# Patient Record
Sex: FEMALE | Race: BLACK OR AFRICAN AMERICAN | NOT HISPANIC OR LATINO | Employment: OTHER | URBAN - METROPOLITAN AREA
[De-identification: names, ages, dates, MRNs, and addresses within clinical notes are randomized per-mention and may not be internally consistent; named-entity substitution may affect disease eponyms.]

---

## 2017-01-27 ENCOUNTER — HISTORICAL (OUTPATIENT)
Dept: RADIOLOGY | Facility: HOSPITAL | Age: 82
End: 2017-01-27

## 2017-04-17 ENCOUNTER — HISTORICAL (OUTPATIENT)
Dept: LAB | Facility: HOSPITAL | Age: 82
End: 2017-04-17

## 2017-05-23 ENCOUNTER — HISTORICAL (OUTPATIENT)
Dept: SURGERY | Facility: HOSPITAL | Age: 82
End: 2017-05-23

## 2018-01-29 ENCOUNTER — HISTORICAL (OUTPATIENT)
Dept: RADIOLOGY | Facility: HOSPITAL | Age: 83
End: 2018-01-29

## 2018-06-06 ENCOUNTER — HISTORICAL (OUTPATIENT)
Dept: LAB | Facility: HOSPITAL | Age: 83
End: 2018-06-06

## 2018-06-06 LAB
BUN SERPL-MCNC: 19.3 MG/DL (ref 7–18)
CALCIUM SERPL-MCNC: 9.6 MG/DL (ref 8.5–10.1)
CHLORIDE SERPL-SCNC: 104 MMOL/L (ref 98–107)
CO2 SERPL-SCNC: 33.3 MMOL/L (ref 21–32)
CREAT SERPL-MCNC: 1.03 MG/DL (ref 0.6–1.3)
CREAT/UREA NIT SERPL: 19
GLUCOSE SERPL-MCNC: 95 MG/DL (ref 74–106)
POTASSIUM SERPL-SCNC: 4.7 MMOL/L (ref 3.5–5.1)
SODIUM SERPL-SCNC: 140 MMOL/L (ref 136–145)

## 2018-06-27 ENCOUNTER — HISTORICAL (OUTPATIENT)
Dept: LAB | Facility: HOSPITAL | Age: 83
End: 2018-06-27

## 2018-06-27 LAB
BUN SERPL-MCNC: 28.7 MG/DL (ref 7–18)
CALCIUM SERPL-MCNC: 9.8 MG/DL (ref 8.5–10.1)
CHLORIDE SERPL-SCNC: 102 MMOL/L (ref 98–107)
CO2 SERPL-SCNC: 31.2 MMOL/L (ref 21–32)
CREAT SERPL-MCNC: 1.17 MG/DL (ref 0.6–1.3)
CREAT/UREA NIT SERPL: 25
GLUCOSE SERPL-MCNC: 119 MG/DL (ref 74–106)
POTASSIUM SERPL-SCNC: 4 MMOL/L (ref 3.5–5.1)
SODIUM SERPL-SCNC: 139 MMOL/L (ref 136–145)

## 2018-10-15 ENCOUNTER — HISTORICAL (OUTPATIENT)
Dept: LAB | Facility: HOSPITAL | Age: 83
End: 2018-10-15

## 2018-10-15 LAB
ABS NEUT (OLG): 2.04 X10(3)/MCL (ref 2.1–9.2)
ALBUMIN SERPL-MCNC: 3.4 GM/DL (ref 3.4–5)
ALBUMIN/GLOB SERPL: 0.9 RATIO (ref 1.1–2)
ALP SERPL-CCNC: 90 UNIT/L (ref 46–116)
ALT SERPL-CCNC: 23 UNIT/L (ref 12–78)
AST SERPL-CCNC: 17 UNIT/L (ref 15–37)
BASOPHILS # BLD AUTO: 0 X10(3)/MCL (ref 0–0.2)
BASOPHILS NFR BLD AUTO: 0 %
BILIRUB SERPL-MCNC: 0.4 MG/DL (ref 0.2–1)
BILIRUBIN DIRECT+TOT PNL SERPL-MCNC: 0.17 MG/DL (ref 0–0.2)
BILIRUBIN DIRECT+TOT PNL SERPL-MCNC: 0.23 MG/DL (ref 0–0.8)
BUN SERPL-MCNC: 24.9 MG/DL (ref 7–18)
CALCIUM SERPL-MCNC: 9.5 MG/DL (ref 8.5–10.1)
CHLORIDE SERPL-SCNC: 107 MMOL/L (ref 98–107)
CHOLEST SERPL-MCNC: 209 MG/DL (ref 0–200)
CHOLEST/HDLC SERPL: 2.6 {RATIO} (ref 0–4)
CO2 SERPL-SCNC: 31.9 MMOL/L (ref 21–32)
CREAT SERPL-MCNC: 1.08 MG/DL (ref 0.6–1.3)
EOSINOPHIL # BLD AUTO: 0.1 X10(3)/MCL (ref 0–0.9)
EOSINOPHIL NFR BLD AUTO: 3 %
ERYTHROCYTE [DISTWIDTH] IN BLOOD BY AUTOMATED COUNT: 15.2 % (ref 11.5–17)
EST. AVERAGE GLUCOSE BLD GHB EST-MCNC: 143 MG/DL
GLOBULIN SER-MCNC: 3.9 GM/DL (ref 2.4–3.5)
GLUCOSE SERPL-MCNC: 114 MG/DL (ref 74–106)
HBA1C MFR BLD: 6.6 % (ref 4.5–6.2)
HCT VFR BLD AUTO: 41.8 % (ref 37–47)
HDLC SERPL-MCNC: 80 MG/DL (ref 40–60)
HGB BLD-MCNC: 12.7 GM/DL (ref 12–16)
IMM GRANULOCYTES # BLD AUTO: 0.01 % (ref 0–0.02)
IMM GRANULOCYTES NFR BLD AUTO: 0.3 % (ref 0–0.43)
LDLC SERPL CALC-MCNC: 120 MG/DL (ref 0–129)
LYMPHOCYTES # BLD AUTO: 1.1 X10(3)/MCL (ref 0.6–4.6)
LYMPHOCYTES NFR BLD AUTO: 29 %
MCH RBC QN AUTO: 23 PG (ref 27–31)
MCHC RBC AUTO-ENTMCNC: 30.4 GM/DL (ref 33–36)
MCV RBC AUTO: 75.9 FL (ref 80–94)
MONOCYTES # BLD AUTO: 0.4 X10(3)/MCL (ref 0.1–1.3)
MONOCYTES NFR BLD AUTO: 11 %
NEUTROPHILS # BLD AUTO: 2.04 X10(3)/MCL (ref 1.4–7.9)
NEUTROPHILS NFR BLD AUTO: 56 %
PLATELET # BLD AUTO: 172 X10(3)/MCL (ref 130–400)
PMV BLD AUTO: 11.1 FL (ref 9.4–12.4)
POTASSIUM SERPL-SCNC: 4.2 MMOL/L (ref 3.5–5.1)
PROT SERPL-MCNC: 7.3 GM/DL (ref 6.4–8.2)
RBC # BLD AUTO: 5.51 X10(6)/MCL (ref 4.2–5.4)
SODIUM SERPL-SCNC: 146 MMOL/L (ref 136–145)
TRIGL SERPL-MCNC: 47 MG/DL
TSH SERPL-ACNC: 1.48 MIU/ML (ref 0.36–3.74)
VLDLC SERPL CALC-MCNC: 9 MG/DL
WBC # SPEC AUTO: 3.7 X10(3)/MCL (ref 4.5–11.5)

## 2019-02-04 ENCOUNTER — HISTORICAL (OUTPATIENT)
Dept: RADIOLOGY | Facility: HOSPITAL | Age: 84
End: 2019-02-04

## 2019-07-22 ENCOUNTER — HISTORICAL (OUTPATIENT)
Dept: LAB | Facility: HOSPITAL | Age: 84
End: 2019-07-22

## 2019-07-22 LAB
ALBUMIN SERPL-MCNC: 3.3 GM/DL (ref 3.4–5)
ALBUMIN/GLOB SERPL: 0.9 RATIO (ref 1.1–2)
ALP SERPL-CCNC: 97 UNIT/L (ref 46–116)
ALT SERPL-CCNC: 24 UNIT/L (ref 12–78)
AST SERPL-CCNC: 20 UNIT/L (ref 15–37)
BILIRUB SERPL-MCNC: 0.4 MG/DL (ref 0.2–1)
BILIRUBIN DIRECT+TOT PNL SERPL-MCNC: 0.1 MG/DL (ref 0–0.2)
BILIRUBIN DIRECT+TOT PNL SERPL-MCNC: 0.3 MG/DL (ref 0–0.8)
BUN SERPL-MCNC: 20.6 MG/DL (ref 7–18)
CALCIUM SERPL-MCNC: 9.6 MG/DL (ref 8.5–10.1)
CHLORIDE SERPL-SCNC: 106 MMOL/L (ref 98–107)
CHOLEST SERPL-MCNC: 212 MG/DL (ref 0–200)
CHOLEST/HDLC SERPL: 2.5 {RATIO} (ref 0–4)
CO2 SERPL-SCNC: 33.2 MMOL/L (ref 21–32)
CREAT SERPL-MCNC: 0.99 MG/DL (ref 0.6–1.3)
EST. AVERAGE GLUCOSE BLD GHB EST-MCNC: 148 MG/DL
GLOBULIN SER-MCNC: 3.7 GM/DL (ref 2.4–3.5)
GLUCOSE SERPL-MCNC: 113 MG/DL (ref 74–106)
HBA1C MFR BLD: 6.8 % (ref 4.5–6.2)
HDLC SERPL-MCNC: 85 MG/DL (ref 40–60)
LDLC SERPL CALC-MCNC: 114 MG/DL (ref 0–129)
POTASSIUM SERPL-SCNC: 4.4 MMOL/L (ref 3.5–5.1)
PROT SERPL-MCNC: 7 GM/DL (ref 6.4–8.2)
SODIUM SERPL-SCNC: 144 MMOL/L (ref 136–145)
T4 FREE SERPL-MCNC: 1 NG/DL (ref 0.76–1.46)
TRIGL SERPL-MCNC: 65 MG/DL
TSH SERPL-ACNC: 1.64 MIU/ML (ref 0.36–3.74)
VLDLC SERPL CALC-MCNC: 13 MG/DL

## 2019-08-26 ENCOUNTER — HISTORICAL (OUTPATIENT)
Dept: RADIOLOGY | Facility: HOSPITAL | Age: 84
End: 2019-08-26

## 2020-02-06 ENCOUNTER — HISTORICAL (OUTPATIENT)
Dept: RADIOLOGY | Facility: HOSPITAL | Age: 85
End: 2020-02-06

## 2020-05-27 ENCOUNTER — HISTORICAL (OUTPATIENT)
Dept: LAB | Facility: HOSPITAL | Age: 85
End: 2020-05-27

## 2020-05-27 LAB
ALBUMIN SERPL-MCNC: 3.7 GM/DL (ref 3.4–5)
ALBUMIN/GLOB SERPL: 0.9 RATIO (ref 1.1–2)
ALP SERPL-CCNC: 76 UNIT/L (ref 46–116)
ALT SERPL-CCNC: 22 UNIT/L (ref 12–78)
AST SERPL-CCNC: 25 UNIT/L (ref 15–37)
BILIRUB SERPL-MCNC: 0.5 MG/DL (ref 0.2–1)
BILIRUBIN DIRECT+TOT PNL SERPL-MCNC: 0.14 MG/DL (ref 0–0.2)
BILIRUBIN DIRECT+TOT PNL SERPL-MCNC: 0.36 MG/DL (ref 0–0.8)
BUN SERPL-MCNC: 32.9 MG/DL (ref 7–18)
CALCIUM SERPL-MCNC: 10.5 MG/DL (ref 8.5–10.1)
CHLORIDE SERPL-SCNC: 106 MMOL/L (ref 98–107)
CHOLEST SERPL-MCNC: 230 MG/DL (ref 0–200)
CHOLEST/HDLC SERPL: 2.2 {RATIO} (ref 0–4)
CO2 SERPL-SCNC: 30.1 MMOL/L (ref 21–32)
CREAT SERPL-MCNC: 1.04 MG/DL (ref 0.6–1.3)
EST. AVERAGE GLUCOSE BLD GHB EST-MCNC: 126 MG/DL
GLOBULIN SER-MCNC: 4.3 GM/DL (ref 2.4–3.5)
GLUCOSE SERPL-MCNC: 119 MG/DL (ref 74–106)
HBA1C MFR BLD: 6 % (ref 4.5–6.2)
HDLC SERPL-MCNC: 103 MG/DL (ref 40–60)
LDLC SERPL CALC-MCNC: 115 MG/DL (ref 0–129)
POTASSIUM SERPL-SCNC: 5.1 MMOL/L (ref 3.5–5.1)
PROT SERPL-MCNC: 8 GM/DL (ref 6.4–8.2)
SODIUM SERPL-SCNC: 143 MMOL/L (ref 136–145)
TRIGL SERPL-MCNC: 59 MG/DL
VLDLC SERPL CALC-MCNC: 12 MG/DL

## 2021-01-16 ENCOUNTER — HISTORICAL (OUTPATIENT)
Dept: ADMINISTRATIVE | Facility: HOSPITAL | Age: 86
End: 2021-01-16

## 2021-01-16 LAB — SARS-COV-2 RNA RESP QL NAA+PROBE: NOT DETECTED

## 2021-04-05 ENCOUNTER — HISTORICAL (OUTPATIENT)
Dept: RADIOLOGY | Facility: HOSPITAL | Age: 86
End: 2021-04-05

## 2021-08-28 ENCOUNTER — HOSPITAL ENCOUNTER (OUTPATIENT)
Dept: MEDSURG UNIT | Facility: HOSPITAL | Age: 86
End: 2021-08-29
Attending: HOSPITALIST | Admitting: HOSPITALIST

## 2021-08-28 LAB
APPEARANCE, UA: CLEAR
APTT PPP: 22.3 SECOND(S) (ref 24.5–34.9)
BACTERIA SPEC CULT: ABNORMAL
BILIRUB UR QL STRIP: NEGATIVE
COLOR UR: YELLOW
GLUCOSE (UA): NEGATIVE
HGB UR QL STRIP: ABNORMAL
INR PPP: 0.99 (ref 2–3)
KETONES UR QL STRIP: NEGATIVE
LEUKOCYTE ESTERASE UR QL STRIP: NEGATIVE
NITRITE UR QL STRIP: NEGATIVE
PH UR STRIP: 6.5 [PH] (ref 5–9)
PROT UR QL STRIP: NEGATIVE
PROTHROMBIN TIME: 10.4 SECOND(S) (ref 9.3–11.4)
RBC #/AREA URNS HPF: ABNORMAL /HPF
SP GR UR STRIP: 1.02 (ref 1–1.03)
SQUAMOUS EPITHELIAL, UA: ABNORMAL
UROBILINOGEN UR STRIP-ACNC: 0.2
WBC #/AREA URNS HPF: ABNORMAL /[HPF]

## 2021-08-29 LAB
ABS NEUT (OLG): 2.57 X10(3)/MCL (ref 2.1–9.2)
ALBUMIN SERPL-MCNC: 3.2 GM/DL (ref 3.4–4.8)
ALBUMIN/GLOB SERPL: 1.1 RATIO (ref 1.1–2)
ALP SERPL-CCNC: 68 UNIT/L (ref 40–150)
ALT SERPL-CCNC: 16 UNIT/L (ref 0–55)
AST SERPL-CCNC: 20 UNIT/L (ref 5–34)
BASOPHILS # BLD AUTO: 0 X10(3)/MCL (ref 0–0.2)
BASOPHILS NFR BLD AUTO: 0 %
BILIRUB SERPL-MCNC: 0.4 MG/DL
BILIRUBIN DIRECT+TOT PNL SERPL-MCNC: 0.2 MG/DL (ref 0–0.5)
BILIRUBIN DIRECT+TOT PNL SERPL-MCNC: 0.2 MG/DL (ref 0–0.8)
BUN SERPL-MCNC: 24.4 MG/DL (ref 9.8–20.1)
CALCIUM SERPL-MCNC: 9 MG/DL (ref 8.4–10.2)
CHLORIDE SERPL-SCNC: 103 MMOL/L (ref 98–107)
CO2 SERPL-SCNC: 28 MMOL/L (ref 23–31)
CREAT SERPL-MCNC: 0.96 MG/DL (ref 0.55–1.02)
EOSINOPHIL # BLD AUTO: 0.1 X10(3)/MCL (ref 0–0.9)
EOSINOPHIL NFR BLD AUTO: 2 %
ERYTHROCYTE [DISTWIDTH] IN BLOOD BY AUTOMATED COUNT: 15.2 % (ref 11.5–17)
GLOBULIN SER-MCNC: 2.9 GM/DL (ref 2.4–3.5)
GLUCOSE SERPL-MCNC: 98 MG/DL (ref 82–115)
HCT VFR BLD AUTO: 37 % (ref 37–47)
HGB BLD-MCNC: 11.5 GM/DL (ref 12–16)
IMM GRANULOCYTES # BLD AUTO: 0.01 % (ref 0–0.02)
IMM GRANULOCYTES NFR BLD AUTO: 0.2 % (ref 0–0.43)
LYMPHOCYTES # BLD AUTO: 1.5 X10(3)/MCL (ref 0.6–4.6)
LYMPHOCYTES NFR BLD AUTO: 31 %
MCH RBC QN AUTO: 23.8 PG (ref 27–31)
MCHC RBC AUTO-ENTMCNC: 31.1 GM/DL (ref 33–36)
MCV RBC AUTO: 76.4 FL (ref 80–94)
MONOCYTES # BLD AUTO: 0.6 X10(3)/MCL (ref 0.1–1.3)
MONOCYTES NFR BLD AUTO: 13 %
NEUTROPHILS # BLD AUTO: 2.57 X10(3)/MCL (ref 1.4–7.9)
NEUTROPHILS NFR BLD AUTO: 54 %
PLATELET # BLD AUTO: 165 X10(3)/MCL (ref 130–400)
PMV BLD AUTO: 11.4 FL (ref 9.4–12.4)
POTASSIUM SERPL-SCNC: 4.4 MMOL/L (ref 3.5–5.1)
PROT SERPL-MCNC: 6.1 GM/DL (ref 5.8–7.6)
RBC # BLD AUTO: 4.84 X10(6)/MCL (ref 4.2–5.4)
SODIUM SERPL-SCNC: 140 MMOL/L (ref 136–145)
WBC # SPEC AUTO: 4.8 X10(3)/MCL (ref 4.5–11.5)

## 2021-09-02 ENCOUNTER — HISTORICAL (OUTPATIENT)
Dept: ADMINISTRATIVE | Facility: HOSPITAL | Age: 86
End: 2021-09-02

## 2021-09-02 LAB
ABS NEUT (OLG): 2.6 X10(3)/MCL (ref 2.1–9.2)
ALBUMIN SERPL-MCNC: 3.8 GM/DL (ref 3.4–4.8)
ALBUMIN/GLOB SERPL: 1.3 RATIO (ref 1.1–2)
ALP SERPL-CCNC: 83 UNIT/L (ref 40–150)
ALT SERPL-CCNC: 18 UNIT/L (ref 0–55)
AST SERPL-CCNC: 22 UNIT/L (ref 5–34)
BASOPHILS # BLD AUTO: 0 X10(3)/MCL (ref 0–0.2)
BASOPHILS NFR BLD AUTO: 0 %
BILIRUB SERPL-MCNC: 0.7 MG/DL
BILIRUBIN DIRECT+TOT PNL SERPL-MCNC: 0.3 MG/DL (ref 0–0.5)
BILIRUBIN DIRECT+TOT PNL SERPL-MCNC: 0.4 MG/DL (ref 0–0.8)
BUN SERPL-MCNC: 34.2 MG/DL (ref 9.8–20.1)
CALCIUM SERPL-MCNC: 9.3 MG/DL (ref 8.4–10.2)
CHLORIDE SERPL-SCNC: 104 MMOL/L (ref 98–107)
CHOLEST SERPL-MCNC: 172 MG/DL
CHOLEST/HDLC SERPL: 2 {RATIO} (ref 0–5)
CO2 SERPL-SCNC: 26 MMOL/L (ref 23–31)
CREAT SERPL-MCNC: 1.1 MG/DL (ref 0.55–1.02)
EOSINOPHIL # BLD AUTO: 0 X10(3)/MCL (ref 0–0.9)
EOSINOPHIL NFR BLD AUTO: 1 %
ERYTHROCYTE [DISTWIDTH] IN BLOOD BY AUTOMATED COUNT: 15.1 % (ref 11.5–17)
EST. AVERAGE GLUCOSE BLD GHB EST-MCNC: 119.8 MG/DL
GLOBULIN SER-MCNC: 2.9 GM/DL (ref 2.4–3.5)
GLUCOSE SERPL-MCNC: 98 MG/DL (ref 82–115)
HBA1C MFR BLD: 5.8 %
HCT VFR BLD AUTO: 37.3 % (ref 37–47)
HDLC SERPL-MCNC: 82 MG/DL (ref 35–60)
HGB BLD-MCNC: 11.8 GM/DL (ref 12–16)
LDLC SERPL CALC-MCNC: 79 MG/DL (ref 50–140)
LYMPHOCYTES # BLD AUTO: 1.1 X10(3)/MCL (ref 0.6–4.6)
LYMPHOCYTES NFR BLD AUTO: 27 %
MCH RBC QN AUTO: 23.9 PG (ref 27–31)
MCHC RBC AUTO-ENTMCNC: 31.6 GM/DL (ref 33–36)
MCV RBC AUTO: 75.7 FL (ref 80–94)
MONOCYTES # BLD AUTO: 0.4 X10(3)/MCL (ref 0.1–1.3)
MONOCYTES NFR BLD AUTO: 10 %
NEUTROPHILS # BLD AUTO: 2.6 X10(3)/MCL (ref 1.4–7.9)
NEUTROPHILS NFR BLD AUTO: 61 %
PLATELET # BLD AUTO: 187 X10(3)/MCL (ref 130–400)
PMV BLD AUTO: 11.3 FL (ref 9.4–12.4)
POTASSIUM SERPL-SCNC: 4.6 MMOL/L (ref 3.5–5.1)
PROT SERPL-MCNC: 6.7 GM/DL (ref 5.8–7.6)
RBC # BLD AUTO: 4.93 X10(6)/MCL (ref 4.2–5.4)
SODIUM SERPL-SCNC: 140 MMOL/L (ref 136–145)
TRIGL SERPL-MCNC: 53 MG/DL (ref 37–140)
TSH SERPL-ACNC: 1.31 UIU/ML (ref 0.35–4.94)
VLDLC SERPL CALC-MCNC: 11 MG/DL
WBC # SPEC AUTO: 4.2 X10(3)/MCL (ref 4.5–11.5)

## 2021-09-09 ENCOUNTER — HISTORICAL (OUTPATIENT)
Dept: ADMINISTRATIVE | Facility: HOSPITAL | Age: 86
End: 2021-09-09

## 2021-09-09 LAB
CREAT UR-MCNC: 36 MG/DL (ref 45–106)
DEPRECATED CALCIDIOL+CALCIFEROL SERPL-MC: 39.2 NG/ML (ref 30–80)
MICROALBUMIN UR-MCNC: <5 UG/ML
MICROALBUMIN/CREAT RATIO PNL UR: <13.9 MG/GM CR (ref 0–30)

## 2021-09-24 ENCOUNTER — HISTORICAL (OUTPATIENT)
Dept: RADIOLOGY | Facility: HOSPITAL | Age: 86
End: 2021-09-24

## 2022-04-20 ENCOUNTER — HISTORICAL (OUTPATIENT)
Dept: ADMINISTRATIVE | Facility: HOSPITAL | Age: 87
End: 2022-04-20

## 2022-04-20 ENCOUNTER — HISTORICAL (OUTPATIENT)
Dept: RADIOLOGY | Facility: HOSPITAL | Age: 87
End: 2022-04-20

## 2022-04-30 NOTE — OP NOTE
DATE OF SURGERY:    05/23/2017    SURGEON:  Gerry Salinas MD    PREOPERATIVE DIAGNOSES:    1. Right gingival buccal ridge lesion.  2. Midline 1-cm submental lymph node.    POSTOPERATIVE DIAGNOSES:    1. Right gingival buccal ridge lesion.  2. Midline 1-cm submental lymph node.    PROCEDURES:    1. Excisional biopsy of submental lymph node.  2. Excisional biopsy of oral cavity lesion, approximately 5 mm.    ANESTHESIA:  General endotracheal anesthesia.    ESTIMATED BLOOD LOSS:  Less than 5 mL.    SPECIMENS:  Oral cavity lesion and submental lymph node.    INDICATIONS:  This is an 81-year-old female, who presented to the office for evaluation of submental node.  A fine needle biopsy was performed, which came back suspicious for monomorphic population of cells, and could not rule out lymphoma. In light of this, decision was made to proceed with an excisional biopsy.  In addition, she reported an irritating lesion to the right gingival buccal ridge.  I agreed to perform an excisional biopsy of this.  All risks and benefits were explained to the patient in detail.  Informed consent was obtained prior to proceeding.    PROCEDURE IN DETAIL:  The patient was brought to the operating room and placed on the operating table in supine position. General endotracheal anesthesia was administered.  I marked out my preplanned incision of the submental lymph node, as well as the oral cavity lesion, and each were injected with approximately 1 mL of 1% lidocaine with epinephrine.  The patient, before prepping and draping, grasped the very small 5-mm oral cavity lesion with a _______ and Metzenbaum scissors and excised it and sent this for permanent pathology.  No closure was noted.       The patient was prepped and draped in the usual sterile fashion.  A 15 blade was used to make an incision over the submental lymph node.  After approximately 1 to 2 cm in length, using Bovie to dissect down, I quickly was able to identify the  lymph node.  It was grasped with a hemostat and dissected out and bipolar to achieve good hemostasis as we dissected it. It was removed and sent for permanent pathology to rule out lymphoma. The wound was irrigated copiously with saline irrigation.  Bipolar was used for complete hemostasis.  The deep dermal layer was closed using 3-0 Vicryl in interrupted fashion.  The skin was closed using 5-0 nylon in simple interrupted fashion.  Pressure dressing was placed over the wound.  The patient was awoken and brought to the recovery room without complications.        ______________________________  Gerry Salinas MD JD/FRANCISCO  DD:  05/23/2017  Time:  01:19PM  DT:  05/23/2017  Time:  05:22PM  Job #:  [id number]

## 2022-05-04 NOTE — HISTORICAL [facility] CERNER
This is a historical note converted from [employer]. Formatting and pictures may have been removed.  Please reference [employer]ash for original formatting and attached multimedia. Chief Complaint  Ground-level fall  Reason for Consultation  Posttraumatic subarachnoid hemorrhage  History of Present Illness  Patient is an 85-year-old female?who has history of HTN, DM 2,?is on aspirin 81 mg daily?presented to the hospital with?facial trauma after she had a ground-level fall at home where patient tripped and fell?while trying to get her plants outside of the house, she tripped into the concrete and fell forward,?she denies any LOC,?patient with? face discomfort and bleeding from the nose on arrival which is controlled.? Work-up in the ER?showed a small right parietal SAH, ER staff may attempt to transfer patient but unable to, case was discussed by Dr. Miller , ER physician?with FERNANDO Lombardo with [facility]?and recommended admit patient for observation and repeat CT in the morning,?no beds are available at [facility]?and patient is neurologically intact  ?   No beds available. ?I reviewed the CT imaging and patient does have very tiny?right high parietal posttraumatic?subarachnoid blood.? Nothing significant.? She does not?require any neurosurgical intervention.? I talked to the admitting?ER doctor?and recommended just observation and follow-up CT scan?in the morning. ?I indicated I would be willing to review the scan again?and then most likely can discharge. ?Patient was only on aspirin.  Review of Systems  Other than?history of present illness all other systems were reviewed and are negative  Physical Exam  Vitals & Measurements  T:?36.4? ?C (Oral)? HR:?69(Peripheral)? RR:?18? BP:?153/91? SpO2:?99%? SpO2:?100%? WT:?66.1?kg? BMI:?25?  Exam per ER physician  Assessment/Plan  The patient is status post ground-level fall?resulting very tiny right high parietal posttraumatic?hemorrhage.? Does not require any neurosurgical  intervention. ?Patient is neurologically intact.? Recommended observation?for 24 hours with admission?and follow-up CT scan in the morning.? Please call when the scan is done and I will be glad to?review it.   Problem List/Past Medical History  Ongoing  Arthritis of hand  Chest pain  Constipation  Glaucoma  Hypertension  Incontinence  Obesity  Sinus congestion  Wears glasses  Historical  No qualifying data  Procedure/Surgical History  Biopsy Lymph Node (None) (05/23/2017)  Biopsy or excision of lymph node(s); open, superficial (05/23/2017)  Excision Lesion Oral (Right) (05/23/2017)  Excision of Face Subcutaneous Tissue and Fascia, Percutaneous Approach (05/23/2017)  Excision of lesion of mucosa and submucosa, vestibule of mouth; with simple repair (05/23/2017)  Resection of Right Neck Lymphatic, Open Approach (05/23/2017)  BLADDER SUSPENSION  D&C  HYSTERECTOMY   Medications  Inpatient  atorvastatin 10 mg oral tablet, 10 mg= 1 tab(s), Oral, Daily  hydrALAZINE 20 mg/mL injectable solution, 20 mg= 1 mL, IV, q6hr, PRN  hydrochlorothiazide 25 mg oral tablet, 25 mg= 1 tab(s), Oral, Daily  metFORMIN, 500 mg= 1 tab(s), Oral, BID  metoprolol succinate 50 mg oral tablet, extended release, 50 mg= 1 tab(s), Oral, Daily  MVI with Minerals (Adult Tab), 1 tab(s), Oral, Daily  timolol maleate 0.5% ophthalmic solution, 1 drop(s), Eye-Both, Daily  Tylenol, 650 mg= 2 tab(s), Oral, q4hr, PRN  Voltaren 1% topical gel, 1 fior, TOP, QID, PRN  Zestril 10 mg oral tablet, 10 mg= 1 tab(s), Oral, Daily  Home  aspirin 325 mg oral tablet, 325 mg= 1 tab(s), Oral, Daily  atorvastatin 10 mg oral tablet, 10 mg= 1 tab(s), Oral, Daily  Centrum Silver Ultra Womens oral tablet, 1 tab(s), Oral, Daily  diclofenac sodium 50 mg oral delayed release tablet, 50 mg= 1 tab(s), Oral, TID  LISINOP/HCTZ TAB 20-25MG, 1 tab(s), Oral, Daily  metformin 500 mg oral tablet, 500 mg= 1 tab(s), Oral, BID  metoprolol succinate 50 mg oral tablet, extended release, 50 mg= 1  tab(s), Oral, Daily  TIMOLOL MALEATE .5 % SOLN  Allergies  Atarax?(BUMPS ALL OVER BODY)  Social History  Abuse/Neglect  No, 08/28/2021  Alcohol - Denies Alcohol Use, 09/17/2015  Never, 05/21/2017  Employment/School  Home/Environment  Lives with Alone. Living situation: Home/Independent. Family/Friends available for support: Yes., 05/21/2017  Nutrition/Health  Regular, 05/21/2017  Sexual  Sexually active: No., 05/21/2017  Substance Use  Never, 05/21/2017  Tobacco - Denies Tobacco Use, 09/17/2015  Never (less than 100 in lifetime), N/A, 08/28/2021  Never (less than 100 in lifetime), N/A, 02/17/2020  Never (less than 100 in lifetime), N/A, 02/17/2020  Never smoker, 05/21/2017  Family History  PVD - Peripheral vascular disease: Sister.  Primary malignant neoplasm of breast: Sister.  Primary malignant neoplasm of prostate: Brother and Brother.  Stomach cancer......: Brother.  Lab Results  Labs Last 24 Hours?  ?Hematology/Coagulation?   PT: 10.4 second(s) (08/28/21 15:00:00)   INR:?0.99?Low (08/28/21 15:00:00)   PTT:?22.3 second(s)?Low (08/28/21 15:00:00)   ?  ?  Diagnostic Results  Accession:?[id number]  Order:?XR Chest 1 View  Report Dt/Tm:?08/28/2021 16:01  Report:?  EXAMINATION  XR Chest 1 View  ?  INDICATION  Dyspnea  ?  Comparison: 4/17/2017  ?  FINDINGS  The heart is normal in size. The lungs are clear without focal  consolidation. There is no pleural effusion or visible pneumothorax.  ?  IMPRESSION:  No acute abnormality of the chest.  ?Accession:?[id number]  Order:?XR Chest 1 View  Report Dt/Tm:?08/28/2021 16:01  Report:?  EXAMINATION  XR Chest 1 View  ?  INDICATION  Dyspnea  ?  Comparison: 4/17/2017  ?  FINDINGS  The heart is normal in size. The lungs are clear without focal  consolidation. There is no pleural effusion or visible pneumothorax.  ?  IMPRESSION:  No acute abnormality of the chest.  ?  ?

## 2022-05-04 NOTE — HISTORICAL [facility] CERNER
This is a historical note converted from [employer]. Formatting and pictures may have been removed.  Please reference [employer] for original formatting and attached multimedia. Admit and Discharge Dates  Admit Date: 08/28/2021  Discharge Date: 08/29/2021  Physicians  Attending Physician - Ravindra MILES, Leonie  Admitting Physician - Ravindra MILES, Leonie  Consulting Physician - Kate MILES, Raymundo Cazares  Primary Care Physician - Eben NP, Gabriella Valenzuela  Discharge Diagnosis  Fall?W19.XXXA  HTN (hypertension)?I10  SAH (subarachnoid hemorrhage)?I60.9  ?Traumatic small SAH right parietal  Nondisplaced bilateral nasal bones  HTN  DM 2  Patient was taking aspirin 81 mg daily [1]  Surgical Procedures  No procedures recorded for this visit.  Immunizations  No immunizations recorded for this visit.  Admission Information  Patient is an 85-year-old female?who has history of HTN, DM 2,?is on aspirin 81 mg daily?presented to the hospital with?facial trauma after she had a ground-level fall at home where patient tripped and fell?while trying to get her plants outside of the house, she tripped into the concrete and fell forward,?she denies any LOC,?patient with? face discomfort and bleeding from the nose on arrival which is controlled.? Work-up in the ER?showed a small right parietal SAH, ER staff may attempt to transfer patient but unable to, case was discussed by Dr. Miller , ER physician?with Dr. ROBERTA Love, NSG with [facility]?and recommended admit patient for observation and repeat CT in the morning,?no beds are available at [facility]?and patient is neurologically intact. ?Context:?Fall at home, no syncopal episode. ?Modifying factors: None. ?Accompanying signs and symptoms:?Other than face discomfort none [2]  Hospital Course  Patient had an uneventful night,?repeat CT there was no change, she remains neurologically intact,?will add Keppra x 4 weeks to further be adjusted by Dr. Love ?on follow-up outpatient,  neurosurgery.  Significant Findings  (08/28/2021 08:24 CDT CT Head W/O Contrast)  IMPRESSION  ?  ??1. Small focus of right high parietal subarachnoid hemorrhage.  ??2. No evidence of additional acute intracranial process.  ??3. Chronic secondary details discussed above [1] (08/28/2021 08:24 CDT CT Cervical Spine W/O Contrast)  FINDINGS:?  The cervical spine is visualized through the level of T1.  ?  There is no acute fracture identified. There is reversal of normal  cervical lordosis. Moderate disc height loss is present at C4-C7 with  marginal osteophyte formation. There is bilateral facet arthropathy.  There is no paraspinal hematoma. An 8mm left thyroid nodule does not  require further evaluation.  ?  ?  IMPRESSION:?  No acute fracture identified. [2] (08/28/2021 08:24 CDT CT Maxillofacial W/O Contrast)  INDINGS:?  ?  There are nondisplaced bilateral nasal bone fractures. The nasal  septum is deviated to the left. There is mild scattered paranasal  sinus mucosal thickening with fluid layering in the right sphenoid  sinus. The mastoid air cells and middle ear cavities are clear.  ?  There is soft tissue swelling over the nose. The orbits are  unremarkable.  ?  ?  IMPRESSION:?  Nondisplaced bilateral nasal bone fractures. [3]  ?  ?  Repeat CT head 8/29/21.  ?  (08/29/2021 05:07 CDT CT Head W/O Contrast)  IMPRESSION:  Stable small right parietal subarachnoid hemorrhage.  ? [4]  Time Spent on discharge  25 min  Objective  Vitals & Measurements  T:?36.6? ?C (Oral)? TMIN:?36.4? ?C (Oral)? TMAX:?36.9? ?C (Oral)? HR:?68(Peripheral)? RR:?18? BP:?103/67? SpO2:?99%? WT:?66.1?kg? BMI:?25?  Physical Exam  General: ?Alert, awake ?and oriented, No acute distress. ?  Eye: ?Pupils are equal, round and reactive to light, Normal conjunctiva. ?  HENT: ?Normocephalic, Normal hearing. ?  Neck: ?Supple, Non-tender, No carotid bruit. ?  Respiratory: ?Lungs are clear to auscultation, Respirations are non-labored. ?  Cardiovascular:  ?Regular rhythm, No gallop, Good pulses equal in all extremities, No edema. ?  Gastrointestinal: ?Soft, Non-tender, Non-distended. ? ?  Genitourinary: ?normal external genitalia.  Musculoskeletal: ?Normal range of motion, Normal strength, No tenderness. ?  Integumentary: ?Warm, Dry. ?  Neurologic: ?Alert, Oriented, Normal motor function, No focal deficits.Normal DTR and sensation.CN 2-12 wnl. ?  Cognition and Speech: ?Oriented, Speech clear and coherent. ?  Psychiatric: ?Cooperative, Appropriate mood & affect. [5]  Patient Discharge Condition  Good  Discharge Disposition  Home with    Discharge Medication Reconciliation  Prescribed  levETIRAcetam (Keppra 500 mg oral tablet)?500 mg, Oral, BID  Continue  atorvastatin (atorvastatin 10 mg oral tablet)?10 mg, Oral, Daily  hydrochlorothiazide-lisinopril (LISINOP/HCTZ TAB 20-25MG)?1 tab(s), Oral, Daily  metFORMIN (metformin 500 mg oral tablet)?500 mg, Oral, BID  metoprolol (metoprolol succinate 50 mg oral tablet, extended release)?50 mg, Oral, Daily  multivitamin with minerals (Centrum Silver Ultra Womens oral tablet)?1 tab(s), Oral, Daily  timolol ophthalmic (TIMOLOL MALEATE .5 % SOLN)  Discontinue  aspirin (aspirin 325 mg oral tablet)?325 mg, Oral, Daily  diclofenac (diclofenac sodium 50 mg oral delayed release tablet)?50 mg, Oral, TID  Education and Orders Provided  [facility] - Next Dose Due ([facility])  Coronary Artery Disease, Female  Fall Prevention and Home Safety, Easy-to-Read ([facility])  Fall Prevention in Hospitals, Adult  Subarachnoid Hemorrhage  Discharge - 08/29/21 9:20:00 CDT, Home, with Home healthGive all scheduled vaccinations prior to discharge.?  Discharge Activity - Activity as Tolerated?  Discharge Diet - Low Sodium?  Follow up  Raymundo Love, within 1 week  [facility] after discharge Phone # [phone]  Gabriella Treadwell, within 1 week  Car Seat Challenge  No Qualifying Data     [1]?Admission H & P; Ravindra MILES, Leonie  08/28/2021 14:00 CDT  [2]?Admission H & P; Ravindra MILES, Leonie 08/28/2021 14:00 CDT  [3]?Admission H & P; Ravindra MILES, Leonie 08/28/2021 14:00 CDT  [4]?CT Head W/O Contrast; Kya Leon MD 08/29/2021 05:07 CDT  [5]?Admission H & P; Ravindra MILES, Leonie 08/28/2021 14:00 CDT

## 2022-05-04 NOTE — HISTORICAL [facility] CERNER
This is a historical note converted from [employer]. Formatting and pictures may have been removed.  Please reference Arash for original formatting and attached multimedia. Chief Complaint  None at this time  History of Present Illness  Patient is an 85-year-old female?who has history of HTN, DM 2,?is on aspirin 81 mg daily?presented to the hospital with?facial trauma after she had a ground-level fall at home where patient tripped and fell?while trying to get her plants outside of the house, she tripped into the concrete and fell forward,?she denies any LOC,?patient with? face discomfort and bleeding from the nose on arrival which is controlled.? Work-up in the ER?showed a small right parietal SAH, ER staff may attempt to transfer patient but unable to, case was discussed by Dr. Miller , ER physician?with FERNANDO Lombardo with [facility]?and recommended admit patient for observation and repeat CT in the morning,?no beds are available at [facility]?and patient is neurologically intact. ?Context:?Fall at home, no syncopal episode. ?Modifying factors: None. ?Accompanying signs and symptoms:?Other than face discomfort none  Review of Systems  Other systems reviewed and found to be negative except above  Physical Exam  General: ?Alert, awake ?and oriented, No acute distress. ?  Eye: ?Pupils are equal, round and reactive to light, Normal conjunctiva. ?  HENT: ?Normocephalic, Normal hearing. ?  Neck: ?Supple, Non-tender, No carotid bruit. ?  Respiratory: ?Lungs are clear to auscultation, Respirations are non-labored. ?  Cardiovascular: ?Regular rhythm, No gallop, Good pulses equal in all extremities, No edema. ?  Gastrointestinal: ?Soft, Non-tender, Non-distended. ? ?  Genitourinary: ?normal external genitalia.  Musculoskeletal: ?Normal range of motion, Normal strength, No tenderness. ?  Integumentary: ?Warm, Dry. ?  Neurologic: ?Alert, Oriented, Normal motor function, No focal deficits.Normal DTR and sensation.CN 2-12  wnl. ?  Cognition and Speech: ?Oriented, Speech clear and coherent. ?  Psychiatric: ?Cooperative, Appropriate mood & affect.  ?  Assessment/Plan  ?  Impression:  Traumatic small SAH right parietal  Nondisplaced bilateral nasal bones  HTN  DM 2  Patient was taking aspirin 81 mg daily  ?  Plan:  ?  Hold aspirin  ISS per protocol  Keep BP less than 140/80  Neurochecks every 4 hours  Chest x-ray, EKG, CBC, CMP, PT/INR/PTT ordered.  Repeat CT in the morning if stable?will discharge home  Obtain PT/OT?in a.m.   Problem List/Past Medical History  Ongoing  Arthritis of hand  Chest pain  Constipation  Glaucoma  Hypertension  Incontinence  Obesity  Sinus congestion  Wears glasses  Historical  No qualifying data  Procedure/Surgical History  Biopsy Lymph Node (None) (05/23/2017)  Biopsy or excision of lymph node(s); open, superficial (05/23/2017)  Excision Lesion Oral (Right) (05/23/2017)  Excision of Face Subcutaneous Tissue and Fascia, Percutaneous Approach (05/23/2017)  Excision of lesion of mucosa and submucosa, vestibule of mouth; with simple repair (05/23/2017)  Resection of Right Neck Lymphatic, Open Approach (05/23/2017)  BLADDER SUSPENSION  D&C  HYSTERECTOMY   Medications  Inpatient  No active inpatient medications  Home  aspirin 325 mg oral tablet, 325 mg= 1 tab(s), Oral, Daily  atorvastatin 10 mg oral tablet, 10 mg= 1 tab(s), Oral, Daily  Centrum Silver Ultra Womens oral tablet, 1 tab(s), Oral, Daily  diclofenac sodium 50 mg oral delayed release tablet, 50 mg= 1 tab(s), Oral, TID  LISINOP/HCTZ TAB 20-25MG, 1 tab(s), Oral, Daily  metformin 500 mg oral tablet, 500 mg= 1 tab(s), Oral, BID  metoprolol succinate 50 mg oral tablet, extended release, 50 mg= 1 tab(s), Oral, Daily  TIMOLOL MALEATE .5 % SOLN  Allergies  Atarax?(BUMPS ALL OVER BODY)  Social History  Abuse/Neglect  No, 02/17/2020  Alcohol - Denies Alcohol Use, 09/17/2015  Never, 05/21/2017  Employment/School  Home/Environment  Lives with Alone. Living situation:  Home/Independent. Family/Friends available for support: Yes., 05/21/2017  Nutrition/Health  Regular, 05/21/2017  Sexual  Sexually active: No., 05/21/2017  Substance Use  Never, 05/21/2017  Tobacco - Denies Tobacco Use, 09/17/2015  Never (less than 100 in lifetime), N/A, 02/17/2020  Never (less than 100 in lifetime), N/A, 02/17/2020  Never smoker, 05/21/2017  Family History  PVD - Peripheral vascular disease: Sister.  Primary malignant neoplasm of breast: Sister.  Primary malignant neoplasm of prostate: Brother and Brother.  Stomach cancer......: Brother.  Diagnostic Results  (08/28/2021 08:24 CDT CT Head W/O Contrast)  IMPRESSION  ?  ??1. Small focus of right high parietal subarachnoid hemorrhage.  ??2. No evidence of additional acute intracranial process.  ??3. Chronic secondary details discussed above [1] (08/28/2021 08:24 CDT CT Cervical Spine W/O Contrast)  FINDINGS:?  The cervical spine is visualized through the level of T1.  ?  There is no acute fracture identified. There is reversal of normal  cervical lordosis. Moderate disc height loss is present at C4-C7 with  marginal osteophyte formation. There is bilateral facet arthropathy.  There is no paraspinal hematoma. An 8mm left thyroid nodule does not  require further evaluation.  ?  ?  IMPRESSION:?  No acute fracture identified. [2] (08/28/2021 08:24 CDT CT Maxillofacial W/O Contrast)  INDINGS:?  ?  There are nondisplaced bilateral nasal bone fractures. The nasal  septum is deviated to the left. There is mild scattered paranasal  sinus mucosal thickening with fluid layering in the right sphenoid  sinus. The mastoid air cells and middle ear cavities are clear.  ?  There is soft tissue swelling over the nose. The orbits are  unremarkable.  ?  ?  IMPRESSION:?  Nondisplaced bilateral nasal bone fractures. [3]     [1]?CT Head W/O Contrast; Price Henning MD 08/28/2021 08:24 CDT  [2]?CT Cervical Spine W/O Contrast; Kya Leon MD 08/28/2021 08:24  CDT  [3]?CT Maxillofacial W/O Contrast; Kya Leon MD 08/28/2021 08:24 CDT   Patient admitted to observation.

## 2023-03-18 ENCOUNTER — LAB VISIT (OUTPATIENT)
Dept: LAB | Facility: HOSPITAL | Age: 88
End: 2023-03-18
Attending: INTERNAL MEDICINE
Payer: MEDICARE

## 2023-03-18 DIAGNOSIS — I10 HYPERTENSION, UNSPECIFIED TYPE: Primary | ICD-10-CM

## 2023-03-18 LAB
ALBUMIN SERPL-MCNC: 3.7 G/DL (ref 3.4–4.8)
ALP SERPL-CCNC: 107 UNIT/L (ref 40–150)
ALT SERPL-CCNC: 20 UNIT/L (ref 0–55)
AST SERPL-CCNC: 23 UNIT/L (ref 5–34)
BILIRUBIN DIRECT+TOT PNL SERPL-MCNC: 0.2 MG/DL (ref 0–?)
BILIRUBIN DIRECT+TOT PNL SERPL-MCNC: 0.3 MG/DL (ref 0–0.8)
BILIRUBIN DIRECT+TOT PNL SERPL-MCNC: 0.5 MG/DL
CHOLEST SERPL-MCNC: 171 MG/DL
CHOLEST/HDLC SERPL: 2 {RATIO} (ref 0–5)
HDLC SERPL-MCNC: 82 MG/DL (ref 35–60)
LDLC SERPL CALC-MCNC: 79 MG/DL (ref 50–140)
PROT SERPL-MCNC: 7.5 GM/DL (ref 5.8–7.6)
TRIGL SERPL-MCNC: 48 MG/DL (ref 37–140)
VLDLC SERPL CALC-MCNC: 10 MG/DL

## 2023-03-18 PROCEDURE — 80061 LIPID PANEL: CPT

## 2023-03-18 PROCEDURE — 80076 HEPATIC FUNCTION PANEL: CPT

## 2023-03-18 PROCEDURE — 36415 COLL VENOUS BLD VENIPUNCTURE: CPT

## 2023-03-23 DIAGNOSIS — Z12.31 BREAST CANCER SCREENING BY MAMMOGRAM: Primary | ICD-10-CM

## 2023-04-24 ENCOUNTER — HOSPITAL ENCOUNTER (OUTPATIENT)
Dept: RADIOLOGY | Facility: HOSPITAL | Age: 88
Discharge: HOME | End: 2023-04-24
Attending: NURSE PRACTITIONER
Payer: MEDICARE

## 2023-04-24 DIAGNOSIS — Z12.31 BREAST CANCER SCREENING BY MAMMOGRAM: ICD-10-CM

## 2023-04-24 PROCEDURE — 77067 SCR MAMMO BI INCL CAD: CPT | Mod: TC

## 2023-04-24 PROCEDURE — 77067 MAMMO DIGITAL SCREENING BILAT WITH TOMO: ICD-10-PCS | Mod: 26,,, | Performed by: RADIOLOGY

## 2023-04-24 PROCEDURE — 77063 BREAST TOMOSYNTHESIS BI: CPT | Mod: 26,,, | Performed by: RADIOLOGY

## 2023-04-24 PROCEDURE — 77063 MAMMO DIGITAL SCREENING BILAT WITH TOMO: ICD-10-PCS | Mod: 26,,, | Performed by: RADIOLOGY

## 2023-04-24 PROCEDURE — 77067 SCR MAMMO BI INCL CAD: CPT | Mod: 26,,, | Performed by: RADIOLOGY

## 2023-07-22 ENCOUNTER — HOSPITAL ENCOUNTER (EMERGENCY)
Facility: HOSPITAL | Age: 88
Discharge: HOME | End: 2023-07-22
Attending: FAMILY MEDICINE
Payer: MEDICARE

## 2023-07-22 VITALS
HEIGHT: 65 IN | RESPIRATION RATE: 15 BRPM | OXYGEN SATURATION: 99 % | WEIGHT: 150 LBS | DIASTOLIC BLOOD PRESSURE: 81 MMHG | SYSTOLIC BLOOD PRESSURE: 114 MMHG | TEMPERATURE: 98 F | BODY MASS INDEX: 24.99 KG/M2 | HEART RATE: 69 BPM

## 2023-07-22 DIAGNOSIS — E86.0 DEHYDRATION: Primary | ICD-10-CM

## 2023-07-22 DIAGNOSIS — R07.9 CHEST PAIN: ICD-10-CM

## 2023-07-22 LAB
ALBUMIN SERPL-MCNC: 3.8 G/DL (ref 3.4–4.8)
ALBUMIN/GLOB SERPL: 1 RATIO (ref 1.1–2)
ALP SERPL-CCNC: 74 UNIT/L (ref 40–150)
ALT SERPL-CCNC: 20 UNIT/L (ref 0–55)
APPEARANCE UR: CLEAR
AST SERPL-CCNC: 25 UNIT/L (ref 5–34)
BACTERIA #/AREA URNS AUTO: NORMAL /HPF
BASOPHILS # BLD AUTO: 0.02 X10(3)/MCL
BASOPHILS NFR BLD AUTO: 0.5 %
BILIRUB UR QL STRIP.AUTO: NEGATIVE
BILIRUBIN DIRECT+TOT PNL SERPL-MCNC: 0.5 MG/DL
BUN SERPL-MCNC: 29.8 MG/DL (ref 9.8–20.1)
CALCIUM SERPL-MCNC: 10.3 MG/DL (ref 8.4–10.2)
CHLORIDE SERPL-SCNC: 106 MMOL/L (ref 98–107)
CO2 SERPL-SCNC: 27 MMOL/L (ref 23–31)
COLOR UR: YELLOW
CREAT SERPL-MCNC: 1.08 MG/DL (ref 0.55–1.02)
EOSINOPHIL # BLD AUTO: 0.04 X10(3)/MCL (ref 0–0.9)
EOSINOPHIL NFR BLD AUTO: 1 %
ERYTHROCYTE [DISTWIDTH] IN BLOOD BY AUTOMATED COUNT: 15.5 % (ref 11.5–17)
GFR SERPLBLD CREATININE-BSD FMLA CKD-EPI: 50 MLS/MIN/1.73/M2
GLOBULIN SER-MCNC: 3.9 GM/DL (ref 2.4–3.5)
GLUCOSE SERPL-MCNC: 108 MG/DL (ref 82–115)
GLUCOSE UR QL STRIP.AUTO: NEGATIVE
HCT VFR BLD AUTO: 38.4 % (ref 37–47)
HGB BLD-MCNC: 11.6 G/DL (ref 12–16)
IMM GRANULOCYTES # BLD AUTO: 0 X10(3)/MCL (ref 0–0.04)
IMM GRANULOCYTES NFR BLD AUTO: 0 %
KETONES UR QL STRIP.AUTO: NEGATIVE
LEUKOCYTE ESTERASE UR QL STRIP.AUTO: NEGATIVE
LYMPHOCYTES # BLD AUTO: 1.08 X10(3)/MCL (ref 0.6–4.6)
LYMPHOCYTES NFR BLD AUTO: 26.9 %
MCH RBC QN AUTO: 22.9 PG (ref 27–31)
MCHC RBC AUTO-ENTMCNC: 30.2 G/DL (ref 33–36)
MCV RBC AUTO: 75.7 FL (ref 80–94)
MONOCYTES # BLD AUTO: 0.44 X10(3)/MCL (ref 0.1–1.3)
MONOCYTES NFR BLD AUTO: 11 %
NEUTROPHILS # BLD AUTO: 2.43 X10(3)/MCL (ref 2.1–9.2)
NEUTROPHILS NFR BLD AUTO: 60.6 %
NITRITE UR QL STRIP.AUTO: NEGATIVE
PH UR STRIP.AUTO: 5.5 [PH]
PLATELET # BLD AUTO: 167 X10(3)/MCL (ref 130–400)
PMV BLD AUTO: 11.7 FL (ref 7.4–10.4)
POC CARDIAC TROPONIN I: 0.01 NG/ML (ref 0–0.08)
POCT GLUCOSE: 121 MG/DL (ref 70–110)
POTASSIUM SERPL-SCNC: 4.1 MMOL/L (ref 3.5–5.1)
PROT SERPL-MCNC: 7.7 GM/DL (ref 5.8–7.6)
PROT UR QL STRIP.AUTO: NEGATIVE
RBC # BLD AUTO: 5.07 X10(6)/MCL (ref 4.2–5.4)
RBC #/AREA URNS AUTO: NORMAL /HPF
RBC UR QL AUTO: ABNORMAL
SAMPLE: NORMAL
SODIUM SERPL-SCNC: 144 MMOL/L (ref 136–145)
SP GR UR STRIP.AUTO: 1.02
SQUAMOUS #/AREA URNS AUTO: NORMAL /HPF
UROBILINOGEN UR STRIP-ACNC: 0.2
WBC # SPEC AUTO: 4.01 X10(3)/MCL (ref 4.5–11.5)
WBC #/AREA URNS AUTO: NORMAL /HPF

## 2023-07-22 PROCEDURE — 93005 ELECTROCARDIOGRAM TRACING: CPT

## 2023-07-22 PROCEDURE — 85025 COMPLETE CBC W/AUTO DIFF WBC: CPT | Performed by: FAMILY MEDICINE

## 2023-07-22 PROCEDURE — 96361 HYDRATE IV INFUSION ADD-ON: CPT

## 2023-07-22 PROCEDURE — 96360 HYDRATION IV INFUSION INIT: CPT

## 2023-07-22 PROCEDURE — 81001 URINALYSIS AUTO W/SCOPE: CPT | Performed by: FAMILY MEDICINE

## 2023-07-22 PROCEDURE — 82962 GLUCOSE BLOOD TEST: CPT

## 2023-07-22 PROCEDURE — 93010 ELECTROCARDIOGRAM REPORT: CPT | Mod: ,,, | Performed by: STUDENT IN AN ORGANIZED HEALTH CARE EDUCATION/TRAINING PROGRAM

## 2023-07-22 PROCEDURE — 99285 EMERGENCY DEPT VISIT HI MDM: CPT | Mod: 25

## 2023-07-22 PROCEDURE — 84484 ASSAY OF TROPONIN QUANT: CPT

## 2023-07-22 PROCEDURE — 25000003 PHARM REV CODE 250: Performed by: FAMILY MEDICINE

## 2023-07-22 PROCEDURE — 80053 COMPREHEN METABOLIC PANEL: CPT | Performed by: FAMILY MEDICINE

## 2023-07-22 PROCEDURE — 93010 EKG 12-LEAD: ICD-10-PCS | Mod: ,,, | Performed by: STUDENT IN AN ORGANIZED HEALTH CARE EDUCATION/TRAINING PROGRAM

## 2023-07-22 RX ORDER — MECLIZINE HYDROCHLORIDE 25 MG/1
25 TABLET ORAL
Status: COMPLETED | OUTPATIENT
Start: 2023-07-22 | End: 2023-07-22

## 2023-07-22 RX ORDER — MECLIZINE HYDROCHLORIDE 25 MG/1
25 TABLET ORAL 3 TIMES DAILY PRN
Qty: 15 TABLET | Refills: 0 | Status: SHIPPED | OUTPATIENT
Start: 2023-07-22 | End: 2023-07-27

## 2023-07-22 RX ADMIN — SODIUM CHLORIDE 500 ML: 9 INJECTION, SOLUTION INTRAVENOUS at 09:07

## 2023-07-22 RX ADMIN — MECLIZINE HYDROCHLORIDE 25 MG: 25 TABLET ORAL at 09:07

## 2023-07-22 RX ADMIN — SODIUM CHLORIDE 500 ML: 9 INJECTION, SOLUTION INTRAVENOUS at 10:07

## 2023-07-22 NOTE — ED PROVIDER NOTES
Encounter Date: 7/22/2023       History     Chief Complaint   Patient presents with    Dizziness     Pt c/o dizziness that started this morning on her way to [place]. Pt denies CP/ SOB. Pt reports she is a diabetic, and ate a light breakfast this morning;  in triage.     Dizziness   87-year-old female patient who going to [place] earlier today and developed some vertigo otherwise patient had no chest pain no shortness of breath no dyspnea on exertion nausea no vomiting no diarrhea no fever chills or night sweats patient has no complaints at this time patient has chronic history diabetes that she thought was contributing to today's episode otherwise patient is her own history source with family members in the      Review of patient's allergies indicates:  No Known Allergies  No past medical history on file.  No past surgical history on file.  No family history on file.     Review of Systems   Constitutional:  Negative for fever.   HENT:  Negative for sore throat.    Respiratory:  Negative for shortness of breath.    Cardiovascular:  Negative for chest pain.   Gastrointestinal:  Negative for nausea.   Genitourinary:  Negative for dysuria.   Musculoskeletal:  Negative for back pain.   Skin:  Negative for rash.   Neurological:  Positive for dizziness. Negative for weakness.   Hematological:  Does not bruise/bleed easily.   All other systems reviewed and are negative.    Physical Exam     Initial Vitals [07/22/23 0914]   BP Pulse Resp Temp SpO2   (!) 154/74 81 18 98 °F (36.7 °C) 98 %      MAP       --         Physical Exam    Nursing note and vitals reviewed.  Constitutional: She appears well-developed.   HENT:   Head: Normocephalic and atraumatic.   Right Ear: External ear normal.   Left Ear: External ear normal.   Nose: Nose normal.   Mouth/Throat: Oropharynx is clear and moist. No oropharyngeal exudate.   Eyes: Conjunctivae and EOM are normal. Pupils are equal, round, and reactive to light. Right eye exhibits no  discharge. Left eye exhibits no discharge.   Neck: Neck supple. No tracheal deviation present. No JVD present.   Normal range of motion.  Cardiovascular:  Normal rate, regular rhythm, normal heart sounds and intact distal pulses.     Exam reveals no gallop and no friction rub.       No murmur heard.  Pulmonary/Chest: Breath sounds normal. No stridor. No respiratory distress. She has no wheezes. She has no rhonchi. She has no rales.   Abdominal: Abdomen is soft. Bowel sounds are normal. She exhibits no distension and no mass. There is no abdominal tenderness. There is no rebound and no guarding.   Musculoskeletal:         General: Normal range of motion.      Cervical back: Normal range of motion and neck supple.     Neurological: She is alert and oriented to person, place, and time. She has normal strength. No cranial nerve deficit. GCS score is 15. GCS eye subscore is 4. GCS verbal subscore is 5. GCS motor subscore is 6.   Skin: Skin is warm and dry. No rash and no abscess noted. No erythema.   Psychiatric: She has a normal mood and affect. Her behavior is normal. Judgment and thought content normal.       ED Course   Procedures  Labs Reviewed   COMPREHENSIVE METABOLIC PANEL - Abnormal; Notable for the following components:       Result Value    Blood Urea Nitrogen 29.8 (*)     Creatinine 1.08 (*)     Calcium Level Total 10.3 (*)     Protein Total 7.7 (*)     Globulin 3.9 (*)     Albumin/Globulin Ratio 1.0 (*)     All other components within normal limits   URINALYSIS, REFLEX TO URINE CULTURE - Abnormal; Notable for the following components:    Blood, UA Trace-Intact (*)     All other components within normal limits   CBC WITH DIFFERENTIAL - Abnormal; Notable for the following components:    WBC 4.01 (*)     Hgb 11.6 (*)     MCV 75.7 (*)     MCH 22.9 (*)     MCHC 30.2 (*)     MPV 11.7 (*)     All other components within normal limits   POCT GLUCOSE - Abnormal; Notable for the following components:    POCT Glucose  121 (*)     All other components within normal limits   URINALYSIS, MICROSCOPIC - Normal   TROPONIN ISTAT   CBC W/ AUTO DIFFERENTIAL    Narrative:     The following orders were created for panel order CBC auto differential.  Procedure                               Abnormality         Status                     ---------                               -----------         ------                     CBC with Differential[[id number]]        Abnormal            Final result                 Please view results for these tests on the individual orders.   POCT TROPONIN     EKG Readings: (Independently Interpreted)   Initial Reading: No STEMI. Rhythm: Normal Sinus Rhythm. Ectopy: No Ectopy. Conduction: Normal. ST Segments: Normal ST Segments. T Waves: Normal. Axis: Normal. Clinical Impression: Normal Sinus Rhythm     Imaging Results              CT Head Without Contrast (Final result)  Result time 07/22/23 10:38:11      Final result by Zev Quispe MD (07/22/23 10:38:11)                   Impression:      No acute intracranial abnormality identified.  Findings of chronic microvascular ischemic disease.      Electronically signed by: Zev Quispe  Date:    07/22/2023  Time:    10:38               Narrative:    EXAMINATION:  CT HEAD WITHOUT CONTRAST    CLINICAL HISTORY:  Syncope, recurrent;    TECHNIQUE:  Low dose axial images were obtained through the head.  Coronal and sagittal reformations were also performed. Contrast was not administered.    Automatic exposure control was utilized to reduce the patient's radiation dose.    DLP= 1105    COMPARISON:  09/24/2021    FINDINGS:  No acute intracranial hemorrhage, edema or mass. No acute parenchymal abnormality.    Diffuse cerebral atrophy with concordant ventricular enlargement.    There is normal gray white differentiation.    The osseous structures are normal.    The mastoid air cells are clear.    The auditory canals are patent bilaterally.    The globes and orbital  contents are normal bilaterally.    The visualized maxillary, ethmoid and sphenoid sinuses are clear.                                       X-Ray Chest AP Portable (Final result)  Result time 07/22/23 10:32:45      Final result by Zev Quispe MD (07/22/23 10:32:45)                   Impression:      No acute cardiopulmonary process.      Electronically signed by: Zev Quispe  Date:    07/22/2023  Time:    10:32               Narrative:    EXAMINATION:  XR CHEST AP PORTABLE    CLINICAL HISTORY:  Chest Pain;    TECHNIQUE:  Single view of the chest    COMPARISON:  08/28/2021    FINDINGS:  No focal opacification, pleural effusion, or pneumothorax.    The cardiomediastinal silhouette is within normal limits.    No acute osseous abnormality.                                       Medications   sodium chloride 0.9% bolus 500 mL 500 mL (500 mLs Intravenous New Bag 7/22/23 1046)   sodium chloride 0.9% bolus 500 mL 500 mL (0 mLs Intravenous Stopped 7/22/23 1051)   meclizine tablet 25 mg (25 mg Oral Given 7/22/23 9836)     Medical Decision Making:   Differential Diagnosis:   Dehydration anemia stroke                        Clinical Impression:   Final diagnoses:  [R07.9] Chest pain  [E86.0] Dehydration (Primary)        ED Disposition Condition    Discharge Stable          ED Prescriptions       Medication Sig Dispense Start Date End Date Auth. Provider    meclizine (ANTIVERT) 25 mg tablet Take 1 tablet (25 mg total) by mouth 3 (three) times daily as needed. 15 tablet 7/22/2023 7/27/2023 Brock Menon MD          Follow-up Information    None          Brock Menon MD  07/22/23 4750

## 2023-11-26 ENCOUNTER — HOSPITAL ENCOUNTER (EMERGENCY)
Facility: HOSPITAL | Age: 88
Discharge: ANOTHER HOSPITAL | End: 2023-11-26
Attending: FAMILY MEDICINE
Payer: MEDICARE

## 2023-11-26 VITALS
OXYGEN SATURATION: 100 % | HEART RATE: 57 BPM | RESPIRATION RATE: 18 BRPM | TEMPERATURE: 98 F | DIASTOLIC BLOOD PRESSURE: 65 MMHG | SYSTOLIC BLOOD PRESSURE: 134 MMHG

## 2023-11-26 DIAGNOSIS — I60.9 SUBARACHNOID BLEED: Primary | ICD-10-CM

## 2023-11-26 DIAGNOSIS — R29.818 ACUTE FOCAL NEUROLOGICAL DEFICIT: ICD-10-CM

## 2023-11-26 LAB
ALBUMIN SERPL-MCNC: 3.7 G/DL (ref 3.4–4.8)
ALBUMIN/GLOB SERPL: 0.9 RATIO (ref 1.1–2)
ALP SERPL-CCNC: 74 UNIT/L (ref 40–150)
ALT SERPL-CCNC: 18 UNIT/L (ref 0–55)
APPEARANCE UR: CLEAR
APTT PPP: 21.9 SECONDS (ref 23.2–33.7)
AST SERPL-CCNC: 25 UNIT/L (ref 5–34)
BACTERIA #/AREA URNS AUTO: ABNORMAL /HPF
BASOPHILS # BLD AUTO: 0.02 X10(3)/MCL
BASOPHILS NFR BLD AUTO: 0.5 %
BILIRUB SERPL-MCNC: 0.5 MG/DL
BILIRUB UR QL STRIP.AUTO: NEGATIVE
BUN SERPL-MCNC: 23.8 MG/DL (ref 9.8–20.1)
CALCIUM SERPL-MCNC: 10.1 MG/DL (ref 8.4–10.2)
CHLORIDE SERPL-SCNC: 105 MMOL/L (ref 98–107)
CO2 SERPL-SCNC: 26 MMOL/L (ref 23–31)
COLOR UR AUTO: YELLOW
CREAT SERPL-MCNC: 1.15 MG/DL (ref 0.55–1.02)
EOSINOPHIL # BLD AUTO: 0.11 X10(3)/MCL (ref 0–0.9)
EOSINOPHIL NFR BLD AUTO: 2.5 %
ERYTHROCYTE [DISTWIDTH] IN BLOOD BY AUTOMATED COUNT: 15.3 % (ref 11.5–17)
FLUAV AG UPPER RESP QL IA.RAPID: NOT DETECTED
FLUBV AG UPPER RESP QL IA.RAPID: NOT DETECTED
GFR SERPLBLD CREATININE-BSD FMLA CKD-EPI: 46 MLS/MIN/1.73/M2
GLOBULIN SER-MCNC: 3.9 GM/DL (ref 2.4–3.5)
GLUCOSE SERPL-MCNC: 101 MG/DL (ref 82–115)
GLUCOSE UR QL STRIP.AUTO: NEGATIVE
HCT VFR BLD AUTO: 39.4 % (ref 37–47)
HGB BLD-MCNC: 11.6 G/DL (ref 12–16)
IMM GRANULOCYTES # BLD AUTO: 0 X10(3)/MCL (ref 0–0.04)
IMM GRANULOCYTES NFR BLD AUTO: 0 %
INR PPP: 1
KETONES UR QL STRIP.AUTO: NEGATIVE
LEUKOCYTE ESTERASE UR QL STRIP.AUTO: ABNORMAL
LYMPHOCYTES # BLD AUTO: 1.59 X10(3)/MCL (ref 0.6–4.6)
LYMPHOCYTES NFR BLD AUTO: 36.3 %
MCH RBC QN AUTO: 22.7 PG (ref 27–31)
MCHC RBC AUTO-ENTMCNC: 29.4 G/DL (ref 33–36)
MCV RBC AUTO: 77.3 FL (ref 80–94)
MONOCYTES # BLD AUTO: 0.57 X10(3)/MCL (ref 0.1–1.3)
MONOCYTES NFR BLD AUTO: 13 %
NEUTROPHILS # BLD AUTO: 2.09 X10(3)/MCL (ref 2.1–9.2)
NEUTROPHILS NFR BLD AUTO: 47.7 %
NITRITE UR QL STRIP.AUTO: NEGATIVE
PH UR STRIP.AUTO: 6 [PH]
PLATELET # BLD AUTO: 182 X10(3)/MCL (ref 130–400)
PMV BLD AUTO: 10.9 FL (ref 7.4–10.4)
POC CARDIAC TROPONIN I: 0 NG/ML (ref 0–0.08)
POCT GLUCOSE: 101 MG/DL (ref 70–110)
POTASSIUM SERPL-SCNC: 4.4 MMOL/L (ref 3.5–5.1)
PROT SERPL-MCNC: 7.6 GM/DL (ref 5.8–7.6)
PROT UR QL STRIP.AUTO: NEGATIVE
PROTHROMBIN TIME: 10.4 SECONDS (ref 12.5–14.5)
RBC # BLD AUTO: 5.1 X10(6)/MCL (ref 4.2–5.4)
RBC #/AREA URNS AUTO: ABNORMAL /HPF
RBC UR QL AUTO: ABNORMAL
RSV A 5' UTR RNA NPH QL NAA+PROBE: NOT DETECTED
SAMPLE: NORMAL
SARS-COV-2 RNA RESP QL NAA+PROBE: NOT DETECTED
SODIUM SERPL-SCNC: 142 MMOL/L (ref 136–145)
SP GR UR STRIP.AUTO: 1.02 (ref 1–1.03)
SQUAMOUS #/AREA URNS AUTO: ABNORMAL /HPF
UROBILINOGEN UR STRIP-ACNC: 1
WBC # SPEC AUTO: 4.38 X10(3)/MCL (ref 4.5–11.5)
WBC #/AREA URNS AUTO: ABNORMAL /HPF

## 2023-11-26 PROCEDURE — 99285 EMERGENCY DEPT VISIT HI MDM: CPT | Mod: 25

## 2023-11-26 PROCEDURE — 85730 THROMBOPLASTIN TIME PARTIAL: CPT | Performed by: FAMILY MEDICINE

## 2023-11-26 PROCEDURE — 0241U COVID/RSV/FLU A&B PCR: CPT | Performed by: FAMILY MEDICINE

## 2023-11-26 PROCEDURE — 63600175 PHARM REV CODE 636 W HCPCS: Performed by: FAMILY MEDICINE

## 2023-11-26 PROCEDURE — 85025 COMPLETE CBC W/AUTO DIFF WBC: CPT | Performed by: FAMILY MEDICINE

## 2023-11-26 PROCEDURE — 25000003 PHARM REV CODE 250: Performed by: FAMILY MEDICINE

## 2023-11-26 PROCEDURE — 96365 THER/PROPH/DIAG IV INF INIT: CPT | Mod: 59

## 2023-11-26 PROCEDURE — 93010 ELECTROCARDIOGRAM REPORT: CPT | Mod: ,,, | Performed by: STUDENT IN AN ORGANIZED HEALTH CARE EDUCATION/TRAINING PROGRAM

## 2023-11-26 PROCEDURE — 84484 ASSAY OF TROPONIN QUANT: CPT

## 2023-11-26 PROCEDURE — 93010 EKG 12-LEAD: ICD-10-PCS | Mod: ,,, | Performed by: STUDENT IN AN ORGANIZED HEALTH CARE EDUCATION/TRAINING PROGRAM

## 2023-11-26 PROCEDURE — 93005 ELECTROCARDIOGRAM TRACING: CPT

## 2023-11-26 PROCEDURE — 85610 PROTHROMBIN TIME: CPT | Performed by: FAMILY MEDICINE

## 2023-11-26 PROCEDURE — 81001 URINALYSIS AUTO W/SCOPE: CPT | Performed by: FAMILY MEDICINE

## 2023-11-26 PROCEDURE — 25500020 PHARM REV CODE 255: Performed by: FAMILY MEDICINE

## 2023-11-26 PROCEDURE — 82962 GLUCOSE BLOOD TEST: CPT

## 2023-11-26 PROCEDURE — 80053 COMPREHEN METABOLIC PANEL: CPT | Performed by: FAMILY MEDICINE

## 2023-11-26 RX ADMIN — IOPAMIDOL 100 ML: 755 INJECTION, SOLUTION INTRAVENOUS at 05:11

## 2023-11-26 RX ADMIN — CEFTRIAXONE SODIUM 1 G: 1 INJECTION, POWDER, FOR SOLUTION INTRAMUSCULAR; INTRAVENOUS at 06:11

## 2023-11-26 NOTE — ED PROVIDER NOTES
Encounter Date: 11/26/2023       History     Chief Complaint   Patient presents with    Facial Droop     Slurred speech/lt arm numbness- resolved on arrival to ER- started approx 15 min pta-gait steady     Slurred speech with left arm numbness that resolve when the patient arrived patient started approximately 15 minutes before arrival  87-year-old female patient comes in with neurological symptoms that resolved when she walked in the door of the ER patient is still complaining of a mild headache no chronic condition contributing to today's episode patient has the history source        Review of patient's allergies indicates:  No Known Allergies  No past medical history on file.  No past surgical history on file.  No family history on file.     Review of Systems   Constitutional:  Negative for fever.   HENT:  Negative for sore throat.    Respiratory:  Negative for shortness of breath.    Cardiovascular:  Negative for chest pain.   Gastrointestinal:  Negative for nausea.   Genitourinary:  Negative for dysuria.   Musculoskeletal:  Negative for back pain.   Skin:  Negative for rash.   Neurological:  Positive for headaches. Negative for weakness.   Hematological:  Does not bruise/bleed easily.   All other systems reviewed and are negative.      Physical Exam     Initial Vitals [11/26/23 1435]   BP Pulse Resp Temp SpO2   (!) 152/71 68 18 97.7 °F (36.5 °C) 100 %      MAP       --         Physical Exam    Nursing note and vitals reviewed.  Constitutional: She appears well-developed.   HENT:   Head: Normocephalic and atraumatic.   Right Ear: External ear normal.   Left Ear: External ear normal.   Nose: Nose normal.   Mouth/Throat: Oropharynx is clear and moist. No oropharyngeal exudate.   Eyes: Conjunctivae and EOM are normal. Pupils are equal, round, and reactive to light. Right eye exhibits no discharge. Left eye exhibits no discharge.   Neck: Neck supple. No tracheal deviation present. No JVD present.   Normal range of  motion.  Cardiovascular:  Normal rate, regular rhythm, normal heart sounds and intact distal pulses.     Exam reveals no gallop and no friction rub.       No murmur heard.  Pulmonary/Chest: Breath sounds normal. No stridor. No respiratory distress. She has no wheezes. She has no rhonchi. She has no rales.   Abdominal: Abdomen is soft. Bowel sounds are normal. She exhibits no distension and no mass. There is no abdominal tenderness. There is no rebound and no guarding.   Musculoskeletal:         General: Normal range of motion.      Cervical back: Normal range of motion and neck supple.     Neurological: She is alert and oriented to person, place, and time. She has normal strength. No cranial nerve deficit.   Skin: Skin is warm and dry. No rash and no abscess noted. No erythema.   Psychiatric: She has a normal mood and affect. Her behavior is normal. Judgment and thought content normal.         ED Course   Procedures  Labs Reviewed   COMPREHENSIVE METABOLIC PANEL - Abnormal; Notable for the following components:       Result Value    Blood Urea Nitrogen 23.8 (*)     Creatinine 1.15 (*)     Globulin 3.9 (*)     Albumin/Globulin Ratio 0.9 (*)     All other components within normal limits   PROTIME-INR - Abnormal; Notable for the following components:    PT 10.4 (*)     All other components within normal limits   APTT - Abnormal; Notable for the following components:    PTT 21.9 (*)     All other components within normal limits   CBC WITH DIFFERENTIAL - Abnormal; Notable for the following components:    WBC 4.38 (*)     Hgb 11.6 (*)     MCV 77.3 (*)     MCH 22.7 (*)     MCHC 29.4 (*)     MPV 10.9 (*)     Neut # 2.09 (*)     All other components within normal limits   URINALYSIS, REFLEX TO URINE CULTURE - Abnormal; Notable for the following components:    Blood, UA Moderate (*)     Leukocyte Esterase, UA Trace (*)     All other components within normal limits   URINALYSIS, MICROSCOPIC - Abnormal; Notable for the  following components:    Squamous Epithelial Cells, UA Few (*)     All other components within normal limits   COVID/RSV/FLU A&B PCR - Normal    Narrative:     The Xpert Xpress SARS-CoV-2/FLU/RSV plus is a rapid, multiplexed real-time PCR test intended for the simultaneous qualitative detection and differentiation of SARS-CoV-2, Influenza A, Influenza B, and respiratory syncytial virus (RSV) viral RNA in either nasopharyngeal swab or nasal swab specimens.         CBC W/ AUTO DIFFERENTIAL    Narrative:     The following orders were created for panel order CBC W/ AUTO DIFFERENTIAL.  Procedure                               Abnormality         Status                     ---------                               -----------         ------                     CBC with Differential[[id number]]       Abnormal            Final result                 Please view results for these tests on the individual orders.   TROPONIN ISTAT   POCT GLUCOSE, HAND-HELD DEVICE   POCT GLUCOSE          Imaging Results              CTA Brain (In process)                      CTA Neck (In process)                      CT HEAD FOR STROKE (Final result)  Result time 11/26/23 15:03:16   Procedure changed from CT Head Without Contrast     Final result by Terrell Matthew MD (11/26/23 15:03:16)                   Impression:      1. No acute parenchymal hemorrhage or evolving major vascular distribution infarct identified.  2. Small subarachnoid hemorrhage in the right central sulcus.  Critical results discussed with Dr. Brock Menon at 1459 on 11/26/2023.      Electronically signed by: Terrell Matthew  Date:    11/26/2023  Time:    15:03               Narrative:    EXAMINATION:  CT HEAD FOR STROKE    CLINICAL HISTORY:  Neuro deficit, acute, stroke suspected;    TECHNIQUE:  Low dose axial CT images obtained throughout the head without intravenous contrast. Sagittal and coronal reconstructions were performed.    COMPARISON:  CT  07/22/2023    FINDINGS:  Intracranial compartment:    Prominence of the ventricles and sulci compatible generalized cerebral volume loss.  No hydrocephalus.  Small subarachnoid hemorrhage in the right frontoparietal region within the right central sulcus.  No extra-axial hemorrhage identified elsewhere.    Patchy hypoattenuation throughout the supratentorial white matter most commonly reflects chronic microvascular ischemic change.  No acute parenchymal hemorrhage or evolving major vascular distribution infarct.  No intracranial mass effect or midline shift.    Skull/extracranial contents (limited evaluation): No fracture. Patchy mucosal thickening in the ethmoid air cells and sphenoid sinuses.  Mastoid air cells are essentially clear.  Postsurgical changes of bilateral lens replacements.                                       Medications - No data to display  Medical Decision Making  Stroke hemorrhage ischemic stroke hemorrhagic stroke head injury    Amount and/or Complexity of Data Reviewed  Labs: ordered.  Radiology: ordered.                                   Clinical Impression:  Final diagnoses:  [R29.818] Acute focal neurological deficit  [I60.9] Subarachnoid bleed (Primary)          ED Disposition Condition    Transfer to Another Facility Stable                Brock Menon MD  11/26/23 8651

## 2023-11-26 NOTE — ED NOTES
Emerson Durham from transfer center, pt accepted by Dr. Bee at [facility], going ER to ER; transfer center set up transport with [facility].

## 2023-12-13 DIAGNOSIS — I60.9 SAH (SUBARACHNOID HEMORRHAGE): Primary | ICD-10-CM

## 2023-12-19 ENCOUNTER — OFFICE VISIT (OUTPATIENT)
Dept: NEUROLOGY | Facility: CLINIC | Age: 88
End: 2023-12-19
Payer: MEDICARE

## 2023-12-19 VITALS
SYSTOLIC BLOOD PRESSURE: 125 MMHG | WEIGHT: 150 LBS | DIASTOLIC BLOOD PRESSURE: 77 MMHG | HEART RATE: 74 BPM | HEIGHT: 65 IN | BODY MASS INDEX: 24.99 KG/M2

## 2023-12-19 DIAGNOSIS — E78.49 OTHER HYPERLIPIDEMIA: ICD-10-CM

## 2023-12-19 DIAGNOSIS — I60.9 SAH (SUBARACHNOID HEMORRHAGE): Primary | ICD-10-CM

## 2023-12-19 DIAGNOSIS — I10 PRIMARY HYPERTENSION: ICD-10-CM

## 2023-12-19 DIAGNOSIS — I60.8 OTHER NONTRAUMATIC SUBARACHNOID HEMORRHAGE: ICD-10-CM

## 2023-12-19 DIAGNOSIS — E11.9 TYPE 2 DIABETES MELLITUS WITHOUT COMPLICATION, WITHOUT LONG-TERM CURRENT USE OF INSULIN: ICD-10-CM

## 2023-12-19 PROCEDURE — 99205 OFFICE O/P NEW HI 60 MIN: CPT | Mod: S$PBB,,, | Performed by: PSYCHIATRY & NEUROLOGY

## 2023-12-19 PROCEDURE — 99205 PR OFFICE/OUTPT VISIT, NEW, LEVL V, 60-74 MIN: ICD-10-PCS | Mod: S$PBB,,, | Performed by: PSYCHIATRY & NEUROLOGY

## 2023-12-19 PROCEDURE — 99999 PR PBB SHADOW E&M-EST. PATIENT-LVL IV: ICD-10-PCS | Mod: PBBFAC,,, | Performed by: PSYCHIATRY & NEUROLOGY

## 2023-12-19 PROCEDURE — 99999 PR PBB SHADOW E&M-EST. PATIENT-LVL IV: CPT | Mod: PBBFAC,,, | Performed by: PSYCHIATRY & NEUROLOGY

## 2023-12-19 PROCEDURE — 99214 OFFICE O/P EST MOD 30 MIN: CPT | Mod: PBBFAC | Performed by: PSYCHIATRY & NEUROLOGY

## 2023-12-19 RX ORDER — TIMOLOL MALEATE 5 MG/ML
SOLUTION/ DROPS OPHTHALMIC
COMMUNITY
Start: 2023-11-15

## 2023-12-19 RX ORDER — METFORMIN HYDROCHLORIDE 500 MG/1
500 TABLET ORAL
COMMUNITY

## 2023-12-19 RX ORDER — METOPROLOL SUCCINATE 50 MG/1
50 TABLET, EXTENDED RELEASE ORAL
COMMUNITY
Start: 2023-12-04 | End: 2023-12-19

## 2023-12-19 RX ORDER — LISINOPRIL AND HYDROCHLOROTHIAZIDE 20; 25 MG/1; MG/1
1 TABLET ORAL
COMMUNITY

## 2023-12-19 RX ORDER — ATORVASTATIN CALCIUM 10 MG/1
10 TABLET, FILM COATED ORAL
COMMUNITY

## 2023-12-19 RX ORDER — LATANOPROST 50 UG/ML
SOLUTION/ DROPS OPHTHALMIC
COMMUNITY
Start: 2023-12-11

## 2023-12-19 RX ORDER — AMLODIPINE BESYLATE 2.5 MG/1
1 TABLET ORAL EVERY MORNING
COMMUNITY

## 2023-12-19 NOTE — PROGRESS NOTES
Neurology Office Visit  Neurology  HPI:    Tali Beard is a 88 y.o. female who is referred to stroke clinic by her PCP ABHIJEET Keene due to a recent hospitalization due to subarachnoid hemorrhage. She is accompanied by her sons today. The sons report that she developed left facial droop, slurred speech and mild headache on 11/26 and was taken to [facility] where she was found to have right frontal convexity SAH. She was transferred to [facility] for neurosurgical evaluation and had CTA which did not show any obvious aneurysms or vascular abnormalities. She was subsequently discharged after repeat scans were shown to be stable with a plan to follow up with a neurologist. At the time of the bleed, she was on aspirin 81mg which she has been on for many years. She denied any trauma or loss of consciousness. She was prescribed medrol dose pack due to intermittent left facial droop and slurred speech but reports that she has not had them for a while now. The sons report that patient is back to living by herself with close supervision from family and denies any current weakness, numbness, vision or speech changes, balance problems.     Denies any family history of brain bleed. But reports that 2 years ago she had a bleed after a fall. Reportedly she was on aspirin at that time as well. She denies any episodes concerning for seizure at this time and denies headaches.     ROS:  Review of Systems   Eyes:  Negative for blurred vision and double vision.   Neurological:  Negative for dizziness, tingling, tremors, sensory change, speech change, focal weakness, seizures and headaches.        Past Medical History:   Diagnosis Date    Subarachnoid hemorrhage      History reviewed. No pertinent surgical history.  History reviewed. No pertinent family history.  Review of patient's allergies indicates:   Allergen Reactions    Hydroxyzine hcl      Other Reaction(s): BUMPS ALL OVER BODY    Other reaction(s): BUMPS  ALL OVER BODY    Felodipine Rash     Edema in the legs       Current Outpatient Medications:     amLODIPine (NORVASC) 2.5 MG tablet, Take 1 tablet by mouth every morning., Disp: , Rfl:     atorvastatin (LIPITOR) 10 MG tablet, Take 10 mg by mouth., Disp: , Rfl:     latanoprost 0.005 % ophthalmic solution, Place into both eyes., Disp: , Rfl:     lisinopriL-hydrochlorothiazide (PRINZIDE,ZESTORETIC) 20-25 mg Tab, Take 1 tablet by mouth., Disp: , Rfl:     metFORMIN (GLUCOPHAGE) 500 MG tablet, Take 500 mg by mouth., Disp: , Rfl:     timolol maleate 0.5% (TIMOPTIC) 0.5 % Drop, Place into both eyes., Disp: , Rfl:     meclizine (ANTIVERT) 25 mg tablet, Take 1 tablet (25 mg total) by mouth 3 (three) times daily as needed., Disp: 15 tablet, Rfl: 0  Outpatient Medications Marked as Taking for the 12/19/23 encounter (Office Visit) with Moises Hannah MD   Medication Sig Dispense Refill    amLODIPine (NORVASC) 2.5 MG tablet Take 1 tablet by mouth every morning.      atorvastatin (LIPITOR) 10 MG tablet Take 10 mg by mouth.      latanoprost 0.005 % ophthalmic solution Place into both eyes.      lisinopriL-hydrochlorothiazide (PRINZIDE,ZESTORETIC) 20-25 mg Tab Take 1 tablet by mouth.      metFORMIN (GLUCOPHAGE) 500 MG tablet Take 500 mg by mouth.      timolol maleate 0.5% (TIMOPTIC) 0.5 % Drop Place into both eyes.       Social History     Tobacco Use    Smoking status: Never     Passive exposure: Never    Smokeless tobacco: Never   Substance Use Topics    Alcohol use: Not Currently    Drug use: Never         Vitals:    12/19/23 1029   BP: 125/77   Pulse: 74       Physical Exam:  HEENT NC/AT  CV RRR, no tenderness  Resp clear without dyspnea  GI soft  Ext no edema    Neuro:  Alert & oriented x 3  EOMI, PERRLA, visual field intact in all 4 quadrants, no nystagmus or diplopia noted on exam  Face symmetric, speech clear and fluent, facial sensation equal bilaterally  Tongue midline  Strength 5/5 in bilateral upper and lower  extremities   Sensation intact and equal bilaterally  Gait steady and balanced     Imaging:  Reviewed CTH and CTA. Small right frontal convexity SAH. CTA did not show any obvious aneurysm or vascular abnormality    Assessment:   Ms Beard is a pleasant 88 year old female referred by her PCP ABHIJEET Keene due to a recent hospitalization due to subarachnoid hemorrhage on 11/26. No etiology was identified and was recommended to follow up with neurologist upon discharge from hospital in [place]. She had transient episodes of left facial droop, slurred speech which resolved with medrol dose pack which she has since completed. Her current neurological exam is non focal and denies any headaches.     I reviewed the imaging with the patient and family and explained the possible etiology of the SAH including aneurysmal, vascular abnormality such as AV fistula, traumatic, spontaneous hemorrhage in the setting of antiplatelet use. I explained that CTA did not show any obvious aneurysms and is unlikely to be the etiology given the distribution of SAH. However, I can not rule out an AV fistula based on CTA even though there are no obvious abnormal vessels noted. I explained the most likely reason of the SAH is related to the antiplatelet use. I offered a diagnostic cerebral angiogram but it has a low yield in her case and given her age, family did not wish to pursue anything aggressive unless absolutely needed. I discussed that we will plan to obtain CTA in 1 month. In the meantime, she will remain off antiplatelets and educated the family on seeking emergent care in case she develops new symptoms or sudden onset of severe headache. Family expressed understanding and agreed with the plan .    Plan:   - CTA head in 1 month  - f/u in 2 months  - hold aspirin for now.     Moises Hannah MD  Vascular and Interventional Neurology

## 2023-12-22 ENCOUNTER — HOSPITAL ENCOUNTER (OUTPATIENT)
Dept: RADIOLOGY | Facility: HOSPITAL | Age: 88
Discharge: HOME | End: 2023-12-22
Attending: PSYCHIATRY & NEUROLOGY
Payer: MEDICARE

## 2023-12-22 DIAGNOSIS — I60.9 SAH (SUBARACHNOID HEMORRHAGE): ICD-10-CM

## 2023-12-22 DIAGNOSIS — I60.8 OTHER NONTRAUMATIC SUBARACHNOID HEMORRHAGE: ICD-10-CM

## 2023-12-22 PROCEDURE — 70496 CT ANGIOGRAPHY HEAD: CPT | Mod: TC

## 2023-12-22 PROCEDURE — 25500020 PHARM REV CODE 255: Performed by: PSYCHIATRY & NEUROLOGY

## 2023-12-22 RX ADMIN — IOPAMIDOL 75 ML: 755 INJECTION, SOLUTION INTRAVENOUS at 12:12

## 2024-02-26 ENCOUNTER — OFFICE VISIT (OUTPATIENT)
Dept: NEUROLOGY | Facility: CLINIC | Age: 89
End: 2024-02-26
Payer: MEDICARE

## 2024-02-26 VITALS
WEIGHT: 150 LBS | BODY MASS INDEX: 24.99 KG/M2 | DIASTOLIC BLOOD PRESSURE: 62 MMHG | SYSTOLIC BLOOD PRESSURE: 123 MMHG | HEIGHT: 65 IN

## 2024-02-26 DIAGNOSIS — I60.9 SAH (SUBARACHNOID HEMORRHAGE): Primary | ICD-10-CM

## 2024-02-26 PROCEDURE — 99999 PR PBB SHADOW E&M-EST. PATIENT-LVL III: CPT | Mod: PBBFAC,,, | Performed by: PSYCHIATRY & NEUROLOGY

## 2024-02-26 PROCEDURE — 99213 OFFICE O/P EST LOW 20 MIN: CPT | Mod: S$PBB,,, | Performed by: NURSE PRACTITIONER

## 2024-02-26 PROCEDURE — 99213 OFFICE O/P EST LOW 20 MIN: CPT | Mod: PBBFAC | Performed by: PSYCHIATRY & NEUROLOGY

## 2024-02-26 NOTE — PROGRESS NOTES
Subjective:      Patient ID: Tali Beard is a 88 y.o. female.    Chief Complaint:  subarachnoid hemorrhage (2 mos f/u Here for CTA results. )      History of Present Illness  Patient with history of SAH (11/26/23) presents for follow up of CTA results. CTA reveals no aneurysm or source of SAH. SAH most likely caused by antiplatelet use. Today patient denies any new onset of numbness, tingling or weakness. Denies any facial droop, difficulty with speech or headache. Is no longer taking ASA.      CTA HEAD     CLINICAL HISTORY:  Nontraumatic subarachnoid hemorrhage, unspecified Subarachnoid hemorrhage (SAH), follow up;     TECHNIQUE:  Axial images obtained through the [facility] before and after the administration of intravenous contrast.     Coronal, sagittal, MIP and 3D reconstructions were obtained from the axial data.     Automatic exposure control was utilized to limit radiation dose.     Radiation Dose:     Total DLP: 3437 mGy*cm     COMPARISON:  CT head dated 11/26/2023     FINDINGS:  Head CT with contrast:     Interval resolution of small subarachnoid hemorrhage.     No enhancing abnormalities.     Intracranial CTA:     There are calcifications along the course of the carotid siphons with moderate narrowing bilaterally.  The middle cerebral arteries and anterior cerebral patent.     The vertebral arteries, basilar artery and posterior cerebral arteries are patent.     The dural venous sinuses are patent.     Impression:     No definite aneurysm or evidence of a vascular malformation.        Electronically signed by: Kya Leon  Date:                                            12/22/2023  Time:                                           15:40    Past Medical History:   Diagnosis Date    Subarachnoid hemorrhage        History reviewed. No pertinent surgical history.    History reviewed. No pertinent family history.    Social History     Socioeconomic History    Marital status:    Tobacco  "Use    Smoking status: Never     Passive exposure: Never    Smokeless tobacco: Never   Substance and Sexual Activity    Alcohol use: Not Currently    Drug use: Never    Sexual activity: Not Currently       Current Outpatient Medications   Medication Sig Dispense Refill    amLODIPine (NORVASC) 2.5 MG tablet Take 1 tablet by mouth every morning.      atorvastatin (LIPITOR) 10 MG tablet Take 10 mg by mouth.      latanoprost 0.005 % ophthalmic solution Place into both eyes.      lisinopriL-hydrochlorothiazide (PRINZIDE,ZESTORETIC) 20-25 mg Tab Take 1 tablet by mouth.      metFORMIN (GLUCOPHAGE) 500 MG tablet Take 500 mg by mouth.      timolol maleate 0.5% (TIMOPTIC) 0.5 % Drop Place into both eyes.      meclizine (ANTIVERT) 25 mg tablet Take 1 tablet (25 mg total) by mouth 3 (three) times daily as needed. 15 tablet 0     No current facility-administered medications for this visit.       Review of patient's allergies indicates:   Allergen Reactions    Hydroxyzine hcl      Other Reaction(s): BUMPS ALL OVER BODY    Other reaction(s): BUMPS ALL OVER BODY    Felodipine Rash     Edema in the legs        Vitals:    02/26/24 0949   BP: 123/62   BP Location: Left arm   Patient Position: Sitting   Weight: 68 kg (150 lb)   Height: 5' 5" (1.651 m)        Review of Systems  12 point ROS performed and negative unless otherwise documented in HPI  Objective:     Neurologic Exam     Mental Status   Oriented to person, place, and time.   Speech: speech is normal   Level of consciousness: alert    Cranial Nerves   Cranial nerves II through XII intact.     Motor Exam   Muscle bulk: normal    Strength   Strength 5/5 throughout.     Sensory Exam   Light touch normal.     Gait, Coordination, and Reflexes     Gait  Gait: normal      Physical Exam  Vitals reviewed.   Pulmonary:      Effort: Pulmonary effort is normal.   Neurological:      Mental Status: She is oriented to person, place, and time.      Cranial Nerves: Cranial nerves 2-12 are " intact.      Motor: Motor strength is normal.     Gait: Gait is intact.   Psychiatric:         Speech: Speech normal.          Assessment:     1. SAH (subarachnoid hemorrhage)         Plan:     CTA normal; discussed results with patient and patient's son who stated an understanding.  SAH most likely caused by antiplatelet use; recommend patient not to start ASA again.

## 2024-03-25 DIAGNOSIS — Z12.31 SCREENING MAMMOGRAM FOR BREAST CANCER: Primary | ICD-10-CM

## 2024-04-15 ENCOUNTER — HOSPITAL ENCOUNTER (EMERGENCY)
Facility: HOSPITAL | Age: 89
Discharge: HOME | End: 2024-04-15
Attending: FAMILY MEDICINE
Payer: MEDICARE

## 2024-04-15 VITALS
HEIGHT: 64 IN | SYSTOLIC BLOOD PRESSURE: 160 MMHG | RESPIRATION RATE: 18 BRPM | DIASTOLIC BLOOD PRESSURE: 68 MMHG | WEIGHT: 145 LBS | TEMPERATURE: 98 F | OXYGEN SATURATION: 98 % | BODY MASS INDEX: 24.75 KG/M2 | HEART RATE: 77 BPM

## 2024-04-15 DIAGNOSIS — B35.3 TINEA PEDIS OF LEFT FOOT: Primary | ICD-10-CM

## 2024-04-15 PROCEDURE — 99282 EMERGENCY DEPT VISIT SF MDM: CPT

## 2024-04-15 RX ORDER — BUTENAFINE HYDROCHLORIDE 10 MG/G
CREAM TOPICAL 2 TIMES DAILY
Qty: 30 G | Refills: 0 | Status: SHIPPED | OUTPATIENT
Start: 2024-04-15

## 2024-04-15 NOTE — ED PROVIDER NOTES
Encounter Date: 4/15/2024       History     Chief Complaint   Patient presents with    Rash     Pt with complaints of rash to left foot that started hurting her last night.      80-year-old complains of chronic rash in his left foot not improving patient saw PCP was put on steroid cream rashes gotten worse no fevers no chills no signs of infection vital signs are stable blood pressure little elevated still has a rash on the left foot appears more fungal we will try some antifungal meds have her follow up with the PCP this week for referral to derm if not improved also needs to rechecked blood pressure patient understands agrees with plan        Review of patient's allergies indicates:   Allergen Reactions    Hydroxyzine hcl      Other Reaction(s): BUMPS ALL OVER BODY    Other reaction(s): BUMPS ALL OVER BODY    Felodipine Rash     Edema in the legs     Past Medical History:   Diagnosis Date    Subarachnoid hemorrhage      No past surgical history on file.  No family history on file.  Social History     Tobacco Use    Smoking status: Never     Passive exposure: Never    Smokeless tobacco: Never   Substance Use Topics    Alcohol use: Not Currently    Drug use: Never     Review of Systems   Skin:  Positive for rash.   All other systems reviewed and are negative.      Physical Exam     Initial Vitals [04/15/24 0905]   BP Pulse Resp Temp SpO2   (!) 160/68 77 18 98.1 °F (36.7 °C) 98 %      MAP       --         Physical Exam    Nursing note and vitals reviewed.  Constitutional: She appears well-developed and well-nourished. She is active.   HENT:   Head: Normocephalic and atraumatic.   Mouth/Throat: Oropharynx is clear and moist.   Eyes: Conjunctivae, EOM and lids are normal. Pupils are equal, round, and reactive to light.   Neck: Trachea normal and phonation normal. Neck supple. No thyroid mass present.   Normal range of motion.  Cardiovascular:  Normal rate, regular rhythm, normal heart sounds and normal pulses.            Pulmonary/Chest: Breath sounds normal.   Abdominal: Abdomen is soft. Bowel sounds are normal.   Musculoskeletal:         General: Normal range of motion.      Cervical back: Normal range of motion and neck supple.     Neurological: She is alert and oriented to person, place, and time. She has normal strength and normal reflexes.   Skin: Skin is warm and intact. Rash noted.   Psychiatric: She has a normal mood and affect. Her speech is normal and behavior is normal. Judgment and thought content normal. Cognition and memory are normal.         ED Course   Procedures  Labs Reviewed - No data to display       Imaging Results    None          Medications - No data to display  Medical Decision Making  80-year-old complains of chronic rash in his left foot not improving patient saw PCP was put on steroid cream rashes gotten worse no fevers no chills no signs of infection vital signs are stable blood pressure little elevated still has a rash on the left foot appears more fungal we will try some antifungal meds have her follow up with the PCP this week for referral to derm if not improved also needs to rechecked blood pressure patient understands agrees with plan          Risk  Prescription drug management.  Risk Details: Differential diagnosis eczema versus fungal infection                                      Clinical Impression:  Final diagnoses:  [B35.3] Tinea pedis of left foot (Primary)          ED Disposition Condition    Discharge Stable          ED Prescriptions       Medication Sig Dispense Start Date End Date Auth. Provider    butenafine (MENTAX) 1 % cream Apply topically 2 (two) times daily. 30 g 4/15/2024 -- Dennis Almaguer MD          Follow-up Information       Follow up With Specialties Details Why Contact Info    Aurora Moe FNP Nurse Practitioner In 1 week  [address] [place] [id number]  [phone]               Dennis Almaguer MD  04/15/24 0968

## 2024-04-29 ENCOUNTER — HOSPITAL ENCOUNTER (OUTPATIENT)
Dept: RADIOLOGY | Facility: HOSPITAL | Age: 89
Discharge: HOME | End: 2024-04-29
Attending: NURSE PRACTITIONER
Payer: MEDICARE

## 2024-04-29 DIAGNOSIS — Z12.31 SCREENING MAMMOGRAM FOR BREAST CANCER: ICD-10-CM

## 2024-04-29 PROCEDURE — 77063 BREAST TOMOSYNTHESIS BI: CPT | Mod: 26,,, | Performed by: RADIOLOGY

## 2024-04-29 PROCEDURE — 77067 SCR MAMMO BI INCL CAD: CPT | Mod: 26,,, | Performed by: RADIOLOGY

## 2024-04-29 PROCEDURE — 77063 BREAST TOMOSYNTHESIS BI: CPT | Mod: TC

## 2024-05-07 ENCOUNTER — HOSPITAL ENCOUNTER (OUTPATIENT)
Dept: WOUND CARE | Facility: HOSPITAL | Age: 89
Discharge: HOME | End: 2024-05-07
Attending: PEDIATRICS
Payer: MEDICARE

## 2024-05-07 VITALS
HEART RATE: 82 BPM | OXYGEN SATURATION: 100 % | SYSTOLIC BLOOD PRESSURE: 125 MMHG | DIASTOLIC BLOOD PRESSURE: 59 MMHG | RESPIRATION RATE: 20 BRPM | TEMPERATURE: 98 F

## 2024-05-07 DIAGNOSIS — E11.9 DIABETES MELLITUS WITHOUT COMPLICATION: ICD-10-CM

## 2024-05-07 DIAGNOSIS — L97.522 FOOT ULCER WITH FAT LAYER EXPOSED, LEFT: Primary | ICD-10-CM

## 2024-05-07 DIAGNOSIS — I10 PRIMARY HYPERTENSION: ICD-10-CM

## 2024-05-07 PROCEDURE — 99203 OFFICE O/P NEW LOW 30 MIN: CPT | Mod: 25,ICN,, | Performed by: PEDIATRICS

## 2024-05-07 PROCEDURE — 11042 DBRDMT SUBQ TIS 1ST 20SQCM/<: CPT | Mod: ,,, | Performed by: PEDIATRICS

## 2024-05-07 PROCEDURE — 27000999 HC MEDICAL RECORD PHOTO DOCUMENTATION

## 2024-05-07 PROCEDURE — 99213 OFFICE O/P EST LOW 20 MIN: CPT | Mod: 25

## 2024-05-07 PROCEDURE — 11042 DBRDMT SUBQ TIS 1ST 20SQCM/<: CPT

## 2024-05-07 PROCEDURE — 87070 CULTURE OTHR SPECIMN AEROBIC: CPT

## 2024-05-07 RX ORDER — NYSTATIN 100000 U/G
CREAM TOPICAL
COMMUNITY
Start: 2024-04-30 | End: 2025-04-30

## 2024-05-07 RX ORDER — METOPROLOL SUCCINATE 50 MG/1
50 TABLET, EXTENDED RELEASE ORAL
COMMUNITY
Start: 2024-01-01

## 2024-05-07 RX ORDER — TRIAMCINOLONE ACETONIDE 1 MG/G
CREAM TOPICAL
COMMUNITY
Start: 2024-02-19

## 2024-05-07 NOTE — PROCEDURES
"Debridement    Date/Time: 5/7/2024 9:33 AM    Performed by: Trey Perez MD  Authorized by: Trey Perez MD    Time out: Immediately prior to procedure a "time out" was called to verify the correct patient, procedure, equipment, support staff and site/side marked as required.    Consent Done?:  Yes (Verbal) and Yes (Written)    Preparation: Patient was prepped and draped with clean technique    Local anesthesia used?: Yes    Anesthesia:  Local infiltration  Local anesthetic:  Lidocaine 2% topical gel  Anesthetic total (ml):  4    Wound Details:    Location:  Left foot    Location:  Left Dorsal Foot    Type of Debridement:  Excisional       Length (cm):  0.5       Area (sq cm):  0.3       Width (cm):  0.6       Percent Debrided (%):  100       Depth (cm):  0.4       Total Area Debrided (sq cm):  0.3    Depth of debridement:  Subcutaneous tissue    Tissue debrided:  Dermis, Epidermis, Adipose and Subcutaneous    Devitalized tissue debrided:  Necrotic/Eschar and Slough    Instruments:  Curette  Bleeding:  Minimal  Hemostasis Achieved: Yes  Method Used:  Pressure  Patient tolerance:  Patient tolerated the procedure well with no immediate complications  Specimen Collected: Specimen sent to microbiology  "

## 2024-05-07 NOTE — PROGRESS NOTES
Subjective:       Patient ID: Tali Beard is a 88 y.o. female.    Chief Complaint: Wound Consult (New consult for wound to left foot, referred by PCP. Patient was seen in ER 4/15 and prescribed Butenafine 1% cream. Patient was seen by PCP on 4/30 and referred to wound care clinic for non healing wound. Here for evaluation and treatment with MD. )    HPI patient was referred by her PCP.  In emergency room on 04/15/2026 patient was diagnosed of as leads foot.  Patient continue have problems and had an open wound to the dorsum of the left foot and was referred on 04/30/2026  Review of Systems negative except as above.      Objective:      Temp:  [98.4 °F (36.9 °C)]   Pulse:  [82]   Resp:  [20]   BP: (125)/(59)   SpO2:  [100 %]   Physical Exam       Wound 05/07/24 0952 Ulceration Left medial Foot (Active)   05/07/24 0952   Present on Original Admission: Y   Primary Wound Type: Ulceration   Side: Left   Orientation: medial   Location: Foot   Wound Approximate Age at First Assessment (Weeks):    Wound Number:    Is this injury device related?:    Incision Type:    Closure Method:    Wound Description (Comments):    Type:    Additional Comments:    Ankle-Brachial Index:    Pulses: 2+ palpable DP and PT pulses, strong doppler signal to both   Removal Indication and Assessment:    Wound Outcome:    Wound Image     05/07/24 1043   Dressing Appearance Open to air 05/07/24 1043   Drainage Amount Scant 05/07/24 1043   Drainage Characteristics/Odor Serous 05/07/24 1043   Appearance Wesley 05/07/24 1043   Tissue loss description Full thickness 05/07/24 1043   Yellow (%), Wound Tissue Color 100 % 05/07/24 1043   Periwound Area Intact;Dry;Hemosiderin Staining;Edematous 05/07/24 1043   Wound Edges Defined 05/07/24 1043   Wound Length (cm) 0.4 cm 05/07/24 1043   Wound Width (cm) 0.5 cm 05/07/24 1043   Wound Depth (cm) 0.1 cm 05/07/24 1043   Wound Volume (cm^3) 0.02 cm^3 05/07/24 1043   Wound Surface Area (cm^2) 0.2 cm^2 05/07/24  1043   Care Cleansed with:;Antimicrobial agent;Wound cleanser 05/07/24 1043   Dressing Sodium chloride impregnated;Bandaid 05/07/24 1043         Assessment:     Patient has stasis dermatitis bilaterally.  There is hemosiderin staining.  Wound is 0.4 cm x 0.5 cm with a depth of 0.1 cm.  Tissue is tan.  There is no granulation tissue.  Because of this an excisional debridement was done.  See procedure note.  There was some granulation tissue at the bottom of the wound.  Area was cleaned and Medihoney and Mesalt applied to the wound.  This was covered with a Band-Aid.  Patient will elevation of the legs for edema control.  Dressings will be changed daily.  Patient will return in 1 week for MD visit.    ICD-10-CM ICD-9-CM   1. Foot ulcer with fat layer exposed, left  L97.522 707.15   2. Primary hypertension  I10 401.9   3. Diabetes mellitus without complication  E11.9 250.00         Plan:   Tissue pathology and/or culture taken:  [x] Yes [] No   Sharp debridement performed:   [x] Yes [] No   Labs ordered this visit:   [] Yes [x] No   Imaging ordered this visit:   [] Yes [x] No           Orders Placed This Encounter   Procedures    Debridement     This order was created via procedure documentation     Standing Status:   Standing     Number of Occurrences:   1    Tissue Culture - Aerobic    Change dressing     Cleanse wound with: wound cleanser, NS or soap and water, pat dry  Primary dressing: apply medihoney to wound bed followed by Mesalt  Secondary dressing: Band-Aid, Do not leave open to air  Frequency: daily    Lidocaine: 2% topical gel prn prior to wound care   Silver nitrate: prn     Edema control: elevate legs as tolerated  Follow-up: 1 week        Follow up in about 1 week (around 5/14/2024) for MD visit.

## 2024-05-07 NOTE — PATIENT INSTRUCTIONS
Cleanse wound with: wound cleanser, NS or soap and water, pat dry  Primary dressing: apply medihoney to wound bed followed by Mesalt  Secondary dressing: Band-Aid, Do not leave open to air  Frequency: daily    Lidocaine: 2% topical gel prn prior to wound care   Silver nitrate: prn     Edema control: elevate legs as tolerated  Follow-up: 1 week

## 2024-05-09 NOTE — PATIENT INSTRUCTIONS
Cleanse wound with: wound cleanser, NS or soap and water, pat dry  Primary dressing: apply Vashe moistened mesalt  Secondary dressing: cover with a Band-Aid, Do not leave open to air  Frequency: daily     Lidocaine: 2% topical gel prn prior to wound care   Silver nitrate: prn      Edema control: elevate legs as tolerated    Continue antibiotics as prescribed    Follow-up: 1 week

## 2024-05-10 RX ORDER — LEVOFLOXACIN 750 MG/1
750 TABLET ORAL DAILY
Qty: 10 TABLET | Refills: 0 | Status: SHIPPED | OUTPATIENT
Start: 2024-05-10 | End: 2024-05-20

## 2024-05-10 NOTE — PROGRESS NOTES
Dr. Perez notified of culture results per telephone.   New telephone orders obtained for Levaquin 750mg po 1 tab daily x 10 days.  Sent electronically to pt's pharmacy.   Notified son Jesus of preliminary results and Levaquin order sent to pharmacy.  Verbalized  understanding.

## 2024-05-11 LAB
BACTERIA TISS AEROBE CULT: ABNORMAL

## 2024-05-14 ENCOUNTER — HOSPITAL ENCOUNTER (OUTPATIENT)
Dept: WOUND CARE | Facility: HOSPITAL | Age: 89
Discharge: HOME | End: 2024-05-14
Attending: PEDIATRICS
Payer: MEDICARE

## 2024-05-14 VITALS
DIASTOLIC BLOOD PRESSURE: 72 MMHG | RESPIRATION RATE: 20 BRPM | OXYGEN SATURATION: 95 % | TEMPERATURE: 99 F | SYSTOLIC BLOOD PRESSURE: 125 MMHG | HEART RATE: 69 BPM

## 2024-05-14 DIAGNOSIS — L97.522 FOOT ULCER WITH FAT LAYER EXPOSED, LEFT: Primary | ICD-10-CM

## 2024-05-14 PROCEDURE — 27000999 HC MEDICAL RECORD PHOTO DOCUMENTATION

## 2024-05-14 PROCEDURE — 99213 OFFICE O/P EST LOW 20 MIN: CPT | Mod: ,,, | Performed by: PEDIATRICS

## 2024-05-14 PROCEDURE — 99213 OFFICE O/P EST LOW 20 MIN: CPT

## 2024-05-14 NOTE — PROGRESS NOTES
Subjective:       Patient ID: Tali Beard is a 88 y.o. female.    Chief Complaint: Foot Ulcer (Left medial foot ulceration. Patient reports taking antibiotics as prescribed. Here for re-evaluation and treatment with MD. )    HPI  Review of Systems      Objective:      Temp:  [98.5 °F (36.9 °C)]   Pulse:  [69]   Resp:  [20]   BP: (125)/(72)   SpO2:  [95 %]   Physical Exam       Wound 05/07/24 0952 Ulceration Left medial Foot (Active)   05/07/24 0952   Present on Original Admission: Y   Primary Wound Type: Ulceration   Side: Left   Orientation: medial   Location: Foot   Wound Approximate Age at First Assessment (Weeks):    Wound Number:    Is this injury device related?:    Incision Type:    Closure Method:    Wound Description (Comments):    Type:    Additional Comments:    Ankle-Brachial Index:    Pulses: 2+ palpable DP and PT pulses, strong doppler signal to both   Removal Indication and Assessment:    Wound Outcome:    Wound Image    05/14/24 1347   Dressing Appearance Intact;Moist drainage 05/14/24 1347   Drainage Amount Scant 05/14/24 1347   Drainage Characteristics/Odor Serous 05/14/24 1347   Appearance Tan;Fibrin;Slough;Pink;Not granulating 05/14/24 1347   Tissue loss description Full thickness 05/14/24 1347   Red (%), Wound Tissue Color 25 % 05/14/24 1347   Yellow (%), Wound Tissue Color 75 % 05/14/24 1347   Periwound Area Intact;Dry 05/14/24 1347   Wound Edges Defined 05/14/24 1347   Wound Length (cm) 0.5 cm 05/14/24 1347   Wound Width (cm) 0.8 cm 05/14/24 1347   Wound Depth (cm) 0.5 cm 05/14/24 1347   Wound Volume (cm^3) 0.2 cm^3 05/14/24 1347   Wound Surface Area (cm^2) 0.4 cm^2 05/14/24 1347   Care Cleansed with:;Antimicrobial agent 05/14/24 1347   Dressing Sodium chloride impregnated;Bandaid 05/14/24 1347         Assessment:     Today is 0.5 cm x 0.8 cm with a depth of 0.5 cm.  Has beginning granulation.  There is still some fibrin.  Area was cleaned and Vashe moistened Mesalt was applied to the  wound.  Patient will stop using Medihoney.  Patient will keep wound covered and change dressings daily.  Patient continue on antibiotics..  Patient is on Levaquin.  This covers all 3 bacteria.  Patient will return in 1 week for MD visit    ICD-10-CM ICD-9-CM   1. Foot ulcer with fat layer exposed, left  L97.522 707.15         Plan:   Tissue pathology and/or culture taken:  [] Yes [x] No   Sharp debridement performed:   [] Yes [x] No   Labs ordered this visit:   [] Yes [x] No   Imaging ordered this visit:   [] Yes [x] No           Orders Placed This Encounter   Procedures    Change dressing     Cleanse wound with: wound cleanser, NS or soap and water, pat dry  Primary dressing: apply Vashe moistened mesalt  Secondary dressing: cover with a Band-Aid, Do not leave open to air  Frequency: daily     Lidocaine: 2% topical gel prn prior to wound care   Silver nitrate: prn      Edema control: elevate legs as tolerated    Continue antibiotics as prescribed    Follow-up: 1 week        Follow up in about 1 week (around 5/21/2024) for MD visit.

## 2024-05-19 ENCOUNTER — HOSPITAL ENCOUNTER (EMERGENCY)
Facility: HOSPITAL | Age: 89
Discharge: HOME | End: 2024-05-19
Attending: FAMILY MEDICINE
Payer: MEDICARE

## 2024-05-19 VITALS
WEIGHT: 145 LBS | RESPIRATION RATE: 20 BRPM | OXYGEN SATURATION: 98 % | DIASTOLIC BLOOD PRESSURE: 64 MMHG | HEART RATE: 70 BPM | BODY MASS INDEX: 24.75 KG/M2 | HEIGHT: 64 IN | TEMPERATURE: 98 F | SYSTOLIC BLOOD PRESSURE: 112 MMHG

## 2024-05-19 DIAGNOSIS — U07.1 COVID: Primary | ICD-10-CM

## 2024-05-19 DIAGNOSIS — R05.9 COUGH: ICD-10-CM

## 2024-05-19 DIAGNOSIS — I60.9 SAH (SUBARACHNOID HEMORRHAGE): ICD-10-CM

## 2024-05-19 DIAGNOSIS — R07.9 CHEST PAIN: ICD-10-CM

## 2024-05-19 LAB
ALBUMIN SERPL-MCNC: 3.3 G/DL (ref 3.4–4.8)
ALBUMIN/GLOB SERPL: 0.8 RATIO (ref 1.1–2)
ALP SERPL-CCNC: 58 UNIT/L (ref 40–150)
ALT SERPL-CCNC: 19 UNIT/L (ref 0–55)
ANION GAP SERPL CALC-SCNC: 10 MEQ/L
AST SERPL-CCNC: 25 UNIT/L (ref 5–34)
BACTERIA #/AREA URNS AUTO: ABNORMAL /HPF
BASOPHILS # BLD AUTO: 0.02 X10(3)/MCL
BASOPHILS NFR BLD AUTO: 0.4 %
BILIRUB SERPL-MCNC: 0.5 MG/DL
BILIRUB UR QL STRIP.AUTO: NEGATIVE
BUN SERPL-MCNC: 52.9 MG/DL (ref 9.8–20.1)
CALCIUM SERPL-MCNC: 9.9 MG/DL (ref 8.4–10.2)
CHLORIDE SERPL-SCNC: 104 MMOL/L (ref 98–107)
CLARITY UR: CLEAR
CO2 SERPL-SCNC: 25 MMOL/L (ref 23–31)
COLOR UR AUTO: YELLOW
CREAT SERPL-MCNC: 2.72 MG/DL (ref 0.55–1.02)
CREAT/UREA NIT SERPL: 19
EOSINOPHIL # BLD AUTO: 0.03 X10(3)/MCL (ref 0–0.9)
EOSINOPHIL NFR BLD AUTO: 0.5 %
ERYTHROCYTE [DISTWIDTH] IN BLOOD BY AUTOMATED COUNT: 15.1 % (ref 11.5–17)
FLUAV AG UPPER RESP QL IA.RAPID: NOT DETECTED
FLUBV AG UPPER RESP QL IA.RAPID: NOT DETECTED
GFR SERPLBLD CREATININE-BSD FMLA CKD-EPI: 16 ML/MIN/1.73/M2
GLOBULIN SER-MCNC: 4.2 GM/DL (ref 2.4–3.5)
GLUCOSE SERPL-MCNC: 105 MG/DL (ref 82–115)
GLUCOSE UR QL STRIP: NEGATIVE
HCT VFR BLD AUTO: 39.3 % (ref 37–47)
HGB BLD-MCNC: 12.3 G/DL (ref 12–16)
HGB UR QL STRIP: NEGATIVE
IMM GRANULOCYTES # BLD AUTO: 0.02 X10(3)/MCL (ref 0–0.04)
IMM GRANULOCYTES NFR BLD AUTO: 0.4 %
KETONES UR QL STRIP: NEGATIVE
LACTATE SERPL-SCNC: 1.3 MMOL/L (ref 0.5–2.2)
LEUKOCYTE ESTERASE UR QL STRIP: NEGATIVE
LYMPHOCYTES # BLD AUTO: 1.06 X10(3)/MCL (ref 0.6–4.6)
LYMPHOCYTES NFR BLD AUTO: 18.7 %
MCH RBC QN AUTO: 23.2 PG (ref 27–31)
MCHC RBC AUTO-ENTMCNC: 31.3 G/DL (ref 33–36)
MCV RBC AUTO: 74 FL (ref 80–94)
MONOCYTES # BLD AUTO: 0.62 X10(3)/MCL (ref 0.1–1.3)
MONOCYTES NFR BLD AUTO: 11 %
NEUTROPHILS # BLD AUTO: 3.91 X10(3)/MCL (ref 2.1–9.2)
NEUTROPHILS NFR BLD AUTO: 69 %
NITRITE UR QL STRIP: NEGATIVE
PH UR STRIP: 5.5 [PH]
PLATELET # BLD AUTO: 155 X10(3)/MCL (ref 130–400)
PMV BLD AUTO: 11.5 FL (ref 7.4–10.4)
POTASSIUM SERPL-SCNC: 3.9 MMOL/L (ref 3.5–5.1)
PROT SERPL-MCNC: 7.5 GM/DL (ref 5.8–7.6)
PROT UR QL STRIP: NEGATIVE
RBC # BLD AUTO: 5.31 X10(6)/MCL (ref 4.2–5.4)
RBC #/AREA URNS AUTO: ABNORMAL /HPF
SARS-COV-2 RNA RESP QL NAA+PROBE: DETECTED
SODIUM SERPL-SCNC: 139 MMOL/L (ref 136–145)
SP GR UR STRIP.AUTO: >=1.03 (ref 1–1.03)
SQUAMOUS #/AREA URNS AUTO: ABNORMAL /HPF
UROBILINOGEN UR STRIP-ACNC: 0.2
WBC # SPEC AUTO: 5.66 X10(3)/MCL (ref 4.5–11.5)
WBC #/AREA URNS AUTO: ABNORMAL /HPF

## 2024-05-19 PROCEDURE — 81003 URINALYSIS AUTO W/O SCOPE: CPT | Performed by: FAMILY MEDICINE

## 2024-05-19 PROCEDURE — 25000003 PHARM REV CODE 250: Performed by: FAMILY MEDICINE

## 2024-05-19 PROCEDURE — 93010 ELECTROCARDIOGRAM REPORT: CPT | Mod: ,,, | Performed by: INTERNAL MEDICINE

## 2024-05-19 PROCEDURE — 83605 ASSAY OF LACTIC ACID: CPT | Performed by: FAMILY MEDICINE

## 2024-05-19 PROCEDURE — 99285 EMERGENCY DEPT VISIT HI MDM: CPT | Mod: 25

## 2024-05-19 PROCEDURE — 80053 COMPREHEN METABOLIC PANEL: CPT | Performed by: FAMILY MEDICINE

## 2024-05-19 PROCEDURE — 0240U COVID/FLU A&B PCR: CPT | Performed by: FAMILY MEDICINE

## 2024-05-19 PROCEDURE — 85025 COMPLETE CBC W/AUTO DIFF WBC: CPT | Performed by: FAMILY MEDICINE

## 2024-05-19 PROCEDURE — 93005 ELECTROCARDIOGRAM TRACING: CPT

## 2024-05-19 PROCEDURE — 96360 HYDRATION IV INFUSION INIT: CPT

## 2024-05-19 RX ADMIN — SODIUM CHLORIDE 500 ML: 9 INJECTION, SOLUTION INTRAVENOUS at 10:05

## 2024-05-19 NOTE — ED PROVIDER NOTES
"Encounter Date: 5/19/2024       History     Chief Complaint   Patient presents with    Night Sweats     Pt with complaints of night sweats and decreased appetite for over a week.  Her son says she has been intermittent confusion for the past week. She answers questions appropriately today. She just says she "doesn't feel good."   She was recently started on levofloxacin on may 10th for wound of left foot. They think symptoms started around the same time.     decreased appetite     Weakness   88-year-old female patient comes in with complaint of weakness not eating having sweating chills otherwise patient has no sick contacts patient has no diarrhea or vomiting patient is the history source otherwise no chronic condition contributing to today's episode        Review of patient's allergies indicates:   Allergen Reactions    Hydroxyzine hcl      Other Reaction(s): BUMPS ALL OVER BODY    Other reaction(s): BUMPS ALL OVER BODY    Felodipine Rash     Edema in the legs     Past Medical History:   Diagnosis Date    Subarachnoid hemorrhage      No past surgical history on file.  No family history on file.  Social History     Tobacco Use    Smoking status: Never     Passive exposure: Never    Smokeless tobacco: Never   Substance Use Topics    Alcohol use: Not Currently    Drug use: Never     Review of Systems   Constitutional:  Positive for chills and diaphoresis. Negative for fever.   HENT:  Negative for sore throat.    Respiratory:  Negative for shortness of breath.    Cardiovascular:  Negative for chest pain.   Gastrointestinal:  Negative for nausea.   Genitourinary:  Negative for dysuria.   Musculoskeletal:  Negative for back pain.   Skin:  Negative for rash.   Neurological:  Positive for weakness.   Hematological:  Does not bruise/bleed easily.   All other systems reviewed and are negative.      Physical Exam     Initial Vitals [05/19/24 0947]   BP Pulse Resp Temp SpO2   106/68 84 18 97.9 °F (36.6 °C) 99 %      MAP     "   --         Physical Exam    Nursing note and vitals reviewed.  Constitutional: She appears well-developed.   HENT:   Head: Normocephalic and atraumatic.   Right Ear: External ear normal.   Left Ear: External ear normal.   Nose: Nose normal.   Mouth/Throat: Oropharynx is clear and moist. No oropharyngeal exudate.   Eyes: Conjunctivae and EOM are normal. Pupils are equal, round, and reactive to light. Right eye exhibits no discharge. Left eye exhibits no discharge.   Neck: Neck supple. No tracheal deviation present. No JVD present.   Normal range of motion.  Cardiovascular:  Normal rate, regular rhythm, normal heart sounds and intact distal pulses.     Exam reveals no gallop and no friction rub.       No murmur heard.  Pulmonary/Chest: Breath sounds normal. No stridor. No respiratory distress. She has no wheezes. She has no rhonchi. She has no rales.   Abdominal: Abdomen is soft. Bowel sounds are normal. She exhibits no distension and no mass. There is no abdominal tenderness. There is no rebound and no guarding.   Musculoskeletal:         General: Normal range of motion.      Cervical back: Normal range of motion and neck supple.     Neurological: She is alert and oriented to person, place, and time. She has normal strength. No cranial nerve deficit.   Skin: Skin is warm and dry. No rash and no abscess noted. No erythema.   Psychiatric: She has a normal mood and affect. Her behavior is normal. Judgment and thought content normal.         ED Course   Procedures  Labs Reviewed   COMPREHENSIVE METABOLIC PANEL - Abnormal; Notable for the following components:       Result Value    Blood Urea Nitrogen 52.9 (*)     Creatinine 2.72 (*)     Albumin 3.3 (*)     Globulin 4.2 (*)     Albumin/Globulin Ratio 0.8 (*)     All other components within normal limits   CBC WITH DIFFERENTIAL - Abnormal; Notable for the following components:    MCV 74.0 (*)     MCH 23.2 (*)     MCHC 31.3 (*)     MPV 11.5 (*)     All other components  within normal limits   COVID/FLU A&B PCR - Abnormal; Notable for the following components:    SARS-CoV-2 PCR Detected (*)     All other components within normal limits    Narrative:     The Xpert Xpress SARS-CoV-2/FLU/RSV plus is a rapid, multiplexed real-time PCR test intended for the simultaneous qualitative detection and differentiation of SARS-CoV-2, Influenza A, Influenza B, and respiratory syncytial virus (RSV) viral RNA in either nasopharyngeal swab or nasal swab specimens.         URINALYSIS, MICROSCOPIC - Abnormal; Notable for the following components:    Squamous Epithelial Cells, UA Many (*)     All other components within normal limits   LACTIC ACID, PLASMA - Normal   URINALYSIS, REFLEX TO URINE CULTURE - Normal   CBC W/ AUTO DIFFERENTIAL    Narrative:     The following orders were created for panel order CBC auto differential.  Procedure                               Abnormality         Status                     ---------                               -----------         ------                     CBC with Differential[[id number]]       Abnormal            Final result                 Please view results for these tests on the individual orders.          Imaging Results              CT Head Without Contrast (Final result)  Result time 05/19/24 10:32:42      Final result by José Barrientos MD (05/19/24 10:32:42)                   Impression:      Chronic age-related changes.    Ethmoid sinusitis      Electronically signed by: Dagoberto Barrientos  Date:    05/19/2024  Time:    10:32               Narrative:    EXAMINATION:  CT HEAD WITHOUT CONTRAST    CLINICAL HISTORY:  Dizziness, persistent/recurrent, cardiac or vascular cause suspected;    TECHNIQUE:  Multiple axial images were obtained from the base of the brain to the vertex without contrast administration.  Sagittal and coronal reconstructions were performed..Automatic exposure control is utilized to reduce patient radiation  exposure.    COMPARISON:  12/22/2023    FINDINGS:  There is no intracranial mass or lesion seen.  No hemorrhage is seen.  No acute infarct is seen.  There is some cerebral atrophy seen.  There is some compensatory ventricular dilatation and periventricular white matter changes consistent with the patient's age.  Calvarium is intact.  The posterior fossa is unremarkable..  There is evidence of ethmoid sinusitis.                                       X-Ray Chest AP Portable (Final result)  Result time 05/19/24 10:39:14      Final result by José Barrientos MD (05/19/24 10:39:14)                   Impression:      No abnormality seen      Electronically signed by: Dagoberto Barrientos  Date:    05/19/2024  Time:    10:39               Narrative:    EXAMINATION:  XR CHEST AP PORTABLE    CLINICAL HISTORY:  Chest Pain;    TECHNIQUE:  Single frontal view of the chest was performed.    COMPARISON:  07/22/2023    FINDINGS:  The lungs are clear.  The heart is normal appearance.  The pulmonary vascularity is unremarkable.  Aorta appears grossly unremarkable.  No pleural effusions are seen.  Bones and joints show no acute abnormality.                                       Medications   sodium chloride 0.9% bolus 500 mL 500 mL (500 mLs Intravenous New Bag 5/19/24 1048)     Medical Decision Making  Sepsis UTI COVID flu pneumonia    Amount and/or Complexity of Data Reviewed  Labs: ordered.  Radiology: ordered.                                      Clinical Impression:  Final diagnoses:  [R07.9] Chest pain  [R05.9] Cough  [U07.1] COVID (Primary)          ED Disposition Condition    Discharge Stable          ED Prescriptions    None       Follow-up Information       Follow up With Specialties Details Why Contact Info    Aurora Moe, FNP Family Medicine   [address] [place] [id number]  [phone]               Brock Menon MD  05/19/24 9875

## 2024-05-21 LAB
OHS QRS DURATION: 70 MS
OHS QTC CALCULATION: 447 MS

## 2024-05-23 NOTE — PATIENT INSTRUCTIONS
Cleanse wound with: wound cleanser, NS or soap and water, pat dry  Primary dressing: apply Vashe moistened mesalt  Secondary dressing: cover with a Band-Aid, Do not leave open to air  Frequency: daily     Lidocaine: 2% topical gel prn prior to wound care   Silver nitrate: prn      Edema control: elevate legs as tolerated          Follow-up: 1 week

## 2024-05-28 ENCOUNTER — HOSPITAL ENCOUNTER (OUTPATIENT)
Dept: WOUND CARE | Facility: HOSPITAL | Age: 89
Discharge: HOME | End: 2024-05-28
Attending: PEDIATRICS
Payer: MEDICARE

## 2024-05-28 VITALS
HEART RATE: 85 BPM | SYSTOLIC BLOOD PRESSURE: 146 MMHG | TEMPERATURE: 99 F | DIASTOLIC BLOOD PRESSURE: 71 MMHG | OXYGEN SATURATION: 95 % | RESPIRATION RATE: 20 BRPM

## 2024-05-28 DIAGNOSIS — L97.522 FOOT ULCER WITH FAT LAYER EXPOSED, LEFT: Primary | ICD-10-CM

## 2024-05-28 PROCEDURE — 27000999 HC MEDICAL RECORD PHOTO DOCUMENTATION

## 2024-05-28 PROCEDURE — 99213 OFFICE O/P EST LOW 20 MIN: CPT | Mod: ,,, | Performed by: PEDIATRICS

## 2024-05-28 PROCEDURE — 99213 OFFICE O/P EST LOW 20 MIN: CPT | Mod: 27

## 2024-05-28 NOTE — PROGRESS NOTES
Subjective:       Patient ID: Tali Beard is a 88 y.o. female.    Chief Complaint: Foot Ulcer (Left medial foot ulceration. Here for re-evaluation and treatment with MD. / /)    HPI  Review of Systems      Objective:      Temp:  [98.5 °F (36.9 °C)]   Pulse:  [85]   Resp:  [20]   BP: (146)/(71)   SpO2:  [95 %]   Physical Exam       Wound 05/07/24 0952 Ulceration Left medial Foot (Active)   05/07/24 0952   Present on Original Admission: Y   Primary Wound Type: Ulceration   Side: Left   Orientation: medial   Location: Foot   Wound Approximate Age at First Assessment (Weeks):    Wound Number:    Is this injury device related?:    Incision Type:    Closure Method:    Wound Description (Comments):    Type:    Additional Comments:    Ankle-Brachial Index:    Pulses: 2+ palpable DP and PT pulses, strong doppler signal to both   Removal Indication and Assessment:    Wound Outcome:    Wound Image     05/28/24 1318   Dressing Appearance Intact;Dried drainage 05/28/24 1318   Drainage Amount Scant 05/28/24 1318   Drainage Characteristics/Odor Serous 05/28/24 1318   Appearance Tan;Fibrin;Slough;Red;Granulating 05/28/24 1318   Tissue loss description Full thickness 05/28/24 1318   Red (%), Wound Tissue Color 40 % 05/28/24 1318   Yellow (%), Wound Tissue Color 60 % 05/28/24 1318   Periwound Area Intact;Dry 05/28/24 1318   Wound Edges Defined 05/28/24 1318   Wound Length (cm) 0.4 cm 05/28/24 1318   Wound Width (cm) 0.5 cm 05/28/24 1318   Wound Depth (cm) 0.1 cm 05/28/24 1318   Wound Volume (cm^3) 0.02 cm^3 05/28/24 1318   Wound Surface Area (cm^2) 0.2 cm^2 05/28/24 1318   Care Cleansed with:;Antimicrobial agent;Wound cleanser;Removed: 05/28/24 1318   Dressing Sodium chloride impregnated;Gauze;Bandaid 05/28/24 1318         Assessment:     Today wound is 0.4 cm x 0.5 cm with a depth of 0.1 cm there is some granulating tissue.  Also some slight slough.  This was cleaned and Vashe moistened and Mesalt was applied and covered  with Band-Aid.  This is improving and dressings will be changed daily.  Patient will return in 1 week for MD visit    ICD-10-CM ICD-9-CM   1. Foot ulcer with fat layer exposed, left  L97.522 707.15         Plan:   Tissue pathology and/or culture taken:  [] Yes [x] No   Sharp debridement performed:   [] Yes [x] No   Labs ordered this visit:   [] Yes [x] No   Imaging ordered this visit:   [] Yes [x] No           Orders Placed This Encounter   Procedures    Change dressing     Cleanse wound with: wound cleanser, NS or soap and water, pat dry  Primary dressing: apply Vashe moistened mesalt  Secondary dressing: cover with a Band-Aid, Do not leave open to air  Frequency: daily     Lidocaine: 2% topical gel prn prior to wound care   Silver nitrate: prn      Edema control: elevate legs as tolerated       Follow-up: 1 week        Follow up in about 1 week (around 6/4/2024) for MD visit.

## 2024-05-30 NOTE — PATIENT INSTRUCTIONS
Cleanse wound with: wound cleanser, NS or soap and water, pat dry  Apply skin prep to debbi wound, allow to dry   Primary dressing: apply a small amount of Woun'dres (yellow tube) followed by collagen -to wound bed  Secondary dressing: cover with a Band-Aid, Do not leave open to air  Frequency: every other day         Edema control: may wear compression stockings for control of swelling.       Follow-up: 1 week

## 2024-06-04 ENCOUNTER — HOSPITAL ENCOUNTER (OUTPATIENT)
Dept: WOUND CARE | Facility: HOSPITAL | Age: 89
Discharge: HOME | End: 2024-06-04
Attending: PEDIATRICS
Payer: MEDICARE

## 2024-06-04 VITALS
TEMPERATURE: 98 F | HEART RATE: 91 BPM | DIASTOLIC BLOOD PRESSURE: 80 MMHG | SYSTOLIC BLOOD PRESSURE: 145 MMHG | RESPIRATION RATE: 20 BRPM | OXYGEN SATURATION: 94 %

## 2024-06-04 DIAGNOSIS — L97.522 FOOT ULCER WITH FAT LAYER EXPOSED, LEFT: Primary | ICD-10-CM

## 2024-06-04 PROCEDURE — 27000999 HC MEDICAL RECORD PHOTO DOCUMENTATION

## 2024-06-04 PROCEDURE — 99213 OFFICE O/P EST LOW 20 MIN: CPT

## 2024-06-04 PROCEDURE — 99213 OFFICE O/P EST LOW 20 MIN: CPT | Mod: ,,, | Performed by: PEDIATRICS

## 2024-06-04 NOTE — PROGRESS NOTES
Subjective:       Patient ID: Tali Beard is a 88 y.o. female.    Chief Complaint: Foot Ulcer (Left medial foot ulceration. Here for re-evaluation and treatment with MD. / /)    HPI  Review of Systems      Objective:      Temp:  [98.1 °F (36.7 °C)]   Pulse:  [91]   Resp:  [20]   BP: (145)/(80)   SpO2:  [94 %]   Physical Exam       Wound 05/07/24 0952 Ulceration Left medial Foot (Active)   05/07/24 0952   Present on Original Admission: Y   Primary Wound Type: Ulceration   Side: Left   Orientation: medial   Location: Foot   Wound Approximate Age at First Assessment (Weeks):    Wound Number:    Is this injury device related?:    Incision Type:    Closure Method:    Wound Description (Comments):    Type:    Additional Comments:    Ankle-Brachial Index:    Pulses: 2+ palpable DP and PT pulses, strong doppler signal to both   Removal Indication and Assessment:    Wound Outcome:    Wound Image    06/04/24 1348   Dressing Appearance Intact;Dried drainage 06/04/24 1348   Drainage Amount Scant 06/04/24 1348   Drainage Characteristics/Odor Serosanguineous 06/04/24 1348   Appearance Red;Granulating 06/04/24 1348   Tissue loss description Full thickness 06/04/24 1348   Red (%), Wound Tissue Color 100 % 06/04/24 1348   Periwound Area Intact;Macerated 06/04/24 1348   Wound Edges Defined 06/04/24 1348   Wound Length (cm) 0.3 cm 06/04/24 1348   Wound Width (cm) 0.6 cm 06/04/24 1348   Wound Depth (cm) 0.2 cm 06/04/24 1348   Wound Volume (cm^3) 0.036 cm^3 06/04/24 1348   Wound Surface Area (cm^2) 0.18 cm^2 06/04/24 1348   Care Cleansed with:;Antimicrobial agent;Wound cleanser 06/04/24 1348   Dressing Applied;Collagen;Bandaid 06/04/24 1348         Assessment:     Today wound is 0.3 cm x 0.6 cm with a depth of 0.2 cm this is reddened granulated.  Area was cleaned and Woun'Dress was applied and collagen.  This was covered with Band-Aid.  Patient will change this every other day and patient will continue to wear compression  stockings.  Patient will return in 1 week for MD visit.    ICD-10-CM ICD-9-CM   1. Foot ulcer with fat layer exposed, left  L97.522 707.15         Plan:   Tissue pathology and/or culture taken:  [] Yes [x] No   Sharp debridement performed:   [] Yes [x] No   Labs ordered this visit:   [] Yes [x] No   Imaging ordered this visit:   [] Yes [x] No           Orders Placed This Encounter   Procedures    Change dressing     Cleanse wound with: wound cleanser, NS or soap and water, pat dry  Apply skin prep to debbi wound, allow to dry   Primary dressing: apply a small amount of Woun'dres (yellow tube) followed by collagen -to wound bed  Secondary dressing: cover with a Band-Aid, Do not leave open to air  Frequency: every other day         Edema control: may wear compression stockings for control of swelling.       Follow-up: 1 week        Follow up in about 1 week (around 6/11/2024) for MD visit.

## 2024-06-06 NOTE — PATIENT INSTRUCTIONS
Cleanse wound with: wound cleanser, NS or soap and water, pat dry  Apply skin prep to debbi wound, allow to dry   Primary dressing: apply a small piece (cut to fit) of hydrofera blue to wound bed   Secondary dressing: cover with 2x2 gauze and a Band-Aid, Do not leave open to air  Frequency: every day     Edema control: may wear compression stockings for control of swelling.       Follow-up: 1 week

## 2024-06-11 ENCOUNTER — HOSPITAL ENCOUNTER (OUTPATIENT)
Dept: WOUND CARE | Facility: HOSPITAL | Age: 89
Discharge: HOME | End: 2024-06-11
Attending: PEDIATRICS
Payer: MEDICARE

## 2024-06-11 VITALS
SYSTOLIC BLOOD PRESSURE: 157 MMHG | RESPIRATION RATE: 20 BRPM | OXYGEN SATURATION: 98 % | TEMPERATURE: 98 F | DIASTOLIC BLOOD PRESSURE: 70 MMHG | HEART RATE: 89 BPM

## 2024-06-11 DIAGNOSIS — L97.522 FOOT ULCER WITH FAT LAYER EXPOSED, LEFT: Primary | ICD-10-CM

## 2024-06-11 PROCEDURE — 99213 OFFICE O/P EST LOW 20 MIN: CPT | Mod: ,,, | Performed by: PEDIATRICS

## 2024-06-11 PROCEDURE — 99213 OFFICE O/P EST LOW 20 MIN: CPT

## 2024-06-11 PROCEDURE — 27000999 HC MEDICAL RECORD PHOTO DOCUMENTATION

## 2024-06-11 NOTE — PROGRESS NOTES
Subjective:       Patient ID: Tali Beard is a 88 y.o. female.    Chief Complaint: Venous Ulcer (Left foot venous ulceration.    Follow up with md for re-evaluation and treatment)    HPI  Review of Systems      Objective:      Temp:  [98 °F (36.7 °C)]   Pulse:  [89]   Resp:  [20]   BP: (157)/(70)   SpO2:  [98 %]   Physical Exam       Wound 05/07/24 0952 Ulceration Left medial Foot (Active)   05/07/24 0952   Present on Original Admission: Y   Primary Wound Type: Ulceration   Side: Left   Orientation: medial   Location: Foot   Wound Approximate Age at First Assessment (Weeks):    Wound Number:    Is this injury device related?:    Incision Type:    Closure Method:    Wound Description (Comments):    Type:    Additional Comments:    Ankle-Brachial Index:    Pulses: 2+ palpable DP and PT pulses, strong doppler signal to both   Removal Indication and Assessment:    Wound Outcome:    Wound Image   06/11/24 1345   Dressing Appearance Intact;Dried drainage 06/11/24 1345   Drainage Amount Scant 06/11/24 1345   Drainage Characteristics/Odor Creamy;Tan 06/11/24 1345   Appearance Red;Granulating;Tan;Fibrin 06/11/24 1345   Tissue loss description Full thickness 06/11/24 1345   Red (%), Wound Tissue Color 100 % 06/11/24 1345   Periwound Area Intact;Macerated 06/11/24 1345   Wound Edges Defined 06/11/24 1345   Wound Length (cm) 0.4 cm 06/11/24 1345   Wound Width (cm) 0.6 cm 06/11/24 1345   Wound Depth (cm) 0.3 cm 06/11/24 1345   Wound Volume (cm^3) 0.072 cm^3 06/11/24 1345   Wound Surface Area (cm^2) 0.24 cm^2 06/11/24 1345   Care Cleansed with:;Antimicrobial agent;Wound cleanser 06/11/24 1345   Dressing Applied;Methylene blue/gentian violet;Gauze;Bandaid 06/11/24 1345   Periwound Care Skin barrier film applied 06/11/24 1345         Assessment:     Today wound is red and granulating.  0.4 cm x 0.6 cm with a depth of 0.3 cm.  Area was cleaned.  Surrounding tissue slightly macerated.  Area was cleaned and Hydrofera Blue was  applied to the wound bed.  2 x 2 gauze was applied and Band-Aid.  Dressings will be changed every day.  Patient will wear compression stockings and patient return in week for MD  follow up.    ICD-10-CM ICD-9-CM   1. Foot ulcer with fat layer exposed, left  L97.522 707.15         Plan:   Tissue pathology and/or culture taken:  [] Yes [x] No   Sharp debridement performed:   [] Yes [x] No   Labs ordered this visit:   [] Yes [x] No   Imaging ordered this visit:   [] Yes [x] No           Orders Placed This Encounter   Procedures    Change dressing     Cleanse wound with: wound cleanser, NS or soap and water, pat dry  Apply skin prep to debbi wound, allow to dry   Primary dressing: apply a small piece (cut to fit) of hydrofera blue to wound bed   Secondary dressing: cover with 2x2 gauze and a Band-Aid, Do not leave open to air  Frequency: every day     Edema control: may wear compression stockings for control of swelling.       Follow-up: 1 week        Follow up in about 1 week (around 6/18/2024) for md visit .

## 2024-06-18 ENCOUNTER — HOSPITAL ENCOUNTER (OUTPATIENT)
Dept: WOUND CARE | Facility: HOSPITAL | Age: 89
Discharge: HOME | End: 2024-06-18
Attending: PEDIATRICS
Payer: MEDICARE

## 2024-06-18 VITALS
OXYGEN SATURATION: 99 % | SYSTOLIC BLOOD PRESSURE: 148 MMHG | RESPIRATION RATE: 20 BRPM | TEMPERATURE: 98 F | DIASTOLIC BLOOD PRESSURE: 67 MMHG | HEART RATE: 83 BPM

## 2024-06-18 DIAGNOSIS — L97.522 FOOT ULCER WITH FAT LAYER EXPOSED, LEFT: Primary | ICD-10-CM

## 2024-06-18 PROCEDURE — 27000999 HC MEDICAL RECORD PHOTO DOCUMENTATION

## 2024-06-18 PROCEDURE — 99213 OFFICE O/P EST LOW 20 MIN: CPT | Mod: ,,, | Performed by: PEDIATRICS

## 2024-06-18 PROCEDURE — 99213 OFFICE O/P EST LOW 20 MIN: CPT

## 2024-06-18 NOTE — PROGRESS NOTES
Subjective:       Patient ID: Tali Beard is a 88 y.o. female.    Chief Complaint: Venous Ulcer (Left foot venous ulceration. Follow up with MD for re-evaluation and treatment)    HPI  Review of Systems      Objective:      Temp:  [98.1 °F (36.7 °C)]   Pulse:  [83]   Resp:  [20]   BP: (148)/(67)   SpO2:  [99 %]   Physical Exam       Wound 05/07/24 0952 Ulceration Left medial Foot (Active)   05/07/24 0952   Present on Original Admission: Y   Primary Wound Type: Ulceration   Side: Left   Orientation: medial   Location: Foot   Wound Approximate Age at First Assessment (Weeks):    Wound Number:    Is this injury device related?:    Incision Type:    Closure Method:    Wound Description (Comments):    Type:    Additional Comments:    Ankle-Brachial Index:    Pulses: 2+ palpable DP and PT pulses, strong doppler signal to both   Removal Indication and Assessment:    Wound Outcome:    Wound Image    06/18/24 1332   Dressing Appearance Intact;Dried drainage 06/18/24 1332   Drainage Amount Scant 06/18/24 1332   Drainage Characteristics/Odor Serous 06/18/24 1332   Appearance Red;Granulating;Tan;Fibrin 06/18/24 1332   Tissue loss description Full thickness 06/18/24 1332   Red (%), Wound Tissue Color 50 % 06/18/24 1332   Periwound Area Intact;Dry 06/18/24 1332   Wound Edges Defined 06/18/24 1332   Wound Length (cm) 0.5 cm 06/18/24 1332   Wound Width (cm) 0.7 cm 06/18/24 1332   Wound Depth (cm) 0.3 cm 06/18/24 1332   Wound Volume (cm^3) 0.105 cm^3 06/18/24 1332   Wound Surface Area (cm^2) 0.35 cm^2 06/18/24 1332   Care Cleansed with:;Antimicrobial agent;Wound cleanser 06/18/24 1332   Dressing Applied;Methylene blue/gentian violet;Gauze;Bandaid 06/18/24 1332   Periwound Care Skin barrier film applied 06/18/24 1332         Assessment:     Today wound is red and granulating.  0.5 cm x 0.7 cm with a depth of 0.3 cm.  This is improving.  Hydrofera Blue was applied with bandage.  This dressing will be changed every other day.   Patient will continue to wear compression stockings and patient will return in 1 week for MD visit.    ICD-10-CM ICD-9-CM   1. Foot ulcer with fat layer exposed, left  L97.522 707.15         Plan:   Tissue pathology and/or culture taken:  [] Yes [x] No   Sharp debridement performed:   [] Yes [x] No   Labs ordered this visit:   [] Yes [x] No   Imaging ordered this visit:   [] Yes [x] No           Orders Placed This Encounter   Procedures    Change dressing     Cleanse wound with: wound cleanser, NS or soap and water, pat dry  Apply skin prep to debbi wound, allow to dry   Primary dressing: apply a small piece (cut to fit) of hydrofera blue to wound bed   Secondary dressing: cover with 2x2 gauze and a Band-Aid, Do not leave open to air  Frequency: every day     Edema control: may wear compression stockings for control of swelling.       Follow-up: 1 week        Follow up in about 1 week (around 6/25/2024) for MD visit.

## 2024-06-25 ENCOUNTER — HOSPITAL ENCOUNTER (OUTPATIENT)
Dept: WOUND CARE | Facility: HOSPITAL | Age: 89
Discharge: HOME | End: 2024-06-25
Attending: PEDIATRICS
Payer: MEDICARE

## 2024-06-25 VITALS
RESPIRATION RATE: 20 BRPM | DIASTOLIC BLOOD PRESSURE: 87 MMHG | SYSTOLIC BLOOD PRESSURE: 155 MMHG | HEART RATE: 73 BPM | OXYGEN SATURATION: 100 % | TEMPERATURE: 98 F

## 2024-06-25 DIAGNOSIS — L97.522 FOOT ULCER WITH FAT LAYER EXPOSED, LEFT: Primary | ICD-10-CM

## 2024-06-25 PROCEDURE — 99213 OFFICE O/P EST LOW 20 MIN: CPT

## 2024-06-25 PROCEDURE — 99213 OFFICE O/P EST LOW 20 MIN: CPT | Mod: ,,, | Performed by: PEDIATRICS

## 2024-06-25 RX ORDER — CLOPIDOGREL BISULFATE 75 MG/1
TABLET ORAL
COMMUNITY
Start: 2024-06-24

## 2024-06-25 NOTE — PROGRESS NOTES
Subjective:       Patient ID: Tali Beard is a 88 y.o. female.    Chief Complaint: Venous Ulcer (Left foot venous ulceration. Follow up with MD for re-evaluation and treatment/ /)    HPI  Review of Systems      Objective:      Temp:  [98.2 °F (36.8 °C)]   Pulse:  [73]   Resp:  [20]   BP: (155)/(87)   SpO2:  [100 %]   Physical Exam       Wound 05/07/24 0952 Ulceration Left medial Foot (Active)   05/07/24 0952   Present on Original Admission: Y   Primary Wound Type: Ulceration   Side: Left   Orientation: medial   Location: Foot   Wound Approximate Age at First Assessment (Weeks):    Wound Number:    Is this injury device related?:    Incision Type:    Closure Method:    Wound Description (Comments):    Type:    Additional Comments:    Ankle-Brachial Index:    Pulses: 2+ palpable DP and PT pulses, strong doppler signal to both   Removal Indication and Assessment:    Wound Outcome:    Dressing Appearance Intact;Dried drainage 06/25/24 1348   Drainage Amount Scant 06/25/24 1348   Drainage Characteristics/Odor Serous 06/25/24 1348   Appearance Pink;Granulating;White;Fibrin 06/25/24 1348   Tissue loss description Full thickness 06/25/24 1348   Red (%), Wound Tissue Color 90 % 06/25/24 1348   Periwound Area Intact;Dry 06/25/24 1348   Wound Edges Defined 06/25/24 1348   Wound Length (cm) 0.5 cm 06/25/24 1348   Wound Width (cm) 0.7 cm 06/25/24 1348   Wound Depth (cm) 0.2 cm 06/25/24 1348   Wound Volume (cm^3) 0.07 cm^3 06/25/24 1348   Wound Surface Area (cm^2) 0.35 cm^2 06/25/24 1348   Care Cleansed with:;Antimicrobial agent;Wound cleanser 06/25/24 1348   Dressing Applied;Methylene blue/gentian violet;Gauze;Bandaid 06/25/24 1348   Periwound Care Skin barrier film applied 06/25/24 1348         Assessment:     Today wound is 0.5 cm by 0.7 cm with a depth of 0.2 cm.  It is pink and granulating.  This is improving.  Healing is slow because area has been irradiated in the past.  Area was cleaned and Hydrofera Blue was put  in the wound and Band-Aid was applied.  Patient return in 1 week for MD visit.    ICD-10-CM ICD-9-CM   1. Foot ulcer with fat layer exposed, left  L97.522 707.15         Plan:   Tissue pathology and/or culture taken:  [] Yes [x] No   Sharp debridement performed:   [] Yes [x] No   Labs ordered this visit:   [] Yes [x] No   Imaging ordered this visit:   [] Yes [x] No           Orders Placed This Encounter   Procedures    Change dressing     Cleanse wound with: wound cleanser, NS or soap and water, pat dry  Apply skin prep to debbi wound, allow to dry   Primary dressing: apply a small piece (cut to fit) of hydrofera blue to wound bed   Secondary dressing: cover with 2x2 gauze and a Band-Aid, Do not leave open to air  Frequency: every day     Edema control: may wear compression stockings for control of swelling.       Follow-up: 1 week        Follow up in about 1 week (around 7/2/2024) for MD visit.

## 2024-07-02 ENCOUNTER — HOSPITAL ENCOUNTER (OUTPATIENT)
Dept: WOUND CARE | Facility: HOSPITAL | Age: 89
Discharge: HOME | End: 2024-07-02
Attending: PEDIATRICS
Payer: MEDICARE

## 2024-07-02 VITALS
SYSTOLIC BLOOD PRESSURE: 147 MMHG | DIASTOLIC BLOOD PRESSURE: 71 MMHG | HEART RATE: 84 BPM | RESPIRATION RATE: 20 BRPM | TEMPERATURE: 99 F | OXYGEN SATURATION: 99 %

## 2024-07-02 DIAGNOSIS — L97.522 FOOT ULCER WITH FAT LAYER EXPOSED, LEFT: Primary | ICD-10-CM

## 2024-07-02 PROCEDURE — 11055 PARING/CUTG B9 HYPRKER LES 1: CPT

## 2024-07-02 PROCEDURE — 99213 OFFICE O/P EST LOW 20 MIN: CPT

## 2024-07-02 PROCEDURE — 99499 UNLISTED E&M SERVICE: CPT | Mod: ICN,,, | Performed by: PEDIATRICS

## 2024-07-02 PROCEDURE — 27000999 HC MEDICAL RECORD PHOTO DOCUMENTATION

## 2024-07-02 PROCEDURE — 11055 PARING/CUTG B9 HYPRKER LES 1: CPT | Mod: ,,, | Performed by: PEDIATRICS

## 2024-07-02 RX ORDER — LIDOCAINE 40 MG/G
CREAM TOPICAL
Status: DISPENSED
Start: 2024-07-02 | End: 2024-07-02

## 2024-07-02 NOTE — PROGRESS NOTES
Subjective:       Patient ID: Tali Beard is a 88 y.o. female.    Chief Complaint: Venous Ulcer (Left foot venous ulceration. Follow up with MD for re-evaluation and treatment)    HPI  Review of Systems      Objective:      Temp:  [98.8 °F (37.1 °C)]   Pulse:  [84]   Resp:  [20]   BP: (147)/(71)   SpO2:  [99 %]   Physical Exam       Wound 05/07/24 0952 Ulceration Left medial Foot (Active)   05/07/24 0952   Present on Original Admission: Y   Primary Wound Type: Ulceration   Side: Left   Orientation: medial   Location: Foot   Wound Approximate Age at First Assessment (Weeks):    Wound Number:    Is this injury device related?:    Incision Type:    Closure Method:    Wound Description (Comments):    Type:    Additional Comments:    Ankle-Brachial Index:    Pulses: 2+ palpable DP and PT pulses, strong doppler signal to both   Removal Indication and Assessment:    Wound Outcome:    Wound Image     07/02/24 0926   Dressing Appearance Intact;Dried drainage 07/02/24 0926   Drainage Amount Scant 07/02/24 0926   Drainage Characteristics/Odor Serous 07/02/24 0926   Appearance Pink;Not granulating;White;Fibrin 07/02/24 0926   Tissue loss description Full thickness 07/02/24 0926   Periwound Area Intact;Dry;Scar tissue 07/02/24 0926   Wound Edges Rolled/closed;Callused 07/02/24 0926   Wound Length (cm) 0.5 cm 07/02/24 0926   Wound Width (cm) 0.6 cm 07/02/24 0926   Wound Depth (cm) 0.3 cm 07/02/24 0926   Wound Volume (cm^3) 0.09 cm^3 07/02/24 0926   Wound Surface Area (cm^2) 0.3 cm^2 07/02/24 0926   Care Cleansed with:;Antimicrobial agent;Wound cleanser 07/02/24 0926   Dressing Applied;Methylene blue/gentian violet;Gauze;Bandaid 07/02/24 0926         Assessment:     Today wound area is somewhat pale.  There is definite rolled callus.  0.5 cm x 0.6 cm with a depth of 0.3 cm.  Area was cleaned and callus was pared with curette.  This was packed with Hydrofera Blue and Band-Aid.  Dressings will be changed daily.  Patient  will return in 1 week for MD visit.    ICD-10-CM ICD-9-CM   1. Foot ulcer with fat layer exposed, left  L97.522 707.15         Plan:   Tissue pathology and/or culture taken:  [] Yes [x] No   Sharp debridement performed:   [] Yes [x] No   Labs ordered this visit:   [] Yes [x] No   Imaging ordered this visit:   [] Yes [x] No           Orders Placed This Encounter   Procedures    Change dressing     Cleanse wound with: wound cleanser, NS or soap and water, pat dry  Primary dressing: apply hydrofera blue (cut slightly larger than wound)  Secondary dressing: cover with rolled 2x2 gauze (for light pressure) and a Band-Aid, Do not leave open to air  Frequency: every day     Edema control: may wear compression stockings for control of swelling.       Follow-up: 1 week        Follow up in about 1 week (around 7/9/2024) for MD visit.

## 2024-07-03 NOTE — PATIENT INSTRUCTIONS
Cleanse wound with: wound cleanser, NS or soap and water, pat dry  Primary dressing: apply hydrofera blue (cut slightly larger than wound)  Secondary dressing: cover with rolled 2x2 gauze (for light pressure) and a Band-Aid, Do not leave open to air  Frequency: every day     Edema control: may wear compression stockings for control of swelling.      Begin taking Vitamin C 1000mg by mouth twice daily  Vitamin E 400 iu daily  Zinc 50mg once daily  Vitamin D 1000 once daily      Follow-up: 1 week

## 2024-07-09 ENCOUNTER — HOSPITAL ENCOUNTER (OUTPATIENT)
Dept: WOUND CARE | Facility: HOSPITAL | Age: 89
Discharge: HOME | End: 2024-07-09
Attending: PEDIATRICS
Payer: MEDICARE

## 2024-07-09 VITALS
RESPIRATION RATE: 20 BRPM | TEMPERATURE: 98 F | HEART RATE: 58 BPM | SYSTOLIC BLOOD PRESSURE: 136 MMHG | DIASTOLIC BLOOD PRESSURE: 74 MMHG | OXYGEN SATURATION: 98 %

## 2024-07-09 DIAGNOSIS — L97.522 FOOT ULCER WITH FAT LAYER EXPOSED, LEFT: Primary | ICD-10-CM

## 2024-07-09 PROCEDURE — 27000999 HC MEDICAL RECORD PHOTO DOCUMENTATION

## 2024-07-09 PROCEDURE — 99213 OFFICE O/P EST LOW 20 MIN: CPT | Mod: ,,, | Performed by: PEDIATRICS

## 2024-07-09 PROCEDURE — 99213 OFFICE O/P EST LOW 20 MIN: CPT

## 2024-07-09 RX ORDER — LIDOCAINE 40 MG/G
CREAM TOPICAL
Status: DISCONTINUED
Start: 2024-07-09 | End: 2024-07-10 | Stop reason: HOSPADM

## 2024-07-09 NOTE — PROGRESS NOTES
Subjective:       Patient ID: Tali Beard is a 88 y.o. female.    Chief Complaint: Venous Ulcer (Left foot venous ulceration. Follow up with MD for re-evaluation and treatment/ /)    HPI  Review of Systems      Objective:      Temp:  [98.4 °F (36.9 °C)]   Pulse:  [58]   Resp:  [20]   BP: (136)/(74)   SpO2:  [98 %]   Physical Exam       Wound 05/07/24 0952 Ulceration Left medial Foot (Active)   05/07/24 0952   Present on Original Admission: Y   Primary Wound Type: Ulceration   Side: Left   Orientation: medial   Location: Foot   Wound Approximate Age at First Assessment (Weeks):    Wound Number:    Is this injury device related?:    Incision Type:    Closure Method:    Wound Description (Comments):    Type:    Additional Comments:    Ankle-Brachial Index:    Pulses: 2+ palpable DP and PT pulses, strong doppler signal to both   Removal Indication and Assessment:    Wound Outcome:    Wound Image    07/09/24 1359   Dressing Appearance Intact;Dried drainage 07/09/24 1359   Drainage Amount Scant 07/09/24 1359   Drainage Characteristics/Odor Serous 07/09/24 1359   Appearance Pink;Not granulating;White;Fibrin;Red 07/09/24 1359   Tissue loss description Full thickness 07/09/24 1359   Periwound Area Intact;Dry;Scar tissue 07/09/24 1359   Wound Edges Callused 07/09/24 1359   Wound Length (cm) 0.3 cm 07/09/24 1359   Wound Width (cm) 0.7 cm 07/09/24 1359   Wound Depth (cm) 0.2 cm 07/09/24 1359   Wound Volume (cm^3) 0.042 cm^3 07/09/24 1359   Wound Surface Area (cm^2) 0.21 cm^2 07/09/24 1359   Care Cleansed with:;Sterile normal saline 07/09/24 1359   Dressing Applied;Methylene blue/gentian violet;Gauze;Bandaid 07/09/24 1359         Assessment:     Today there was dried drainage in the wound.  This was serous.  There was whitish slough areas.  Drainage was removed with curette and Q tip.  There was red granulating tissue underneath.  Area was 0.3 cm x 0.7 cm with a depth of 0.2 cm.  This is smaller.  Hydrofera Blue was  applied and a pressure dressing was applied.  This will be changed every day.  Patient start on vitamin therapy.  Patient will return in 1 week for MD follow up    ICD-10-CM ICD-9-CM   1. Foot ulcer with fat layer exposed, left  L97.522 707.15         Plan:   Tissue pathology and/or culture taken:  [] Yes [x] No   Sharp debridement performed:   [] Yes [x] No   Labs ordered this visit:   [] Yes [x] No   Imaging ordered this visit:   [] Yes [x] No           Orders Placed This Encounter   Procedures    Change dressing     Cleanse wound with: wound cleanser, NS or soap and water, pat dry  Primary dressing: apply hydrofera blue (cut slightly larger than wound)  Secondary dressing: cover with rolled 2x2 gauze (for light pressure) and a Band-Aid, Do not leave open to air  Frequency: every day     Edema control: may wear compression stockings for control of swelling.      Begin taking Vitamin C 1000mg by mouth twice daily  Vitamin E 400 iu daily  Zinc 50mg once daily  Vitamin D 1000 once daily      Follow-up: 1 week        Follow up in about 1 week (around 7/16/2024) for MD visit.

## 2024-07-16 ENCOUNTER — HOSPITAL ENCOUNTER (OUTPATIENT)
Dept: WOUND CARE | Facility: HOSPITAL | Age: 89
Discharge: HOME | End: 2024-07-16
Attending: PEDIATRICS
Payer: MEDICARE

## 2024-07-16 VITALS
TEMPERATURE: 98 F | SYSTOLIC BLOOD PRESSURE: 154 MMHG | OXYGEN SATURATION: 100 % | HEART RATE: 71 BPM | RESPIRATION RATE: 18 BRPM | DIASTOLIC BLOOD PRESSURE: 75 MMHG

## 2024-07-16 DIAGNOSIS — L97.522 FOOT ULCER WITH FAT LAYER EXPOSED, LEFT: Primary | ICD-10-CM

## 2024-07-16 PROCEDURE — 99212 OFFICE O/P EST SF 10 MIN: CPT

## 2024-07-16 PROCEDURE — 99213 OFFICE O/P EST LOW 20 MIN: CPT | Mod: ,,, | Performed by: PEDIATRICS

## 2024-07-16 PROCEDURE — 27000999 HC MEDICAL RECORD PHOTO DOCUMENTATION

## 2024-07-16 NOTE — PROGRESS NOTES
Subjective:       Patient ID: Tali Beard is a 88 y.o. female.    Chief Complaint: Non-healing Wound Follow Up (L foot ulceration. Follow up with md for re-evaluation and treatment with md )    HPI  Review of Systems      Objective:      Temp:  [98.1 °F (36.7 °C)]   Pulse:  [71]   Resp:  [18]   BP: (154)/(75)   SpO2:  [100 %]   Physical Exam       Wound 05/07/24 0952 Ulceration Left medial Foot (Active)   05/07/24 0952   Present on Original Admission: Y   Primary Wound Type: Ulceration   Side: Left   Orientation: medial   Location: Foot   Wound Approximate Age at First Assessment (Weeks):    Wound Number:    Is this injury device related?:    Incision Type:    Closure Method:    Wound Description (Comments):    Type:    Additional Comments:    Ankle-Brachial Index:    Pulses: 2+ palpable DP and PT pulses, strong doppler signal to both   Removal Indication and Assessment:    Wound Outcome:    Wound Image   07/16/24 1322   Dressing Appearance Intact;Moist drainage 07/16/24 1322   Drainage Amount Scant 07/16/24 1322   Drainage Characteristics/Odor Serous 07/16/24 1322   Appearance Pink;Granulating 07/16/24 1322   Tissue loss description Full thickness 07/16/24 1322   Red (%), Wound Tissue Color 100 % 07/16/24 1322   Periwound Area Intact;Hemosiderin Staining;Dry 07/16/24 1322   Wound Length (cm) 0.3 cm 07/16/24 1322   Wound Width (cm) 0.5 cm 07/16/24 1322   Wound Depth (cm) 0.2 cm 07/16/24 1322   Wound Volume (cm^3) 0.03 cm^3 07/16/24 1322   Wound Surface Area (cm^2) 0.15 cm^2 07/16/24 1322   Care Cleansed with:;Antimicrobial agent;Wound cleanser 07/16/24 1322   Dressing Applied;Methylene blue/gentian violet;Foam;Gauze;Bandaid 07/16/24 1322   Periwound Care Skin barrier film applied 07/16/24 1322         Assessment:     Today wound is 0.3 cm x 0.5 cm with a depth of 0.2 cm.  Area is now pink and granulating.  Area was cleaned and Hydrofera Blue applied.  Also foam and gauze.  Dressings will be changed daily  and patient is return in 1 week for MD visit.    ICD-10-CM ICD-9-CM   1. Foot ulcer with fat layer exposed, left  L97.522 707.15         Plan:   Tissue pathology and/or culture taken:  [] Yes [x] No   Sharp debridement performed:   [] Yes [x] No   Labs ordered this visit:   [] Yes [x] No   Imaging ordered this visit:   [] Yes [x] No           Orders Placed This Encounter   Procedures    Change dressing     Cleanse wound with: wound cleanser, NS or soap and water, pat dry  Primary dressing: apply hydrofera blue (cut slightly larger than wound)  Secondary dressing: cover with rolled 2x2 gauze (for light pressure) and a Band-Aid, Do not leave open to air  Frequency: every day     Edema control: may wear compression stockings for control of swelling.       Begin taking Vitamin C 1000mg by mouth twice daily  Vitamin E 400 iu daily  Zinc 50mg once daily  Vitamin D 1000 once daily      Follow-up: 1 week        Follow up in about 1 week (around 7/23/2024) for md visit .

## 2024-07-23 ENCOUNTER — HOSPITAL ENCOUNTER (OUTPATIENT)
Dept: WOUND CARE | Facility: HOSPITAL | Age: 89
Discharge: HOME | End: 2024-07-23
Attending: PEDIATRICS
Payer: MEDICARE

## 2024-07-23 VITALS
TEMPERATURE: 98 F | DIASTOLIC BLOOD PRESSURE: 73 MMHG | SYSTOLIC BLOOD PRESSURE: 158 MMHG | RESPIRATION RATE: 18 BRPM | HEART RATE: 68 BPM | OXYGEN SATURATION: 97 %

## 2024-07-23 DIAGNOSIS — L60.2 HYPERTROPHIC TOENAIL: ICD-10-CM

## 2024-07-23 DIAGNOSIS — L97.522 FOOT ULCER WITH FAT LAYER EXPOSED, LEFT: Primary | ICD-10-CM

## 2024-07-23 PROCEDURE — 99499 UNLISTED E&M SERVICE: CPT | Mod: ICN,,, | Performed by: PEDIATRICS

## 2024-07-23 PROCEDURE — 99213 OFFICE O/P EST LOW 20 MIN: CPT | Mod: 25

## 2024-07-23 PROCEDURE — 11719 TRIM NAIL(S) ANY NUMBER: CPT | Mod: ,,, | Performed by: PEDIATRICS

## 2024-07-23 PROCEDURE — 11721 DEBRIDE NAIL 6 OR MORE: CPT

## 2024-07-23 PROCEDURE — 27000999 HC MEDICAL RECORD PHOTO DOCUMENTATION

## 2024-07-23 NOTE — PROGRESS NOTES
Subjective:       Patient ID: Tali Beard is a 88 y.o. female.    Chief Complaint: Non-healing Wound Follow Up (L foot ulceration. Follow up with MD for re-evaluation and treatment/ /)    HPI  Review of Systems      Objective:      Temp:  [98.2 °F (36.8 °C)]   Pulse:  [68]   Resp:  [18]   BP: (158)/(73)   SpO2:  [97 %]   Physical Exam       Wound 05/07/24 0952 Ulceration Left medial Foot (Active)   05/07/24 0952   Present on Original Admission: Y   Primary Wound Type: Ulceration   Side: Left   Orientation: medial   Location: Foot   Wound Approximate Age at First Assessment (Weeks):    Wound Number:    Is this injury device related?:    Incision Type:    Closure Method:    Wound Description (Comments):    Type:    Additional Comments:    Ankle-Brachial Index:    Pulses: 2+ palpable DP and PT pulses, strong doppler signal to both   Removal Indication and Assessment:    Wound Outcome:    Wound Image    07/23/24 1430   Dressing Appearance Intact;Dry 07/23/24 1430   Drainage Amount None 07/23/24 1430   Appearance Pink;Granulating 07/23/24 1430   Tissue loss description Full thickness 07/23/24 1430   Red (%), Wound Tissue Color 100 % 07/23/24 1430   Periwound Area Intact;Dry;Hemosiderin Staining 07/23/24 1430   Wound Length (cm) 0.3 cm 07/23/24 1430   Wound Width (cm) 0.6 cm 07/23/24 1430   Wound Depth (cm) 0.1 cm 07/23/24 1430   Wound Volume (cm^3) 0.018 cm^3 07/23/24 1430   Wound Surface Area (cm^2) 0.18 cm^2 07/23/24 1430   Care Cleansed with:;Antimicrobial agent;Wound cleanser 07/23/24 1430   Dressing Applied;Methylene blue/gentian violet;Foam;Gauze;Bandaid 07/23/24 1430         Assessment:     Today ulceration of left foot is 0.3 cm x 0.6 cm with a depth of 0.1 cm this is much more shallow.  Tissue is pink and granulating.  This was cleaned and Hydrofera Blue was applied with foam and gauze and Band-Aid.  All 10 toes had hypertrophic nails and these were trimmed with nail clippers.  Patient will change  dressings daily and return in 1 week for MD visit    ICD-10-CM ICD-9-CM   1. Foot ulcer with fat layer exposed, left  L97.522 707.15         Plan:   Tissue pathology and/or culture taken:  [] Yes [x] No   Sharp debridement performed:   [] Yes [x] No   Labs ordered this visit:   [] Yes [x] No   Imaging ordered this visit:   [] Yes [x] No           Orders Placed This Encounter   Procedures    Change dressing     Cleanse wound with: wound cleanser, NS or soap and water, pat dry  Primary dressing: apply hydrofera blue (cut slightly larger than wound)  Secondary dressing: cover with rolled 2x2 gauze (for light pressure) and a Band-Aid, Do not leave open to air  Frequency: every day     Edema control: may wear compression stockings for control of swelling.       Begin taking Vitamin C 1000mg by mouth twice daily  Vitamin E 400 iu daily  Zinc 50mg once daily  Vitamin D 1000 once daily      Follow-up: 1 week        Follow up in about 1 week (around 7/30/2024) for MD visit.

## 2024-07-23 NOTE — ADDENDUM NOTE
Encounter addended by: Enedina Olson LPN on: 7/23/2024 4:14 PM   Actions taken: Charge Capture section accepted

## 2024-07-30 ENCOUNTER — HOSPITAL ENCOUNTER (OUTPATIENT)
Dept: WOUND CARE | Facility: HOSPITAL | Age: 89
Discharge: HOME | End: 2024-07-30
Attending: PEDIATRICS
Payer: MEDICARE

## 2024-07-30 VITALS
SYSTOLIC BLOOD PRESSURE: 148 MMHG | RESPIRATION RATE: 18 BRPM | TEMPERATURE: 98 F | OXYGEN SATURATION: 97 % | DIASTOLIC BLOOD PRESSURE: 74 MMHG | HEART RATE: 65 BPM

## 2024-07-30 DIAGNOSIS — L97.522 FOOT ULCER WITH FAT LAYER EXPOSED, LEFT: Primary | ICD-10-CM

## 2024-07-30 PROCEDURE — 27000999 HC MEDICAL RECORD PHOTO DOCUMENTATION

## 2024-07-30 PROCEDURE — 99213 OFFICE O/P EST LOW 20 MIN: CPT | Mod: ,,, | Performed by: PEDIATRICS

## 2024-07-30 PROCEDURE — 99213 OFFICE O/P EST LOW 20 MIN: CPT

## 2024-07-30 NOTE — PROGRESS NOTES
Subjective:       Patient ID: Tali Beard is a 88 y.o. female.    Chief Complaint: Non-healing Wound Follow Up (L foot ulceration. Follow up with MD for re-evaluation and treatment)    HPI  Review of Systems      Objective:      Temp:  [98.1 °F (36.7 °C)]   Pulse:  [65]   Resp:  [18]   BP: (148)/(74)   SpO2:  [97 %]   Physical Exam       Wound 05/07/24 0952 Ulceration Left medial Foot (Active)   05/07/24 0952   Present on Original Admission: Y   Primary Wound Type: Ulceration   Side: Left   Orientation: medial   Location: Foot   Wound Approximate Age at First Assessment (Weeks):    Wound Number:    Is this injury device related?:    Incision Type:    Closure Method:    Wound Description (Comments):    Type:    Additional Comments:    Ankle-Brachial Index:    Pulses: 2+ palpable DP and PT pulses, strong doppler signal to both   Removal Indication and Assessment:    Wound Outcome:    Wound Image   07/30/24 1436   Dressing Appearance Intact;Dried drainage 07/30/24 1436   Drainage Amount Scant 07/30/24 1436   Drainage Characteristics/Odor Serous 07/30/24 1436   Appearance Pink;Granulating 07/30/24 1436   Tissue loss description Full thickness 07/30/24 1436   Red (%), Wound Tissue Color 100 % 07/30/24 1436   Periwound Area Intact;Hemosiderin Staining 07/30/24 1436   Wound Length (cm) 0.3 cm 07/30/24 1436   Wound Width (cm) 0.5 cm 07/30/24 1436   Wound Depth (cm) 0.1 cm 07/30/24 1436   Wound Volume (cm^3) 0.015 cm^3 07/30/24 1436   Wound Surface Area (cm^2) 0.15 cm^2 07/30/24 1436   Care Cleansed with:;Sterile normal saline 07/30/24 1436   Dressing Applied;Methylene blue/gentian violet;Foam;Gauze;Bandaid 07/30/24 1436         Assessment:     Today wound is 0.3 cm x 0.5 cm with a depth of 0.1 cm.  This is pink and granulating.  This is smaller and improved this was cleaned and Hydrofera Blue was applied and will be changed daily and patient will return in 1 week for MD visit.    ICD-10-CM ICD-9-CM   1. Foot ulcer  with fat layer exposed, left  L97.525 707.15         Plan:   Tissue pathology and/or culture taken:  [] Yes [x] No   Sharp debridement performed:   [] Yes [x] No   Labs ordered this visit:   [] Yes [x] No   Imaging ordered this visit:   [] Yes [x] No           Orders Placed This Encounter   Procedures    Change dressing     Cleanse wound with: wound cleanser, NS or soap and water, pat dry  Primary dressing: apply hydrofera blue (cut slightly larger than wound)  Secondary dressing: cover with rolled 2x2 gauze (for light pressure) and a Band-Aid, Do not leave open to air  Frequency: every day     Edema control: may wear compression stockings for control of swelling.       Begin taking Vitamin C 1000mg by mouth twice daily  Vitamin E 400 iu daily  Zinc 50mg once daily  Vitamin D 1000 once daily      Follow-up: 1 week        Follow up in about 1 week (around 8/6/2024) for MD visit.

## 2024-08-06 ENCOUNTER — HOSPITAL ENCOUNTER (OUTPATIENT)
Dept: WOUND CARE | Facility: HOSPITAL | Age: 89
Discharge: HOME | End: 2024-08-06
Attending: PEDIATRICS
Payer: MEDICARE

## 2024-08-06 VITALS
TEMPERATURE: 98 F | DIASTOLIC BLOOD PRESSURE: 76 MMHG | HEART RATE: 68 BPM | SYSTOLIC BLOOD PRESSURE: 151 MMHG | OXYGEN SATURATION: 100 % | RESPIRATION RATE: 18 BRPM

## 2024-08-06 DIAGNOSIS — L97.522 FOOT ULCER WITH FAT LAYER EXPOSED, LEFT: Primary | ICD-10-CM

## 2024-08-06 PROCEDURE — 99213 OFFICE O/P EST LOW 20 MIN: CPT

## 2024-08-06 PROCEDURE — 27000999 HC MEDICAL RECORD PHOTO DOCUMENTATION

## 2024-08-06 PROCEDURE — 99213 OFFICE O/P EST LOW 20 MIN: CPT | Mod: ,,, | Performed by: PEDIATRICS

## 2024-08-08 NOTE — PATIENT INSTRUCTIONS
Cleanse wound with: wound cleanser, NS or soap and water, pat dry  Apply skin prep to debbi wound, allow to dry   Primary dressing: apply hydrofera blue (cut slightly larger than wound)  Secondary dressing: cover with squared gauze (for light pressure) and a Band-Aid, Do not leave open to air  Frequency: every day     Edema control: may wear compression stockings for control of swelling.       Begin taking Vitamin C 1000mg by mouth twice daily  Vitamin E 400 iu daily  Zinc 50mg once daily  Vitamin D 1000 once daily      Follow-up: 1 week

## 2024-08-13 ENCOUNTER — HOSPITAL ENCOUNTER (OUTPATIENT)
Dept: WOUND CARE | Facility: HOSPITAL | Age: 89
Discharge: HOME | End: 2024-08-13
Attending: PEDIATRICS
Payer: MEDICARE

## 2024-08-13 VITALS
DIASTOLIC BLOOD PRESSURE: 66 MMHG | TEMPERATURE: 98 F | SYSTOLIC BLOOD PRESSURE: 148 MMHG | HEART RATE: 88 BPM | RESPIRATION RATE: 20 BRPM | OXYGEN SATURATION: 99 %

## 2024-08-13 DIAGNOSIS — L97.522 FOOT ULCER WITH FAT LAYER EXPOSED, LEFT: Primary | ICD-10-CM

## 2024-08-13 PROCEDURE — 97597 DBRDMT OPN WND 1ST 20 CM/<: CPT | Mod: ,,, | Performed by: PEDIATRICS

## 2024-08-13 PROCEDURE — 97597 DBRDMT OPN WND 1ST 20 CM/<: CPT

## 2024-08-13 PROCEDURE — 99213 OFFICE O/P EST LOW 20 MIN: CPT | Mod: 25,,, | Performed by: PEDIATRICS

## 2024-08-13 PROCEDURE — 99213 OFFICE O/P EST LOW 20 MIN: CPT

## 2024-08-13 PROCEDURE — 27000999 HC MEDICAL RECORD PHOTO DOCUMENTATION

## 2024-08-13 RX ORDER — LIDOCAINE 40 MG/G
CREAM TOPICAL
Status: DISPENSED
Start: 2024-08-13 | End: 2024-08-13

## 2024-08-13 NOTE — PROCEDURES
"Debridement    Date/Time: 8/13/2024 10:26 AM    Performed by: Trey Perez MD  Authorized by: Trey Perez MD  Associated wounds:        Wound 05/07/24 0952 Ulceration Left medial Foot  Time out: Immediately prior to procedure a "time out" was called to verify the correct patient, procedure, equipment, support staff and site/side marked as required.    Consent Done?:  Yes (Verbal) and Yes (Written)    Preparation: Patient was prepped and draped with clean technique    Local anesthesia used?: Yes    Local anesthetic:  Lidocaine 4%  Anesthetic total (ml):  2    Wound Details:    Location:  Left foot    Location:  Left Dorsal Foot    Type of Debridement:  Non-excisional       Length (cm):  0.3       Area (sq cm):  0.12       Width (cm):  0.4       Percent Debrided (%):  100       Depth (cm):  0.2       Total Area Debrided (sq cm):  0.12    Depth of debridement:  Epidermis/Dermis    Tissue debrided:  Dermis and Epidermis    Devitalized tissue debrided:  Biofilm and Slough    Instruments:  Curette  Bleeding:  None  "

## 2024-08-13 NOTE — PROGRESS NOTES
Subjective:       Patient ID: Tali Beard is a 88 y.o. female.    Chief Complaint: Venous Ulcer (Left foot ulceration. Follow up with MD for re-evaluation and treatment/ /)    HPI  Review of Systems      Objective:      Temp:  [98 °F (36.7 °C)]   Pulse:  [88]   Resp:  [20]   BP: (148)/(66)   SpO2:  [99 %]   Physical Exam       Wound 05/07/24 0952 Ulceration Left medial Foot (Active)   05/07/24 0952   Present on Original Admission: Y   Primary Wound Type: Ulceration   Side: Left   Orientation: medial   Location: Foot   Wound Approximate Age at First Assessment (Weeks):    Wound Number:    Is this injury device related?:    Incision Type:    Closure Method:    Wound Description (Comments):    Type:    Additional Comments:    Ankle-Brachial Index:    Pulses: 2+ palpable DP and PT pulses, strong doppler signal to both   Removal Indication and Assessment:    Wound Outcome:    Wound Image     08/13/24 1042   Dressing Appearance Intact;Moist drainage 08/13/24 1042   Drainage Amount Scant 08/13/24 1042   Drainage Characteristics/Odor Serous 08/13/24 1042   Appearance Pink;Not granulating;Tan;Slough 08/13/24 1042   Tissue loss description Full thickness 08/13/24 1042   Red (%), Wound Tissue Color 90 % 08/13/24 1042   Periwound Area Intact;Macerated;Hemosiderin Staining 08/13/24 1042   Wound Length (cm) 0.3 cm 08/13/24 1042   Wound Width (cm) 0.8 cm 08/13/24 1042   Wound Depth (cm) 0.2 cm 08/13/24 1042   Wound Volume (cm^3) 0.048 cm^3 08/13/24 1042   Wound Surface Area (cm^2) 0.24 cm^2 08/13/24 1042   Care Cleansed with:;Antimicrobial agent;Wound cleanser;Debrided 08/13/24 1042   Dressing Applied;Methylene blue/gentian violet;Foam;Gauze;Bandaid 08/13/24 1042   Periwound Care Skin barrier film applied 08/13/24 1042   Compression Compression Stocking (20-30 mmHg) 08/13/24 1042         Assessment:     Today is 0.3 cm x 0.8 cm with a depth of 0.2 cm.  There is some slough and Biofilm.  Surrounding area was slightly  macerated.  Because of the slough a selective debridement was done.  See procedure note.  Area was cleaned and Hydrofera Blue was applied with bandage.  Will changed every day and patient will return in 1 week for MD visit.    ICD-10-CM ICD-9-CM   1. Foot ulcer with fat layer exposed, left  L97.522 707.15         Plan:   Tissue pathology and/or culture taken:  [] Yes [x] No   Sharp debridement performed:   [x] Yes [] No   Labs ordered this visit:   [] Yes [x] No   Imaging ordered this visit:   [] Yes [x] No           Orders Placed This Encounter   Procedures    Debridement     This order was created via procedure documentation     Standing Status:   Standing     Number of Occurrences:   1    Change dressing     Cleanse wound with: wound cleanser, NS or soap and water, pat dry  Apply skin prep to debbi wound, allow to dry   Primary dressing: apply hydrofera blue (cut slightly larger than wound)  Secondary dressing: cover with squared gauze (for light pressure) and a Band-Aid, Do not leave open to air  Frequency: every day     Edema control: may wear compression stockings for control of swelling.       Begin taking Vitamin C 1000mg by mouth twice daily  Vitamin E 400 iu daily  Zinc 50mg once daily  Vitamin D 1000 once daily      Follow-up: 1 week        Follow up in about 1 week (around 8/20/2024) for MD visit.

## 2024-08-20 ENCOUNTER — HOSPITAL ENCOUNTER (OUTPATIENT)
Dept: WOUND CARE | Facility: HOSPITAL | Age: 89
Discharge: HOME | End: 2024-08-20
Attending: PEDIATRICS
Payer: MEDICARE

## 2024-08-20 VITALS
RESPIRATION RATE: 18 BRPM | HEART RATE: 66 BPM | OXYGEN SATURATION: 99 % | DIASTOLIC BLOOD PRESSURE: 72 MMHG | SYSTOLIC BLOOD PRESSURE: 156 MMHG | TEMPERATURE: 98 F

## 2024-08-20 DIAGNOSIS — L97.522 FOOT ULCER WITH FAT LAYER EXPOSED, LEFT: Primary | ICD-10-CM

## 2024-08-20 PROCEDURE — 99213 OFFICE O/P EST LOW 20 MIN: CPT | Mod: ,,, | Performed by: PEDIATRICS

## 2024-08-20 PROCEDURE — 99213 OFFICE O/P EST LOW 20 MIN: CPT

## 2024-08-20 PROCEDURE — 27000999 HC MEDICAL RECORD PHOTO DOCUMENTATION

## 2024-08-20 NOTE — PROGRESS NOTES
Subjective:       Patient ID: Tali Beard is a 88 y.o. female.    Chief Complaint: Venous Ulcer (Left foot ulceration. Follow up with MD for re-evaluation and treatment/ /)    HPI  Review of Systems      Objective:      Temp:  [98 °F (36.7 °C)]   Pulse:  [66]   Resp:  [18]   BP: (156)/(72)   SpO2:  [99 %]   Physical Exam       Wound 05/07/24 0952 Ulceration Left medial Foot (Active)   05/07/24 0952   Present on Original Admission: Y   Primary Wound Type: Ulceration   Side: Left   Orientation: medial   Location: Foot   Wound Approximate Age at First Assessment (Weeks):    Wound Number:    Is this injury device related?:    Incision Type:    Closure Method:    Wound Description (Comments):    Type:    Additional Comments:    Ankle-Brachial Index:    Pulses: 2+ palpable DP and PT pulses, strong doppler signal to both   Removal Indication and Assessment:    Wound Outcome:    Wound Image    08/20/24 1335   Dressing Appearance Intact;Dried drainage 08/20/24 1335   Drainage Amount Scant 08/20/24 1335   Drainage Characteristics/Odor Serous 08/20/24 1335   Appearance Pink;Not granulating;Tan;Slough 08/20/24 1335   Tissue loss description Full thickness 08/20/24 1335   Red (%), Wound Tissue Color 95 % 08/20/24 1335   Yellow (%), Wound Tissue Color 5 % 08/20/24 1335   Periwound Area Intact;Dry;Hemosiderin Staining 08/20/24 1335   Wound Length (cm) 0.3 cm 08/20/24 1335   Wound Width (cm) 0.5 cm 08/20/24 1335   Wound Depth (cm) 0.2 cm 08/20/24 1335   Wound Volume (cm^3) 0.03 cm^3 08/20/24 1335   Wound Surface Area (cm^2) 0.15 cm^2 08/20/24 1335   Care Cleansed with:;Antimicrobial agent;Wound cleanser 08/20/24 1335   Dressing Applied;Methylene blue/gentian violet;Other (comment);Gauze;Bandaid 08/20/24 1335   Periwound Care Dry periwound area maintained 08/20/24 1335   Compression Compression Stocking (20-30 mmHg) 08/20/24 1335         Assessment:     Today wound is pink.  This is smaller today.  0.3 cm x 0.5 cm with a  depth of 0.2 cm.  Surrounding callus was pared with curette.  Wound was cleaned and Hydrofera Blue was applied and covered with gauze and a Band-Aid.  This was to be changed every other day.  Patient will return in 1 week for MD visit    ICD-10-CM ICD-9-CM   1. Foot ulcer with fat layer exposed, left  L97.522 707.15         Plan:   Tissue pathology and/or culture taken:  [] Yes [x] No   Sharp debridement performed:   [] Yes [x] No   Labs ordered this visit:   [] Yes [x] No   Imaging ordered this visit:   [] Yes [x] No           Orders Placed This Encounter   Procedures    Change dressing     Cleanse wound with: wound cleanser, NS or soap and water, pat dry  Apply skin prep to debbi wound, allow to dry   Primary dressing: apply hydrofera blue (cut slightly larger than wound)  Secondary dressing: cover with squared gauze (for light pressure) and a Band-Aid, Do not leave open to air  Frequency: every day     Edema control: may wear compression stockings for control of swelling.       Begin taking Vitamin C 1000mg by mouth twice daily  Vitamin E 400 iu daily  Zinc 50mg once daily  Vitamin D 1000 once daily      Follow-up: 1 week        Follow up in about 1 week (around 8/27/2024) for MD visit.

## 2024-08-26 NOTE — PATIENT INSTRUCTIONS
Cleanse wound with: wound cleanser or Normal saline  Pat dry  Primary dressing: apply small piece of xeroform gauze to open area, make sure that it is touching the base of the wound  Secondary dressing: cover with  Band-Aid, Do not leave open to air  Frequency: every day     Edema control: may wear compression stockings for control of swelling.       Begin taking Vitamin C 1000mg by mouth twice daily  Vitamin E 400 iu daily  Zinc 50mg once daily  Vitamin D 1000 once daily      Follow-up: 1 week

## 2024-08-27 ENCOUNTER — HOSPITAL ENCOUNTER (OUTPATIENT)
Dept: WOUND CARE | Facility: HOSPITAL | Age: 89
Discharge: HOME | End: 2024-08-27
Attending: PEDIATRICS
Payer: MEDICARE

## 2024-08-27 VITALS
TEMPERATURE: 99 F | DIASTOLIC BLOOD PRESSURE: 76 MMHG | OXYGEN SATURATION: 95 % | SYSTOLIC BLOOD PRESSURE: 156 MMHG | HEART RATE: 71 BPM

## 2024-08-27 DIAGNOSIS — L97.522 FOOT ULCER WITH FAT LAYER EXPOSED, LEFT: Primary | ICD-10-CM

## 2024-08-27 PROCEDURE — 27000999 HC MEDICAL RECORD PHOTO DOCUMENTATION

## 2024-08-27 PROCEDURE — 11042 DBRDMT SUBQ TIS 1ST 20SQCM/<: CPT

## 2024-08-27 PROCEDURE — 99499 UNLISTED E&M SERVICE: CPT | Mod: ICN,,, | Performed by: PEDIATRICS

## 2024-08-27 PROCEDURE — 11042 DBRDMT SUBQ TIS 1ST 20SQCM/<: CPT | Mod: ,,, | Performed by: PEDIATRICS

## 2024-08-27 NOTE — PROCEDURES
"Debridement    Date/Time: 8/27/2024 1:53 PM    Performed by: Trey Perez MD  Authorized by: Trey Perez MD  Associated wounds:        Wound 05/07/24 0952 Ulceration Left medial Foot  Time out: Immediately prior to procedure a "time out" was called to verify the correct patient, procedure, equipment, support staff and site/side marked as required.    Consent Done?:  Yes (Verbal) and Yes (Written)    Preparation: Patient was prepped and draped with clean technique    Local anesthesia used?: Yes    Local anesthetic:  Lidocaine 4%    Wound Details:    Location:  Left foot    Location:  Left Ankle    Type of Debridement:  Excisional       Length (cm):  0.3       Area (sq cm):  0.18       Width (cm):  0.6       Percent Debrided (%):  100       Depth (cm):  0.2       Total Area Debrided (sq cm):  0.18    Depth of debridement:  Subcutaneous tissue    Tissue debrided:  Subcutaneous    Devitalized tissue debrided:  Biofilm and Slough    Instruments:  Curette  Bleeding:  Minimal  Hemostasis Achieved: Yes  Method Used:  Pressure  Patient tolerance:  Patient tolerated the procedure well with no immediate complications  "

## 2024-08-27 NOTE — PROGRESS NOTES
Subjective:       Patient ID: Tali Beard is a 88 y.o. female.    Chief Complaint: Venous Ulcer (Left foot venous ulceration.  Here for re-evaluation and treatment with md. )    HPI  Review of Systems      Objective:      Temp:  [98.6 °F (37 °C)]   Pulse:  [71]   BP: (156)/(76)   SpO2:  [95 %]   Physical Exam       Wound 05/07/24 0952 Ulceration Left medial Foot (Active)   05/07/24 0952   Present on Original Admission: Y   Primary Wound Type: Ulceration   Side: Left   Orientation: medial   Location: Foot   Wound Approximate Age at First Assessment (Weeks):    Wound Number:    Is this injury device related?:    Incision Type:    Closure Method:    Wound Description (Comments):    Type:    Additional Comments:    Ankle-Brachial Index:    Pulses: 2+ palpable DP and PT pulses, strong doppler signal to both   Removal Indication and Assessment:    Wound Outcome:    Wound Image    08/27/24 1415   Dressing Appearance Intact;Dried drainage 08/27/24 1415   Drainage Amount Scant 08/27/24 1415   Drainage Characteristics/Odor Serous 08/27/24 1415   Appearance Pink;Granulating;Tan;Slough 08/27/24 1415   Tissue loss description Full thickness 08/27/24 1415   Red (%), Wound Tissue Color 90 % 08/27/24 1415   Yellow (%), Wound Tissue Color 10 % 08/27/24 1415   Periwound Area Intact;Dry;Hemosiderin Staining;Scar tissue 08/27/24 1415   Wound Edges Callused 08/27/24 1415   Wound Length (cm) 0.3 cm 08/27/24 1415   Wound Width (cm) 0.4 cm 08/27/24 1415   Wound Depth (cm) 0.1 cm 08/27/24 1415   Wound Volume (cm^3) 0.012 cm^3 08/27/24 1415   Wound Surface Area (cm^2) 0.12 cm^2 08/27/24 1415   Care Cleansed with:;Antimicrobial agent;Wound cleanser;Debrided 08/27/24 1415   Dressing Applied;Bandaid;Non-adherent;Other (comment) 08/27/24 1415   Periwound Care Dry periwound area maintained 08/27/24 1415   Compression Compression Stocking (20-30 mmHg) 08/27/24 1415         Assessment:     Today wound was 0.3 cm x 0.4 cm with a depth of  0.1 cm.  There was some slough in the wound itself.  This was cleaned with a curette.  See procedure note.  The underlying tissue was red and granulating.  Area was cleaned and Xeroform gauze was applied to the open area.  This was covered with Band-Aid.  This will be changed every other day.  Patient will return in 1 week for MD follow up.    ICD-10-CM ICD-9-CM   1. Foot ulcer with fat layer exposed, left  L97.522 707.15         Plan:   Tissue pathology and/or culture taken:  [] Yes [x] No   Sharp debridement performed:   [x] Yes [] No   Labs ordered this visit:   [] Yes [x] No   Imaging ordered this visit:   [] Yes [x] No           Orders Placed This Encounter   Procedures    Change dressing     Cleanse wound with: wound cleanser or Normal saline  Pat dry  Primary dressing: apply small piece of xeroform gauze to open area, make sure that it is touching the base of the wound  Secondary dressing: cover with  Band-Aid, Do not leave open to air  Frequency: every day     Edema control: may wear compression stockings for control of swelling.       Begin taking Vitamin C 1000mg by mouth twice daily  Vitamin E 400 iu daily  Zinc 50mg once daily  Vitamin D 1000 once daily      Follow-up: 1 week        Follow up in about 1 week (around 9/3/2024) for md visit .

## 2024-08-27 NOTE — ADDENDUM NOTE
Encounter addended by: Trey Perez MD on: 8/27/2024 4:34 PM   Actions taken: Child order released for a procedure order, SmartForm saved, Clinical Note Signed

## 2024-09-02 ENCOUNTER — HOSPITAL ENCOUNTER (EMERGENCY)
Facility: HOSPITAL | Age: 89
Discharge: ANOTHER HOSPITAL | End: 2024-09-03
Payer: MEDICARE

## 2024-09-02 DIAGNOSIS — I60.9 SUBARACHNOID HEMORRHAGE: ICD-10-CM

## 2024-09-02 DIAGNOSIS — R51.9 ACUTE NONINTRACTABLE HEADACHE, UNSPECIFIED HEADACHE TYPE: Primary | ICD-10-CM

## 2024-09-02 PROCEDURE — 99291 CRITICAL CARE FIRST HOUR: CPT

## 2024-09-02 PROCEDURE — 25000003 PHARM REV CODE 250

## 2024-09-02 RX ORDER — ACETAMINOPHEN 500 MG
1000 TABLET ORAL
Status: COMPLETED | OUTPATIENT
Start: 2024-09-02 | End: 2024-09-02

## 2024-09-02 RX ADMIN — ACETAMINOPHEN 1000 MG: 500 TABLET ORAL at 10:09

## 2024-09-03 ENCOUNTER — HOSPITAL ENCOUNTER (EMERGENCY)
Facility: HOSPITAL | Age: 89
Discharge: HOME | End: 2024-09-03
Attending: EMERGENCY MEDICINE
Payer: MEDICARE

## 2024-09-03 VITALS
HEIGHT: 65 IN | RESPIRATION RATE: 19 BRPM | DIASTOLIC BLOOD PRESSURE: 70 MMHG | TEMPERATURE: 98 F | OXYGEN SATURATION: 100 % | WEIGHT: 143 LBS | SYSTOLIC BLOOD PRESSURE: 117 MMHG | HEART RATE: 69 BPM | BODY MASS INDEX: 23.82 KG/M2

## 2024-09-03 VITALS
OXYGEN SATURATION: 100 % | BODY MASS INDEX: 23.82 KG/M2 | RESPIRATION RATE: 18 BRPM | HEIGHT: 65 IN | WEIGHT: 143 LBS | DIASTOLIC BLOOD PRESSURE: 104 MMHG | SYSTOLIC BLOOD PRESSURE: 142 MMHG | HEART RATE: 72 BPM | TEMPERATURE: 98 F

## 2024-09-03 DIAGNOSIS — R51.9 ACUTE NONINTRACTABLE HEADACHE, UNSPECIFIED HEADACHE TYPE: Primary | ICD-10-CM

## 2024-09-03 DIAGNOSIS — I10 BENIGN ESSENTIAL HTN: ICD-10-CM

## 2024-09-03 DIAGNOSIS — S06.6XAA SUBARACHNOID HEMATOMA: ICD-10-CM

## 2024-09-03 LAB
ALBUMIN SERPL-MCNC: 3.7 G/DL (ref 3.4–4.8)
ALBUMIN/GLOB SERPL: 0.9 RATIO (ref 1.1–2)
ALP SERPL-CCNC: 99 UNIT/L (ref 40–150)
ALT SERPL-CCNC: 13 UNIT/L (ref 0–55)
ANION GAP SERPL CALC-SCNC: 11 MEQ/L
APTT PPP: 24.7 SECONDS (ref 23.2–33.7)
AST SERPL-CCNC: 19 UNIT/L (ref 5–34)
BASOPHILS # BLD AUTO: 0.03 X10(3)/MCL
BASOPHILS NFR BLD AUTO: 0.6 %
BILIRUB SERPL-MCNC: 0.3 MG/DL
BUN SERPL-MCNC: 26.1 MG/DL (ref 9.8–20.1)
CALCIUM SERPL-MCNC: 10.2 MG/DL (ref 8.4–10.2)
CHLORIDE SERPL-SCNC: 105 MMOL/L (ref 98–107)
CO2 SERPL-SCNC: 27 MMOL/L (ref 23–31)
CREAT SERPL-MCNC: 1.25 MG/DL (ref 0.55–1.02)
CREAT/UREA NIT SERPL: 21
EOSINOPHIL # BLD AUTO: 0.15 X10(3)/MCL (ref 0–0.9)
EOSINOPHIL NFR BLD AUTO: 3.1 %
ERYTHROCYTE [DISTWIDTH] IN BLOOD BY AUTOMATED COUNT: 14.9 % (ref 11.5–17)
GFR SERPLBLD CREATININE-BSD FMLA CKD-EPI: 42 ML/MIN/1.73/M2
GLOBULIN SER-MCNC: 4.1 GM/DL (ref 2.4–3.5)
GLUCOSE SERPL-MCNC: 131 MG/DL (ref 82–115)
HCT VFR BLD AUTO: 39 % (ref 37–47)
HGB BLD-MCNC: 11.7 G/DL (ref 12–16)
IMM GRANULOCYTES # BLD AUTO: 0.01 X10(3)/MCL (ref 0–0.04)
IMM GRANULOCYTES NFR BLD AUTO: 0.2 %
INR PPP: 1.1
LYMPHOCYTES # BLD AUTO: 1.03 X10(3)/MCL (ref 0.6–4.6)
LYMPHOCYTES NFR BLD AUTO: 21.2 %
MCH RBC QN AUTO: 23.4 PG (ref 27–31)
MCHC RBC AUTO-ENTMCNC: 30 G/DL (ref 33–36)
MCV RBC AUTO: 78 FL (ref 80–94)
MONOCYTES # BLD AUTO: 0.45 X10(3)/MCL (ref 0.1–1.3)
MONOCYTES NFR BLD AUTO: 9.3 %
NEUTROPHILS # BLD AUTO: 3.19 X10(3)/MCL (ref 2.1–9.2)
NEUTROPHILS NFR BLD AUTO: 65.6 %
PLATELET # BLD AUTO: 144 X10(3)/MCL (ref 130–400)
PMV BLD AUTO: 11.9 FL (ref 7.4–10.4)
POTASSIUM SERPL-SCNC: 4.2 MMOL/L (ref 3.5–5.1)
PROT SERPL-MCNC: 7.8 GM/DL (ref 5.8–7.6)
PROTHROMBIN TIME: 10.8 SECONDS (ref 12.5–14.5)
RBC # BLD AUTO: 5 X10(6)/MCL (ref 4.2–5.4)
SODIUM SERPL-SCNC: 143 MMOL/L (ref 136–145)
WBC # BLD AUTO: 4.86 X10(3)/MCL (ref 4.5–11.5)

## 2024-09-03 PROCEDURE — 25500020 PHARM REV CODE 255: Performed by: EMERGENCY MEDICINE

## 2024-09-03 PROCEDURE — 80053 COMPREHEN METABOLIC PANEL: CPT

## 2024-09-03 PROCEDURE — 85610 PROTHROMBIN TIME: CPT

## 2024-09-03 PROCEDURE — 85730 THROMBOPLASTIN TIME PARTIAL: CPT

## 2024-09-03 PROCEDURE — 85025 COMPLETE CBC W/AUTO DIFF WBC: CPT

## 2024-09-03 PROCEDURE — 99285 EMERGENCY DEPT VISIT HI MDM: CPT | Mod: 25

## 2024-09-03 RX ORDER — NIMODIPINE 30 MG/1
60 CAPSULE, LIQUID FILLED ORAL EVERY 4 HOURS
Status: CANCELLED | OUTPATIENT
Start: 2024-09-03

## 2024-09-03 RX ORDER — BISACODYL 10 MG/1
10 SUPPOSITORY RECTAL DAILY PRN
Status: CANCELLED | OUTPATIENT
Start: 2024-09-03

## 2024-09-03 RX ORDER — SODIUM CHLORIDE 0.9 % (FLUSH) 0.9 %
10 SYRINGE (ML) INJECTION
Status: CANCELLED | OUTPATIENT
Start: 2024-09-03

## 2024-09-03 RX ORDER — HYDRALAZINE HYDROCHLORIDE 20 MG/ML
10 INJECTION INTRAMUSCULAR; INTRAVENOUS
Status: DISCONTINUED | OUTPATIENT
Start: 2024-09-03 | End: 2024-09-03

## 2024-09-03 RX ORDER — LABETALOL HYDROCHLORIDE 5 MG/ML
10 INJECTION, SOLUTION INTRAVENOUS
Status: CANCELLED | OUTPATIENT
Start: 2024-09-03

## 2024-09-03 RX ORDER — AMOXICILLIN 250 MG
1 CAPSULE ORAL 2 TIMES DAILY
Status: CANCELLED | OUTPATIENT
Start: 2024-09-03

## 2024-09-03 RX ADMIN — IOHEXOL 100 ML: 350 INJECTION, SOLUTION INTRAVENOUS at 02:09

## 2024-09-03 NOTE — ED PROVIDER NOTES
Encounter Date: 9/2/2024       History     Chief Complaint   Patient presents with    Headache     L sided head pain intermittently for 2 days. Denies any cough, fever, or cold symptoms. Denies any N/V or vision changes.      88-year-old female with past medical history of HTN and spontaneous subarachnoid hemorrhage presenting to the ER for acute left-sided headache over the last 2 days.  Patient reports that she has has constant headache that is waxing and waning in intensity but denies any recent traumas or falls.  Patient denies any vision changes, numbness or tinnitus.    The history is provided by the patient and a relative.     Review of patient's allergies indicates:   Allergen Reactions    Hydroxyzine hcl      Other Reaction(s): BUMPS ALL OVER BODY    Other reaction(s): BUMPS ALL OVER BODY    Felodipine Rash     Edema in the legs     Past Medical History:   Diagnosis Date    Subarachnoid hemorrhage      No past surgical history on file.  No family history on file.  Social History     Tobacco Use    Smoking status: Never     Passive exposure: Never    Smokeless tobacco: Never   Substance Use Topics    Alcohol use: Not Currently    Drug use: Never     Review of Systems   Constitutional:  Negative for fever.   HENT:  Negative for sore throat.    Respiratory:  Negative for shortness of breath.    Cardiovascular:  Negative for chest pain.   Gastrointestinal:  Negative for nausea.   Genitourinary:  Negative for dysuria.   Musculoskeletal:  Negative for back pain.   Skin:  Negative for rash.   Neurological:  Positive for headaches. Negative for dizziness, seizures, syncope, facial asymmetry, weakness and numbness.   Hematological:  Does not bruise/bleed easily.       Physical Exam     Initial Vitals [09/02/24 2129]   BP Pulse Resp Temp SpO2   (!) 156/73 71 18 98.4 °F (36.9 °C) 100 %      MAP       --         Physical Exam    Vitals reviewed.  Constitutional: She appears well-developed and well-nourished. No  distress.   HENT:   Head: Normocephalic and atraumatic.   Eyes: EOM are normal. Pupils are equal, round, and reactive to light. Right eye exhibits no discharge. Left eye exhibits no discharge.   Cardiovascular:  Normal rate and regular rhythm.           No murmur heard.  Pulmonary/Chest: No respiratory distress. She has no wheezes. She has no rhonchi. She has no rales.   Abdominal: Abdomen is soft. She exhibits no distension. There is no abdominal tenderness.     Neurological: She is alert and oriented to person, place, and time. She has normal strength. No cranial nerve deficit or sensory deficit.   Skin: Skin is warm and dry. Capillary refill takes less than 2 seconds.         ED Course   Critical Care    Date/Time: 9/3/2024 1:40 AM    Performed by: Naif Parada MD  Authorized by: Naif Parada MD  Total critical care time (exclusive of procedural time) : 35 minutes  Critical care time was exclusive of separately billable procedures and treating other patients.  Critical care was necessary to treat or prevent imminent or life-threatening deterioration of the following conditions: CNS failure or compromise.  Critical care was time spent personally by me on the following activities: blood draw for specimens, development of treatment plan with patient or surrogate, discussions with consultants, evaluation of patient's response to treatment, examination of patient, obtaining history from patient or surrogate, ordering and review of radiographic studies, ordering and review of laboratory studies, re-evaluation of patient's condition, review of old charts and pulse oximetry.        Labs Reviewed   CBC WITH DIFFERENTIAL - Abnormal       Result Value    WBC 4.86      RBC 5.00      Hgb 11.7 (*)     Hct 39.0      MCV 78.0 (*)     MCH 23.4 (*)     MCHC 30.0 (*)     RDW 14.9      Platelet 144      MPV 11.9 (*)     Neut % 65.6      Lymph % 21.2      Mono % 9.3      Eos % 3.1      Basophil % 0.6      Lymph # 1.03      Neut #  3.19      Mono # 0.45      Eos # 0.15      Baso # 0.03      IG# 0.01      IG% 0.2     CBC W/ AUTO DIFFERENTIAL    Narrative:     The following orders were created for panel order CBC auto differential.  Procedure                               Abnormality         Status                     ---------                               -----------         ------                     CBC with Differential[[id number]]       Abnormal            Final result                 Please view results for these tests on the individual orders.   COMPREHENSIVE METABOLIC PANEL   APTT   PROTIME-INR     EKG Readings: (Independently Interpreted)   Initial Reading: No STEMI. Rhythm: Normal Sinus Rhythm. Ectopy: No Ectopy. Conduction: Normal.       Imaging Results              CT Head Without Contrast (In process)                      Medications   acetaminophen tablet 1,000 mg (1,000 mg Oral Given 9/2/24 2558)     Medical Decision Making  89 yo F presents with headache with history and exam consistent with likely subarachnoid hemorrhage (SAH).     Initial considerations in this patient included SAH from ruptured aneurysm, non-aneurysmal bleeding, and trauma, epidural and subdural hemorrhage, intracranial mass, cerebrovascular accident (CVA), transient ischemic attack (TIA), meningitis, encephalitis, and cerebral venous thrombosis among others.     Patient presented with headache.  Patient noted to have associated no neuro deficits.  CT scan obtained with evidence of left temporoparietal SAH.  Patient noted to have no current anticoagulant or antiplatelet use.     Despite absence of clear consensus recommendations on blood pressure control in the setting of SAH, we maintained a goal systolic blood pressure of less than 160 mmHg without requiring medications.     Case discussed with Dr. Chambers who accepted patient for transfer.     We discussed concern for SAH and plan for transfer with the patient/family with associated risks and benefits,  and they demonstrated understanding and agreement with this plan and provided appropriate consent.    Amount and/or Complexity of Data Reviewed  External Data Reviewed: labs, radiology, ECG and notes.  Labs: ordered.  Radiology: ordered and independent interpretation performed. Decision-making details documented in ED Course.  ECG/medicine tests: ordered and independent interpretation performed.    Risk  OTC drugs.  Decision regarding hospitalization.               ED Course as of 09/03/24 0141   Tue Sep 03, 2024   0033 CT Head Without Contrast  Spoke to radiologist about CT head which revealed concerns for small subarachnoid hemorrhage in the left frontoparietal region.  Discussed results with patient and family at bedside and recommendation for transfer to another facility with neurology and neurosurgery capabilities.  Family currently discussing whether they would like to transfer or follow-up tomorrow morning with the patient's neurologist. [TE]      ED Course User Index  [TE] Naif Parada MD                           Clinical Impression:  Final diagnoses:  [R51.9] Acute nonintractable headache, unspecified headache type (Primary)  [I60.9] Subarachnoid hemorrhage          ED Disposition Condition    Transfer to Another Facility Stable                Naif Parada MD  09/03/24 0142

## 2024-09-03 NOTE — ED PROVIDER NOTES
Encounter Date: 9/3/2024       History     Chief Complaint   Patient presents with    Transfer     Transfer from [facility] for NeuroSx - SAH     89 yo F with h/o SAH and HTN presenting to the ED as a transfer from another facility for higher level of care. She presented to that facility complaining of worsening left sided headache without any neuro deficits.  She was given 1 g tylenol and had cbc, cmp, coags that were unremarkable. Ct head with subarachonid.  She has had no recent falls or trauma. Headache is improved at this time, throbbing and left sided. Headache has been intermittent for 2 days    The history is provided by the patient. No  was used.     Review of patient's allergies indicates:   Allergen Reactions    Hydroxyzine hcl      Other Reaction(s): BUMPS ALL OVER BODY    Other reaction(s): BUMPS ALL OVER BODY    Felodipine Rash     Edema in the legs     Past Medical History:   Diagnosis Date    Subarachnoid hemorrhage      No past surgical history on file.  No family history on file.  Social History     Tobacco Use    Smoking status: Never     Passive exposure: Never    Smokeless tobacco: Never   Substance Use Topics    Alcohol use: Not Currently    Drug use: Never     Review of Systems   Neurological:  Positive for headaches.       Physical Exam     Initial Vitals [09/03/24 0209]   BP Pulse Resp Temp SpO2   (!) 153/60 68 18 97.9 °F (36.6 °C) 100 %      MAP       --         Physical Exam    Nursing note and vitals reviewed.  Constitutional: She appears well-developed and well-nourished. She is not diaphoretic. No distress.   HENT:   Head: Normocephalic and atraumatic.   Nose: Nose normal.   Mouth/Throat: Oropharynx is clear and moist.   Eyes: Conjunctivae and EOM are normal. Pupils are equal, round, and reactive to light.   Neck: Trachea normal. Neck supple.   Normal range of motion.  Cardiovascular:  Normal rate, regular rhythm, normal heart sounds and intact distal pulses.           No  murmur heard.  Pulmonary/Chest: Breath sounds normal. No respiratory distress. She has no wheezes. She has no rhonchi. She has no rales. She exhibits no tenderness.   Abdominal: Abdomen is soft. Bowel sounds are normal. She exhibits no distension and no mass. There is no abdominal tenderness. There is no rebound and no guarding.   Musculoskeletal:         General: No tenderness or edema. Normal range of motion.      Cervical back: Normal range of motion and neck supple.      Lumbar back: Normal. Normal range of motion.     Neurological: She is alert and oriented to person, place, and time. She has normal strength. No cranial nerve deficit or sensory deficit.   Skin: Skin is warm and dry. Capillary refill takes less than 2 seconds. No abscess noted. No erythema. No pallor.   Psychiatric: She has a normal mood and affect. Her behavior is normal. Judgment and thought content normal.         ED Course   Procedures  Labs Reviewed - No data to display       Imaging Results              CTA Head and Neck (xpd) (Preliminary result)  Result time 09/03/24 03:27:04      Preliminary result by Mike Hu MD (09/03/24 03:27:04)                   Narrative:    START OF REPORT:  Technique: CT angiogram of the intracranial vessels was performed with intravenous contrast with direct axial as well as and 3D reconstructions. CT angiogram of the neck vessels was performed with intravenous contrast.    Comparison: Comparison is with study dated 2023-12-22 and 2023-11-26.    Clinical history: Transfer (Transfer from [facility] for NeuroSx - SAH).    Findings:  Intracranial Vascular structures:  Internal carotid arteries: Mild atheromatous calcification of the cavernous and clinoid segments of the bilateral internal carotid artery is seen with mild bilateral associated stenosis.  Middle cerebral arteries: Unremarkable.  Anterior cerebral arteries: Unremarkable.  Vertebral arteries: Right vertebral artery is dominant. The vertebrobasilar  junction is unremarkable.  Basilar artery: Unremarkable.  Posterior cerebral arteries: Unremarkable.  Posterior communicating arteries: Unremarkable.  Carotids:  Common carotid arteries: Unremarkable.  Internal carotid artery: Unremarkable.  Vertebral arteries: Unremarkable.  Jugular Veins and venous sinuses: Unremarkable.  Hemorrhage: There is a subtle hyperdense focus at the left parietal sulci (image 19, series 9). This is consistent with stable subarachnoid hemorrhage.  CSF spaces: The ventricles sulci and basal cisterns are within normal limits.  Cerebral Atrophy: The ventricles and sulci and basal cisterns all appear mildly prominent suggesting an element of global cerebral atrophy.  Brain parenchyma: There is preservation of the grey white junction throughout. No intracranial contusion. No acute infarct.  Cerebellum: Unremarkable.  Herniation: None.  Intracranial calcifications: Incidental note is made of bilateral choroid plexus calcification. Incidental note is made of some pineal region calcification.  Calvarium: No acute linear or depressed skull fracture is seen.  Scalp: Unremarkable.      Impression:  1. There is a subtle hyperdense focus at the left parietal sulci (image 19, series 9). This is consistent with stable subarachnoid hemorrhage.  2. Mild atheromatous calcification of the cavernous and clinoid segments of the bilateral internal carotid artery is seen with mild bilateral associated stenosis.  3. Unremarkable unremarkable CT angiogram of the neck. Details and findings as above.                                         Medications   iohexoL (OMNIPAQUE 350) injection 100 mL (100 mLs Intravenous Given 9/3/24 0249)     Medical Decision Making  Given patient's presentation, differential diagnosis includes but is not limited to ruptured aneurysm, hypertensive emergency, sah  To evaluate these  possible etiologies reviewed labs and imaging from sending facility. Cta ordered here  See ed course for  discussion  Does not require admit  Minimal subarachonid with stable ich on repeat ct, no aneurysm, htn or symptoms. Family comfortable with dc    Problems Addressed:  Acute nonintractable headache, unspecified headache type: acute illness or injury that poses a threat to life or bodily functions  Benign essential HTN: chronic illness or injury  Subarachnoid hematoma: acute illness or injury that poses a threat to life or bodily functions    Amount and/or Complexity of Data Reviewed  External Data Reviewed: labs, radiology and notes.  Radiology: ordered.    Risk  OTC drugs.  Prescription drug management.  Decision regarding hospitalization.               ED Course as of 09/03/24 0524   Tue Sep 03, 2024   0231 Per chart review it appears the previous sah was believed to be due to antiplatelet use. Pt reports she no longer is on antiplatelets [BS]   0240 Pt remains stable, awaiting cta to have full information prior to discussion with nsgy [BS]   0255 CT was faxed over impression subtle likely subarachnoid in the left anterior parietal lobe sulcus team best on coronal series 601, images 16-19 and sagittal series 602, images 14 and 15 without mass effect [BS]   0331 1. There is a subtle hyperdense focus at the left parietal sulci (image 19, series 9). This is consistent with stable subarachnoid hemorrhage.  2. Mild atheromatous calcification of the cavernous and clinoid segments of the bilateral internal carotid artery is seen with mild bilateral associated stenosis.  3. Unremarkable unremarkable CT angiogram of the neck. Details and findings as above.      [BS]   0339 Neurosurgery consulted [BS]   0345 Discussed with dr tay, he states that it is so small and the distribution is not consistent with atraumatic subarachnoid but the cta could serve as her repeat head ct and was stable, would obs her for a couple more hours and if stable and comfortablew ith dc can be discharged [BS]   0353 Discussed with patient and  son at bedside who are very comfortable with that plan [BS]   0505 Pt has had no change, she is at her baseline [BS]      ED Course User Index  [BS] Merle Chambers MD                             Clinical Impression:  Final diagnoses:  [S06.6XAA] Subarachnoid hematoma  [R51.9] Acute nonintractable headache, unspecified headache type (Primary)  [I10] Benign essential HTN          ED Disposition Condition    Discharge Stable          ED Prescriptions    None       Follow-up Information       Follow up With Specialties Details Why Contact Info    Moises Hannah MD Neurology, Interventional Radiology Schedule an appointment as soon as possible for a visit   [address] [place] [address] [address]  [facility] [address] [phone]      [facility]  Emergency Dept Emergency Medicine  As needed, If symptoms worsen [address] [facility] [address] [phone]    Aurora Moe, [facility] Family Medicine Schedule an appointment as soon as possible for a visit   [address] [place] [address] [phone]               Merle Chambers MD  09/03/24 0569

## 2024-09-04 LAB
OHS QRS DURATION: 82 MS
OHS QTC CALCULATION: 436 MS

## 2024-09-18 ENCOUNTER — HOSPITAL ENCOUNTER (OUTPATIENT)
Dept: WOUND CARE | Facility: HOSPITAL | Age: 89
Discharge: HOME | End: 2024-09-18
Attending: PEDIATRICS
Payer: MEDICARE

## 2024-09-18 VITALS
RESPIRATION RATE: 16 BRPM | HEART RATE: 78 BPM | SYSTOLIC BLOOD PRESSURE: 147 MMHG | OXYGEN SATURATION: 98 % | TEMPERATURE: 98 F | DIASTOLIC BLOOD PRESSURE: 74 MMHG

## 2024-09-18 DIAGNOSIS — L97.522 FOOT ULCER WITH FAT LAYER EXPOSED, LEFT: Primary | ICD-10-CM

## 2024-09-18 PROCEDURE — 87186 SC STD MICRODIL/AGAR DIL: CPT

## 2024-09-18 PROCEDURE — 99213 OFFICE O/P EST LOW 20 MIN: CPT | Mod: ,,, | Performed by: PEDIATRICS

## 2024-09-18 PROCEDURE — 27000999 HC MEDICAL RECORD PHOTO DOCUMENTATION

## 2024-09-18 PROCEDURE — 99213 OFFICE O/P EST LOW 20 MIN: CPT

## 2024-09-18 PROCEDURE — 87077 CULTURE AEROBIC IDENTIFY: CPT

## 2024-09-18 RX ORDER — TRIAMCINOLONE ACETONIDE 1 MG/G
CREAM TOPICAL DAILY
Qty: 15 G | Refills: 1 | Status: SHIPPED | OUTPATIENT
Start: 2024-09-18 | End: 2024-09-25

## 2024-09-18 RX ORDER — DOXYCYCLINE 100 MG/1
100 CAPSULE ORAL EVERY 12 HOURS
Qty: 20 CAPSULE | Refills: 0 | Status: SHIPPED | OUTPATIENT
Start: 2024-09-18 | End: 2024-09-28

## 2024-09-18 NOTE — PATIENT INSTRUCTIONS
Cleanse wound with: wound cleanser or Normal saline  Pat dry  Apply small amount of triamcinolone to periwound irritation (Do not apply on open wound bed)  Primary dressing: apply small piece of Aquacel Ag to open area  Secondary dressing: cover with  small piece of gauze and  Band-Aid,   Frequency: every day       Edema control: may wear compression stockings for control of swelling.       Begin taking Vitamin C 1000mg by mouth twice daily  Vitamin E 400 iu daily  Zinc 50mg once daily  Vitamin D 1000 once daily     Triamcinolone ointment has been sent to pharmacy, do not put on wound bed , only around wound where it itches.    A wound culture was obtained at today's visit.   Antibiotics have been ordered, pickup and take as prescribed.   Antibiotics may be changed if needed when final culture results are obtained.          Follow-up: 1 week

## 2024-09-18 NOTE — PROGRESS NOTES
Subjective:       Patient ID: Tali Beard is a 88 y.o. female.    Chief Complaint: Venous Ulcer (Left foot venous ulceration.  Here for re-evaluation and treatment with MD/ /)    HPI  Review of Systems      Objective:      Temp:  [98.3 °F (36.8 °C)]   Pulse:  [78]   Resp:  [16]   BP: (147)/(74)   SpO2:  [98 %]   Physical Exam       Wound 05/07/24 0952 Ulceration Left medial Foot (Active)   05/07/24 0952   Present on Original Admission: Y   Primary Wound Type: Ulceration   Side: Left   Orientation: medial   Location: Foot   Wound Approximate Age at First Assessment (Weeks):    Wound Number:    Is this injury device related?:    Incision Type:    Closure Method:    Wound Description (Comments):    Type:    Additional Comments:    Ankle-Brachial Index:    Pulses: 2+ palpable DP and PT pulses, strong doppler signal to both   Removal Indication and Assessment:    Wound Outcome:    Wound Image     09/18/24 1424   Dressing Appearance Intact 09/18/24 1424   Drainage Amount Scant 09/18/24 1424   Drainage Characteristics/Odor Serous 09/18/24 1424   Appearance Red;Granulating;Tan;Fibrin 09/18/24 1424   Tissue loss description Full thickness 09/18/24 1424   Red (%), Wound Tissue Color 95 % 09/18/24 1424   Yellow (%), Wound Tissue Color 5 % 09/18/24 1424   Periwound Area Hemosiderin Staining;Scar tissue;Macerated 09/18/24 1424   Wound Edges Callused 09/18/24 1424   Wound Length (cm) 1.2 cm 09/18/24 1424   Wound Width (cm) 1.4 cm 09/18/24 1424   Wound Depth (cm) 0.3 cm 09/18/24 1424   Wound Volume (cm^3) 0.504 cm^3 09/18/24 1424   Wound Surface Area (cm^2) 1.68 cm^2 09/18/24 1424   Care Cleansed with:;Antimicrobial agent;Wound cleanser;Other (see comments) 09/18/24 1424   Dressing Applied;Hydrofiber;Silver;Gauze;Bandaid 09/18/24 1424   Periwound Care Topical treatment applied 09/18/24 1424   Compression Compression Stocking (20-30 mmHg) 09/18/24 1424         Assessment:     Today wound is reddened granulating.  There is  some area denuded skin in the surrounding areas which have appear [place] moist.  There is some maceration.  This area was cleaned and a tissue culture was taken.  Because of itching in the surrounding areas triamcinolone cream was applied.  Aquacel Ag was applied to the wound bed and this was covered with gauze and Band-Aid.  Doxycycline will be started.  Dressings will be changed daily.  Patient will return in 1 week for MD visit.    ICD-10-CM ICD-9-CM   1. Foot ulcer with fat layer exposed, left  L97.522 707.15         Plan:   Tissue pathology and/or culture taken:  [x] Yes [] No   Sharp debridement performed:   [] Yes [x] No   Labs ordered this visit:   [] Yes [x] No   Imaging ordered this visit:   [] Yes [] No           Orders Placed This Encounter   Procedures    Tissue Culture - Aerobic    Change dressing     Cleanse wound with: wound cleanser or Normal saline  Pat dry  Apply small amount of triamcinolone to periwound irritation (Do not apply on open wound bed)  Primary dressing: apply small piece of Aquacel Ag to open area  Secondary dressing: cover with  small piece of gauze and  Band-Aid,   Frequency: every day       Edema control: may wear compression stockings for control of swelling.       Begin taking Vitamin C 1000mg by mouth twice daily  Vitamin E 400 iu daily  Zinc 50mg once daily  Vitamin D 1000 once daily     Triamcinolone ointment has been sent to pharmacy, do not put on wound bed , only around wound where it itches.    A wound culture was obtained at today's visit.   Antibiotics have been ordered, pickup and take as prescribed.   Antibiotics may be changed if needed when final culture results are obtained.          Follow-up: 1 week        Follow up in about 1 week (around 9/25/2024) for md visit.

## 2024-09-21 LAB — BACTERIA TISS AEROBE CULT: ABNORMAL

## 2024-09-23 NOTE — PATIENT INSTRUCTIONS
Cleanse wound with: wound cleanser or Normal saline  Pat dry  Apply small amount of triamcinolone to periwound irritation (Do not apply on open wound bed) Only use if needed for itching periwound)  Primary dressing: apply small amount of  gentamicin ointment then Aquacel to open area  Secondary dressing: cover with  small piece of gauze and  Band-Aid or tape  Frequency: every day        Edema control: may wear compression stockings for control of swelling.       Begin taking Vitamin C 1000mg by mouth twice daily  Vitamin E 400 iu daily  Zinc 50mg once daily  Vitamin D 1000 once daily      Triamcinolone ointment has been sent to pharmacy, do not put on wound bed , only around wound where it itches.      Discontinue  Doxycycline orally and  Gentamicin ointment from pharmacy and use as prescribed         Follow-up: 1 week

## 2024-09-25 ENCOUNTER — HOSPITAL ENCOUNTER (OUTPATIENT)
Dept: WOUND CARE | Facility: HOSPITAL | Age: 89
Discharge: HOME | End: 2024-09-25
Attending: PEDIATRICS
Payer: MEDICARE

## 2024-09-25 ENCOUNTER — OFFICE VISIT (OUTPATIENT)
Dept: NEUROLOGY | Facility: CLINIC | Age: 89
End: 2024-09-25
Payer: MEDICARE

## 2024-09-25 VITALS
DIASTOLIC BLOOD PRESSURE: 74 MMHG | SYSTOLIC BLOOD PRESSURE: 126 MMHG | RESPIRATION RATE: 18 BRPM | HEART RATE: 74 BPM | TEMPERATURE: 98 F | OXYGEN SATURATION: 96 %

## 2024-09-25 VITALS
BODY MASS INDEX: 23.83 KG/M2 | WEIGHT: 143.06 LBS | HEART RATE: 83 BPM | HEIGHT: 65 IN | SYSTOLIC BLOOD PRESSURE: 122 MMHG | DIASTOLIC BLOOD PRESSURE: 70 MMHG

## 2024-09-25 DIAGNOSIS — I60.9 SAH (SUBARACHNOID HEMORRHAGE): Primary | ICD-10-CM

## 2024-09-25 DIAGNOSIS — R51.9 NONINTRACTABLE HEADACHE, UNSPECIFIED CHRONICITY PATTERN, UNSPECIFIED HEADACHE TYPE: ICD-10-CM

## 2024-09-25 DIAGNOSIS — L97.522 FOOT ULCER WITH FAT LAYER EXPOSED, LEFT: Primary | ICD-10-CM

## 2024-09-25 PROCEDURE — 99214 OFFICE O/P EST MOD 30 MIN: CPT | Mod: S$PBB,,, | Performed by: NURSE PRACTITIONER

## 2024-09-25 PROCEDURE — 99213 OFFICE O/P EST LOW 20 MIN: CPT | Mod: PBBFAC | Performed by: NURSE PRACTITIONER

## 2024-09-25 PROCEDURE — 99213 OFFICE O/P EST LOW 20 MIN: CPT | Mod: 27

## 2024-09-25 PROCEDURE — 99213 OFFICE O/P EST LOW 20 MIN: CPT | Mod: ,,, | Performed by: PEDIATRICS

## 2024-09-25 PROCEDURE — 27000999 HC MEDICAL RECORD PHOTO DOCUMENTATION

## 2024-09-25 PROCEDURE — 99999 PR PBB SHADOW E&M-EST. PATIENT-LVL III: CPT | Mod: PBBFAC,,, | Performed by: NURSE PRACTITIONER

## 2024-09-25 RX ORDER — LEVOFLOXACIN 750 MG/1
750 TABLET ORAL DAILY
COMMUNITY

## 2024-09-25 RX ORDER — GENTAMICIN SULFATE 1 MG/G
OINTMENT TOPICAL DAILY
Qty: 15 G | Refills: 1 | Status: SHIPPED | OUTPATIENT
Start: 2024-09-25 | End: 2024-10-09

## 2024-09-25 RX ORDER — CLOPIDOGREL BISULFATE 75 MG/1
75 TABLET ORAL DAILY
COMMUNITY
End: 2024-09-25

## 2024-09-25 NOTE — PROGRESS NOTES
Subjective:      Patient ID: Tali Beard is a 88 y.o. female.    Chief Complaint:  Hospital Follow Up (3 week f/u. Pt complain of headache located on left side of head. Describes pain as aching. Also c/o dizziness. Denies nausea/vomiting or slurred speech. )        History of Present Illness  Patient with history of SAH (11/26/23) presents for follow up. Patient recently went to [facility] with reports of a headache x 2 days. Patient had CT head that showed a questionable area of SAH. CTA was normal. Patient denied any falls at the time. No longer on blood thinners. Reports she only has left sided head pain when she sits under the air conditioner at [employer]. Denies any dizziness today.          Past Medical History:   Diagnosis Date    Subarachnoid hemorrhage        History reviewed. No pertinent surgical history.    No family history on file.    Social History     Socioeconomic History    Marital status:    Tobacco Use    Smoking status: Never     Passive exposure: Never    Smokeless tobacco: Never   Substance and Sexual Activity    Alcohol use: Not Currently    Drug use: Never    Sexual activity: Not Currently       Current Outpatient Medications   Medication Sig Dispense Refill    amLODIPine (NORVASC) 2.5 MG tablet Take 1 tablet by mouth every morning.      atorvastatin (LIPITOR) 10 MG tablet Take 10 mg by mouth.      butenafine (MENTAX) 1 % cream Apply topically 2 (two) times daily. 30 g 0    clopidogreL (PLAVIX) 75 mg tablet Take 75 mg by mouth once daily.      doxycycline (VIBRAMYCIN) 100 MG Cap Take 1 capsule (100 mg total) by mouth every 12 (twelve) hours. for 10 days 20 capsule 0    latanoprost 0.005 % ophthalmic solution Place into both eyes.      levoFLOXacin (LEVAQUIN) 750 MG tablet Take 750 mg by mouth once daily.      lisinopriL-hydrochlorothiazide (PRINZIDE,ZESTORETIC) 20-25 mg Tab Take 1 tablet by mouth.      metFORMIN (GLUCOPHAGE) 500 MG tablet Take 500 mg by mouth.       metoprolol succinate (TOPROL-XL) 50 MG 24 hr tablet Take 50 mg by mouth.      nystatin (MYCOSTATIN) cream Apply topically.      prednisoLONE acetate (PRED MILD) 0.12 % ophthalmic suspension Place 1 drop into both eyes 4 (four) times daily.      timolol maleate 0.5% (TIMOPTIC) 0.5 % Drop Place into both eyes.      triamcinolone acetonide 0.1% (KENALOG) 0.1 % cream Apply topically Daily. for 7 days 15 g 1    meclizine (ANTIVERT) 25 mg tablet Take 1 tablet (25 mg total) by mouth 3 (three) times daily as needed. 15 tablet 0     No current facility-administered medications for this visit.       Review of patient's allergies indicates:   Allergen Reactions    Hydroxyzine hcl      Other Reaction(s): BUMPS ALL OVER BODY    Other reaction(s): BUMPS ALL OVER BODY    Felodipine Rash     Edema in the legs        Review of Systems  12 point ROS performed and negative unless otherwise documented in HPI  Objective:     Neurologic Exam      Mental Status   Oriented to person, place, and time.   Speech: speech is normal   Level of consciousness: alert     Cranial Nerves   Cranial nerves II through XII intact.      Motor Exam   Muscle bulk: normal     Strength   Strength 5/5 throughout.      Sensory Exam   Light touch normal.      Gait, Coordination, and Reflexes      Gait  Gait: normal        Physical Exam  Vitals reviewed.   Pulmonary:      Effort: Pulmonary effort is normal.   Neurological:      Mental Status: She is oriented to person, place, and time.      Cranial Nerves: Cranial nerves 2-12 are intact.      Motor: Motor strength is normal.     Gait: Gait is intact.   Psychiatric:         Speech: Speech normal.        Assessment:     1. SAH (subarachnoid hemorrhage)           Modified Jefferson Scale 0  Plan:     Repeat CT head at [facility]  Secondary stroke prevention measures discussed. Education provided on signs and symptoms of stroke; advised to call 9-1-1 with any new onset of numbness, tingling or weakness, difficulty with  speech or facial droop.

## 2024-09-25 NOTE — PROGRESS NOTES
Subjective:       Patient ID: Tali Beard is a 88 y.o. female.    Chief Complaint: Venous Ulcer (Left foot venous ulceration. Patient reports taking antibiotics as prescribed. Here for re-evaluation and treatment with MD/ /)    HPI  Review of Systems      Objective:      Temp:  [98.1 °F (36.7 °C)]   Pulse:  [74-83]   Resp:  [18]   BP: (122-126)/(70-74)   SpO2:  [96 %]   Physical Exam       Wound 05/07/24 0952 Ulceration Left medial Foot (Active)   05/07/24 0952   Present on Original Admission: Y   Primary Wound Type: Ulceration   Side: Left   Orientation: medial   Location: Foot   Wound Approximate Age at First Assessment (Weeks):    Wound Number:    Is this injury device related?:    Incision Type:    Closure Method:    Wound Description (Comments):    Type:    Additional Comments:    Ankle-Brachial Index:    Pulses: 2+ palpable DP and PT pulses, strong doppler signal to both   Removal Indication and Assessment:    Wound Outcome:    Wound Image     09/25/24 1426   Dressing Appearance Intact 09/25/24 1426   Drainage Amount Small 09/25/24 1426   Drainage Characteristics/Odor Yellow;Creamy 09/25/24 1426   Appearance Red;Granulating;Yellow;Fibrin 09/25/24 1426   Tissue loss description Full thickness 09/25/24 1426   Red (%), Wound Tissue Color 75 % 09/25/24 1426   Periwound Area Dry;Scar tissue;Hemosiderin Staining 09/25/24 1426   Wound Edges Callused 09/25/24 1426   Wound Length (cm) 1 cm 09/25/24 1426   Wound Width (cm) 1.1 cm 09/25/24 1426   Wound Depth (cm) 0.2 cm 09/25/24 1426   Wound Volume (cm^3) 0.22 cm^3 09/25/24 1426   Wound Surface Area (cm^2) 1.1 cm^2 09/25/24 1426   Care Cleansed with:;Antimicrobial agent;Wound cleanser 09/25/24 1426   Dressing Applied;Hydrofiber;Gauze;Bandaid 09/25/24 1426   Periwound Care Topical treatment applied;Other (see comments) 09/25/24 1426   Compression Compression Stocking (20-30 mmHg) 09/25/24 1426         Assessment:     Today wound is red and granulating.  There is  some slight fibrin.  Wound is 1 cm x 1.1 cm with a depth of 0.2 cm.  This has improved since last week.  This was cleaned and gentamicin ointment was applied.  Was applied with a secondary dressing.  This will be changed daily.  Patient grew a Pseudomonas which was not sensitive to doxycycline.  Since the wound was improving it was decided to stop the doxycycline and use gentamicin ointment on the wound itself.  Periwound area has improved.  Dressings will be changed daily and patient will return in 1 week for MD visit    ICD-10-CM ICD-9-CM   1. Foot ulcer with fat layer exposed, left  L97.522 707.15         Plan:   Tissue pathology and/or culture taken:  [] Yes [x] No   Sharp debridement performed:   [] Yes [x] No   Labs ordered this visit:   [] Yes [x] No   Imaging ordered this visit:   [] Yes [x] No           Orders Placed This Encounter   Procedures    Change dressing     Cleanse wound with: wound cleanser or Normal saline  Pat dry  Apply small amount of triamcinolone to periwound irritation (Do not apply on open wound bed) Only use if needed for itching periwound)  Primary dressing: apply small amount of  gentamicin ointment then Aquacel to open area  Secondary dressing: cover with  small piece of gauze and  Band-Aid or tape  Frequency: every day        Edema control: may wear compression stockings for control of swelling.       Begin taking Vitamin C 1000mg by mouth twice daily  Vitamin E 400 iu daily  Zinc 50mg once daily  Vitamin D 1000 once daily      Triamcinolone ointment has been sent to pharmacy, do not put on wound bed , only around wound where it itches.      Discontinue  Doxycycline orally and  Gentamicin ointment from pharmacy and use as prescribed         Follow-up: 1 week        Follow up in about 1 week (around 10/2/2024) for MD visit .

## 2024-09-30 ENCOUNTER — HOSPITAL ENCOUNTER (OUTPATIENT)
Dept: RADIOLOGY | Facility: HOSPITAL | Age: 89
Discharge: HOME | End: 2024-09-30
Attending: NURSE PRACTITIONER
Payer: MEDICARE

## 2024-09-30 DIAGNOSIS — R51.9 NONINTRACTABLE HEADACHE, UNSPECIFIED CHRONICITY PATTERN, UNSPECIFIED HEADACHE TYPE: ICD-10-CM

## 2024-09-30 PROCEDURE — 70450 CT HEAD/BRAIN W/O DYE: CPT | Mod: TC

## 2024-09-30 NOTE — PATIENT INSTRUCTIONS
Cleanse wound with wound cleanser or Normal saline  Pat dry  Apply 1/4 inch Nitro bid paste to debbi wound  (Do not apply on open wound bed)  Primary dressing: apply small amount of  gentamicin ointment then Aquacel to open area  Secondary dressing: cover with  small piece of gauze and  Band-Aid or tape  Frequency: every day        Edema control: may wear compression stockings for control of swelling.       Begin taking Vitamin C 1000mg by mouth twice daily  Vitamin E 400 iu daily  Zinc 50mg once daily  Vitamin D 1000 once daily          Follow-up: 1 week

## 2024-10-02 ENCOUNTER — HOSPITAL ENCOUNTER (OUTPATIENT)
Dept: WOUND CARE | Facility: HOSPITAL | Age: 89
Discharge: HOME | End: 2024-10-02
Attending: PEDIATRICS
Payer: MEDICARE

## 2024-10-02 ENCOUNTER — TELEPHONE (OUTPATIENT)
Dept: NEUROLOGY | Facility: CLINIC | Age: 89
End: 2024-10-02
Payer: MEDICARE

## 2024-10-02 VITALS
RESPIRATION RATE: 18 BRPM | DIASTOLIC BLOOD PRESSURE: 72 MMHG | HEART RATE: 76 BPM | TEMPERATURE: 98 F | SYSTOLIC BLOOD PRESSURE: 149 MMHG | OXYGEN SATURATION: 96 %

## 2024-10-02 DIAGNOSIS — E11.9 TYPE 2 DIABETES MELLITUS WITHOUT COMPLICATION, WITHOUT LONG-TERM CURRENT USE OF INSULIN: ICD-10-CM

## 2024-10-02 DIAGNOSIS — L97.522 FOOT ULCER WITH FAT LAYER EXPOSED, LEFT: Primary | ICD-10-CM

## 2024-10-02 PROCEDURE — 11042 DBRDMT SUBQ TIS 1ST 20SQCM/<: CPT | Mod: ,,, | Performed by: PEDIATRICS

## 2024-10-02 PROCEDURE — 99213 OFFICE O/P EST LOW 20 MIN: CPT

## 2024-10-02 PROCEDURE — 27000999 HC MEDICAL RECORD PHOTO DOCUMENTATION

## 2024-10-02 PROCEDURE — 11042 DBRDMT SUBQ TIS 1ST 20SQCM/<: CPT

## 2024-10-02 NOTE — PROGRESS NOTES
Subjective:       Patient ID: Tali Beard is a 88 y.o. female.    Chief Complaint: Venous Ulcer (Left foot venous ulceration. Here for re-evaluation and treatment with MD)    HPI  Review of Systems      Objective:      Temp:  [98.2 °F (36.8 °C)]   Pulse:  [76]   Resp:  [18]   BP: (149)/(72)   SpO2:  [96 %]   Physical Exam       Wound 05/07/24 0952 Ulceration Left medial Foot (Active)   05/07/24 0952   Present on Original Admission: Y   Primary Wound Type: Ulceration   Side: Left   Orientation: medial   Location: Foot   Wound Approximate Age at First Assessment (Weeks):    Wound Number:    Is this injury device related?:    Incision Type:    Closure Method:    Wound Description (Comments):    Type:    Additional Comments:    Ankle-Brachial Index:    Pulses: 2+ palpable DP and PT pulses, strong doppler signal to both   Removal Indication and Assessment:    Wound Outcome:    Wound Image   10/02/24 1428   Dressing Appearance Intact 10/02/24 1428   Drainage Amount Small 10/02/24 1428   Drainage Characteristics/Odor Serous 10/02/24 1428   Appearance Red;Pink;Not granulating;Tan;Fibrin 10/02/24 1428   Tissue loss description Full thickness 10/02/24 1428   Red (%), Wound Tissue Color 70 % 10/02/24 1428   Yellow (%), Wound Tissue Color 30 % 10/02/24 1428   Periwound Area Dry;Scar tissue;Hemosiderin Staining 10/02/24 1428   Wound Edges Callused 10/02/24 1428   Wound Length (cm) 1 cm 10/02/24 1428   Wound Width (cm) 1 cm 10/02/24 1428   Wound Depth (cm) 0.1 cm 10/02/24 1428   Wound Volume (cm^3) 0.1 cm^3 10/02/24 1428   Wound Surface Area (cm^2) 1 cm^2 10/02/24 1428   Care Cleansed with:;Antimicrobial agent;Wound cleanser 10/02/24 1428   Dressing Applied;Hydrofiber;Gauze;Bandaid 10/02/24 1428   Periwound Care Topical treatment applied 10/02/24 1428   Compression Compression Stocking (20-30 mmHg) 10/02/24 1428         Assessment:     Today is 1 cm x 1 cm with a depth of 0.1 cm.  This area was sloughy and there was some  10 fibrous material.  Because of this a selective debridement was done.  See procedure note.  This was done down to granulation tissue.  Area was cleaned and gentamicin ointment with Aquacel applied.  Also Nitro bid paste was applied the surrounding area.  This will be changed daily.  Patient will return in one week for MD visit.  Patient is to continue to wear compression stockings.    ICD-10-CM ICD-9-CM   1. Foot ulcer with fat layer exposed, left  L97.522 707.15   2. Type 2 diabetes mellitus without complication, without long-term current use of insulin  E11.9 250.00         Plan:   Tissue pathology and/or culture taken:  [] Yes [x] No   Sharp debridement performed:   [x] Yes [] No   Labs ordered this visit:   [] Yes [x] No   Imaging ordered this visit:   [] Yes [x] No           Orders Placed This Encounter   Procedures    Change dressing     Cleanse wound with wound cleanser or Normal saline  Pat dry  Apply 1/4 inch Nitro bid paste to debbi wound  (Do not apply on open wound bed)  Primary dressing: apply small amount of  gentamicin ointment then Aquacel to open area  Secondary dressing: cover with  small piece of gauze and  Band-Aid or tape  Frequency: every day        Edema control: may wear compression stockings for control of swelling.       Begin taking Vitamin C 1000mg by mouth twice daily  Vitamin E 400 iu daily  Zinc 50mg once daily  Vitamin D 1000 once daily          Follow-up: 1 week        Follow up in about 1 week (around 10/9/2024) for MD visit.

## 2024-10-02 NOTE — TELEPHONE ENCOUNTER
Called patient's son to inform him that patient's CT head did not show any blood product or concerns.

## 2024-10-02 NOTE — PROCEDURES
"Debridement    Date/Time: 10/2/2024 2:00 PM    Performed by: Trey Perez MD  Authorized by: Trey Perez MD    Associated wounds:        Wound 05/07/24 0952 Ulceration Left medial Foot  Time out: Immediately prior to procedure a "time out" was called to verify the correct patient, procedure, equipment, support staff and site/side marked as required.    Consent Done?:  Yes (Verbal) and Yes (Written)    Preparation: Patient was prepped and draped with clean technique    Local anesthesia used?: Yes    Local anesthetic:  Lidocaine 4%    Wound Details:    Location:  Left foot    Location:  Left Ankle    Type of Debridement:  Non-excisional       Length (cm):  1       Area (sq cm):  1       Width (cm):  1       Percent Debrided (%):  100       Depth (cm):  0.1       Total Area Debrided (sq cm):  1    Depth of debridement:  Subcutaneous tissue    Tissue debrided:  Subcutaneous    Devitalized tissue debrided:  Biofilm and Slough    Instruments:  Curette  Bleeding:  None  Patient tolerance:  Patient tolerated the procedure well with no immediate complications  "

## 2024-10-02 NOTE — ADDENDUM NOTE
Encounter addended by: Charo Holley RN on: 10/2/2024 4:39 PM   Actions taken: Charge Capture section accepted

## 2024-10-02 NOTE — TELEPHONE ENCOUNTER
----- Message from ABHIJEET Murguia sent at 10/2/2024  9:28 AM CDT -----  Please notify patient's son that patient's CT head did not show any blood product or concerns.

## 2024-10-03 ENCOUNTER — HOSPITAL ENCOUNTER (INPATIENT)
Facility: HOSPITAL | Age: 89
LOS: 4 days | Discharge: HOME HEALTH | DRG: 065 | End: 2024-10-07
Attending: STUDENT IN AN ORGANIZED HEALTH CARE EDUCATION/TRAINING PROGRAM | Admitting: INTERNAL MEDICINE
Payer: MEDICARE

## 2024-10-03 ENCOUNTER — HOSPITAL ENCOUNTER (EMERGENCY)
Facility: HOSPITAL | Age: 89
Discharge: ANOTHER HOSPITAL | End: 2024-10-03
Attending: STUDENT IN AN ORGANIZED HEALTH CARE EDUCATION/TRAINING PROGRAM
Payer: MEDICARE

## 2024-10-03 VITALS
DIASTOLIC BLOOD PRESSURE: 60 MMHG | WEIGHT: 147 LBS | HEIGHT: 63 IN | TEMPERATURE: 98 F | SYSTOLIC BLOOD PRESSURE: 123 MMHG | HEART RATE: 69 BPM | BODY MASS INDEX: 26.05 KG/M2 | RESPIRATION RATE: 22 BRPM | OXYGEN SATURATION: 100 %

## 2024-10-03 DIAGNOSIS — I61.9 INTRAPARENCHYMAL HEMORRHAGE OF BRAIN: Primary | ICD-10-CM

## 2024-10-03 DIAGNOSIS — R07.9 CHEST PAIN: ICD-10-CM

## 2024-10-03 DIAGNOSIS — I63.9 STROKE: ICD-10-CM

## 2024-10-03 DIAGNOSIS — I60.9 SUBARACHNOID HEMORRHAGE: Primary | ICD-10-CM

## 2024-10-03 DIAGNOSIS — R41.0 CONFUSION: ICD-10-CM

## 2024-10-03 DIAGNOSIS — I61.9 BRAIN BLEED: ICD-10-CM

## 2024-10-03 LAB
ALBUMIN SERPL-MCNC: 3.5 G/DL (ref 3.4–4.8)
ALBUMIN SERPL-MCNC: 3.5 G/DL (ref 3.4–4.8)
ALBUMIN/GLOB SERPL: 0.9 RATIO (ref 1.1–2)
ALBUMIN/GLOB SERPL: 1 RATIO (ref 1.1–2)
ALP SERPL-CCNC: 66 UNIT/L (ref 40–150)
ALP SERPL-CCNC: 72 UNIT/L (ref 40–150)
ALT SERPL-CCNC: 14 UNIT/L (ref 0–55)
ALT SERPL-CCNC: 14 UNIT/L (ref 0–55)
AMMONIA PLAS-MSCNC: 24.9 UMOL/L (ref 18–72)
ANION GAP SERPL CALC-SCNC: 7 MEQ/L
ANION GAP SERPL CALC-SCNC: 8 MEQ/L
APTT PPP: 22.5 SECONDS (ref 23.2–33.7)
APTT PPP: 23.4 SECONDS (ref 23.2–33.7)
AST SERPL-CCNC: 19 UNIT/L (ref 5–34)
AST SERPL-CCNC: 22 UNIT/L (ref 5–34)
BASOPHILS # BLD AUTO: 0.01 X10(3)/MCL
BASOPHILS # BLD AUTO: 0.02 X10(3)/MCL
BASOPHILS NFR BLD AUTO: 0.2 %
BASOPHILS NFR BLD AUTO: 0.4 %
BILIRUB SERPL-MCNC: 0.5 MG/DL
BILIRUB SERPL-MCNC: 0.6 MG/DL
BUN SERPL-MCNC: 15.2 MG/DL (ref 9.8–20.1)
BUN SERPL-MCNC: 17.4 MG/DL (ref 9.8–20.1)
CALCIUM SERPL-MCNC: 9.2 MG/DL (ref 8.4–10.2)
CALCIUM SERPL-MCNC: 9.9 MG/DL (ref 8.4–10.2)
CHLORIDE SERPL-SCNC: 105 MMOL/L (ref 98–107)
CHLORIDE SERPL-SCNC: 107 MMOL/L (ref 98–107)
CO2 SERPL-SCNC: 25 MMOL/L (ref 23–31)
CO2 SERPL-SCNC: 28 MMOL/L (ref 23–31)
CREAT SERPL-MCNC: 0.94 MG/DL (ref 0.55–1.02)
CREAT SERPL-MCNC: 1.17 MG/DL (ref 0.55–1.02)
CREAT/UREA NIT SERPL: 15
CREAT/UREA NIT SERPL: 16
EOSINOPHIL # BLD AUTO: 0.03 X10(3)/MCL (ref 0–0.9)
EOSINOPHIL # BLD AUTO: 0.09 X10(3)/MCL (ref 0–0.9)
EOSINOPHIL NFR BLD AUTO: 0.5 %
EOSINOPHIL NFR BLD AUTO: 1.9 %
ERYTHROCYTE [DISTWIDTH] IN BLOOD BY AUTOMATED COUNT: 15 % (ref 11.5–17)
ERYTHROCYTE [DISTWIDTH] IN BLOOD BY AUTOMATED COUNT: 15.1 % (ref 11.5–17)
FLUAV AG UPPER RESP QL IA.RAPID: NOT DETECTED
FLUBV AG UPPER RESP QL IA.RAPID: NOT DETECTED
GFR SERPLBLD CREATININE-BSD FMLA CKD-EPI: 45 ML/MIN/1.73/M2
GFR SERPLBLD CREATININE-BSD FMLA CKD-EPI: 58 ML/MIN/1.73/M2
GLOBULIN SER-MCNC: 3.4 GM/DL (ref 2.4–3.5)
GLOBULIN SER-MCNC: 3.7 GM/DL (ref 2.4–3.5)
GLUCOSE SERPL-MCNC: 106 MG/DL (ref 82–115)
GLUCOSE SERPL-MCNC: 89 MG/DL (ref 82–115)
HCT VFR BLD AUTO: 35.7 % (ref 37–47)
HCT VFR BLD AUTO: 38.5 % (ref 37–47)
HGB BLD-MCNC: 11.2 G/DL (ref 12–16)
HGB BLD-MCNC: 12.1 G/DL (ref 12–16)
IMM GRANULOCYTES # BLD AUTO: 0 X10(3)/MCL (ref 0–0.04)
IMM GRANULOCYTES # BLD AUTO: 0.01 X10(3)/MCL (ref 0–0.04)
IMM GRANULOCYTES NFR BLD AUTO: 0 %
IMM GRANULOCYTES NFR BLD AUTO: 0.2 %
INR PPP: 1
INR PPP: 1
LACTATE SERPL-SCNC: 1.9 MMOL/L (ref 0.5–2.2)
LYMPHOCYTES # BLD AUTO: 1.07 X10(3)/MCL (ref 0.6–4.6)
LYMPHOCYTES # BLD AUTO: 1.23 X10(3)/MCL (ref 0.6–4.6)
LYMPHOCYTES NFR BLD AUTO: 22 %
LYMPHOCYTES NFR BLD AUTO: 22.6 %
MAGNESIUM SERPL-MCNC: 1.8 MG/DL (ref 1.6–2.6)
MCH RBC QN AUTO: 23.6 PG (ref 27–31)
MCH RBC QN AUTO: 24.2 PG (ref 27–31)
MCHC RBC AUTO-ENTMCNC: 31.4 G/DL (ref 33–36)
MCHC RBC AUTO-ENTMCNC: 31.4 G/DL (ref 33–36)
MCV RBC AUTO: 75 FL (ref 80–94)
MCV RBC AUTO: 77.3 FL (ref 80–94)
MONOCYTES # BLD AUTO: 0.45 X10(3)/MCL (ref 0.1–1.3)
MONOCYTES # BLD AUTO: 0.55 X10(3)/MCL (ref 0.1–1.3)
MONOCYTES NFR BLD AUTO: 9.5 %
MONOCYTES NFR BLD AUTO: 9.9 %
NEUTROPHILS # BLD AUTO: 3.12 X10(3)/MCL (ref 2.1–9.2)
NEUTROPHILS # BLD AUTO: 3.74 X10(3)/MCL (ref 2.1–9.2)
NEUTROPHILS NFR BLD AUTO: 65.8 %
NEUTROPHILS NFR BLD AUTO: 67 %
NRBC BLD AUTO-RTO: 0 %
PLATELET # BLD AUTO: 143 X10(3)/MCL (ref 130–400)
PLATELET # BLD AUTO: 158 X10(3)/MCL (ref 130–400)
PMV BLD AUTO: 11 FL (ref 7.4–10.4)
PMV BLD AUTO: 11.3 FL (ref 7.4–10.4)
POCT GLUCOSE: 97 MG/DL (ref 70–110)
POTASSIUM SERPL-SCNC: 4.2 MMOL/L (ref 3.5–5.1)
POTASSIUM SERPL-SCNC: 4.4 MMOL/L (ref 3.5–5.1)
PROT SERPL-MCNC: 6.9 GM/DL (ref 5.8–7.6)
PROT SERPL-MCNC: 7.2 GM/DL (ref 5.8–7.6)
PROTHROMBIN TIME: 10.4 SECONDS (ref 12.5–14.5)
PROTHROMBIN TIME: 13 SECONDS (ref 12.5–14.5)
RBC # BLD AUTO: 4.62 X10(6)/MCL (ref 4.2–5.4)
RBC # BLD AUTO: 5.13 X10(6)/MCL (ref 4.2–5.4)
SARS-COV-2 RNA RESP QL NAA+PROBE: NOT DETECTED
SODIUM SERPL-SCNC: 139 MMOL/L (ref 136–145)
SODIUM SERPL-SCNC: 141 MMOL/L (ref 136–145)
TROPONIN I SERPL-MCNC: <0.01 NG/ML (ref 0–0.04)
TSH SERPL-ACNC: 0.74 UIU/ML (ref 0.35–4.94)
WBC # BLD AUTO: 4.74 X10(3)/MCL (ref 4.5–11.5)
WBC # BLD AUTO: 5.58 X10(3)/MCL (ref 4.5–11.5)

## 2024-10-03 PROCEDURE — 85025 COMPLETE CBC W/AUTO DIFF WBC: CPT | Performed by: INTERNAL MEDICINE

## 2024-10-03 PROCEDURE — 63600175 PHARM REV CODE 636 W HCPCS

## 2024-10-03 PROCEDURE — 82140 ASSAY OF AMMONIA: CPT | Performed by: STUDENT IN AN ORGANIZED HEALTH CARE EDUCATION/TRAINING PROGRAM

## 2024-10-03 PROCEDURE — 80053 COMPREHEN METABOLIC PANEL: CPT | Performed by: INTERNAL MEDICINE

## 2024-10-03 PROCEDURE — 20000000 HC ICU ROOM

## 2024-10-03 PROCEDURE — 99232 SBSQ HOSP IP/OBS MODERATE 35: CPT | Mod: FS,,, | Performed by: NEUROLOGICAL SURGERY

## 2024-10-03 PROCEDURE — 83605 ASSAY OF LACTIC ACID: CPT | Performed by: STUDENT IN AN ORGANIZED HEALTH CARE EDUCATION/TRAINING PROGRAM

## 2024-10-03 PROCEDURE — 84443 ASSAY THYROID STIM HORMONE: CPT | Performed by: STUDENT IN AN ORGANIZED HEALTH CARE EDUCATION/TRAINING PROGRAM

## 2024-10-03 PROCEDURE — 85610 PROTHROMBIN TIME: CPT

## 2024-10-03 PROCEDURE — 82962 GLUCOSE BLOOD TEST: CPT

## 2024-10-03 PROCEDURE — 83735 ASSAY OF MAGNESIUM: CPT | Performed by: STUDENT IN AN ORGANIZED HEALTH CARE EDUCATION/TRAINING PROGRAM

## 2024-10-03 PROCEDURE — 25000003 PHARM REV CODE 250: Performed by: NEUROLOGICAL SURGERY

## 2024-10-03 PROCEDURE — 99284 EMERGENCY DEPT VISIT MOD MDM: CPT | Mod: 25

## 2024-10-03 PROCEDURE — 63600175 PHARM REV CODE 636 W HCPCS: Performed by: NEUROLOGICAL SURGERY

## 2024-10-03 PROCEDURE — 85025 COMPLETE CBC W/AUTO DIFF WBC: CPT | Performed by: STUDENT IN AN ORGANIZED HEALTH CARE EDUCATION/TRAINING PROGRAM

## 2024-10-03 PROCEDURE — 85730 THROMBOPLASTIN TIME PARTIAL: CPT

## 2024-10-03 PROCEDURE — 84484 ASSAY OF TROPONIN QUANT: CPT | Performed by: STUDENT IN AN ORGANIZED HEALTH CARE EDUCATION/TRAINING PROGRAM

## 2024-10-03 PROCEDURE — 96374 THER/PROPH/DIAG INJ IV PUSH: CPT

## 2024-10-03 PROCEDURE — 63600175 PHARM REV CODE 636 W HCPCS: Performed by: STUDENT IN AN ORGANIZED HEALTH CARE EDUCATION/TRAINING PROGRAM

## 2024-10-03 PROCEDURE — 0240U COVID/FLU A&B PCR: CPT | Performed by: STUDENT IN AN ORGANIZED HEALTH CARE EDUCATION/TRAINING PROGRAM

## 2024-10-03 PROCEDURE — 99285 EMERGENCY DEPT VISIT HI MDM: CPT | Mod: 25

## 2024-10-03 PROCEDURE — 85610 PROTHROMBIN TIME: CPT | Performed by: STUDENT IN AN ORGANIZED HEALTH CARE EDUCATION/TRAINING PROGRAM

## 2024-10-03 PROCEDURE — 80053 COMPREHEN METABOLIC PANEL: CPT | Performed by: STUDENT IN AN ORGANIZED HEALTH CARE EDUCATION/TRAINING PROGRAM

## 2024-10-03 PROCEDURE — 85730 THROMBOPLASTIN TIME PARTIAL: CPT | Performed by: STUDENT IN AN ORGANIZED HEALTH CARE EDUCATION/TRAINING PROGRAM

## 2024-10-03 RX ORDER — MUPIROCIN 20 MG/G
OINTMENT TOPICAL 2 TIMES DAILY
Status: DISCONTINUED | OUTPATIENT
Start: 2024-10-04 | End: 2024-10-07 | Stop reason: HOSPADM

## 2024-10-03 RX ORDER — SODIUM CHLORIDE 0.9 % (FLUSH) 0.9 %
10 SYRINGE (ML) INJECTION EVERY 12 HOURS PRN
Status: DISCONTINUED | OUTPATIENT
Start: 2024-10-03 | End: 2024-10-07 | Stop reason: HOSPADM

## 2024-10-03 RX ORDER — HYDRALAZINE HYDROCHLORIDE 20 MG/ML
10 INJECTION INTRAMUSCULAR; INTRAVENOUS EVERY 4 HOURS PRN
Status: DISCONTINUED | OUTPATIENT
Start: 2024-10-03 | End: 2024-10-07

## 2024-10-03 RX ORDER — MIDAZOLAM HYDROCHLORIDE 2 MG/2ML
1 INJECTION, SOLUTION INTRAMUSCULAR; INTRAVENOUS ONCE
Status: COMPLETED | OUTPATIENT
Start: 2024-10-03 | End: 2024-10-03

## 2024-10-03 RX ORDER — LABETALOL HYDROCHLORIDE 5 MG/ML
10 INJECTION, SOLUTION INTRAVENOUS EVERY 4 HOURS PRN
Status: DISCONTINUED | OUTPATIENT
Start: 2024-10-03 | End: 2024-10-07 | Stop reason: HOSPADM

## 2024-10-03 RX ORDER — IBUPROFEN 200 MG
24 TABLET ORAL
Status: DISCONTINUED | OUTPATIENT
Start: 2024-10-03 | End: 2024-10-07 | Stop reason: HOSPADM

## 2024-10-03 RX ORDER — NALOXONE HCL 0.4 MG/ML
0.02 VIAL (ML) INJECTION
Status: DISCONTINUED | OUTPATIENT
Start: 2024-10-03 | End: 2024-10-07 | Stop reason: HOSPADM

## 2024-10-03 RX ORDER — IBUPROFEN 200 MG
16 TABLET ORAL
Status: DISCONTINUED | OUTPATIENT
Start: 2024-10-03 | End: 2024-10-07 | Stop reason: HOSPADM

## 2024-10-03 RX ORDER — GLUCAGON 1 MG
1 KIT INJECTION
Status: DISCONTINUED | OUTPATIENT
Start: 2024-10-03 | End: 2024-10-07 | Stop reason: HOSPADM

## 2024-10-03 RX ORDER — LEVETIRACETAM 500 MG/5ML
1000 INJECTION, SOLUTION, CONCENTRATE INTRAVENOUS
Status: COMPLETED | OUTPATIENT
Start: 2024-10-03 | End: 2024-10-03

## 2024-10-03 RX ADMIN — LEVETIRACETAM 500 MG: 100 INJECTION, SOLUTION INTRAVENOUS at 09:10

## 2024-10-03 RX ADMIN — MIDAZOLAM HYDROCHLORIDE 1 MG: 1 INJECTION, SOLUTION INTRAMUSCULAR; INTRAVENOUS at 09:10

## 2024-10-03 RX ADMIN — LEVETIRACETAM 1000 MG: 100 INJECTION, SOLUTION INTRAVENOUS at 07:10

## 2024-10-03 NOTE — H&P
[facility] - [facility]  Pulmonary Critical Care Note    Patient Name: Tali Beard  MRN: [id number]  Admission Date: 10/3/2024  Hospital Length of Stay: 0 days  Code Status: Full Code  Attending Provider: Jamil Robles MD  Primary Care Provider: Aurora Moe FNP     Subjective:     HPI:   Patient is an 87 y/o female admitted to the ICU on 10/3/2024 with an IPH. PMH includes a previous subarachnoid hemorrhage 1 month ago, DM, HTN. Patient initially brought to the ED in [place] by her son for complaints of confusion for the past 48 hours. CT head revealed a 35mm area of intraparenchymal hemorrhage in the left temporoparietal region as well small volume subarachnoid hemorrhage. Pt was transferred to [facility] for NSGY services. Patient is awake, smiles, inappropriately answers questions, does not follow commands. No family at bedside to provide further history.      Hospital Course/Significant events:  10/3/24: Admitted to ICU     24 Hour Interval History:  Pending     Past Medical History:   Diagnosis Date    Subarachnoid hemorrhage        No past surgical history on file.    Social History     Socioeconomic History    Marital status:    Tobacco Use    Smoking status: Never     Passive exposure: Never    Smokeless tobacco: Never   Substance and Sexual Activity    Alcohol use: Not Currently    Drug use: Never    Sexual activity: Not Currently           Current Outpatient Medications   Medication Instructions    amLODIPine (NORVASC) 2.5 MG tablet 1 tablet, Oral, Every morning    atorvastatin (LIPITOR) 10 mg, Oral    butenafine (MENTAX) 1 % cream Topical (Top), 2 times daily    gentamicin (GARAMYCIN) 0.1 % ointment Topical (Top), Daily    latanoprost 0.005 % ophthalmic solution Both Eyes    levoFLOXacin (LEVAQUIN) 750 mg, Oral, Daily    lisinopriL-hydrochlorothiazide (PRINZIDE,ZESTORETIC) 20-25 mg Tab 1 tablet, Oral    meclizine (ANTIVERT) 25 mg, Oral, 3 times daily PRN    metFORMIN  "(GLUCOPHAGE) 500 mg, Oral    metoprolol succinate (TOPROL-XL) 50 mg, Oral    nystatin (MYCOSTATIN) cream Topical    prednisoLONE acetate (PRED MILD) 0.12 % ophthalmic suspension 1 drop, Both Eyes, 4 times daily    timolol maleate 0.5% (TIMOPTIC) 0.5 % Drop Both Eyes    triamcinolone acetonide 0.1% (KENALOG) 0.1 % cream Topical (Top), Daily       Current Inpatient Medications   levETIRAcetam (Keppra) IV (PEDS and ADULTS)  500 mg Intravenous Q12H    [START ON 10/4/2024] mupirocin   Nasal BID       Current Intravenous Infusions        Review of Systems   Unable to perform ROS: Other          Objective:     No intake or output data in the 24 hours ending 10/03/24 1646      Vital Signs (Most Recent):  Temp: 99.8 °F (37.7 °C) (10/03/24 1445)  Pulse: 71 (10/03/24 1445)  Resp: 14 (10/03/24 1445)  BP: 137/66 (10/03/24 1445)  SpO2: 100 % (10/03/24 1445)  There is no height or weight on file to calculate BMI.    Vital Signs (24h Range):  Temp:  [97.8 °F (36.6 °C)-99.8 °F (37.7 °C)] 99.8 °F (37.7 °C)  Pulse:  [67-87] 71  Resp:  [14-22] 14  SpO2:  [97 %-100 %] 100 %  BP: (123-156)/(45-88) 137/66     Physical Exam  Vitals reviewed.   Constitutional:       General: She is not in acute distress.  Eyes:      Extraocular Movements: Extraocular movements intact.      Pupils: Pupils are equal, round, and reactive to light.   Cardiovascular:      Rate and Rhythm: Normal rate and regular rhythm.   Pulmonary:      Effort: Pulmonary effort is normal.      Breath sounds: Normal breath sounds. No wheezing, rhonchi or rales.   Abdominal:      General: There is no distension.      Palpations: Abdomen is soft.   Musculoskeletal:      Right lower leg: No edema.      Left lower leg: No edema.   Neurological:      Comments: Neuro exam is limited as patient does not follow commands. She is awake. Responds inappropriately with "yep" to all questions asked. No facial droop evident. Unable to fully assess motor function, pt appears to spontaneously " "move right extremities > left.            Lines/Drains/Airways       None                   Significant Labs:    Lab Results   Component Value Date    WBC 5.58 10/03/2024    HGB 12.1 10/03/2024    HCT 38.5 10/03/2024    MCV 75.0 (L) 10/03/2024     10/03/2024           BMP  Lab Results   Component Value Date     10/03/2024    K 4.4 10/03/2024    CO2 25 10/03/2024    BUN 15.2 10/03/2024    CREATININE 0.94 10/03/2024    CALCIUM 9.2 10/03/2024    AGAP 7.0 10/03/2024    EGFRNONAA 50 09/02/2021         ABG  No results for input(s): "PH", "PO2", "PCO2", "HCO3", "POCBASEDEF" in the last 168 hours.    Mechanical Ventilation Support:         Significant Imaging:  I have reviewed the pertinent imaging within the past 24 hours.        Assessment/Plan:     Assessment  IPH/SAH      Plan  Admit to ICU for close monitoring   Q1h neurochecks   CBC, CMP, PT/INR, PTT  Repeat CT head ordered  MRI brain ordered  Keppra bid for seizure ppx   SBP goal < 140  Neurology and NSGY consulted, appreciate recs       DVT Prophylaxis: SCDs       32 minutes of critical care was time spent personally by me on the following activities: development of treatment plan with patient or surrogate and bedside caregivers, discussions with consultants, evaluation of patient's response to treatment, examination of patient, ordering and performing treatments and interventions, ordering and review of laboratory studies, ordering and review of radiographic studies, pulse oximetry, re-evaluation of patient's condition.  This critical care time did not overlap with that of any other provider or involve time for any procedures.     Pat Heard DO  Pulmonary Critical Care Medicine  [facility]  DOS: 10/03/2024   "

## 2024-10-03 NOTE — CONSULTS
"[facility]  [facility] Consultation      Reason for Consultation: Patient presented to [facility] with a change in mental status. CT head 10/03/2024 reveals area of intraparenchymal hemorrhage in left temporoparietal region measuring 35 mm diameter. Small left sided subarachnoid hemorrhage. No midline shift. No hydrocephalus.     Consulted by: Jamil Winkler MD    Date of Consultation: 10/3/2024  Date of Admission: 10/3/2024     SUBJECTIVE:     Chief Complaint: Change in mental status    History of Present Illness:   Ms. Beard is a 88 year old female with past medical history for non-insulin-dependent diabetes, previous subarachnoid hemorrhage 1 month ago, hypertension, hyperlipidemia. She was discharged home, stable and oriented to person, place and time on 09/03/2024. Had a follow up with neurology recently on 9/25/204. Patient remained oriented x3 at this visit with a follow up CT that revealed chronic age related changes with no acute process to identify.     Patients family members reported to [facility] with the patient, reporting that she has become more confused since Monday. Reports that at her baseline she is "very sharp."     On exam she is answering questions appropriately. Each answer is "yep" and "nope" no matter what she is asked. Pupils are equal, round and reactive, brisk bilaterally. She is not cooperative on exam and not following any commands when asked. Nurses are at the bedside attempting a long term IV at the time of exam and preparing to bring her down for CT head.       Patient Active Problem List    Diagnosis Date Noted    Foot ulcer with fat layer exposed, left 07/02/2024    Type 2 diabetes mellitus without complication, without long-term current use of insulin 12/19/2023    Primary hypertension 12/19/2023    Other hyperlipidemia 12/19/2023     Past Medical History:   Diagnosis Date    Subarachnoid hemorrhage      No past surgical history on file.    Current " Facility-Administered Medications:     dextrose 10% bolus 125 mL 125 mL, 12.5 g, Intravenous, PRN, Pat Heard, DO    dextrose 10% bolus 250 mL 250 mL, 25 g, Intravenous, PRN, Reji Heardn, DO    glucagon (human recombinant) injection 1 mg, 1 mg, Intramuscular, PRN, StRusty Oneil, Pat, DO    glucose chewable tablet 16 g, 16 g, Oral, PRN, StRusty Oneil, Pat, DO    glucose chewable tablet 24 g, 24 g, Oral, PRN, StRusty Oneil, Pat, DO    hydrALAZINE injection 10 mg, 10 mg, Intravenous, Q4H PRN, StReji Ramosn, DO    labetaloL injection 10 mg, 10 mg, Intravenous, Q4H PRN, Pat Heard, DO    [START ON 10/4/2024] mupirocin 2 % ointment, , Nasal, BID, Jamil Robles MD    naloxone 0.4 mg/mL injection 0.02 mg, 0.02 mg, Intravenous, PRN, Pat Heard, DO    sodium chloride 0.9% flush 10 mL, 10 mL, Intravenous, Q12H PRN, Pat Heard,     Review of patient's allergies indicates:   Allergen Reactions    Hydroxyzine hcl      Other Reaction(s): BUMPS ALL OVER BODY    Other reaction(s): BUMPS ALL OVER BODY    Felodipine Rash     Edema in the legs       Social History     Tobacco Use    Smoking status: Never     Passive exposure: Never    Smokeless tobacco: Never   Substance Use Topics    Alcohol use: Not Currently     Occupation:    No family history on file.    ROS:  Constitutional:  Negative for chills and fever.   HENT:  Negative for congestion and sore throat.    Eyes:  Negative for blurred vision and double vision.   Respiratory:  Negative for cough and shortness of breath.    Cardiovascular:  Negative for chest pain and palpitations.   Gastrointestinal:  Negative for constipation, diarrhea, nausea and vomiting.   Musculoskeletal:  Negative for back pain and neck pain.   Neurological:  Negative for sensory change, focal weakness and headaches. Positive for confusion.   Endo/Heme/Allergies:  Does not bruise/bleed easily.   Psychiatric/Behavioral:  Negative for  "depression. The patient is not nervous/anxious.        OBJECTIVE:     Vital Signs (Most Recent)  Temp: 99.8 °F (37.7 °C) (10/03/24 1445)  Pulse: 71 (10/03/24 1445)  Resp: 14 (10/03/24 1445)  BP: 137/66 (10/03/24 1445)  SpO2: 100 % (10/03/24 1445)  There is no height or weight on file to calculate BMI.      Constitutional:   Well developed, cooperative    Body habitus: Thin      Mental Status:   GCS- 13  E-4, V-4, M-5    Opens eyes spontaneously  Not oriented to person, place or time.  Normal speech  Does not follow commands      Cranial nerves:  CN II: PERRL, brisk bilaterally  CN III, IV, and VI: extraocular movements normal, no ptosis  CN XII: tongue protrusion midline    Limited physical exam, patient not following commands or cooperative to perform exam.       Data Review:    Laboratory Review:  Blood Gases:  No results found for: "PHART", "WRI8MJN", "PO2ART", "SEG2UKH", "Y2DPOSLI", "BEART"    CBC without Differential:  Lab Results   Component Value Date    WBC 4.74 10/03/2024    HGB 11.2 (L) 10/03/2024    HCT 35.7 (L) 10/03/2024     10/03/2024    MCV 77.3 (L) 10/03/2024     Differential:   Lab Results   Component Value Date    NEUTROABS 3.5 10/23/2023    LYMPHSABS 1.0 10/23/2023       Basic Metabolic Panel:  BMP  Lab Results   Component Value Date     10/03/2024    K 4.2 10/03/2024     10/03/2024    CO2 28 10/03/2024    BUN 17.4 10/03/2024    CREATININE 1.17 (H) 10/03/2024    CALCIUM 9.9 10/03/2024    EGFRNORACEVR 45 10/03/2024     Coagulation Studies:  Lab Results   Component Value Date    INR 1.0 10/03/2024    INR 1.1 09/03/2024    INR 1.0 11/26/2023     No results found for: "PTT"  Urinalysis:  Lab Results   Component Value Date    LEUKOCYTESUR Negative 05/19/2024    UROBILINOGEN 0.2 05/19/2024          Radiology:  I have personally reviewed and evaluated the following reports as well as radiographic studies:    CT head without contrast 10/03/2024 reveals area of intraparenchymal " hemorrhage in left temporoparietal region measuring 35 mm diameter. Small left sided subarachnoid hemorrhage. No midline shift. No hydrocephalus.       ASSESSMENT/PLAN:     Ms. Beard is a 88 year old female with past medical history for subarachnoid hemorrhage. Recently admitted for for SAH. Discharged home, stable and oriented to person, place and time. Recent follow up with neurology on 9/25/204. Patient remained oriented x3 at this visit with a follow up with a CT that revealed chronic age related changes with no acute process to identify on 9/30. Patients family members report increased confusion since Monday, 9/30.     CT Head without contrast 10/03/2024 reveals area of intraparenchymal hemorrhage in left temporoparietal region measuring 35 mm diameter. Small left sided subarachnoid hemorrhage. No midline shift. No hydrocephalus.    Plan:  No immediate neurosurgical intervention necessary at this time  ICU status  Neuro checks q1  Appreciate neurology recommendations  MRI brain with and without contrast ordered  Repeat CT head ordered  Keppra for seizure  SBP goal 100-140  Encouraged PT/OT as tolerated  ST eval and treat  SCDs for DVT  Fall precautions  CM consulted for discharge planning  Further recs to follow per MD     Neurosurgery will continue to follow the patient.  Please do not hesitate to contact neurosurgery for any additional questions or concerns.      Thank you for referring this very pleasant patient to the neurosurgery service.         ABHIJEET Lopez  Neurosurgery

## 2024-10-03 NOTE — ED PROVIDER NOTES
"Encounter Date: 10/3/2024       History     Chief Complaint   Patient presents with    Altered Mental Status     Patient here with sons. Per family, patient has been getting progressively more confused since Monday. Patient had brain bleed over labor day and had negative repeat CT Head on Monday. GCS 14 in triage, CBG 97.      She was an 88-year-old  female with a history of non-insulin-dependent diabetes, previous subarachnoid hemorrhage 1 month ago, hypertension, hyperlipidemia who presented to the ER today due to confusion for the last 48 or so hours.  Most of the history provided by the patient's son states that mother is routinely "very sharp. "He states that on 10/01/2024 he noticed that it was mother seemed to be more confused.  He states that she answers inappropriately to questions asked.  He states that she was complained of nothing.  Patient on arrival denies any head pain, vision changes, chest pain, shortness of breath or abdominal pain.  Son states she was had no fevers, cough, congestion, unilateral leg swelling that is new, diarrhea or nausea and vomiting.  He states she was she was currently undergoing treatment for wound delayed healing after "skin cancer was removed from her left leg. "      Review of patient's allergies indicates:   Allergen Reactions    Hydroxyzine hcl      Other Reaction(s): BUMPS ALL OVER BODY    Other reaction(s): BUMPS ALL OVER BODY    Felodipine Rash     Edema in the legs     Past Medical History:   Diagnosis Date    Subarachnoid hemorrhage      No past surgical history on file.  No family history on file.  Social History     Tobacco Use    Smoking status: Never     Passive exposure: Never    Smokeless tobacco: Never   Substance Use Topics    Alcohol use: Not Currently    Drug use: Never     Review of Systems   Constitutional:  Negative for chills, fatigue and fever.   HENT:  Negative for congestion, sore throat and trouble swallowing.    Eyes:  Negative for " visual disturbance.   Respiratory:  Negative for cough, shortness of breath and wheezing.    Cardiovascular:  Negative for chest pain.   Gastrointestinal:  Negative for abdominal pain, constipation, diarrhea, nausea and vomiting.   Genitourinary:  Negative for dysuria and hematuria.   Musculoskeletal:  Negative for back pain and myalgias.   Skin:  Negative for rash and wound.   Neurological:  Negative for seizures, syncope and headaches.   Psychiatric/Behavioral:  Positive for confusion. The patient is not nervous/anxious.        Physical Exam     Initial Vitals [10/03/24 0628]   BP Pulse Resp Temp SpO2   (!) 145/68 87 18 97.8 °F (36.6 °C) 98 %      MAP       --         Physical Exam    Nursing note and vitals reviewed.  Constitutional: She appears well-developed and well-nourished. She is not diaphoretic. No distress.   HENT:   Head: Normocephalic.   Right Ear: External ear normal.   Left Ear: External ear normal.   Nose: Nose normal.   Eyes: Conjunctivae and EOM are normal. Right eye exhibits no discharge. Left eye exhibits no discharge. No scleral icterus.   Neck:   Normal range of motion.  Cardiovascular:  Normal rate, regular rhythm and normal heart sounds.     Exam reveals no gallop and no friction rub.       No murmur heard.  Pulmonary/Chest: Breath sounds normal. No stridor. No respiratory distress. She has no wheezes. She has no rhonchi. She has no rales.   Abdominal: Abdomen is soft. She exhibits no distension. There is no abdominal tenderness. There is no rebound and no guarding.   Musculoskeletal:         General: Normal range of motion.      Cervical back: Normal range of motion.     Neurological: She is alert.   CN II-XII intact bilaterally, PERRLA, EOMI, AO to name only but not to date, situation president, no facial asymmetry noted, strength 5/5 x 4 extremities, sensation intact throughout, no focal neurological deficits noted on exam.  Gait stable without assistance. Negative Pronator drift  bilaterally.       Skin: Skin is warm. No rash noted. No erythema.   Psychiatric: She has a normal mood and affect. Her behavior is normal.         ED Course   Critical Care    Date/Time: 10/3/2024 12:49 PM    Performed by: Jose D Mccormick MD  Authorized by: Jose D Mccormick MD  Direct patient critical care time: 10 minutes  Additional history critical care time: 10 minutes  Ordering / reviewing critical care time: 10 minutes  Documentation critical care time: 10 minutes  Consulting other physicians critical care time: 10 minutes  Total critical care time (exclusive of procedural time) : 50 minutes  Critical care was necessary to treat or prevent imminent or life-threatening deterioration of the following conditions: CNS failure or compromise.  Critical care was time spent personally by me on the following activities: blood draw for specimens, discussions with consultants, development of treatment plan with patient or surrogate, evaluation of patient's response to treatment, examination of patient, obtaining history from patient or surrogate, ordering and performing treatments and interventions, ordering and review of laboratory studies, ordering and review of radiographic studies, pulse oximetry, re-evaluation of patient's condition and review of old charts.        Labs Reviewed   COMPREHENSIVE METABOLIC PANEL - Abnormal       Result Value    Sodium 141      Potassium 4.2      Chloride 105      CO2 28      Glucose 106      Blood Urea Nitrogen 17.4      Creatinine 1.17 (*)     Calcium 9.9      Protein Total 7.2      Albumin 3.5      Globulin 3.7 (*)     Albumin/Globulin Ratio 0.9 (*)     Bilirubin Total 0.5      ALP 72      ALT 14      AST 19      eGFR 45      Anion Gap 8.0      BUN/Creatinine Ratio 15     CBC WITH DIFFERENTIAL - Abnormal    WBC 4.74      RBC 4.62      Hgb 11.2 (*)     Hct 35.7 (*)     MCV 77.3 (*)     MCH 24.2 (*)     MCHC 31.4 (*)     RDW 15.0      Platelet 158      MPV 11.0 (*)     Neut % 65.8       Lymph % 22.6      Mono % 9.5      Eos % 1.9      Basophil % 0.2      Lymph # 1.07      Neut # 3.12      Mono # 0.45      Eos # 0.09      Baso # 0.01      IG# 0.00      IG% 0.0     APTT - Abnormal    PTT 22.5 (*)    PROTIME-INR - Abnormal    PT 10.4 (*)     INR 1.0     AMMONIA - Normal    Ammonia Level 24.9     TROPONIN I - Normal    Troponin-I <0.010     LACTIC ACID, PLASMA - Normal    Lactic Acid Level 1.9     MAGNESIUM - Normal    Magnesium Level 1.80     TSH - Normal    TSH 0.739     COVID/FLU A&B PCR - Normal    Influenza A PCR Not Detected      Influenza B PCR Not Detected      SARS-CoV-2 PCR Not Detected      Narrative:     The Xpert Xpress SARS-CoV-2/FLU/RSV plus is a rapid, multiplexed real-time PCR test intended for the simultaneous qualitative detection and differentiation of SARS-CoV-2, Influenza A, Influenza B, and respiratory syncytial virus (RSV) viral RNA in either nasopharyngeal swab or nasal swab specimens.         CBC W/ AUTO DIFFERENTIAL    Narrative:     The following orders were created for panel order CBC Auto Differential.  Procedure                               Abnormality         Status                     ---------                               -----------         ------                     CBC with Differential[[id number]]       Abnormal            Final result                 Please view results for these tests on the individual orders.   URINALYSIS, REFLEX TO URINE CULTURE   POCT GLUCOSE    POCT Glucose 97            Imaging Results              CT Head Without Contrast (Final result)  Result time 10/03/24 07:38:37      Final result by Rick Finch MD (10/03/24 07:38:37)                   Impression:      35 mm area of intraparenchymal hemorrhage in the left temporoparietal region.  Small volume left-sided subarachnoid hemorrhage as well.    Findings discussed with Dr. Mccormick at the time of dictation.      Electronically signed by: Rick Finch  Date:    10/03/2024  Time:    07:38                Narrative:    EXAMINATION:  CT HEAD WITHOUT CONTRAST    CLINICAL HISTORY:  Mental status change, unknown cause;    TECHNIQUE:  CT images of the head without IV contrast. Axial, coronal and sagittal images reviewed. Dose length product 1297 mGycm. Automatic exposure control, adjustment of mA/kV or iterative reconstruction technique used to limit radiation dose.    COMPARISON:  CT 09/30/2024    FINDINGS:  An area of parenchymal hemorrhage in the left temporoparietal region measures up to 35 mm in diameter.  Additional small volume left-sided subarachnoid hemorrhage.  No significant midline shift.  No hydrocephalus.  Normal positioning of the cerebellar tonsils.                                       Medications   levETIRAcetam injection 1,000 mg (1,000 mg Intravenous Given 10/3/24 0744)     Medical Decision Making  Differentials:  CVA/TIA, metabolic encephalopathy, current of her intracranial bleed, UTI, ACS   Historian is both the patient and her son   88-year-old female presents with a moderate confusion in his only alert oriented to her name stable vital signs for the last 2 days.  No focal deficits noted on exam or facial asymmetry.  Cranial nerves 2-12 grossly intact.  NIHSS of 4 due to mild dysarthria and orientation.  Basic labs obtained no leukocytosis appreciated.  No notable electrolyte disturbance.  CT head preliminary shows intraparenchymal bleed on the right.  Head of bed at 30° patient noted to have blood pressure 137/73.  1 g Keppra ordered.  Transfer for neurosurgery evaluation planned.  Blood pressure remained on average less than 140 systolic.  Patient accepted by Dr. Robles at [facility] ICU.      Amount and/or Complexity of Data Reviewed  Labs: ordered. Decision-making details documented in ED Course.  Radiology: ordered. Decision-making details documented in ED Course.    Risk  Prescription drug management.               ED Course as of 10/03/24 1249   Thu Oct 03, 2024   1055 Transfer  center notified nursing staff the patient was accepted by Neurosurgery at [facility] earlier this morning at a proximally 8:00 a.m..  Patient awaiting transfer. [JS]      ED Course User Index  [JS] Jsoe D Mccormick MD                           Clinical Impression:  Final diagnoses:  [I60.9] Subarachnoid hemorrhage (Primary)  [R41.0] Confusion          ED Disposition Condition    Transfer to Another Facility Stable                Jose D Mccormick MD  10/03/24 [id number]

## 2024-10-03 NOTE — ED NOTES
Spoke with [facility] concerning transfer -states pt is accepted to [facility] but waiting on bed assignment -neurosurgeon asking for repeat CT 6 hrs after initial CT performed -blood pressure systolic to be < 140 -Keppra loading dose and PT with INR and PTT

## 2024-10-04 PROBLEM — I60.9 SUBARACHNOID HEMORRHAGE: Status: ACTIVE | Noted: 2024-10-04

## 2024-10-04 PROBLEM — I61.9 INTRAPARENCHYMAL HEMORRHAGE OF BRAIN: Status: ACTIVE | Noted: 2024-10-04

## 2024-10-04 LAB
ALBUMIN SERPL-MCNC: 3.3 G/DL (ref 3.4–4.8)
ALBUMIN/GLOB SERPL: 0.9 RATIO (ref 1.1–2)
ALP SERPL-CCNC: 60 UNIT/L (ref 40–150)
ALT SERPL-CCNC: 14 UNIT/L (ref 0–55)
ANION GAP SERPL CALC-SCNC: 8 MEQ/L
APICAL FOUR CHAMBER EJECTION FRACTION: 56 %
APICAL TWO CHAMBER EJECTION FRACTION: 60 %
AST SERPL-CCNC: 21 UNIT/L (ref 5–34)
AV INDEX (PROSTH): 0.65
AV MEAN GRADIENT: 3 MMHG
AV PEAK GRADIENT: 4.8 MMHG
AV VALVE AREA BY VELOCITY RATIO: 2.2 CM²
AV VALVE AREA: 2.3 CM²
AV VELOCITY RATIO: 0.64
BASOPHILS # BLD AUTO: 0.02 X10(3)/MCL
BASOPHILS NFR BLD AUTO: 0.4 %
BILIRUB SERPL-MCNC: 0.7 MG/DL
BSA FOR ECHO PROCEDURE: 1.68 M2
BUN SERPL-MCNC: 13.5 MG/DL (ref 9.8–20.1)
CALCIUM SERPL-MCNC: 9.1 MG/DL (ref 8.4–10.2)
CHLORIDE SERPL-SCNC: 106 MMOL/L (ref 98–107)
CHOLEST SERPL-MCNC: 178 MG/DL
CHOLEST/HDLC SERPL: 2 {RATIO} (ref 0–5)
CO2 SERPL-SCNC: 24 MMOL/L (ref 23–31)
CREAT SERPL-MCNC: 0.89 MG/DL (ref 0.55–1.02)
CREAT/UREA NIT SERPL: 15
CV ECHO LV RWT: 0.41 CM
DOP CALC AO PEAK VEL: 1.1 M/S
DOP CALC AO VTI: 28.4 CM
DOP CALC LVOT AREA: 3.5 CM2
DOP CALC LVOT DIAMETER: 2.1 CM
DOP CALC LVOT PEAK VEL: 0.7 M/S
DOP CALC LVOT STROKE VOLUME: 64.4 CM3
DOP CALC MV VTI: 26.9 CM
DOP CALCLVOT PEAK VEL VTI: 18.6 CM
E WAVE DECELERATION TIME: 235 MSEC
E/A RATIO: 0.65
E/E' RATIO: 8.29 M/S
ECHO LV POSTERIOR WALL: 1 CM (ref 0.6–1.1)
EOSINOPHIL # BLD AUTO: 0.04 X10(3)/MCL (ref 0–0.9)
EOSINOPHIL NFR BLD AUTO: 0.8 %
ERYTHROCYTE [DISTWIDTH] IN BLOOD BY AUTOMATED COUNT: 14.9 % (ref 11.5–17)
EST. AVERAGE GLUCOSE BLD GHB EST-MCNC: 119.8 MG/DL
FRACTIONAL SHORTENING: 38.8 % (ref 28–44)
GFR SERPLBLD CREATININE-BSD FMLA CKD-EPI: >60 ML/MIN/1.73/M2
GLOBULIN SER-MCNC: 3.5 GM/DL (ref 2.4–3.5)
GLUCOSE SERPL-MCNC: 89 MG/DL (ref 82–115)
HBA1C MFR BLD: 5.8 %
HCT VFR BLD AUTO: 38.1 % (ref 37–47)
HDLC SERPL-MCNC: 81 MG/DL (ref 35–60)
HGB BLD-MCNC: 11.9 G/DL (ref 12–16)
HR MV ECHO: 58 BPM
IMM GRANULOCYTES # BLD AUTO: 0.01 X10(3)/MCL (ref 0–0.04)
IMM GRANULOCYTES NFR BLD AUTO: 0.2 %
INTERVENTRICULAR SEPTUM: 0.9 CM (ref 0.6–1.1)
LDLC SERPL CALC-MCNC: 81 MG/DL (ref 50–140)
LEFT ATRIUM AREA SYSTOLIC (APICAL 2 CHAMBER): 24.4 CM2
LEFT ATRIUM AREA SYSTOLIC (APICAL 4 CHAMBER): 21.9 CM2
LEFT ATRIUM SIZE: 3.7 CM
LEFT ATRIUM VOLUME INDEX MOD: 41.6 ML/M2
LEFT ATRIUM VOLUME MOD: 69.1 ML
LEFT INTERNAL DIMENSION IN SYSTOLE: 3 CM (ref 2.1–4)
LEFT VENTRICLE DIASTOLIC VOLUME INDEX: 67.96 ML/M2
LEFT VENTRICLE DIASTOLIC VOLUME: 112.81 ML
LEFT VENTRICLE END DIASTOLIC VOLUME APICAL 2 CHAMBER: 47.9 ML
LEFT VENTRICLE END DIASTOLIC VOLUME APICAL 4 CHAMBER: 67.5 ML
LEFT VENTRICLE END SYSTOLIC VOLUME APICAL 2 CHAMBER: 79.4 ML
LEFT VENTRICLE END SYSTOLIC VOLUME APICAL 4 CHAMBER: 57.7 ML
LEFT VENTRICLE MASS INDEX: 99 G/M2
LEFT VENTRICLE SYSTOLIC VOLUME INDEX: 21.1 ML/M2
LEFT VENTRICLE SYSTOLIC VOLUME: 35 ML
LEFT VENTRICULAR INTERNAL DIMENSION IN DIASTOLE: 4.9 CM (ref 3.5–6)
LEFT VENTRICULAR MASS: 164.3 G
LV LATERAL E/E' RATIO: 7.25 M/S
LV SEPTAL E/E' RATIO: 9.67 M/S
LVED V (TEICH): 112.81 ML
LVES V (TEICH): 35 ML
LVOT MG: 1 MMHG
LVOT MV: 0.48 CM/S
LYMPHOCYTES # BLD AUTO: 1.54 X10(3)/MCL (ref 0.6–4.6)
LYMPHOCYTES NFR BLD AUTO: 31.4 %
MAGNESIUM SERPL-MCNC: 1.7 MG/DL (ref 1.6–2.6)
MCH RBC QN AUTO: 23.5 PG (ref 27–31)
MCHC RBC AUTO-ENTMCNC: 31.2 G/DL (ref 33–36)
MCV RBC AUTO: 75.1 FL (ref 80–94)
MONOCYTES # BLD AUTO: 0.53 X10(3)/MCL (ref 0.1–1.3)
MONOCYTES NFR BLD AUTO: 10.8 %
MV MEAN GRADIENT: 1 MMHG
MV PEAK A VEL: 0.89 M/S
MV PEAK E VEL: 0.58 M/S
MV PEAK GRADIENT: 4 MMHG
MV STENOSIS PRESSURE HALF TIME: 81 MS
MV VALVE AREA BY CONTINUITY EQUATION: 2.39 CM2
MV VALVE AREA P 1/2 METHOD: 2.72 CM2
NEUTROPHILS # BLD AUTO: 2.77 X10(3)/MCL (ref 2.1–9.2)
NEUTROPHILS NFR BLD AUTO: 56.4 %
NRBC BLD AUTO-RTO: 0 %
OHS LV EJECTION FRACTION SIMPSONS BIPLANE MOD: 56 %
PHOSPHATE SERPL-MCNC: 2.5 MG/DL (ref 2.3–4.7)
PISA TR MAX VEL: 2.9 M/S
PLATELET # BLD AUTO: 128 X10(3)/MCL (ref 130–400)
PLATELETS.RETICULATED NFR BLD AUTO: 3.5 % (ref 0.9–11.2)
PMV BLD AUTO: 11.3 FL (ref 7.4–10.4)
POTASSIUM SERPL-SCNC: 3.8 MMOL/L (ref 3.5–5.1)
PROT SERPL-MCNC: 6.8 GM/DL (ref 5.8–7.6)
RA PRESSURE ESTIMATED: 3 MMHG
RBC # BLD AUTO: 5.07 X10(6)/MCL (ref 4.2–5.4)
RV TB RVSP: 6 MMHG
SINUS: 3.1 CM
SODIUM SERPL-SCNC: 138 MMOL/L (ref 136–145)
TDI LATERAL: 0.08 M/S
TDI SEPTAL: 0.06 M/S
TDI: 0.07 M/S
TR MAX PG: 34 MMHG
TRICUSPID ANNULAR PLANE SYSTOLIC EXCURSION: 1.91 CM
TRIGL SERPL-MCNC: 78 MG/DL (ref 37–140)
TV REST PULMONARY ARTERY PRESSURE: 37 MMHG
VLDLC SERPL CALC-MCNC: 16 MG/DL
WBC # BLD AUTO: 4.91 X10(3)/MCL (ref 4.5–11.5)
Z-SCORE OF LEFT VENTRICULAR DIMENSION IN END DIASTOLE: 0.52
Z-SCORE OF LEFT VENTRICULAR DIMENSION IN END SYSTOLE: 0.32

## 2024-10-04 PROCEDURE — 63600175 PHARM REV CODE 636 W HCPCS: Performed by: INTERNAL MEDICINE

## 2024-10-04 PROCEDURE — 25000003 PHARM REV CODE 250

## 2024-10-04 PROCEDURE — 36415 COLL VENOUS BLD VENIPUNCTURE: CPT | Performed by: NURSE PRACTITIONER

## 2024-10-04 PROCEDURE — 99223 1ST HOSP IP/OBS HIGH 75: CPT | Mod: FS,,, | Performed by: PSYCHIATRY & NEUROLOGY

## 2024-10-04 PROCEDURE — 25000003 PHARM REV CODE 250: Performed by: NEUROLOGICAL SURGERY

## 2024-10-04 PROCEDURE — 25500020 PHARM REV CODE 255: Performed by: INTERNAL MEDICINE

## 2024-10-04 PROCEDURE — 99231 SBSQ HOSP IP/OBS SF/LOW 25: CPT | Mod: FS,,, | Performed by: NEUROLOGICAL SURGERY

## 2024-10-04 PROCEDURE — 84100 ASSAY OF PHOSPHORUS: CPT

## 2024-10-04 PROCEDURE — 85025 COMPLETE CBC W/AUTO DIFF WBC: CPT

## 2024-10-04 PROCEDURE — 80061 LIPID PANEL: CPT | Performed by: NURSE PRACTITIONER

## 2024-10-04 PROCEDURE — 97163 PT EVAL HIGH COMPLEX 45 MIN: CPT

## 2024-10-04 PROCEDURE — 11000001 HC ACUTE MED/SURG PRIVATE ROOM

## 2024-10-04 PROCEDURE — 83735 ASSAY OF MAGNESIUM: CPT

## 2024-10-04 PROCEDURE — 63600175 PHARM REV CODE 636 W HCPCS: Mod: JZ,JG

## 2024-10-04 PROCEDURE — 25000003 PHARM REV CODE 250: Performed by: STUDENT IN AN ORGANIZED HEALTH CARE EDUCATION/TRAINING PROGRAM

## 2024-10-04 PROCEDURE — 92610 EVALUATE SWALLOWING FUNCTION: CPT

## 2024-10-04 PROCEDURE — 97166 OT EVAL MOD COMPLEX 45 MIN: CPT

## 2024-10-04 PROCEDURE — 83036 HEMOGLOBIN GLYCOSYLATED A1C: CPT | Performed by: NURSE PRACTITIONER

## 2024-10-04 PROCEDURE — 63600175 PHARM REV CODE 636 W HCPCS: Performed by: NEUROLOGICAL SURGERY

## 2024-10-04 PROCEDURE — 80053 COMPREHEN METABOLIC PANEL: CPT

## 2024-10-04 RX ORDER — BISACODYL 10 MG/1
10 SUPPOSITORY RECTAL DAILY PRN
Status: DISCONTINUED | OUTPATIENT
Start: 2024-10-04 | End: 2024-10-07 | Stop reason: HOSPADM

## 2024-10-04 RX ORDER — HALOPERIDOL 5 MG/ML
2 INJECTION INTRAMUSCULAR EVERY 4 HOURS PRN
Status: DISCONTINUED | OUTPATIENT
Start: 2024-10-04 | End: 2024-10-07 | Stop reason: HOSPADM

## 2024-10-04 RX ORDER — OLANZAPINE 10 MG/2ML
10 INJECTION, POWDER, FOR SOLUTION INTRAMUSCULAR ONCE AS NEEDED
Status: COMPLETED | OUTPATIENT
Start: 2024-10-04 | End: 2024-10-04

## 2024-10-04 RX ORDER — DEXMEDETOMIDINE HYDROCHLORIDE 4 UG/ML
INJECTION, SOLUTION INTRAVENOUS
Status: COMPLETED
Start: 2024-10-04 | End: 2024-10-04

## 2024-10-04 RX ORDER — QUETIAPINE FUMARATE 25 MG/1
25 TABLET, FILM COATED ORAL NIGHTLY
Status: DISCONTINUED | OUTPATIENT
Start: 2024-10-04 | End: 2024-10-04

## 2024-10-04 RX ORDER — SODIUM CHLORIDE 0.9 % (FLUSH) 0.9 %
10 SYRINGE (ML) INJECTION
Status: DISCONTINUED | OUTPATIENT
Start: 2024-10-04 | End: 2024-10-07 | Stop reason: HOSPADM

## 2024-10-04 RX ORDER — HALOPERIDOL 5 MG/ML
2 INJECTION INTRAMUSCULAR ONCE
Status: COMPLETED | OUTPATIENT
Start: 2024-10-04 | End: 2024-10-04

## 2024-10-04 RX ORDER — DEXMEDETOMIDINE HYDROCHLORIDE 4 UG/ML
0-1.4 INJECTION, SOLUTION INTRAVENOUS CONTINUOUS
Status: DISCONTINUED | OUTPATIENT
Start: 2024-10-04 | End: 2024-10-04

## 2024-10-04 RX ORDER — SODIUM CHLORIDE 9 MG/ML
INJECTION, SOLUTION INTRAVENOUS CONTINUOUS
Status: ACTIVE | OUTPATIENT
Start: 2024-10-04 | End: 2024-10-05

## 2024-10-04 RX ORDER — LORAZEPAM 2 MG/ML
1 INJECTION INTRAMUSCULAR
Status: DISCONTINUED | OUTPATIENT
Start: 2024-10-04 | End: 2024-10-04

## 2024-10-04 RX ORDER — MAGNESIUM SULFATE HEPTAHYDRATE 40 MG/ML
2 INJECTION, SOLUTION INTRAVENOUS ONCE
Status: COMPLETED | OUTPATIENT
Start: 2024-10-04 | End: 2024-10-04

## 2024-10-04 RX ADMIN — IOHEXOL 100 ML: 350 INJECTION, SOLUTION INTRAVENOUS at 04:10

## 2024-10-04 RX ADMIN — LEVETIRACETAM 500 MG: 100 INJECTION, SOLUTION INTRAVENOUS at 08:10

## 2024-10-04 RX ADMIN — MUPIROCIN: 20 OINTMENT TOPICAL at 09:10

## 2024-10-04 RX ADMIN — DEXMEDETOMIDINE HYDROCHLORIDE 0.4 MCG/KG/HR: 400 INJECTION INTRAVENOUS at 04:10

## 2024-10-04 RX ADMIN — SODIUM CHLORIDE: 9 INJECTION, SOLUTION INTRAVENOUS at 05:10

## 2024-10-04 RX ADMIN — HALOPERIDOL LACTATE 2 MG: 5 INJECTION, SOLUTION INTRAMUSCULAR at 05:10

## 2024-10-04 RX ADMIN — DEXMEDETOMIDINE HYDROCHLORIDE 0.4 MCG/KG/HR: 4 INJECTION, SOLUTION INTRAVENOUS at 04:10

## 2024-10-04 RX ADMIN — MAGNESIUM SULFATE HEPTAHYDRATE 2 G: 40 INJECTION, SOLUTION INTRAVENOUS at 03:10

## 2024-10-04 RX ADMIN — OLANZAPINE 10 MG: 10 INJECTION, POWDER, FOR SOLUTION INTRAMUSCULAR at 02:10

## 2024-10-04 NOTE — ASSESSMENT & PLAN NOTE
- presented with confusion   - Stroke RF: HTN, Diabetes, intraparenchymal hemorrhage appears round with surrounding edema with small left subarchnoid hemorrhage   - Intervention: 10/3/2024 no surgical intervention was advised  - Etiology: TBD    Stroke workup:  -CTh: 35 mm area of intraparenchymal hemorrhage in the left temporoparietal region.  Small volume left-sided subarachnoid hemorrhage as well.     CT head without contrast, 10/03/2024- when compared to a CT head without contrast completed earlier on this same day, there have been no changes in her left temporoparietal occipital acute intraparenchymal hemorrhage measuring 3.5 cm.  The intraparenchymal hemorrhage appears round with surrounding edema, which may be suggestive of an underlying mass.  There is generalized atrophy with chronic white matter ischemic changes.  There is no significant midline shift.       CT head without contrast, 09/30/2024- no acute abnormalities.     CT head without contrast, 09/02/2024- subtle left temporal occipital subarachnoid hemorrhage.  There is generalized atrophy with chronic white matter ischemic changes.     CTA head and neck, 09/03/2024- no significant vascular abnormalities.    -MRI brain w/wo:   -ECHO: EF 55%. Left atrium is mildly dilated. Agitated saline study of the atrial septum is negative, suggesting absence of intracardiac shunt at the atrial level.   -CUS:   -LDL: 81  -A1c: 5.8  -TSH: Normal  -home medications include: lipitor 10 mg      IPH -  -2 hr neuro checks ... notify neurology of any neuro change  - Maintain systolic blood pressure 100-140.  -avoid antiplatelet or anticoagulation at this time  -seizure precautions ... notify neurology of any seizure-like activity  - therapy evaluations   - awaiting MRI brain w/wo  -  Other recommendations may follow from MD

## 2024-10-04 NOTE — PROGRESS NOTES
"[facility]  Pulmonary Critical Care Note    Patient Name: Tali Beard  MRN: [id number]  Admission Date: 10/3/2024  Hospital Length of Stay: 1 days  Code Status: Full Code  Attending Provider: Jamil Robles MD  Primary Care Provider: Aurora Moe FNP     Subjective:     HPI:   Patient is an 87 y/o female admitted to the ICU on 10/3/2024 with an IPH. PMH includes a previous subarachnoid hemorrhage 1 month ago, DM, HTN. Patient initially brought to the ED in [place] by her son for complaints of confusion for the past 48 hours. Per son, patient is typically "very sharp" and independent. Last known normal was the morning of 10/2. CT head revealed a 35mm area of intraparenchymal hemorrhage in the left temporoparietal region as well small volume subarachnoid hemorrhage. Pt was transferred to [facility] for NSGY services. On initial exam, patient is awake, smiles, inappropriately answers questions, does not follow commands.     Hospital Course/Significant events:  10/3/24: Admitted to ICU    24 Hour Interval History:  Patient became agitated overnight and lightly sedated on Precedex. Precedex is now off. She remains on room air. On exam, she is lethargic but easily wakens, spontaneously moving all 4 extremities. She does not follow my commands; however, son is at bedside, and pt follows his commands when spoken in Bulgarian. She is bilingual. Repeat head CT yesterday without acute changes. NSGY following. Awaiting further imaging.     Past Medical History:   Diagnosis Date    Subarachnoid hemorrhage        No past surgical history on file.    Social History     Socioeconomic History    Marital status:    Tobacco Use    Smoking status: Never     Passive exposure: Never    Smokeless tobacco: Never   Substance and Sexual Activity    Alcohol use: Not Currently    Drug use: Never    Sexual activity: Not Currently     Social Drivers of Health     Financial Resource Strain: Low Risk  " (10/3/2024)    Overall Financial Resource Strain (CARDIA)     Difficulty of Paying Living Expenses: Not hard at all   Food Insecurity: No Food Insecurity (10/3/2024)    Hunger Vital Sign     Worried About Running Out of Food in the Last Year: Never true     Ran Out of Food in the Last Year: Never true   Transportation Needs: No Transportation Needs (10/3/2024)    TRANSPORTATION NEEDS     Transportation : No   Stress: No Stress Concern Present (10/3/2024)    [employer] - Occupational Stress Questionnaire     Feeling of Stress : Not at all   Housing Stability: Low Risk  (10/3/2024)    Housing Stability Vital Sign     Unable to Pay for Housing in the Last Year: No     Homeless in the Last Year: No           Current Outpatient Medications   Medication Instructions    amLODIPine (NORVASC) 2.5 MG tablet 1 tablet, Every morning    atorvastatin (LIPITOR) 10 mg    butenafine (MENTAX) 1 % cream Topical (Top), 2 times daily    gentamicin (GARAMYCIN) 0.1 % ointment Topical (Top), Daily    latanoprost 0.005 % ophthalmic solution Both Eyes    levoFLOXacin (LEVAQUIN) 750 mg, Daily    lisinopriL-hydrochlorothiazide (PRINZIDE,ZESTORETIC) 20-25 mg Tab 1 tablet    meclizine (ANTIVERT) 25 mg, Oral, 3 times daily PRN    metFORMIN (GLUCOPHAGE) 500 mg    metoprolol succinate (TOPROL-XL) 50 mg    nystatin (MYCOSTATIN) cream Apply topically.    prednisoLONE acetate (PRED MILD) 0.12 % ophthalmic suspension 1 drop, 4 times daily    timolol maleate 0.5% (TIMOPTIC) 0.5 % Drop Place into both eyes.    triamcinolone acetonide 0.1% (KENALOG) 0.1 % cream Topical (Top), Daily       Current Inpatient Medications   levETIRAcetam (Keppra) IV (PEDS and ADULTS)  500 mg Intravenous Q12H    mupirocin   Nasal BID       Current Intravenous Infusions   0.9% NaCl   Intravenous Continuous 100 mL/hr at 10/04/24 1000 Rate Verify at 10/04/24 1000    dexmedeTOMIDine (Precedex) infusion (titrating)  0-1.4 mcg/kg/hr Intravenous  "Continuous   Stopped at 10/04/24 0824         Review of Systems   Unable to perform ROS: Other          Objective:       Intake/Output Summary (Last 24 hours) at 10/4/2024 1011  Last data filed at 10/4/2024 1000  Gross per 24 hour   Intake 694.61 ml   Output 300 ml   Net 394.61 ml         Vital Signs (Most Recent):  Temp: 97.5 °F (36.4 °C) (10/04/24 0800)  Pulse: (!) 48 (10/04/24 0900)  Resp: 15 (10/04/24 0900)  BP: 126/61 (10/04/24 0900)  SpO2: 100 % (10/04/24 0900)  Body mass index is 24.68 kg/m².  Weight: 63.2 kg (139 lb 5.3 oz) Vital Signs (24h Range):  Temp:  [97.5 °F (36.4 °C)-99.8 °F (37.7 °C)] 97.5 °F (36.4 °C)  Pulse:  [48-78] 48  Resp:  [14-24] 15  SpO2:  [91 %-100 %] 100 %  BP: ()/() 126/61     Physical Exam  Vitals reviewed.   Constitutional:       General: She is not in acute distress.  Eyes:      Extraocular Movements: Extraocular movements intact.      Pupils: Pupils are equal, round, and reactive to light.   Cardiovascular:      Rate and Rhythm: Normal rate and regular rhythm.   Pulmonary:      Effort: Pulmonary effort is normal.      Breath sounds: Normal breath sounds. No wheezing, rhonchi or rales.   Abdominal:      General: There is no distension.      Palpations: Abdomen is soft.   Musculoskeletal:      Right lower leg: No edema.      Left lower leg: No edema.   Neurological:      Comments: Neuro exam is limited as patient does not follow commands. She is awake. Responds inappropriately with "yep" to all questions asked. No facial droop evident. Moves all 4 extremities spontaneously.            Lines/Drains/Airways       Peripheral Intravenous Line  Duration                  Peripheral IV - Single Lumen 10/03/24 20 G Anterior;Right Forearm 1 day                    Significant Labs:    Lab Results   Component Value Date    WBC 4.91 10/04/2024    HGB 11.9 (L) 10/04/2024    HCT 38.1 10/04/2024    MCV 75.1 (L) 10/04/2024     (L) 10/04/2024           BMP  Lab Results   Component " "Value Date     10/04/2024    K 3.8 10/04/2024    CO2 24 10/04/2024    BUN 13.5 10/04/2024    CREATININE 0.89 10/04/2024    CALCIUM 9.1 10/04/2024    AGAP 8.0 10/04/2024    EGFRNONAA 50 09/02/2021         ABG  No results for input(s): "PH", "PO2", "PCO2", "HCO3", "POCBASEDEF" in the last 168 hours.    Mechanical Ventilation Support:         Significant Imaging:  I have reviewed the pertinent imaging within the past 24 hours.        Assessment/Plan:     Assessment  IPH/SAH      Plan  Continue ICU level of care for close monitoring   NSGY following, appreciate recs. No plans for surgical intervention at this time.   Neurology consulted, appreciate recs  Q2h neurochecks   Repeat CT head 10/3/24: 3.5cm intraparenchymal hemorrhage with no acute changes from previous CT done 8 hours prior to arrival.   Per NSGY, imaging concerning for an underlying mass. CT A/P ordered to evaluate for underlying neoplasm and/or metastasis.  MRI brain ordered  Keppra bid for seizure ppx   SBP goal 100-140  PT/OT to mobilize patient   SLP to evaluate and treat  CM for discharge planning     DVT Prophylaxis: SCDs       32 minutes of critical care was time spent personally by me on the following activities: development of treatment plan with patient or surrogate and bedside caregivers, discussions with consultants, evaluation of patient's response to treatment, examination of patient, ordering and performing treatments and interventions, ordering and review of laboratory studies, ordering and review of radiographic studies, pulse oximetry, re-evaluation of patient's condition.  This critical care time did not overlap with that of any other provider or involve time for any procedures.     Pat Heard DO  Pulmonary Critical Care Medicine  [facility]  DOS: 10/04/2024   "

## 2024-10-04 NOTE — PLAN OF CARE
Problem: Adult Inpatient Plan of Care  Goal: Plan of Care Review  Outcome: Progressing  Goal: Patient-Specific Goal (Individualized)  Outcome: Progressing  Goal: Absence of Hospital-Acquired Illness or Injury  Outcome: Progressing  Goal: Optimal Comfort and Wellbeing  Outcome: Progressing  Goal: Readiness for Transition of Care  Outcome: Progressing     Problem: Diabetes Comorbidity  Goal: Blood Glucose Level Within Targeted Range  Outcome: Progressing     Problem: Skin Injury Risk Increased  Goal: Skin Health and Integrity  Outcome: Progressing     Problem: Wound  Goal: Optimal Coping  Outcome: Progressing  Goal: Optimal Functional Ability  Outcome: Progressing  Goal: Absence of Infection Signs and Symptoms  Outcome: Progressing  Goal: Improved Oral Intake  Outcome: Progressing  Goal: Optimal Pain Control and Function  Outcome: Progressing  Goal: Skin Health and Integrity  Outcome: Progressing  Goal: Optimal Wound Healing  Outcome: Progressing     Problem: Fall Injury Risk  Goal: Absence of Fall and Fall-Related Injury  Outcome: Progressing     Problem: Stroke, Intracerebral Hemorrhage  Goal: Optimal Coping  Outcome: Progressing  Goal: Effective Bowel Elimination  Outcome: Progressing  Goal: Optimal Cerebral Tissue Perfusion  Outcome: Progressing  Goal: Optimal Cognitive Function  Outcome: Progressing  Goal: Effective Communication Skills  Outcome: Progressing  Goal: Optimal Functional Ability  Outcome: Progressing  Goal: Optimal Nutrition Intake  Outcome: Progressing  Goal: Optimal Pain Control and Function  Outcome: Progressing  Goal: Effective Oxygenation and Ventilation  Outcome: Progressing  Goal: Improved Sensorimotor Function  Outcome: Progressing  Goal: Safe and Effective Swallow  Outcome: Progressing  Goal: Effective Urinary Elimination  Outcome: Progressing

## 2024-10-04 NOTE — PLAN OF CARE
Family interested in [facility], will wait for diet order then send referral. Will get foc signed.

## 2024-10-04 NOTE — PT/OT/SLP EVAL
Occupational Therapy  Evaluation    Name: Tali Beard  MRN: [id number]  Admitting Diagnosis: IPH  Recent Surgery: * No surgery found *      Recommendations:     Discharge therapy intensity: Moderate Intensity Therapy   Discharge Equipment Recommendations:  to be determined by next level of care  Barriers to discharge: anticipate placement needed    Assessment:     Tali Beard is a 88 y.o. female with a medical diagnosis of IPH in L temporoparietal region, small volume SAH, recent SAH x1 month ago.  Pt with agitation, required precedex this morning therefore OT returned later.  Pt off of precedex but very lethargic.  More awake after sitting EOB.  Not following commands, recognized family members, unable to recall their names.  Anticipate mod intensity therapy at discharge.  She presents with the following performance deficits affecting function: weakness, impaired endurance, impaired self care skills, impaired functional mobility, impaired balance, gait instability, decreased upper extremity function, decreased lower extremity function, decreased safety awareness, impaired cognition, decreased ROM.     Rehab Prognosis: FAIR patient would benefit from acute skilled OT services to address these deficits and reach maximum level of function.       Plan:     Patient to be seen 5 x/week to address the above listed problems via self-care/home management, therapeutic exercises  Plan of Care Expires: 11/04/24  Plan of Care Reviewed with: patient, family    Subjective     Chief Complaint: no complaints  Patient/Family Comments/goals: TBD    Occupational Profile:  Living Environment: lives with son who is a flanagan and is in and out throughout the day, no steps, tub/shower  Previous level of function: independent per son  Roles and Routines: mother, sister  Equipment Used at Home:  (family has access to w/c and they may have RW)  Assistance upon Discharge: TBD, anticipate pt will need to be higher  functioning    Pain/Comfort:  Pain Rating 1: 0/10    Patients cultural, spiritual, Taoism conflicts given the current situation:      Objective:     OT communicated with RN prior to session.      Patient was found HOB elevated with  (vital monitoring, roll belt, SCD, heel floats, mitts) upon OT entry to room.    General Precautions: Standard,  -140  Orthopedic Precautions: N/A  Braces: N/A    Vital Signs: Blood Pressure:   127/54    Bed Mobility:    Patient completed Supine to Sit with total assistance  Patient completed Sit to Supine with total assistance    Functional Mobility/Transfers:  Functional Mobility: does not occur, max A static sitting balance    Activities of Daily Living:  Total A ADLs at this time, limited by lethargy and ability to follow commands    AMPA 6 Click ADL:  AMPAC Total Score: 6    Functional Cognition:  Able to state name, not oriented to place, time    Visual Perceptual Skills:  Eyes closed initially and while seated EOB.  Pt then returned to supine and she awakened and recognized family members although unable to recall their names    Upper Extremity Function: tightness present in BUE, active/spontaneous movement LUE noted, not actively moving R UE spontaneously but pt also not following commands.  Difficult to note any unilateral weakness, will continue to assess.  Questionable resistance vs tightness in BUE    Balance:   Max A static sitting balance    Therapeutic Positioning  Risk for acquired pressure injuries is increased due to relative decrease in mobility d/t hospitalization , impaired mobility, and inability to communicate toileting needs.    OT interventions performed during the course of today's session:   Therapeutic positioning was provided at the conclusion of session to offload all bony prominences for the prevention and/or reduction of pressure injuries    Skin assessment: all bony prominences were assessed    Findings: no redness or breakdown noted, known  ulcer on L medial foot    OT recommendations for therapeutic positioning throughout hospitalization:   Follow [facility] Pressure Injury Prevention Protocol      Patient Education:  Patient and family were provided with verbal education education regarding OT role/goals/POC, fall prevention, Discharge/DME recommendations, and pressure ulcer prevention.  Additional teaching is warranted.     Patient left HOB elevated with family present, lines attached, heels floated, lights on, lines attached.    GOALS:   Multidisciplinary Problems       Occupational Therapy Goals          Problem: Occupational Therapy    Goal Priority Disciplines Outcome Interventions   Occupational Therapy Goal     OT, PT/OT Progressing    Description: Goals to be met by: 11/4/24     Patient will increase functional independence with ADLs by performing:    UE Dressing with Stand-by Assistance.  LE Dressing with Stand-by Assistance.  Grooming while standing at sink with Stand-by Assistance.  Toileting from toilet with Stand-by Assistance for hygiene and clothing management.   Toilet transfer to toilet with Stand-by Assistance.  Increased functional strength to 3/5 through therEx.                         History:     Past Medical History:   Diagnosis Date    Subarachnoid hemorrhage        No past surgical history on file.    Time Tracking:     OT Date of Treatment:    OT Start Time: 1307  OT Stop Time: 1326  OT Total Time (min): 19 min    Billable Minutes:Evaluation MOD    10/4/2024

## 2024-10-04 NOTE — NURSING
[facility] [address] ICU  Wound Care    Patient Name:  Tali Beard   MRN:  [id number]  Date: 10/4/2024  Diagnosis: Intraparenchymal hemorrhage of brain    History:     Past Medical History:   Diagnosis Date    Subarachnoid hemorrhage        Social History     Socioeconomic History    Marital status:    Tobacco Use    Smoking status: Never     Passive exposure: Never    Smokeless tobacco: Never   Substance and Sexual Activity    Alcohol use: Not Currently    Drug use: Never    Sexual activity: Not Currently     Social Drivers of Health     Financial Resource Strain: Low Risk  (10/4/2024)    Overall Financial Resource Strain (CARDIA)     Difficulty of Paying Living Expenses: Not hard at all   Food Insecurity: No Food Insecurity (10/4/2024)    Hunger Vital Sign     Worried About Running Out of Food in the Last Year: Never true     Ran Out of Food in the Last Year: Never true   Transportation Needs: No Transportation Needs (10/4/2024)    TRANSPORTATION NEEDS     Transportation : No   Physical Activity: Inactive (10/4/2024)    Exercise Vital Sign     Days of Exercise per Week: 0 days     Minutes of Exercise per Session: 0 min   Stress: No Stress Concern Present (10/4/2024)    [employer] - Occupational Stress Questionnaire     Feeling of Stress : Not at all   Housing Stability: Low Risk  (10/4/2024)    Housing Stability Vital Sign     Unable to Pay for Housing in the Last Year: No     Homeless in the Last Year: No       Precautions:     Allergies as of 10/03/2024 - Reviewed 10/03/2024   Allergen Reaction Noted    Hydroxyzine hcl  07/29/2019    Felodipine Rash 12/09/2013       [facility] Assessment Details/Treatment   Consult received for left foot wound. Patient unresponsive, family at bedside, give history of having wound for long time and being seen by wound care clinic. Patient with history of venous ulcer, patient does have DM,  being treated at [facility].  Wound cleansed with Vashe, dressed with silver alginate and covered with bordered foam. Patient tolerated well.        10/04/24 0940   WOCN Assessment   WOCN Total Time (mins) 45   Visit Date 10/04/24   Visit Time 0940   Consult Type New   WOCN Speciality Wound        Wound 05/07/24 0952 Ulceration Left medial Foot   Date First Assessed/Time First Assessed: 05/07/24 0952   Present on Original Admission: Yes  Primary Wound Type: Ulceration  Side: Left  Orientation: medial  Location: Foot  Pulses: 2+ palpable DP and PT pulses, strong doppler signal to both   Wound Image    Dressing Appearance Intact   Drainage Amount Small   Drainage Characteristics/Odor Clear;Serous   Appearance Slough;Granulating   Tissue loss description Full thickness   Red (%), Wound Tissue Color 50 %   Yellow (%), Wound Tissue Color 50 %   Periwound Area Intact   Wound Length (cm) 1 cm   Wound Width (cm) 0.8 cm   Wound Depth (cm) 0.4 cm   Wound Volume (cm^3) 0.32 cm^3   Wound Surface Area (cm^2) 0.8 cm^2   Care Wound cleanser   Dressing Applied  (silver alginate, bordered foam)       Recommendations made to primary team for silver alginate, cover with bordered foam . Orders placed.     10/04/2024

## 2024-10-04 NOTE — PROGRESS NOTES
Hospital ICU Progress Note  [facility] Neurosurgery      Admit Date: 10/3/2024  Post-operative Day:    Hospital Day: 2      SUBJECTIVE:       Interval History:    Evaluated patient in the ICU with family at the bedside. No acute events overnight. Precedex has been stopped. Patient undergoing echo at time of rounds. Able to turn her head and look at me when spoken to. Followed commands in lower extremities. Moving all extremities well. Not answering questions appropriately. NG tube placed.    Scheduled Meds:   levETIRAcetam (Keppra) IV (PEDS and ADULTS)  500 mg Intravenous Q12H    magnesium sulfate IVPB  2 g Intravenous Once    mupirocin   Nasal BID     Continuous Infusions:   0.9% NaCl   Intravenous Continuous 100 mL/hr at 10/04/24 1200 Rate Verify at 10/04/24 1200     PRN Meds:  Current Facility-Administered Medications:     bisacodyL, 10 mg, Rectal, Daily PRN    dextrose 10%, 12.5 g, Intravenous, PRN    dextrose 10%, 25 g, Intravenous, PRN    glucagon (human recombinant), 1 mg, Intramuscular, PRN    glucose, 16 g, Oral, PRN    glucose, 24 g, Oral, PRN    hydrALAZINE, 10 mg, Intravenous, Q4H PRN    labetalol, 10 mg, Intravenous, Q4H PRN    lorazepam, 1 mg, Intramuscular, Q15 Min PRN    naloxone, 0.02 mg, Intravenous, PRN    sodium chloride 0.9%, 10 mL, Intravenous, Q12H PRN    sodium chloride 0.9%, 10 mL, Intravenous, PRN    Review of patient's allergies indicates:   Allergen Reactions    Hydroxyzine hcl      Other Reaction(s): BUMPS ALL OVER BODY    Other reaction(s): BUMPS ALL OVER BODY    Felodipine Rash     Edema in the legs       Past Medical History:   Diagnosis Date    Subarachnoid hemorrhage      No past surgical history on file.    OBJECTIVE:     Vital Signs (Most Recent)  Temp: 97.4 °F (36.3 °C) (10/04/24 1200)  Pulse: (!) 55 (10/04/24 1400)  Resp: 15 (10/04/24 1400)  BP: (!) 158/68 (10/04/24 1400)  SpO2: 100 % (10/04/24 1400)    Vital Signs Range (Last 24H):  Temp:  [97.4 °F (36.3 °C)-99  "°F (37.2 °C)]   Pulse:  [48-78]   Resp:  [15-24]   BP: ()/()   SpO2:  [91 %-100 %]       Physical Exam:  General Awake, alert, no acute distress  GCS- 13  E-3, V-4, M-6    Opens eyes to speech  Confused, not oriented to person, place or time  Localizing to b UE  Follows commands in lower extremities     Cranial Nerves PERRL, brisk bilaterally  EOMI  Face symmetric  Tongue midline     Motor Limited exam, does not follow full commands     Sensory Not able to assess as patient does not answer questions appropriately         Laboratory Results:  CBC without Differential:  Lab Results   Component Value Date    WBC 4.91 10/04/2024    HGB 11.9 (L) 10/04/2024    HCT 38.1 10/04/2024     (L) 10/04/2024    MCV 75.1 (L) 10/04/2024     Differential:   Lab Results   Component Value Date    NEUTROABS 2.6 04/24/2024    LYMPHSABS 1.1 04/24/2024       Basic Metabolic Panel:  Lab Results   Component Value Date     10/04/2024    K 3.8 10/04/2024     10/04/2024    BUN 13.5 10/04/2024    MG 1.70 10/04/2024    PHOS 2.5 10/04/2024     Coagulation Studies:  Lab Results   Component Value Date    INR 1.0 10/03/2024    INR 1.0 10/03/2024    INR 1.1 09/03/2024     No results found for: "PTT"  Urinalysis:  Lab Results   Component Value Date    LEUKOCYTESUR Negative 05/19/2024    UROBILINOGEN 0.2 05/19/2024        Radiology:    I have personally reviewed and evaluated the following reports as well as radiographic studies:    CT head without contrast, 10/03/2024- when compared to a CT head without contrast completed earlier on this same day, there have been no changes in her left temporoparietal occipital acute intraparenchymal hemorrhage measuring 3.5 cm. The intraparenchymal hemorrhage appears round with surrounding edema, which may be suggestive of an underlying mass. There is generalized atrophy with chronic white matter ischemic changes. There is no significant midline shift.     CT head without contrast, " 09/30/2024- no acute abnormalities.     CT head without contrast, 09/02/2024- subtle left temporal occipital subarachnoid hemorrhage.  There is generalized atrophy with chronic white matter ischemic changes.    ASSESSMENT/PLAN:     Ms. Beard is a 88 year old female with past medical history for subarachnoid hemorrhage. Recently admitted for for SAH. Discharged home, stable and oriented to person, place and time. Recent follow up with neurology on 9/25/204. Patient remained oriented x3 at this visit with a follow up with a CT that revealed chronic age related changes with no acute process to identify on 9/30. Patients family members report increased confusion since Monday, 9/30     CT head without contrast, 10/03/2024- when compared to a CT head without contrast completed earlier on this same day, there have been no changes in her left temporoparietal occipital acute intraparenchymal hemorrhage measuring 3.5 cm. The intraparenchymal hemorrhage appears round with surrounding edema, which may be suggestive of an underlying mass. There is generalized atrophy with chronic white matter ischemic changes. There is no significant midline shift.     Plan:     ICU status  Neuro checks q2  Appreciate neurology recommendations  CT A/P to evaluate for underlying neoplasm and/or metastasis; pending  MRI brain with and without, pending  Keppra for seizure  SBP goal 100-140  Encouraged PT/OT as tolerated  ST eval and treat  SCDs for DVT  Fall precautions  CM consulted for discharge planning    Neurosurgery will continue to follow the patient.  Please do not hesitate to contact neurosurgery for any additional questions or concerns.        Jessenia Iniguez, ABHIJEET  Neurosurgery

## 2024-10-04 NOTE — SUBJECTIVE & OBJECTIVE
Past Medical History:   Diagnosis Date    Subarachnoid hemorrhage        No past surgical history on file.    Review of patient's allergies indicates:   Allergen Reactions    Hydroxyzine hcl      Other Reaction(s): BUMPS ALL OVER BODY    Other reaction(s): BUMPS ALL OVER BODY    Felodipine Rash     Edema in the legs       No current facility-administered medications on file prior to encounter.     Current Outpatient Medications on File Prior to Encounter   Medication Sig    amLODIPine (NORVASC) 2.5 MG tablet Take 1 tablet by mouth every morning.    atorvastatin (LIPITOR) 10 MG tablet Take 10 mg by mouth.    levoFLOXacin (LEVAQUIN) 750 MG tablet Take 750 mg by mouth once daily.    lisinopriL-hydrochlorothiazide (PRINZIDE,ZESTORETIC) 20-25 mg Tab Take 1 tablet by mouth.    metFORMIN (GLUCOPHAGE) 500 MG tablet Take 500 mg by mouth.    metoprolol succinate (TOPROL-XL) 50 MG 24 hr tablet Take 50 mg by mouth.    nystatin (MYCOSTATIN) cream Apply topically.    prednisoLONE acetate (PRED MILD) 0.12 % ophthalmic suspension Place 1 drop into both eyes 4 (four) times daily.    timolol maleate 0.5% (TIMOPTIC) 0.5 % Drop Place into both eyes.    butenafine (MENTAX) 1 % cream Apply topically 2 (two) times daily.    gentamicin (GARAMYCIN) 0.1 % ointment Apply topically Daily. for 14 days    latanoprost 0.005 % ophthalmic solution Place into both eyes.    meclizine (ANTIVERT) 25 mg tablet Take 1 tablet (25 mg total) by mouth 3 (three) times daily as needed.    triamcinolone acetonide 0.1% (KENALOG) 0.1 % cream Apply topically Daily. for 7 days     Family History    None       Tobacco Use    Smoking status: Never     Passive exposure: Never    Smokeless tobacco: Never   Substance and Sexual Activity    Alcohol use: Not Currently    Drug use: Never    Sexual activity: Not Currently     Review of Systems   Unable to perform ROS: Acuity of condition       Objective:     Vital Signs (Most Recent):  Temp: 97.9 °F (36.6 °C) (10/04/24  0400)  Pulse: (!) 57 (10/04/24 0600)  Resp: (!) 23 (10/04/24 0600)  BP: (!) 100/58 (10/04/24 0600)  SpO2: 99 % (10/04/24 0600) Vital Signs (24h Range):  Temp:  [97.9 °F (36.6 °C)-99.8 °F (37.7 °C)] 97.9 °F (36.6 °C)  Pulse:  [57-78] 57  Resp:  [14-24] 23  SpO2:  [91 %-100 %] 99 %  BP: ()/() 100/58     Weight: 63.2 kg (139 lb 5.3 oz)  Body mass index is 24.68 kg/m².     Physical Exam  Vitals and nursing note reviewed.          NEUROLOGICAL EXAMINATION:     MENTAL STATUS        Morning exam patient is sedated on Precedex.  Afternoon exam patient is awakens opens eyes to voice. Moves extremities.        Significant Labs:   Recent Lab Results         10/04/24  0334   10/03/24  1551   10/03/24  1550   10/03/24  0834        Immature Platelet Fraction 3.5             Albumin/Globulin Ratio 0.9     1.0         Albumin 3.3     3.5         ALP 60     66         ALT 14     14         Anion Gap 8.0     7.0         PTT     23.4  Comment: For Minimal Heparin Infusion, the goal aPTT 64-85 seconds corresponds to an anti-Xa of 0.3-0.5.    For Low Intensity and High Intensity Heparin, the goal aPTT  seconds corresponds to an anti-Xa of 0.3-0.7   22.5  Comment: For Minimal Heparin Infusion, the goal aPTT 64-85 seconds corresponds to an anti-Xa of 0.3-0.5.    For Low Intensity and High Intensity Heparin, the goal aPTT  seconds corresponds to an anti-Xa of 0.3-0.7       AST 21     22         Baso # 0.02   0.02           Basophil % 0.4   0.4           BILIRUBIN TOTAL 0.7     0.6         BUN 13.5     15.2         BUN/CREAT RATIO 15     16         Calcium 9.1     9.2         Chloride 106     107         CO2 24     25         Creatinine 0.89     0.94         eGFR >60     58         Eos # 0.04   0.03           Eos % 0.8   0.5           Globulin, Total 3.5     3.4         Glucose 89     89         Hematocrit 38.1   38.5           Hemoglobin 11.9   12.1           Immature Grans (Abs) 0.01   0.01           Immature  Granulocytes 0.2   0.2           INR     1.0   1.0       Lymph # 1.54   1.23           LYMPH % 31.4   22.0           Magnesium  1.70             MCH 23.5   23.6           MCHC 31.2   31.4           MCV 75.1   75.0           Mono # 0.53   0.55           Mono % 10.8   9.9           MPV 11.3   11.3           Neut # 2.77   3.74           Neut % 56.4   67.0           nRBC 0.0   0.0           Phosphorus Level 2.5             Platelet Count 128   143           Potassium 3.8     4.4         PROTEIN TOTAL 6.8     6.9         PT     13.0   10.4       RBC 5.07   5.13           RDW 14.9   15.1           Sodium 138     139         WBC 4.91   5.58                   Significant Imaging: I have reviewed all pertinent imaging results/findings within the past 24 hours.

## 2024-10-04 NOTE — PLAN OF CARE
10/04/24 1111   Discharge Assessment   Assessment Type Discharge Planning Assessment   Confirmed/corrected address, phone number and insurance Yes   Confirmed Demographics Correct on Facesheet   Source of Information family   If unable to respond/provide information was family/caregiver contacted? Yes  (in lobby)   Contact Name/Number Heri Beard (Son)  [phone]   When was your last doctors appointment?   (1 month ago)   Communicated SENDY with patient/caregiver Date not available/Unable to determine   Reason For Admission Intraparchymal Hemmorhage   People in Home alone   Do you expect to return to your current living situation? Yes   Do you have help at home or someone to help you manage your care at home? Yes   Who are your caregiver(s) and their phone number(s)? Jesus Beard (Son)  [phone]   Prior to hospitilization cognitive status: Unable to Assess   Current cognitive status: Unable to Assess   Walking or Climbing Stairs Difficulty no   Dressing/Bathing Difficulty no   Equipment Currently Used at Home blood pressure machine   Readmission within 30 days? No   Patient currently being followed by outpatient case management? No   Do you currently have service(s) that help you manage your care at home? No   Do you take prescription medications? Yes   Do you have prescription coverage? Yes   Coverage mcr   Do you have any problems affording any of your prescribed medications? No   Is the patient taking medications as prescribed? yes   Who is going to help you get home at discharge? Jesus Beard (Son)  [phone] (   How do you get to doctors appointments? family or friend will provide   Are you on dialysis? No   Do you take coumadin? No   Discharge Plan A Other  (tbd)   Discharge Plan B Home;Home Health   DME Needed Upon Discharge  other (see comments)  (tbd)   Discharge Plan discussed with: Adult children   Transition of Care Barriers None   Physical Activity   On average, how many days per week do you  engage in moderate to strenuous exercise (like a brisk walk)? 0 days   On average, how many minutes do you engage in exercise at this level? 0 min   Financial Resource Strain   How hard is it for you to pay for the very basics like food, housing, medical care, and heating? Not hard   Housing Stability   In the last 12 months, was there a time when you were not able to pay the mortgage or rent on time? N   At any time in the past 12 months, were you homeless or living in a shelter (including now)? N   Transportation Needs   Has the lack of transportation kept you from medical appointments, meetings, work or from getting things needed for daily living? No   Food Insecurity   Within the past 12 months, you worried that your food would run out before you got the money to buy more. Never true   Within the past 12 months, the food you bought just didn't last and you didn't have money to get more. Never true   Stress   Do you feel stress - tense, restless, nervous, or anxious, or unable to sleep at night because your mind is troubled all the time - these days? Not at all   Social Isolation   How often do you feel lonely or isolated from those around you?  Never   Alcohol Use   Q1: How often do you have a drink containing alcohol? Never   Utilities   In the past 12 months has the electric, gas, oil, or water company threatened to shut off services in your home? No   Health Literacy   How often do you need to have someone help you when you read instructions, pamphlets, or other written material from your doctor or pharmacy? Never

## 2024-10-04 NOTE — H&P
[facility]  Hospital Medicine History & Physical Examination       Patient Name: Tali Beard  MRN: [id number]  Patient Class: IP- Inpatient   Admission Date: 10/04/2024   Admitting Service: Hospital Medicine   Length of Stay: 1  Attending Physician: Cyrus Orellana MD   Primary Care Provider: Aurora Moe FNP  Face-to-Face encounter date: 10/04/2024  Code Status: Full code   Chief Complaint: No chief complaint on file.      Patient information was obtained from patient, patient's family, past medical records and ER records.      HISTORY OF PRESENT ILLNESS:   Tali Beard is a 88 y.o. female with a past medical history of hypertension, hyperlipidemia, and subarachnoid hemorrhage who presented to [facility] on 10/3/2024 transferred from [facility] for higher level of care.  Patient presented to outside facility secondary to confusion for the past 48 hours.  CT head revealed 35 mm area of intraparenchymal hemorrhage in the left temporoparietal region with small volume left-sided subarachnoid hemorrhage as well.  Patient was given 1 g of Keppra.  Patient was transferred to [facility] for neurosurgery services.  Patient was admitted to ICU for close monitoring.  Neurosurgery was consulted with recommendations of systolic blood pressure between 100-140 and q.2 hours neurochecks.  Patient was deemed not a surgical candidate.  Neurosurgery also recommended MRI brain and CT chest, abdomen, and pelvis with contrast to evaluate for possible underlying neoplasm and/or metastasis.  Patient became agitated overnight and required Precedex.  Repeat CT head revealed stable intracranial hemorrhages without new findings. Patient was cleared for downgrade out of ICU on 10/04/2024 and admitted to hospital medicine service.     PAST MEDICAL HISTORY:   Hypertension   Hyperlipidemia   Subarachnoid hemorrhage    PAST SURGICAL HISTORY:   Unobtainable     FAMILY HISTORY:   Unobtainable     SOCIAL  HISTORY:   Unobtainable     Screening for Social Drivers for health:  Patient screened for food insecurity, housing instability, transportation needs, utility difficulties, and interpersonal safety (select all that apply as identified as concern)  []Housing or Food  []Transportation Needs  []Utility Difficulties  []Interpersonal safety  [x]None    ALLERGIES:   Hydroxyzine hcl and Felodipine    HOME MEDICATIONS:     Prior to Admission medications    Medication Sig Start Date End Date Taking? Authorizing Provider   amLODIPine (NORVASC) 2.5 MG tablet Take 1 tablet by mouth every morning.   Yes Provider, Historical   atorvastatin (LIPITOR) 10 MG tablet Take 10 mg by mouth.   Yes Provider, Historical   levoFLOXacin (LEVAQUIN) 750 MG tablet Take 750 mg by mouth once daily.   Yes Provider, Historical   lisinopriL-hydrochlorothiazide (PRINZIDE,ZESTORETIC) 20-25 mg Tab Take 1 tablet by mouth.   Yes Provider, Historical   metFORMIN (GLUCOPHAGE) 500 MG tablet Take 500 mg by mouth.   Yes Provider, Historical   metoprolol succinate (TOPROL-XL) 50 MG 24 hr tablet Take 50 mg by mouth. 1/1/24  Yes Provider, Historical   nystatin (MYCOSTATIN) cream Apply topically. 4/30/24 4/30/25 Yes Provider, Historical   prednisoLONE acetate (PRED MILD) 0.12 % ophthalmic suspension Place 1 drop into both eyes 4 (four) times daily.   Yes Provider, Historical   timolol maleate 0.5% (TIMOPTIC) 0.5 % Drop Place into both eyes. 11/15/23  Yes Provider, Historical   butenafine (MENTAX) 1 % cream Apply topically 2 (two) times daily. 4/15/24   Dennis Almaguer MD   gentamicin (GARAMYCIN) 0.1 % ointment Apply topically Daily. for 14 days 9/25/24 10/9/24  Trey Perez MD   latanoprost 0.005 % ophthalmic solution Place into both eyes. 12/11/23   Provider, Historical   meclizine (ANTIVERT) 25 mg tablet Take 1 tablet (25 mg total) by mouth 3 (three) times daily as needed. 7/22/23 5/7/24  Brock Menon MD   triamcinolone acetonide 0.1% (KENALOG)  0.1 % cream Apply topically Daily. for 7 days 9/18/24 9/25/24  Trey Perez MD     ________________________________________________________________________  INPATIENT LIST OF MEDICATIONS     Current Facility-Administered Medications:     0.9%  NaCl infusion, , Intravenous, Continuous, Flavio Abdi MD, Last Rate: 100 mL/hr at 10/04/24 1200, Rate Verify at 10/04/24 1200    bisacodyL suppository 10 mg, 10 mg, Rectal, Daily PRN, Sierra Camarena NP    dexmedetomidine (PRECEDEX) 400mcg/100mL 0.9% NaCL infusion, 0-1.4 mcg/kg/hr, Intravenous, Continuous, Flavio Abdi MD, Stopped at 10/04/24 0824    dextrose 10% bolus 125 mL 125 mL, 12.5 g, Intravenous, PRN, St. Thad, Pat, DO    dextrose 10% bolus 250 mL 250 mL, 25 g, Intravenous, PRN, St. Thad, Pat, DO    glucagon (human recombinant) injection 1 mg, 1 mg, Intramuscular, PRN, St. Thad, Pat, DO    glucose chewable tablet 16 g, 16 g, Oral, PRN, St. Thad, Pat, DO    glucose chewable tablet 24 g, 24 g, Oral, PRN, St. Thad, Pat, DO    hydrALAZINE injection 10 mg, 10 mg, Intravenous, Q4H PRN, St. Thad, Pat, DO    labetaloL injection 10 mg, 10 mg, Intravenous, Q4H PRN, St. Thad, Pat, DO    levETIRAcetam (Keppra) 500 mg in D5W 100 mL IVPB (MB+), 500 mg, Intravenous, Q12H, Marlen Quinones MD, Stopped at 10/04/24 0854    LORazepam injection 1 mg, 1 mg, Intramuscular, Q15 Min PRN, Flavio Abdi MD    magnesium sulfate 2g in water 50mL IVPB (premix), 2 g, Intravenous, Once, Renzo Reeder MD    mupirocin 2 % ointment, , Nasal, BID, Jamil Robles MD    naloxone 0.4 mg/mL injection 0.02 mg, 0.02 mg, Intravenous, PRN, Pat Heard, DO    sodium chloride 0.9% flush 10 mL, 10 mL, Intravenous, Q12H PRN, Pat Heard, DO    sodium chloride 0.9% flush 10 mL, 10 mL, Intravenous, PRN, Sierra Camarena NP    Scheduled Meds:   levETIRAcetam (Keppra) IV (PEDS and ADULTS)  500 mg Intravenous Q12H     magnesium sulfate IVPB  2 g Intravenous Once    mupirocin   Nasal BID     Continuous Infusions:   0.9% NaCl   Intravenous Continuous 100 mL/hr at 10/04/24 1200 Rate Verify at 10/04/24 1200    dexmedeTOMIDine (Precedex) infusion (titrating)  0-1.4 mcg/kg/hr Intravenous Continuous   Stopped at 10/04/24 0824     PRN Meds:.  Current Facility-Administered Medications:     bisacodyL, 10 mg, Rectal, Daily PRN    dextrose 10%, 12.5 g, Intravenous, PRN    dextrose 10%, 25 g, Intravenous, PRN    glucagon (human recombinant), 1 mg, Intramuscular, PRN    glucose, 16 g, Oral, PRN    glucose, 24 g, Oral, PRN    hydrALAZINE, 10 mg, Intravenous, Q4H PRN    labetalol, 10 mg, Intravenous, Q4H PRN    lorazepam, 1 mg, Intramuscular, Q15 Min PRN    naloxone, 0.02 mg, Intravenous, PRN    sodium chloride 0.9%, 10 mL, Intravenous, Q12H PRN    sodium chloride 0.9%, 10 mL, Intravenous, PRN      REVIEW OF SYSTEMS:   Except as documented, all other systems reviewed and negative.    PHYSICAL EXAM:     VITAL SIGNS: 24 HRS MIN & MAX LAST   Temp  Min: 97.4 °F (36.3 °C)  Max: 99.8 °F (37.7 °C) 97.4 °F (36.3 °C)   BP  Min: 95/51  Max: 147/61 119/74   Pulse  Min: 48  Max: 78  64   Resp  Min: 14  Max: 24 (!) 21   SpO2  Min: 91 %  Max: 100 % 100 %       General appearance: Elderly female in no apparent distress.    HENT: Atraumatic head.  NG tube in place  Eyes: Normal extraocular movements.   Neck: Supple.   Lungs: Clear to auscultation bilaterally.   Heart: Regular rate and rhythm.  Abdomen: Soft, non-distended, non-tender.  Extremities: Roll belt and mittens in place  Neuro: Awake and alert.  Intermittently follows commands     LABS AND IMAGING:     Recent Labs   Lab 10/03/24  0648 10/03/24  1551 10/04/24  0334   WBC 4.74 5.58 4.91   RBC 4.62 5.13 5.07   HGB 11.2* 12.1 11.9*   HCT 35.7* 38.5 38.1   MCV 77.3* 75.0* 75.1*   MCH 24.2* 23.6* 23.5*   MCHC 31.4* 31.4* 31.2*   RDW 15.0 15.1 14.9    143 128*   MPV 11.0* 11.3* 11.3*       Recent Labs    Lab 10/03/24  0648 10/03/24  1550 10/04/24  0334    139 138   K 4.2 4.4 3.8    107 106   CO2 28 25 24   BUN 17.4 15.2 13.5   CREATININE 1.17* 0.94 0.89   CALCIUM 9.9 9.2 9.1   MG 1.80  --  1.70   ALBUMIN 3.5 3.5 3.3*   ALKPHOS 72 66 60   ALT 14 14 14   AST 19 22 21   BILITOT 0.5 0.6 0.7       Microbiology Results (last 7 days)       ** No results found for the last 168 hours. **             CT Head Without Contrast  Narrative: EXAMINATION:  CT HEAD WITHOUT CONTRAST    CLINICAL HISTORY:  L temporal parietal intraparenchymal hemorrhage;Nontraumatic intracerebral hemorrhage, unspecified    TECHNIQUE:  Sequential axial images were performed of the brain without contrast.    Dose product length of 834 mGycm. Automated exposure control was utilized to minimize radiation dose.    COMPARISON:  August 3, 2024.    FINDINGS:  Left temporooccipital parenchymal hyperdense hemorrhage overall size and configuration is similar and there is also no significant change in surrounding brain edematous changes.  There also similar left occipital lobe and frontal lobe mild subarachnoid hemorrhages.  There is no midline shift or hydrocephalus.  No new intra-axial or extra-axial hemorrhage. There is no sulcal effacement or low attenuation changes to suggest recent large vessel territory infarction.  There is chronic microangiopathic ischemia and atrophy.    Visualized paranasal sinuses are clear without mucosal thickening, polypoidal abnormality or air-fluid levels. Mastoid air cells aeration is optimal.  Impression: Stable intracranial hemorrhages.  No new findings.    Electronically signed by: Marcus Barrett  Date:    10/03/2024  Time:    17:03  CT Head Without Contrast  Narrative: EXAMINATION:  CT HEAD WITHOUT CONTRAST    CLINICAL HISTORY:  Mental status change, unknown cause;    TECHNIQUE:  CT images of the head without IV contrast. Axial, coronal and sagittal images reviewed. Dose length product 1297 mGycm. Automatic  exposure control, adjustment of mA/kV or iterative reconstruction technique used to limit radiation dose.    COMPARISON:  CT 09/30/2024    FINDINGS:  An area of parenchymal hemorrhage in the left temporoparietal region measures up to 35 mm in diameter.  Additional small volume left-sided subarachnoid hemorrhage.  No significant midline shift.  No hydrocephalus.  Normal positioning of the cerebellar tonsils.  Impression: 35 mm area of intraparenchymal hemorrhage in the left temporoparietal region.  Small volume left-sided subarachnoid hemorrhage as well.    Findings discussed with Dr. Mccormick at the time of dictation.    Electronically signed by: Rick Finch  Date:    10/03/2024  Time:    07:38        ASSESSMENT & PLAN:   Assessment:   IPH/SAH  History of hypertension, hyperlipidemia, and subarachnoid hemorrhage       Plan:  Neurochecks q.2 hours   Systolic blood pressure 100-140 per neurosurgery   IV Keppra 500 mg b.i.d.   PRN IV anti hypersensitive   Fall precautions   Seizure precautions   PT/OT/SLP   MRI brain with and without contrast  CT chest abdomen and pelvis with IV contrast  Resume home medications as deemed appropriate once medication reconciliation is updated  Labs in AM    VTE Prophylaxis: SCDs    Discharge Planning and Disposition: TBD    I, Edvin Velásquez PA-C have reviewed and discussed the case with Cyrus Orellana MD   Please see the attending MD's addendum for further assessment and plan.    Edvin Velásquez PA-C  Department of Hospital Medicine   [facility]   10/04/2024    This note was created with the assistance of [employer] voice recognition software. There may be transcription errors as a result of using this technology, however minimal. Effort has been made to assure accuracy of transcription, but any obvious errors or omissions should be clarified with the author of the document.     _______________________________________________________________________________  MD Addendum:  I, Dr. Kaiser, assumed care of this patient today   For the patient encounter, I performed the substantive portion of the visit, I reviewed the NP/PA documentation, treatment plan, and medical decision making.  I had face to face time with this patient     A. History:  Patient was transferred from another hospital for neurosurgery evaluation  Patient was found to have IPH and SAH  She was seen by neurosurgery team and no surgical intervention was recommended  She is currently confused, has an NGT and pulling on iyt  To be cleared by ST for diet   She has been afebrile  She is oriented only to self     B. Physical exam:  Heart RRR  Lungs clear   Abdomen soft and non tender   Neuro: No FND    Frail and confused     C. Medical decision making:  Patients labs and vitals reviewed  She is 88 years old and has high risk for delirium   Will dc NGT, will keep NPO and use iv fluids for now   Will start on iv prn haldol for agitation  Dc benzos   F/U on MRI brain   Keep SBP <140  Continue iv keppra     Continue supportive care     Keep on delirium precautions     Labs in am     All diagnosis and differential diagnosis have been reviewed; assessment and plan has been documented; I have personally reviewed the labs and test results that are presently available; I have reviewed the patients medication list; I have reviewed the consulting providers response and recommendations. I have reviewed or attempted to review medical records based upon their availability.    All of the patient and family questions have been addressed and answered. Patient's is agreeable to the above stated plan. I will continue to monitor closely and make adjustments to medical management as needed.      10/04/2024

## 2024-10-04 NOTE — PLAN OF CARE
Problem: Adult Inpatient Plan of Care  Goal: Plan of Care Review  Outcome: Progressing  Goal: Patient-Specific Goal (Individualized)  Outcome: Progressing  Goal: Absence of Hospital-Acquired Illness or Injury  Outcome: Progressing  Goal: Optimal Comfort and Wellbeing  Outcome: Progressing  Goal: Readiness for Transition of Care  Outcome: Progressing     Problem: Diabetes Comorbidity  Goal: Blood Glucose Level Within Targeted Range  Outcome: Progressing     Problem: Skin Injury Risk Increased  Goal: Skin Health and Integrity  Outcome: Progressing     Problem: Wound  Goal: Optimal Coping  Outcome: Progressing  Goal: Optimal Functional Ability  Outcome: Progressing  Goal: Absence of Infection Signs and Symptoms  Outcome: Progressing  Goal: Improved Oral Intake  Outcome: Progressing  Goal: Optimal Pain Control and Function  Outcome: Progressing  Goal: Skin Health and Integrity  Outcome: Progressing  Goal: Optimal Wound Healing  Outcome: Progressing     Problem: Fall Injury Risk  Goal: Absence of Fall and Fall-Related Injury  Outcome: Progressing

## 2024-10-04 NOTE — HPI
"Tali Beard is a 88 y.o. female with past medical history of non-insulin-dependent diabetes, previous subarachnoid hemorrhage on 9/3/2024, hypertension, hyperlipidemia who [place] [facility] [place] to confusion over the past 2 days.  Son reported to ED in history that his mom is routinely very sharp.  On October 1st he noticed that his mom seemed to be more confused, she answered questions inappropriately.  Patient had no complaints.  Patient is currently undergoing treatment for wound delayed healing after skin cancer was removed from her left leg".  CT head without contrast 10/3/2024 when compared to CT head without contrast completed earlier the same day was no interval change her left temporoparietal occipital acute intraparenchymal hemorrhage measuring 3.5 cm.  The intraparenchymal hemorrhage appears round with surrounding edema, which may suggest evidence of underlying mass.  There is general most atrophy with chronic white matter ischemic changes.  There was no significant midline shift   Patient was admitted to ICU with intensivist service, sedated with Precedex drip.  Neurosurgery was consulted with no acute neurosurgical intervention recommended.  MRI of the brain with and without contrast was ordered to further assess the etiology of her acute intraparenchymal hemorrhage.  CT of the chest, abdomen and pelvis with contrast were also ordered to evaluate possible underlying neoplasm and/or metastasis. Stroke Neurology consulted.   "

## 2024-10-04 NOTE — PLAN OF CARE
Problem: Physical Therapy  Goal: Physical Therapy Goal  Description: Goals to be met by: 24     Patient will increase functional independence with mobility by performin. Supine to sit with Stand-by Assistance  2. Sit to supine with Stand-by Assistance  3. PT to assess transfers as appropriate  4. PT to assess gait as appropriate    Outcome: Progressing

## 2024-10-04 NOTE — PLAN OF CARE
Problem: Occupational Therapy  Goal: Occupational Therapy Goal  Description: Goals to be met by: 11/4/24     Patient will increase functional independence with ADLs by performing:    UE Dressing with Stand-by Assistance.  LE Dressing with Stand-by Assistance.  Grooming while standing at sink with Stand-by Assistance.  Toileting from toilet with Stand-by Assistance for hygiene and clothing management.   Toilet transfer to toilet with Stand-by Assistance.  Increased functional strength to 3/5 through therEx.    Outcome: Progressing

## 2024-10-04 NOTE — PT/OT/SLP EVAL
Physical Therapy Evaluation    Patient Name:  Tali Beard   MRN:  [id number]    Recommendations:     Discharge therapy intensity: Moderate Intensity Therapy   Discharge Equipment Recommendations: to be determined by next level of care   Barriers to discharge: Impaired mobility and Ongoing medical needs    Assessment:     Tali Beard is a 88 y.o. female admitted with a medical diagnosis of IPH in L temporoparietal region, small volume SAH, recent SAH x1 month ago.  She presents with the following impairments/functional limitations: weakness, impaired endurance, impaired self care skills, impaired functional mobility, gait instability, impaired cognition, decreased ROM, decreased upper extremity function, decreased lower extremity function, decreased safety awareness, impaired balance. Pt lethargic and with decreased command following at beginning of session, only oriented to person. Brother and son present at bedside to provide history. Pt lives with son who is in and out throughout the day and was independent prior to recent SAH. Pt requiring totalA for bed mobility on eval, mobility limited 2/2 impaired command following. When pt was brought back to supine, she became more alert and answering more questions, however still confused and with impaired command following. PT to further assess mobility as appropriate. Anticipating mod intensity PT at d/c.    Rehab Prognosis: Fair; patient would benefit from acute skilled PT services to address these deficits and reach maximum level of function.    Recent Surgery: Procedure(s) (LRB):  MRI (Magnetic Resonance Imagine) (N/A)      Plan:     During this hospitalization, patient would benefit from acute PT services   to address the identified rehab impairments via therapeutic activities, therapeutic exercises, neuromuscular re-education and progress toward the following goals:    Plan of Care Expires:  11/04/24    Subjective     Chief Complaint: none  stated  Patient/Family Comments/goals: TBD  Pain/Comfort:  Pain Rating 1: 0/10    Patients cultural, spiritual, Shinto conflicts given the current situation: no    Living Environment:  Pt lives with son in [facility] with 0 TRES. Son is a flanagan and is in and out throughout the day.  Prior to admission, patients level of function was Independent prior to recent SAH.  Equipment used at home:  (family has access to w/c and they may have RW).  DME owned (not currently used): none.  Upon discharge, patient will have assistance from son.    Objective:     Communicated with RN prior to session.  Patient found HOB elevated with  (vital monitoring, roll belt, SCD, heel floats)  upon PT entry to room.    General Precautions: Standard, fall (-140)  Orthopedic Precautions:N/A   Braces: N/A  Respiratory Status: Room air, SpO2 100%  Blood Pressure: 127/54, HR 54      Exams:  Cognitive Exam:  Patient is oriented to Person  RLE ROM: PROM WFL but stiff  RLE Strength: unable to assess 2/2 decreased command following  LLE ROM: PROM WFL but stiff  LLE Strength: unable to assess 2/2 decreased command following  Skin integrity: Visible skin intact      Functional Mobility:  Bed Mobility:     Supine to Sit: total assistance  Sit to Supine: total assistance      AM-PAC 6 CLICK MOBILITY  Total Score:        Treatment & Education:      Patient and family were provided with verbal education education regarding PT role/goals/POC, fall prevention, safety awareness, and discharge/DME recommendations.  Additional teaching is warranted.     Patient left HOB elevated with all lines intact, call button in reach, RN notified, and family .    GOALS:   Multidisciplinary Problems       Physical Therapy Goals          Problem: Physical Therapy    Goal Priority Disciplines Outcome Interventions   Physical Therapy Goal     PT, PT/OT Progressing    Description: Goals to be met by: 11/4/24     Patient will increase functional independence with mobility  by performin. Supine to sit with Stand-by Assistance  2. Sit to supine with Stand-by Assistance  3. PT to assess transfers as appropriate  4. PT to assess gait as appropriate                         History:     Past Medical History:   Diagnosis Date    Subarachnoid hemorrhage        No past surgical history on file.    Time Tracking:     PT Received On: 10/04/24  PT Start Time: 1307     PT Stop Time: 1324  PT Total Time (min): 17 min     Billable Minutes: Evaluation 17      10/04/2024

## 2024-10-04 NOTE — PT/OT/SLP EVAL
[facility]  Speech Language Pathology Department  Clinical Swallow Evaluation    Patient Name:  Tali Beard   MRN:  [id number]    Recommendations     General recommendations:  Modified Barium Swallow Study pending progress and dysphagia therapy  Solid texture recommendation:  NPO  Liquid consistency recommendation: NPO   Medications: crushed in puree    History     Patient is an 89 y/o female admitted to the ICU on 10/3/2024 with an IPH. PMH includes a previous subarachnoid hemorrhage 1 month ago, DM, HTN. Patient initially brought to the ED in [place] by her son for complaints of confusion for the past 48 hours. CT head revealed a 35mm area of intraparenchymal hemorrhage in the left temporoparietal region as well small volume subarachnoid hemorrhage.     Past Medical History:   Diagnosis Date    Subarachnoid hemorrhage      No past surgical history on file.    Home diet texture/consistency: Regular and thin liquids  Current method of nutrition: NPO    Subjective     Patient lethargic.    Spiritual/Cultural/Roman Catholic Beliefs/Practices that affect care: no    Pain/Comfort: Pain Rating 1: 0/10    Objective     ORAL MUSCULATURE  Dentition: own teeth  Secretion Management: adequate  Vocal Quality:  no verbalizations    PO TRIALS  Consistency Fed By Oral Symptoms Pharyngeal Symptoms   Thin liquid by spoon SLP Anterior spillage  Bolus holding None   Puree SLP Bolus holding  Slowed oral transit time None     Limited PO trials due to lethargy/mental status.    Assessment     REC: NPO with meds crushed in puree. May benefit from MBS once clinically appropriate.    Outcome Measures     Functional Oral Intake Scale: 1 - Nothing by mouth    Goals     Multidisciplinary Problems       SLP Goals          Problem: SLP    Goal Priority Disciplines Outcome   SLP Goal     SLP    Description: LTG: Patient will tolerate safest and least PO diet without signs/sx of aspiration.  STG: PO trials with  adequate TREY.  STG: MBS if/when clinically warranted.                       Education     Patient provided with verbal education regarding ST POC.  Additional teaching is warranted.    Plan     SLP Follow-Up:  Yes   Patient to be seen:  5 x/week   Plan of Care expires:  10/11/24  Plan of Care reviewed with:  patient, family     Time Tracking     SLP Treatment Date:   10/04/24  Speech Start Time:  0930  Speech Stop Time:  0940     Speech Total Time (min):  10 min    Billable minutes:  Swallow and Oral Function Evaluation, 10 minutes     10/04/2024

## 2024-10-05 LAB
ANION GAP SERPL CALC-SCNC: 8 MEQ/L
BASOPHILS # BLD AUTO: 0.01 X10(3)/MCL
BASOPHILS NFR BLD AUTO: 0.2 %
BUN SERPL-MCNC: 13 MG/DL (ref 9.8–20.1)
CALCIUM SERPL-MCNC: 9 MG/DL (ref 8.4–10.2)
CHLORIDE SERPL-SCNC: 110 MMOL/L (ref 98–107)
CO2 SERPL-SCNC: 22 MMOL/L (ref 23–31)
CREAT SERPL-MCNC: 0.94 MG/DL (ref 0.55–1.02)
CREAT/UREA NIT SERPL: 14
EOSINOPHIL # BLD AUTO: 0.09 X10(3)/MCL (ref 0–0.9)
EOSINOPHIL NFR BLD AUTO: 1.6 %
ERYTHROCYTE [DISTWIDTH] IN BLOOD BY AUTOMATED COUNT: 14.9 % (ref 11.5–17)
GFR SERPLBLD CREATININE-BSD FMLA CKD-EPI: 58 ML/MIN/1.73/M2
GLUCOSE SERPL-MCNC: 80 MG/DL (ref 82–115)
HCT VFR BLD AUTO: 38.5 % (ref 37–47)
HGB BLD-MCNC: 12 G/DL (ref 12–16)
IMM GRANULOCYTES # BLD AUTO: 0.01 X10(3)/MCL (ref 0–0.04)
IMM GRANULOCYTES NFR BLD AUTO: 0.2 %
LYMPHOCYTES # BLD AUTO: 1.19 X10(3)/MCL (ref 0.6–4.6)
LYMPHOCYTES NFR BLD AUTO: 21 %
MCH RBC QN AUTO: 23.5 PG (ref 27–31)
MCHC RBC AUTO-ENTMCNC: 31.2 G/DL (ref 33–36)
MCV RBC AUTO: 75.5 FL (ref 80–94)
MONOCYTES # BLD AUTO: 0.58 X10(3)/MCL (ref 0.1–1.3)
MONOCYTES NFR BLD AUTO: 10.2 %
NEUTROPHILS # BLD AUTO: 3.79 X10(3)/MCL (ref 2.1–9.2)
NEUTROPHILS NFR BLD AUTO: 66.8 %
NRBC BLD AUTO-RTO: 0 %
PLATELET # BLD AUTO: 145 X10(3)/MCL (ref 130–400)
PMV BLD AUTO: 10.9 FL (ref 7.4–10.4)
POTASSIUM SERPL-SCNC: 4.4 MMOL/L (ref 3.5–5.1)
RBC # BLD AUTO: 5.1 X10(6)/MCL (ref 4.2–5.4)
SODIUM SERPL-SCNC: 140 MMOL/L (ref 136–145)
WBC # BLD AUTO: 5.67 X10(3)/MCL (ref 4.5–11.5)

## 2024-10-05 PROCEDURE — 97535 SELF CARE MNGMENT TRAINING: CPT | Mod: CO

## 2024-10-05 PROCEDURE — 80048 BASIC METABOLIC PNL TOTAL CA: CPT | Performed by: INTERNAL MEDICINE

## 2024-10-05 PROCEDURE — 11000001 HC ACUTE MED/SURG PRIVATE ROOM

## 2024-10-05 PROCEDURE — 85025 COMPLETE CBC W/AUTO DIFF WBC: CPT | Performed by: INTERNAL MEDICINE

## 2024-10-05 PROCEDURE — 92526 ORAL FUNCTION THERAPY: CPT

## 2024-10-05 PROCEDURE — 36415 COLL VENOUS BLD VENIPUNCTURE: CPT | Performed by: INTERNAL MEDICINE

## 2024-10-05 PROCEDURE — 97530 THERAPEUTIC ACTIVITIES: CPT

## 2024-10-05 PROCEDURE — 25000003 PHARM REV CODE 250: Performed by: NEUROLOGICAL SURGERY

## 2024-10-05 PROCEDURE — S5010 5% DEXTROSE AND 0.45% SALINE: HCPCS | Performed by: INTERNAL MEDICINE

## 2024-10-05 PROCEDURE — 25000003 PHARM REV CODE 250: Performed by: STUDENT IN AN ORGANIZED HEALTH CARE EDUCATION/TRAINING PROGRAM

## 2024-10-05 PROCEDURE — 25000003 PHARM REV CODE 250: Performed by: INTERNAL MEDICINE

## 2024-10-05 PROCEDURE — 63600175 PHARM REV CODE 636 W HCPCS: Performed by: NEUROLOGICAL SURGERY

## 2024-10-05 RX ORDER — DEXTROSE MONOHYDRATE AND SODIUM CHLORIDE 5; .45 G/100ML; G/100ML
INJECTION, SOLUTION INTRAVENOUS CONTINUOUS
Status: DISCONTINUED | OUTPATIENT
Start: 2024-10-05 | End: 2024-10-07

## 2024-10-05 RX ADMIN — MUPIROCIN: 20 OINTMENT TOPICAL at 08:10

## 2024-10-05 RX ADMIN — LEVETIRACETAM 500 MG: 100 INJECTION, SOLUTION INTRAVENOUS at 08:10

## 2024-10-05 RX ADMIN — DEXTROSE AND SODIUM CHLORIDE: 5; 450 INJECTION, SOLUTION INTRAVENOUS at 04:10

## 2024-10-05 NOTE — PLAN OF CARE
Problem: Physical Therapy  Goal: Physical Therapy Goal  Description: Goals to be met by: 24     Patient will increase functional independence with mobility by performin. Supine to sit with Modified [place]  2. Sit to supine with Modified [place]  3. Sit to stand with Stand-by Assistance  4. Bed to chair with Stand-by Assistance with LRAD  4. Gait x 200' with Stand-by Assistance using LRAD    Outcome: Progressing

## 2024-10-05 NOTE — PT/OT/SLP PROGRESS
[facility]  Speech Language Pathology Department  Dysphagia Therapy Progress Note    Patient Name:  Tali Beard   MRN:  [id number]    Recommendations     General recommendations:  Modified Barium Swallow Study pending readiness and dysphagia therapy  Solid texture recommendation:  NPO  Liquid consistency recommendation: NPO   Medications: NPO    Discharge therapy intensity: Moderate Intensity Therapy   Barriers to safe discharge:  acuity of illness and severity of impairment    Subjective     Patient awake and cooperative.  Spiritual/Cultural/Confucianism Beliefs/Practices that affect care: no    Pain/Comfort: Pain Rating 1: 0/10    Respiratory Status:  room air    Objective     Therapeutic PO Trials:  Consistency Amount Fed By Oral Symptoms Pharyngeal Symptoms   Ice chips 2 oz SLP Bolus holding  Prolonged bolus formation/mastication Multiple swallows  Throat clear after swallow   Thin liquid by spoon 2 tsp SLP Anterior spillage Multiple swallows   Puree 4 tsp  SLP Reduced closure around utensil/straw  Bolus holding  Prolonged bolus formation/mastication Reduced laryngeal movement to palpation  Multiple swallows   Thin liquid by straw 2 sips Self with assistance Bolus holding Multiple swallows  Wet vocal quality after swallow  Throat clear after swallow     Assessment     Pt continues to present with oropharyngeal dysphagia and remains unsafe for PO intake.    Outcome Measures     Functional Oral Intake Scale: 1 - Nothing by mouth    Goals     Multidisciplinary Problems       SLP Goals          Problem: SLP    Goal Priority Disciplines Outcome   SLP Goal     SLP    Description: LTG: Patient will tolerate safest and least PO diet without signs/sx of aspiration.  STG: PO trials with adequate TREY.  STG: MBS if/when clinically warranted.                       Patient Education     Patient and family were provided with verbal education regarding POC.  Understanding was verbalized.    Plan      Will continue to follow and tx as appropriate.    SLP Follow-Up:  Yes   Patient to be seen:  5 x/week   Plan of Care expires:  10/11/24  Plan of Care reviewed with:  patient, son       Time Tracking     SLP Treatment Date:   10/05/24  Speech Start Time:  0940  Speech Stop Time:  0955     Speech Total Time (min):  15 min    Billable minutes:  Treatment of Swallow Dysfunction, 15 minutes       10/05/2024

## 2024-10-05 NOTE — CONSULTS
"[facility]  Neurology  Consult Note    Patient Name: Tali Beard  MRN: [id number]  Admission Date: 10/3/2024  Hospital Length of Stay: 1 days  Code Status: Full Code   Attending Provider: Cyrus Orellana MD   Consulting Provider: Sierra Camarena NP  Primary Care Physician: Aurora Moe FNP  Principal Problem:Intraparenchymal hemorrhage of brain    Inpatient consult to Neurology  Consult performed by: Sierra Camarena NP  Consult ordered by: Lanette Hi MD         Subjective:     Chief Complaint: confusion      HPI:   Tali Beard is a 88 y.o. female with past medical history of non-insulin-dependent diabetes, previous subarachnoid hemorrhage on 9/3/2024, hypertension, hyperlipidemia who presented to [facility] ED related to confusion over the past 2 days.  Son reported to ED in history that his mom is routinely very sharp.  On October 1st he noticed that his mom seemed to be more confused, she answered questions inappropriately.  Patient had no complaints.  Patient is currently undergoing treatment for wound delayed healing after skin cancer was removed from her left leg".  CT head without contrast 10/3/2024 when compared to CT head without contrast completed earlier the same day was no interval change her left temporoparietal occipital acute intraparenchymal hemorrhage measuring 3.5 cm.  The intraparenchymal hemorrhage appears round with surrounding edema, which may suggest evidence of underlying mass.  There is general most atrophy with chronic white matter ischemic changes.  There was no significant midline shift   Patient was admitted to ICU with intensivist service, sedated with Precedex drip.  Neurosurgery was consulted with no acute neurosurgical intervention recommended.  MRI of the brain with and without contrast was ordered to further assess the etiology of her acute intraparenchymal hemorrhage.  CT of the chest, abdomen and pelvis with contrast " were also ordered to evaluate possible underlying neoplasm and/or metastasis. Stroke Neurology consulted.      Past Medical History:   Diagnosis Date    Subarachnoid hemorrhage        No past surgical history on file.    Review of patient's allergies indicates:   Allergen Reactions    Hydroxyzine hcl      Other Reaction(s): BUMPS ALL OVER BODY    Other reaction(s): BUMPS ALL OVER BODY    Felodipine Rash     Edema in the legs       No current facility-administered medications on file prior to encounter.     Current Outpatient Medications on File Prior to Encounter   Medication Sig    amLODIPine (NORVASC) 2.5 MG tablet Take 1 tablet by mouth every morning.    atorvastatin (LIPITOR) 10 MG tablet Take 10 mg by mouth.    levoFLOXacin (LEVAQUIN) 750 MG tablet Take 750 mg by mouth once daily.    lisinopriL-hydrochlorothiazide (PRINZIDE,ZESTORETIC) 20-25 mg Tab Take 1 tablet by mouth.    metFORMIN (GLUCOPHAGE) 500 MG tablet Take 500 mg by mouth.    metoprolol succinate (TOPROL-XL) 50 MG 24 hr tablet Take 50 mg by mouth.    nystatin (MYCOSTATIN) cream Apply topically.    prednisoLONE acetate (PRED MILD) 0.12 % ophthalmic suspension Place 1 drop into both eyes 4 (four) times daily.    timolol maleate 0.5% (TIMOPTIC) 0.5 % Drop Place into both eyes.    butenafine (MENTAX) 1 % cream Apply topically 2 (two) times daily.    gentamicin (GARAMYCIN) 0.1 % ointment Apply topically Daily. for 14 days    latanoprost 0.005 % ophthalmic solution Place into both eyes.    meclizine (ANTIVERT) 25 mg tablet Take 1 tablet (25 mg total) by mouth 3 (three) times daily as needed.    triamcinolone acetonide 0.1% (KENALOG) 0.1 % cream Apply topically Daily. for 7 days     Family History    None       Tobacco Use    Smoking status: Never     Passive exposure: Never    Smokeless tobacco: Never   Substance and Sexual Activity    Alcohol use: Not Currently    Drug use: Never    Sexual activity: Not Currently     Review of Systems   Unable to perform  ROS: Acuity of condition       Objective:     Vital Signs (Most Recent):  Temp: 97.9 °F (36.6 °C) (10/04/24 0400)  Pulse: (!) 57 (10/04/24 0600)  Resp: (!) 23 (10/04/24 0600)  BP: (!) 100/58 (10/04/24 0600)  SpO2: 99 % (10/04/24 0600) Vital Signs (24h Range):  Temp:  [97.9 °F (36.6 °C)-99.8 °F (37.7 °C)] 97.9 °F (36.6 °C)  Pulse:  [57-78] 57  Resp:  [14-24] 23  SpO2:  [91 %-100 %] 99 %  BP: ()/() 100/58     Weight: 63.2 kg (139 lb 5.3 oz)  Body mass index is 24.68 kg/m².     Physical Exam  Vitals and nursing note reviewed.          NEUROLOGICAL EXAMINATION:     MENTAL STATUS        Morning exam patient is sedated on Precedex.  Afternoon exam patient is awakens opens eyes to voice. Moves extremities.        Significant Labs:   Recent Lab Results         10/04/24  0334   10/03/24  1551   10/03/24  1550   10/03/24  0834        Immature Platelet Fraction 3.5             Albumin/Globulin Ratio 0.9     1.0         Albumin 3.3     3.5         ALP 60     66         ALT 14     14         Anion Gap 8.0     7.0         PTT     23.4  Comment: For Minimal Heparin Infusion, the goal aPTT 64-85 seconds corresponds to an anti-Xa of 0.3-0.5.    For Low Intensity and High Intensity Heparin, the goal aPTT  seconds corresponds to an anti-Xa of 0.3-0.7   22.5  Comment: For Minimal Heparin Infusion, the goal aPTT 64-85 seconds corresponds to an anti-Xa of 0.3-0.5.    For Low Intensity and High Intensity Heparin, the goal aPTT  seconds corresponds to an anti-Xa of 0.3-0.7       AST 21     22         Baso # 0.02   0.02           Basophil % 0.4   0.4           BILIRUBIN TOTAL 0.7     0.6         BUN 13.5     15.2         BUN/CREAT RATIO 15     16         Calcium 9.1     9.2         Chloride 106     107         CO2 24     25         Creatinine 0.89     0.94         eGFR >60     58         Eos # 0.04   0.03           Eos % 0.8   0.5           Globulin, Total 3.5     3.4         Glucose 89     89         Hematocrit  38.1   38.5           Hemoglobin 11.9   12.1           Immature Grans (Abs) 0.01   0.01           Immature Granulocytes 0.2   0.2           INR     1.0   1.0       Lymph # 1.54   1.23           LYMPH % 31.4   22.0           Magnesium  1.70             MCH 23.5   23.6           MCHC 31.2   31.4           MCV 75.1   75.0           Mono # 0.53   0.55           Mono % 10.8   9.9           MPV 11.3   11.3           Neut # 2.77   3.74           Neut % 56.4   67.0           nRBC 0.0   0.0           Phosphorus Level 2.5             Platelet Count 128   143           Potassium 3.8     4.4         PROTEIN TOTAL 6.8     6.9         PT     13.0   10.4       RBC 5.07   5.13           RDW 14.9   15.1           Sodium 138     139         WBC 4.91   5.58                   Significant Imaging: I have reviewed all pertinent imaging results/findings within the past 24 hours.  Assessment and Plan:     * Intraparenchymal hemorrhage of brain  - presented with confusion   - Stroke RF: HTN, Diabetes, intraparenchymal hemorrhage appears round with surrounding edema with small left subarchnoid hemorrhage   - Intervention: 10/3/2024 no surgical intervention was advised  - Etiology: TBD    Stroke workup:  -CTh: 35 mm area of intraparenchymal hemorrhage in the left temporoparietal region.  Small volume left-sided subarachnoid hemorrhage as well.     CT head without contrast, 10/03/2024- when compared to a CT head without contrast completed earlier on this same day, there have been no changes in her left temporoparietal occipital acute intraparenchymal hemorrhage measuring 3.5 cm.  The intraparenchymal hemorrhage appears round with surrounding edema, which may be suggestive of an underlying mass.  There is generalized atrophy with chronic white matter ischemic changes.  There is no significant midline shift.       CT head without contrast, 09/30/2024- no acute abnormalities.     CT head without contrast, 09/02/2024- subtle left temporal  occipital subarachnoid hemorrhage.  There is generalized atrophy with chronic white matter ischemic changes.     CTA head and neck, 09/03/2024- no significant vascular abnormalities.    -MRI brain w/wo:   -ECHO: EF 55%. Left atrium is mildly dilated. Agitated saline study of the atrial septum is negative, suggesting absence of intracardiac shunt at the atrial level.   -CUS:   -LDL: 81  -A1c: 5.8  -TSH: Normal  -home medications include: lipitor 10 mg      IPH -  -2 hr neuro checks ... notify neurology of any neuro change  - Maintain systolic blood pressure 100-140.  -avoid antiplatelet or anticoagulation at this time  -seizure precautions ... notify neurology of any seizure-like activity  - therapy evaluations   - awaiting MRI brain w/wo  -  Other recommendations may follow from MD                VTE Risk Mitigation (From admission, onward)           Ordered     Place sequential compression device  Until discontinued         10/04/24 0758     Reason for No Pharmacological VTE Prophylaxis  Once        Question:  Reasons:  Answer:  Active Bleeding    10/04/24 0758     IP VTE HIGH RISK PATIENT  Once         10/04/24 0758                    Thank you for your consult.    Sierra Camarena NP  Neurology  [facility]

## 2024-10-05 NOTE — PROGRESS NOTES
[facility]  Hospital Medicine Progress Note        Chief Complaint: Inpatient Follow-up for SAH    HPI:   Tali Beard is a 88 y.o. female with a past medical history of hypertension, hyperlipidemia, and subarachnoid hemorrhage who presented to [facility] on 10/3/2024 transferred from [facility] ED for higher level of care.  Patient presented to outside facility secondary to confusion for the past 48 hours.  CT head revealed 35 mm area of intraparenchymal hemorrhage in the left temporoparietal region with small volume left-sided subarachnoid hemorrhage as well.  Patient was given 1 g of Keppra.  Patient was transferred to [facility] for neurosurgery services.  Patient was admitted to ICU for close monitoring.  Neurosurgery was consulted with recommendations of systolic blood pressure between 100-140 and q.2 hours neurochecks.  Patient was deemed not a surgical candidate.  Neurosurgery also recommended MRI brain and CT chest, abdomen, and pelvis with contrast to evaluate for possible underlying neoplasm and/or metastasis.  Patient became agitated overnight and required Precedex.  Repeat CT head revealed stable intracranial hemorrhages without new findings. Patient was cleared for downgrade out of ICU on 10/04/2024 and admitted to hospital medicine service.     Patient followed by neurosurgery team and recommended MRI brain.     Interval Hx:   Patient today awake and comfortable. More oriented than yesterday. She is able to tolerate po meds. Has been afebrile.     Family at bedside, explained in detail about the patients condition, diagnosis, vitals, labs and treatment plan. They understand and agree with the plan. All their questions were answered.      Case was discussed with patient's nurse and  on the floor.    Objective/physical exam:  General: In no acute distress, frail.   Chest: Clear to auscultation bilaterally  Heart: RRR, +S1, S2, no appreciable murmur  Abdomen: Soft,  nontender, BS +  MSK: Warm, no lower extremity edema, no clubbing or cyanosis  Neurologic: Alert and oriented to self. Cranial nerve II-XII intact, Strength 5/5 in all 4 extremities    VITAL SIGNS: 24 HRS MIN & MAX LAST   Temp  Min: 97.4 °F (36.3 °C)  Max: 97.5 °F (36.4 °C) 97.5 °F (36.4 °C)   BP  Min: 115/87  Max: 151/69 (!) 151/69   Pulse  Min: 59  Max: 106  64   Resp  Min: 13  Max: 35 19   SpO2  Min: 88 %  Max: 99 % 96 %     I have reviewed the following labs:  Recent Labs   Lab 10/03/24  1551 10/04/24  0334 10/05/24  0418   WBC 5.58 4.91 5.67   RBC 5.13 5.07 5.10   HGB 12.1 11.9* 12.0   HCT 38.5 38.1 38.5   MCV 75.0* 75.1* 75.5*   MCH 23.6* 23.5* 23.5*   MCHC 31.4* 31.2* 31.2*   RDW 15.1 14.9 14.9    128* 145   MPV 11.3* 11.3* 10.9*     Recent Labs   Lab 10/03/24  0648 10/03/24  1550 10/04/24  0334 10/05/24  0418    139 138 140   K 4.2 4.4 3.8 4.4    107 106 110*   CO2 28 25 24 22*   BUN 17.4 15.2 13.5 13.0   CREATININE 1.17* 0.94 0.89 0.94   CALCIUM 9.9 9.2 9.1 9.0   MG 1.80  --  1.70  --    ALBUMIN 3.5 3.5 3.3*  --    ALKPHOS 72 66 60  --    ALT 14 14 14  --    AST 19 22 21  --    BILITOT 0.5 0.6 0.7  --      Microbiology Results (last 7 days)       ** No results found for the last 168 hours. **             See below for Radiology    Assessment/Plan:  IPH/SAH  Mild delirium: improved   ? Dementia     History of hypertension, hyperlipidemia, and subarachnoid hemorrhage     Plan:  Patient now more alert and focus.   Mood is calm   Slept well last night  Working with ST and PT   Neurosurgery recommending MRI brain, pending   Current meds reviewed    Continue delirium precautions     Continue iv fluids for now     VTE prophylaxis: SCD    Patient condition:  Fair    Anticipated discharge and Disposition:   Home       All diagnosis and differential diagnosis have been reviewed; assessment and plan has been documented; I have personally reviewed the labs and test results that are presently  available; I have reviewed the patients medication list; I have reviewed the consulting providers response and recommendations. I have reviewed or attempted to review medical records based upon their availability    All of the patient's questions have been  addressed and answered. Patient's is agreeable to the above stated plan. I will continue to monitor closely and make adjustments to medical management as needed.    Portions of this note dictated using EMR integrated voice recognition software, and may be subject to voice recognition errors not corrected at proofreading. Please contact writer for clarification if needed.   _____________________________________________________________________    Malnutrition Status:    Scheduled Med:   levETIRAcetam (Keppra) IV (PEDS and ADULTS)  500 mg Intravenous Q12H    mupirocin   Nasal BID      Continuous Infusions:   D5 and 0.45% NaCl   Intravenous Continuous          PRN Meds:    Current Facility-Administered Medications:     bisacodyL, 10 mg, Rectal, Daily PRN    dextrose 10%, 12.5 g, Intravenous, PRN    dextrose 10%, 25 g, Intravenous, PRN    glucagon (human recombinant), 1 mg, Intramuscular, PRN    glucose, 16 g, Oral, PRN    glucose, 24 g, Oral, PRN    haloperidol lactate, 2 mg, Intravenous, Q4H PRN    hydrALAZINE, 10 mg, Intravenous, Q4H PRN    labetalol, 10 mg, Intravenous, Q4H PRN    naloxone, 0.02 mg, Intravenous, PRN    sodium chloride 0.9%, 10 mL, Intravenous, Q12H PRN    sodium chloride 0.9%, 10 mL, Intravenous, PRN     Radiology:  I have personally reviewed the following imaging and agree with the radiologist.     CT Head Stealth W/O Contrast  Narrative: EXAMINATION:  CT HEAD STEALTH W/O CONTRAST    CLINICAL HISTORY:  ich;    TECHNIQUE:  Multiple axial images were obtained from the base of the brain to the vertex without contrast administration.  Sagittal and coronal reconstructions were performed..Automatic exposure control is utilized to reduce patient  radiation exposure.    COMPARISON:  None    FINDINGS:  Hemorrhage: No significant change is seen the hematoma in the left temporo-occipital region. It currently measures 3.1 x 2.3 cm on image 28 series 9. A similar degree of mild perilesional edema is seen. A similar degree of mild mass effect of sulcal effacement with no rightward midline shift is identified. No significant change is also seen in the small subarachnoid hemorrhages in the left frontal and occipital sulci.    CSF spaces: The ventricles sulci and basal cisterns are within normal limits for age and stable compared to the prior study.    Brain parenchyma: No acute infarct is identified. Mild stable appearing microvascular change is seen in portions of the periventricular and deep white matter tracts.    Cerebellum: Unremarkable.    Sella and skull base: The sella appears to be within normal limits for age    Intracranial calcifications: Incidental note is made of bilateral choroid plexus calcification. Incidental note is made of some pineal region calcification.    Calvarium: No acute linear or depressed skull fracture is seen    Maxillofacial Structures:Paranasal sinuses: The visualized paranasal sinuses appear clear with no significant mucoperiosteal thickening or air fluid levels identified    Orbits: The orbits appear unremarkable.    Zygomatic arches: The zygomatic arches are intact and unremarkable.    Temporal bones and mastoids: The temporal bones and mastoids appear unremarkable.    TMJ: The mandibular condyles appear normally placed with respect to the mandibular fossa.  Impression: 1. No significant change is seen the hematoma in the left temporo-occipital region. It currently measures 3.1 x 2.3 cm on image 28 series 9. A similar degree of mild perilesional edema is seen. A similar degree of mild mass effect of sulcal effacement with no rightward midline shift is identified. No significant change is also seen in the small subarachnoid  hemorrhages in the left frontal and occipital sulci. Correlate with clinical and laboratory findings as regards additional evaluation and follow-up.    2. Details and other findings as noted above.    I concur with the preliminary report above.    Electronically signed by: Dagoberto Barrientos  Date:    10/05/2024  Time:    08:28      Cyrus Orellana MD  Department of Hospital Medicine   [facility]   10/05/2024

## 2024-10-05 NOTE — PT/OT/SLP PROGRESS
Occupational Therapy   Treatment    Name: Tali Beard  MRN: [id number]  Admitting Diagnosis:  Intraparenchymal hemorrhage of brain       Recommendations:     Recommended therapy intensity at discharge: Moderate Intensity Therapy   Discharge Equipment Recommendations:  to be determined by next level of care  Barriers to discharge:       Assessment:     Tali Beard is a 88 y.o. female with a medical diagnosis of Intraparenchymal hemorrhage of brain.  She presents with intermittent confusion and impulsivity. Pt. Requiring constant cueing for safety, pt. Is a high fall risk at this time recommending Mod intensity therapy pending progress. Performance deficits affecting function are weakness, impaired endurance, impaired self care skills, impaired functional mobility, impaired balance.     Rehab Prognosis:  Good; patient would benefit from acute skilled OT services to address these deficits and reach maximum level of function.       Plan:     Patient to be seen 5 x/week to address the above listed problems via self-care/home management, therapeutic exercises  Plan of Care Expires: 11/04/24  Plan of Care Reviewed with: patient    Subjective     Pain/Comfort:       Objective:     Communicated with: RN prior to session.  Patient found HOB elevated with   upon OT entry to room.    General Precautions: Standard, fall    Orthopedic Precautions:N/A  Braces: N/A  Respiratory Status: Room air  Vital Signs: Blood Pressure: 132/54     Occupational Performance:   (Bed Mobility- Mod A)  (Sitting balance- SBA/CGA) cueing for safety, due to impulsivity.   (Sit to stand- Min A x 2)  Pt. Ambulating to toilet B HHA with min A x 2 with constant cueing for safety, pt. Performing toilet t/f with Min A required assistance for safe descend onto low surface.   Pt. Performing toilet hygiene seated on toilet with assistance required for balance intermittently.   Pt. Ambulating to BS chair left with all needs within reach.  Therapeutic  Positioning    OT interventions performed during the course of today's session in an effort to prevent and/or reduce acquired pressure injuries:   Therapeutic positioning was provided at the conclusion of session to offload all bony prominences for the prevention and/or reduction of pressure injuries        [facility] 6 Click ADL:      Patient Education:  Patient provided with verbal education education regarding fall prevention, safety awareness, and pressure ulcer prevention.  Additional teaching is warranted.      Patient left up in chair with all lines intact and call button in reach.    GOALS:   Multidisciplinary Problems       Occupational Therapy Goals          Problem: Occupational Therapy    Goal Priority Disciplines Outcome Interventions   Occupational Therapy Goal     OT, PT/OT Progressing    Description: Goals to be met by: 11/4/24     Patient will increase functional independence with ADLs by performing:    UE Dressing with Stand-by Assistance.  LE Dressing with Stand-by Assistance.  Grooming while standing at sink with Stand-by Assistance.  Toileting from toilet with Stand-by Assistance for hygiene and clothing management.   Toilet transfer to toilet with Stand-by Assistance.  Increased functional strength to 3/5 through therEx.                         Time Tracking:     OT Date of Treatment: 10/05/24  OT Start Time: 0950  OT Stop Time: 1015  OT Total Time (min): 25 min    Billable Minutes:Self Care/Home Management 2    OT/KESHIA: KESHIA     Number of KESHIA visits since last OT visit: 1    10/5/2024

## 2024-10-05 NOTE — PROGRESS NOTES
[facility]  Neurosurgery  Progress Note    Subjective:     Interval History: She is doing much better today. She is oriented to all spheres and currently denies HA, blurred vision and N/V.     Post-Op Info:  Procedure(s) (LRB):  MRI (Magnetic Resonance Imagine) (N/A)          Medications:  Continuous Infusions:  Scheduled Meds:   levETIRAcetam (Keppra) IV (PEDS and ADULTS)  500 mg Intravenous Q12H    mupirocin   Nasal BID     PRN Meds:  Current Facility-Administered Medications:     bisacodyL, 10 mg, Rectal, Daily PRN    dextrose 10%, 12.5 g, Intravenous, PRN    dextrose 10%, 25 g, Intravenous, PRN    glucagon (human recombinant), 1 mg, Intramuscular, PRN    glucose, 16 g, Oral, PRN    glucose, 24 g, Oral, PRN    haloperidol lactate, 2 mg, Intravenous, Q4H PRN    hydrALAZINE, 10 mg, Intravenous, Q4H PRN    labetalol, 10 mg, Intravenous, Q4H PRN    naloxone, 0.02 mg, Intravenous, PRN    sodium chloride 0.9%, 10 mL, Intravenous, Q12H PRN    sodium chloride 0.9%, 10 mL, Intravenous, PRN     Review of Systems  Objective:     Weight: 63.2 kg (139 lb 5.3 oz)  Body mass index is 24.68 kg/m².  Vital Signs (Most Recent):  Temp: 97.5 °F (36.4 °C) (10/05/24 0800)  Pulse: 61 (10/05/24 0800)  Resp: 18 (10/05/24 0800)  BP: (!) 132/54 (10/05/24 0800)  SpO2: 99 % (10/05/24 0800) Vital Signs (24h Range):  Temp:  [97.4 °F (36.3 °C)-97.5 °F (36.4 °C)] 97.5 °F (36.4 °C)  Pulse:  [] 61  Resp:  [13-35] 18  SpO2:  [57 %-100 %] 99 %  BP: (115-158)/(54-87) 132/54     Date 10/05/24 0700 - 10/06/24 0659   Shift 6834-1629 7186-6789 1828-6366 24 Hour Total   INTAKE   I.V.(mL/kg) 1070.8(16.9)   1070.8(16.9)   IV Piggyback 96.7   96.7   Shift Total(mL/kg) 1167.4(18.5)   1167.4(18.5)   OUTPUT   Shift Total(mL/kg)       Weight (kg) 63.2 63.2 63.2 63.2     Neurosurgery Physical Exam  General Awake, alert, no acute distress  GCS- 14  E-4, V-4, M-6     Opens eyes to speech  Confused, not oriented to person, place or  time  Localizing to b UE  Follows commands in lower extremities      Cranial Nerves PERRL, brisk bilaterally  EOMI  Face symmetric  Tongue midline      Motor Follows commands in all ext. No deficits appreciated     Significant Labs:  Recent Labs   Lab 10/03/24  1550 10/04/24  0334 10/05/24  0418    138 140   K 4.4 3.8 4.4    106 110*   CO2 25 24 22*   BUN 15.2 13.5 13.0   CREATININE 0.94 0.89 0.94   CALCIUM 9.2 9.1 9.0   MG  --  1.70  --      Recent Labs   Lab 10/03/24  1551 10/04/24  0334 10/05/24  0418   WBC 5.58 4.91 5.67   HGB 12.1 11.9* 12.0   HCT 38.5 38.1 38.5    128* 145     Recent Labs   Lab 10/03/24  1550   INR 1.0   APTT 23.4     Microbiology Results (last 7 days)       ** No results found for the last 168 hours. **          Significant Diagnostics:  CT Head Stealth W/O Contrast [[id number]] Resulted: 10/05/24 0828   Order Status: Completed Updated: 10/05/24 0831   Narrative:     EXAMINATION:  CT HEAD STEALTH W/O CONTRAST    CLINICAL HISTORY:  ich;    TECHNIQUE:  Multiple axial images were obtained from the base of the brain to the vertex without contrast administration.  Sagittal and coronal reconstructions were performed..Automatic exposure control is utilized to reduce patient radiation exposure.    COMPARISON:  None    FINDINGS:  Hemorrhage: No significant change is seen the hematoma in the left temporo-occipital region. It currently measures 3.1 x 2.3 cm on image 28 series 9. A similar degree of mild perilesional edema is seen. A similar degree of mild mass effect of sulcal effacement with no rightward midline shift is identified. No significant change is also seen in the small subarachnoid hemorrhages in the left frontal and occipital sulci.    CSF spaces: The ventricles sulci and basal cisterns are within normal limits for age and stable compared to the prior study.    Brain parenchyma: No acute infarct is identified. Mild stable appearing microvascular change is seen in portions  of the periventricular and deep white matter tracts.    Cerebellum: Unremarkable.    Sella and skull base: The sella appears to be within normal limits for age    Intracranial calcifications: Incidental note is made of bilateral choroid plexus calcification. Incidental note is made of some pineal region calcification.    Calvarium: No acute linear or depressed skull fracture is seen    Maxillofacial Structures:Paranasal sinuses: The visualized paranasal sinuses appear clear with no significant mucoperiosteal thickening or air fluid levels identified    Orbits: The orbits appear unremarkable.    Zygomatic arches: The zygomatic arches are intact and unremarkable.    Temporal bones and mastoids: The temporal bones and mastoids appear unremarkable.    TMJ: The mandibular condyles appear normally placed with respect to the mandibular fossa.   Impression:       1. No significant change is seen the hematoma in the left temporo-occipital region. It currently measures 3.1 x 2.3 cm on image 28 series 9. A similar degree of mild perilesional edema is seen. A similar degree of mild mass effect of sulcal effacement with no rightward midline shift is identified. No significant change is also seen in the small subarachnoid hemorrhages in the left frontal and occipital sulci. Correlate with clinical and laboratory findings as regards additional evaluation and follow-up.    2. Details and other findings as noted above.     Assessment/Plan:     Active Diagnoses:    Diagnosis Date Noted POA    PRINCIPAL PROBLEM:  Intraparenchymal hemorrhage of brain [I61.9] 10/04/2024 Yes      Problems Resolved During this Admission:     She is currently GCS 14, exam improved from previous  CT head this am shows stable IPH  She has been downgraded, awaiting floor bed  Continue Q4 hour neuro exams  Continue BP parameters below 140/90  Keppra BID  Continue PT/OT  Diet per ST  [facility]  MRI brain ordered. Scheduled for Monday with  anesthesia. Further recs to follow    NIKUNJ Chávez  Neurosurgery  [facility]

## 2024-10-05 NOTE — PT/OT/SLP PROGRESS
Physical Therapy Treatment    Patient Name:  Tali Beard   MRN:  [id number]    Recommendations:     Discharge therapy intensity: Moderate Intensity Therapy   Discharge Equipment Recommendations: to be determined by next level of care  Barriers to discharge: Impaired mobility and Ongoing medical needs    Assessment:     Tali Beard is a 88 y.o. female admitted with a medical diagnosis of  IPH in L temporoparietal region, small volume SAH, recent SAH x1 month ago.  She presents with the following impairments/functional limitations: weakness, impaired endurance, impaired self care skills, impaired functional mobility, gait instability, impaired cognition, decreased ROM, decreased upper extremity function, decreased lower extremity function, decreased safety awareness, impaired balance.     Pt more alert with some improved command following this session but still impaired and with decreased safety awareness. Pt is very impulsive with mobility and requiring frequent redirection and cues for safety. Pt is overall Vy for transfers and walking with HHA. Goals updated to include transfer and gait goal. Will continue to progress as appropriate.    Rehab Prognosis: Good; patient would benefit from acute skilled PT services to address these deficits and reach maximum level of function.    Recent Surgery: Procedure(s) (LRB):  MRI (Magnetic Resonance Imagine) (N/A)      Plan:     During this hospitalization, patient would benefit from acute PT services 6 x/week to address the identified rehab impairments via gait training, therapeutic activities, therapeutic exercises, neuromuscular re-education and progress toward the following goals:    Plan of Care Expires:  11/04/24    Subjective     Chief Complaint: none  Patient/Family Comments/goals: TBD  Pain/Comfort:  Pain Rating 1: 0/10      Objective:     Communicated with RN prior to session.  Patient found HOB elevated with pulse ox (continuous), blood pressure cuff,  telemetry, peripheral IV (roll belt) upon PT entry to room.     General Precautions: Standard, fall, aspiration  Orthopedic Precautions: N/A  Braces: N/A  Respiratory Status: Room air  Blood Pressure: 114/76, HR 73  Skin Integrity: Visible skin intact      Functional Mobility:  Bed Mobility:     Scooting: minimum assistance  Supine to Sit: minimum assistance  Transfers:     Sit to Stand:  minimum assistance with hand-held assist, pt with initial buckling upon first stand with modA to prevent LOB, pt very impulsive with trying to stand unassisted and requiring redirection and cues for safety  Performed one trial from EOB and one trial from toilet  Gait: Pt ambulated 2 x 8' in room with Vy, HHAx2. Slow cortney and decreased step lengths, cues for safety  Balance: static sitting balance SBA-CGA, standing balance Vy    Therapeutic Activities/Exercises:  Increased time for +void, BM on toilet. Pt impulsively trying to stand from toilet on multiple occasions, tactile/verbal cues required to redirect    Education:  Patient and son/s were provided with verbal education education regarding PT role/goals/POC, fall prevention, safety awareness, and discharge/DME recommendations.  Understanding was verbalized, however additional teaching warranted.     Patient left up in chair with all lines intact, call button in reach, chair alarm on, RN notified, son present, and posey vest donned    GOALS:   Multidisciplinary Problems       Physical Therapy Goals          Problem: Physical Therapy    Goal Priority Disciplines Outcome Interventions   Physical Therapy Goal     PT, PT/OT Progressing    Description: Goals to be met by: 24     Patient will increase functional independence with mobility by performin. Supine to sit with Modified [place]  2. Sit to supine with Modified [place]  3. Sit to stand with Stand-by Assistance  4. Bed to chair with Stand-by Assistance with LRAD  4. Gait x 200' with Stand-by  Assistance using LRAD                         Time Tracking:     PT Received On: 10/05/24  PT Start Time: 0950     PT Stop Time: 1015  PT Total Time (min): 25 min     Billable Minutes: Therapeutic Activity 25    Treatment Type: Treatment  PT/PTA: PT     Number of PTA visits since last PT visit: 1     10/05/2024

## 2024-10-06 LAB — POCT GLUCOSE: 112 MG/DL (ref 70–110)

## 2024-10-06 PROCEDURE — 25000003 PHARM REV CODE 250: Performed by: INTERNAL MEDICINE

## 2024-10-06 PROCEDURE — 92611 MOTION FLUOROSCOPY/SWALLOW: CPT

## 2024-10-06 PROCEDURE — 25000003 PHARM REV CODE 250: Performed by: NEUROLOGICAL SURGERY

## 2024-10-06 PROCEDURE — 11000001 HC ACUTE MED/SURG PRIVATE ROOM

## 2024-10-06 PROCEDURE — 63600175 PHARM REV CODE 636 W HCPCS

## 2024-10-06 PROCEDURE — A9698 NON-RAD CONTRAST MATERIALNOC: HCPCS | Performed by: INTERNAL MEDICINE

## 2024-10-06 PROCEDURE — 63600175 PHARM REV CODE 636 W HCPCS: Performed by: NEUROLOGICAL SURGERY

## 2024-10-06 PROCEDURE — 25500020 PHARM REV CODE 255: Performed by: INTERNAL MEDICINE

## 2024-10-06 RX ORDER — LATANOPROST 50 UG/ML
1 SOLUTION/ DROPS OPHTHALMIC DAILY
Status: DISCONTINUED | OUTPATIENT
Start: 2024-10-06 | End: 2024-10-06

## 2024-10-06 RX ORDER — PREDNISOLONE ACETATE 10 MG/ML
1 SUSPENSION/ DROPS OPHTHALMIC EVERY 6 HOURS PRN
Status: DISCONTINUED | OUTPATIENT
Start: 2024-10-06 | End: 2024-10-07 | Stop reason: HOSPADM

## 2024-10-06 RX ORDER — AMLODIPINE BESYLATE 5 MG/1
5 TABLET ORAL DAILY
Status: DISCONTINUED | OUTPATIENT
Start: 2024-10-06 | End: 2024-10-07 | Stop reason: HOSPADM

## 2024-10-06 RX ADMIN — AMLODIPINE BESYLATE 5 MG: 5 TABLET ORAL at 02:10

## 2024-10-06 RX ADMIN — LABETALOL HYDROCHLORIDE 10 MG: 5 INJECTION, SOLUTION INTRAVENOUS at 01:10

## 2024-10-06 RX ADMIN — HYDRALAZINE HYDROCHLORIDE 10 MG: 20 INJECTION INTRAMUSCULAR; INTRAVENOUS at 09:10

## 2024-10-06 RX ADMIN — MUPIROCIN: 20 OINTMENT TOPICAL at 08:10

## 2024-10-06 RX ADMIN — LEVETIRACETAM 500 MG: 100 INJECTION, SOLUTION INTRAVENOUS at 08:10

## 2024-10-06 RX ADMIN — BARIUM SULFATE 10 ML: 0.81 POWDER, FOR SUSPENSION ORAL at 01:10

## 2024-10-06 RX ADMIN — LATANOPROST 1 DROP: 50 SOLUTION OPHTHALMIC at 11:10

## 2024-10-06 NOTE — PROGRESS NOTES
[facility] [address]  Neurosurgery  Progress Note    Subjective:     Interval History:    Follow up IPH/subarachnoid hemorrhage.    Patient is sitting up in the chair visiting with her .    Improved as compared to yesterday's assessment.    No headache or visual issues.  Blood pressure is controlled currently.  No headache.  Answering some questions appropriate but easily confused as before.    Post-Op Info:  Procedure(s) (LRB):  MRI (Magnetic Resonance Imagine) (N/A)          Medications:  Continuous Infusions:   D5 and 0.45% NaCl   Intravenous Continuous 50 mL/hr at 10/05/24 1800 Rate Verify at 10/05/24 1800     Scheduled Meds:   latanoprost  1 drop Both Eyes Daily    levETIRAcetam (Keppra) IV (PEDS and ADULTS)  500 mg Intravenous Q12H    mupirocin   Nasal BID     PRN Meds:  Current Facility-Administered Medications:     bisacodyL, 10 mg, Rectal, Daily PRN    dextrose 10%, 12.5 g, Intravenous, PRN    dextrose 10%, 25 g, Intravenous, PRN    glucagon (human recombinant), 1 mg, Intramuscular, PRN    glucose, 16 g, Oral, PRN    glucose, 24 g, Oral, PRN    haloperidol lactate, 2 mg, Intravenous, Q4H PRN    hydrALAZINE, 10 mg, Intravenous, Q4H PRN    labetalol, 10 mg, Intravenous, Q4H PRN    naloxone, 0.02 mg, Intravenous, PRN    sodium chloride 0.9%, 10 mL, Intravenous, Q12H PRN    sodium chloride 0.9%, 10 mL, Intravenous, PRN     Review of Systems  Objective:     Weight: 63.2 kg (139 lb 5.3 oz)  Body mass index is 24.68 kg/m².  Vital Signs (Most Recent):  Temp: 98.2 °F (36.8 °C) (10/06/24 0800)  Pulse: 73 (10/06/24 0945)  Resp: 20 (10/06/24 0945)  BP: (!) 147/79 (10/06/24 0945)  SpO2: 97 % (10/06/24 0945) Vital Signs (24h Range):  Temp:  [98.1 °F (36.7 °C)-98.3 °F (36.8 °C)] 98.2 °F (36.8 °C)  Pulse:  [64-74] 73  Resp:  [13-25] 20  SpO2:  [96 %-98 %] 97 %  BP: (130-159)/() 147/79         Neurosurgery Physical Exam  General Awake, alert, no acute distress  GCS- 14  E-4, V-4, M-6    "  Answering some questions with yes and no but confused overall.  She does follow simple commands.      Cranial Nerves PERRL, brisk bilaterally  EOMI  Face symmetric  Tongue midline      Motor Follows commands in all ext. No deficits appreciated     Significant Labs:  Recent Labs   Lab 10/05/24  0418      K 4.4   *   CO2 22*   BUN 13.0   CREATININE 0.94   CALCIUM 9.0     Recent Labs   Lab 10/05/24  0418   WBC 5.67   HGB 12.0   HCT 38.5        No results for input(s): "LABPT", "INR", "APTT" in the last 48 hours.    Microbiology Results (last 7 days)       ** No results found for the last 168 hours. **          Significant Diagnostics:                        Assessment/Plan:    Stable CT head without contrast yesterday 10/05/2024.    Downgraded to floor   Every 4 hour neurological exams   Blood pressure less than 140/90   Keppra, seizure precautions   Continue PTOT ST   SCDs   MRI of the brain with anesthesia on Monday 10/07/2024.    Answered all of the 's questions.       Active Diagnoses:    Diagnosis Date Noted POA    PRINCIPAL PROBLEM:  Intraparenchymal hemorrhage of brain [I61.9] 10/04/2024 Yes      Problems Resolved During this Admission:       Dorothy Askew, [facility]  Neurosurgery  [facility] - [facility] ICU    "

## 2024-10-06 NOTE — PROGRESS NOTES
[facility]  Hospital Medicine Progress Note        Chief Complaint: Inpatient Follow-up for SAH    HPI:   Tali Beard is a 88 y.o. female with a past medical history of hypertension, hyperlipidemia, and subarachnoid hemorrhage who presented to [facility] on 10/3/2024 transferred from [place] ED for higher level of care.  Patient presented to outside facility secondary to confusion for the past 48 hours.  CT head revealed 35 mm area of intraparenchymal hemorrhage in the left temporoparietal region with small volume left-sided subarachnoid hemorrhage as well.  Patient was given 1 g of Keppra.  Patient was transferred to [facility] for neurosurgery services.  Patient was admitted to ICU for close monitoring.  Neurosurgery was consulted with recommendations of systolic blood pressure between 100-140 and q.2 hours neurochecks.  Patient was deemed not a surgical candidate.  Neurosurgery also recommended MRI brain and CT chest, abdomen, and pelvis with contrast to evaluate for possible underlying neoplasm and/or metastasis.  Patient became agitated overnight and required Precedex.  Repeat CT head revealed stable intracranial hemorrhages without new findings. Patient was cleared for downgrade out of ICU on 10/04/2024 and admitted to hospital medicine service.     Patient followed by neurosurgery team and recommended MRI brain.     Interval Hx:   Patient today awake and comfortable. Mental status is more stable. She slept well. No chest pain, fever or chills. She is yet to be cleared for po diet by ST.     Family at bedside, explained in detail about the patients condition, diagnosis, vitals, labs and treatment plan. They understand and agree with the plan. All their questions were answered.      Case was discussed with patient's nurse and  on the floor.    Objective/physical exam:  General: In no acute distress, frail.   Chest: Clear to auscultation bilaterally  Heart: RRR, +S1, S2, no  appreciable murmur  Abdomen: Soft, nontender, BS +  MSK: Warm, no lower extremity edema, no clubbing or cyanosis  Neurologic: Alert and oriented to self. Cranial nerve II-XII intact, Strength 5/5 in all 4 extremities    VITAL SIGNS: 24 HRS MIN & MAX LAST   Temp  Min: 98.1 °F (36.7 °C)  Max: 98.3 °F (36.8 °C) 98.2 °F (36.8 °C)   BP  Min: 130/107  Max: 159/75 131/62   Pulse  Min: 64  Max: 75  75   Resp  Min: 13  Max: 25 (!) 22   SpO2  Min: 96 %  Max: 100 % 100 %     I have reviewed the following labs:  Recent Labs   Lab 10/03/24  1551 10/04/24  0334 10/05/24  0418   WBC 5.58 4.91 5.67   RBC 5.13 5.07 5.10   HGB 12.1 11.9* 12.0   HCT 38.5 38.1 38.5   MCV 75.0* 75.1* 75.5*   MCH 23.6* 23.5* 23.5*   MCHC 31.4* 31.2* 31.2*   RDW 15.1 14.9 14.9    128* 145   MPV 11.3* 11.3* 10.9*     Recent Labs   Lab 10/03/24  0648 10/03/24  1550 10/04/24  0334 10/05/24  0418    139 138 140   K 4.2 4.4 3.8 4.4    107 106 110*   CO2 28 25 24 22*   BUN 17.4 15.2 13.5 13.0   CREATININE 1.17* 0.94 0.89 0.94   CALCIUM 9.9 9.2 9.1 9.0   MG 1.80  --  1.70  --    ALBUMIN 3.5 3.5 3.3*  --    ALKPHOS 72 66 60  --    ALT 14 14 14  --    AST 19 22 21  --    BILITOT 0.5 0.6 0.7  --      Microbiology Results (last 7 days)       ** No results found for the last 168 hours. **             See below for Radiology    Assessment/Plan:  IPH/SAH  Mild delirium: improved   ? Dementia     History of hypertension, hyperlipidemia, and subarachnoid hemorrhage     Plan:  Patient now at baseline mental status  Sleeping well. Has been afebrile.   Mood is calm   Working with ST and PT   Neurosurgery recommending MRI brain, pending - shpud; be done in am   Current meds reviewed    Continue delirium precautions     Continue iv fluids for now     VTE prophylaxis: SCD    Patient condition:  Fair    Anticipated discharge and Disposition:   Home when cleared by neurosurgery team,       All diagnosis and differential diagnosis have been reviewed; assessment  and plan has been documented; I have personally reviewed the labs and test results that are presently available; I have reviewed the patients medication list; I have reviewed the consulting providers response and recommendations. I have reviewed or attempted to review medical records based upon their availability    All of the patient's questions have been  addressed and answered. Patient's is agreeable to the above stated plan. I will continue to monitor closely and make adjustments to medical management as needed.    Portions of this note dictated using EMR integrated voice recognition software, and may be subject to voice recognition errors not corrected at proofreading. Please contact writer for clarification if needed.   _____________________________________________________________________    Malnutrition Status:    Scheduled Med:   amLODIPine  5 mg Oral Daily    levETIRAcetam (Keppra) IV (PEDS and ADULTS)  500 mg Intravenous Q12H    mupirocin   Nasal BID      Continuous Infusions:   D5 and 0.45% NaCl   Intravenous Continuous 50 mL/hr at 10/05/24 1800 Rate Verify at 10/05/24 1800      PRN Meds:    Current Facility-Administered Medications:     bisacodyL, 10 mg, Rectal, Daily PRN    dextrose 10%, 12.5 g, Intravenous, PRN    dextrose 10%, 25 g, Intravenous, PRN    glucagon (human recombinant), 1 mg, Intramuscular, PRN    glucose, 16 g, Oral, PRN    glucose, 24 g, Oral, PRN    haloperidol lactate, 2 mg, Intravenous, Q4H PRN    hydrALAZINE, 10 mg, Intravenous, Q4H PRN    labetalol, 10 mg, Intravenous, Q4H PRN    naloxone, 0.02 mg, Intravenous, PRN    prednisoLONE acetate, 1 drop, Both Eyes, Q6H PRN    sodium chloride 0.9%, 10 mL, Intravenous, Q12H PRN    sodium chloride 0.9%, 10 mL, Intravenous, PRN     Radiology:  I have personally reviewed the following imaging and agree with the radiologist.     CT Head Stealth W/O Contrast  Narrative: EXAMINATION:  CT HEAD STEALTH W/O CONTRAST    CLINICAL  HISTORY:  ich;    TECHNIQUE:  Multiple axial images were obtained from the base of the brain to the vertex without contrast administration.  Sagittal and coronal reconstructions were performed..Automatic exposure control is utilized to reduce patient radiation exposure.    COMPARISON:  None    FINDINGS:  Hemorrhage: No significant change is seen the hematoma in the left temporo-occipital region. It currently measures 3.1 x 2.3 cm on image 28 series 9. A similar degree of mild perilesional edema is seen. A similar degree of mild mass effect of sulcal effacement with no rightward midline shift is identified. No significant change is also seen in the small subarachnoid hemorrhages in the left frontal and occipital sulci.    CSF spaces: The ventricles sulci and basal cisterns are within normal limits for age and stable compared to the prior study.    Brain parenchyma: No acute infarct is identified. Mild stable appearing microvascular change is seen in portions of the periventricular and deep white matter tracts.    Cerebellum: Unremarkable.    Sella and skull base: The sella appears to be within normal limits for age    Intracranial calcifications: Incidental note is made of bilateral choroid plexus calcification. Incidental note is made of some pineal region calcification.    Calvarium: No acute linear or depressed skull fracture is seen    Maxillofacial Structures:Paranasal sinuses: The visualized paranasal sinuses appear clear with no significant mucoperiosteal thickening or air fluid levels identified    Orbits: The orbits appear unremarkable.    Zygomatic arches: The zygomatic arches are intact and unremarkable.    Temporal bones and mastoids: The temporal bones and mastoids appear unremarkable.    TMJ: The mandibular condyles appear normally placed with respect to the mandibular fossa.  Impression: 1. No significant change is seen the hematoma in the left temporo-occipital region. It currently measures 3.1 x 2.3  cm on image 28 series 9. A similar degree of mild perilesional edema is seen. A similar degree of mild mass effect of sulcal effacement with no rightward midline shift is identified. No significant change is also seen in the small subarachnoid hemorrhages in the left frontal and occipital sulci. Correlate with clinical and laboratory findings as regards additional evaluation and follow-up.    2. Details and other findings as noted above.    I concur with the preliminary report above.    Electronically signed by: Dagoberto Barrientos  Date:    10/05/2024  Time:    08:28      Cyrus Orellana MD  Department of Hospital Medicine   [facility]   10/06/2024

## 2024-10-06 NOTE — PROCEDURES
[facility]  Speech Language Pathology Department  Modified Barium Swallow (MBS) Study    Patient Name:  Tali Beard   MRN:  [id number]    Recommendations     General recommendations:  SLP follow up x1 for diet tolerance  Solid texture recommendation:  Regular Diet - IDDSI Level 7  Liquid consistency recommendation: Thin liquids - IDDSI Level 0   Medications: per patient preference  Swallow strategies/precautions: additional swallow per bite/sip and alternate solids/liquids  General precautions: aspiration    History     Tali Beard is a/n 88 y.o. female  admitted to the ICU on 10/3/2024 with an IPH. PMH includes a previous subarachnoid hemorrhage 1 month ago, DM, HTN. Patient initially brought to the ED in [place] by her son for complaints of confusion for the past 48 hours. CT head revealed a 35mm area of intraparenchymal hemorrhage in the left temporoparietal region as well small volume subarachnoid hemorrhage.     Past Medical History:   Diagnosis Date    Subarachnoid hemorrhage      No past surgical history on file.    A MBS Study was completed at the bedside to assess the efficiency of her swallow function, rule out aspiration and make recommendations regarding safe dietary consistencies, effective compensatory strategies, and safe eating environment.       Home diet texture/consistency: Regular and thin liquids  Current Method of Nutrition: NPO      Imaging   No results found for this or any previous visit.    No results found for this or any previous visit.    No results found for this or any previous visit.    Subjective     Patient awake, alert, calm, and cooperative.    Spiritual/Cultural/B[place] Beliefs/Practices that affect care: no  Pain/Comfort: Pain Rating 1: 0/10    Respiratory Status:  room air    Restraints/positioning devices: none    Fluoroscopic Findings     Oral Musculature  Dentition: own teeth  Secretion Management: adequate  Mucosal Quality:  good  Facial Movement: WFL  Buccal Strength & Mobility: WFL  Mandibular Strength & Mobility: WFL  Oral Labial Strength & Mobility: WFL  Lingual Strength & Mobility: WFL  Velar Elevation: WFL  Vocal Quality: adequate  Volitional Cough: Able to clear secretions      Setup  Upright in bed  Able to self feed  Adequate head control    Visualization  Lateral view    Oral Phase:   Adequate lip closure  Adequate bolus formation  Adequate bolus cohesion  posterior lingual residue    Pharyngeal Phase:   Swallow delay with spill to the valleculae  Adequate epiglottic deflection  Adequate hyolaryngeal excursion  Adequate airway protection  Consistency Fed by Laryngeal Penetration Aspiration Residue   Thin liquid by straw SLP None None Mild  Base of tongue  Cleared with additional swallow   Thin liquid by cup Self None None Mild  Base of tongue  Cleared with additional swallow   Chewable solid SLP None None Mild-moderate  Base of tongue and Valleculae  Cleared with additional swallow       Cervical Esophageal Phase:   UES appeared to accommodate all bolus types without stasis or retrograde movement visualized    Compensatory Strategies:  Additional swallow aided in clearance of BOT and vallecular residue.      Assessment     Pt exhibited mild oropharyngeal dysphagia characterized by the findings noted above.  No laryngeal penetration/aspiration was visualized during this study.  Swallow safety is preserved; however, swallow efficiency is impaired.    Patient appears to be at low risk for aspiration related pneumonia when considering reduced mobility and cognitive impairments.  Prognosis for behavioral swallow rehabilitation is good.    Outcome Measures     Functional Oral Intake Scale: 7 - Total oral diet with no restrictions    Goals     Multidisciplinary Problems       SLP Goals          Problem: SLP    Goal Priority Disciplines Outcome   SLP Goal     SLP    Description: LTG: Patient will tolerate safest and least PO diet  without signs/sx of aspiration.  STG: PO trials with adequate TREY.  STG: MBS if/when clinically warranted.                       Education     Patient provided with verbal education regarding MBS results.  Understanding was verbalized, however additional teaching warranted.    Plan     SLP Follow-Up:  Yes    Patient to be seen:  5 x/week   Plan of Care expires:  10/11/24  Plan of Care reviewed with:  patient     Time Tracking     SLP Treatment Date:    10/6/2024  Speech Start Time:  1400  Speech Stop Time:  1430     Speech Total Time (min):  30 min    Billable minutes:   Motion Fluoroscopic Evaluation, Video Recording, 30 minutes     10/06/2024

## 2024-10-06 NOTE — PLAN OF CARE
Problem: Adult Inpatient Plan of Care  Goal: Plan of Care Review  Outcome: Progressing  Goal: Patient-Specific Goal (Individualized)  Outcome: Progressing  Flowsheets (Taken 10/6/2024 [id number])  Individualized Care Needs: nourishment, vital sign control, neurological monitoring  Anxieties, Fears or Concerns: nourishment, blood pressure control, regulation of care with disorientation and absence of family overnight  Patient/Family-Specific Goals (Include Timeframe): return near baseline values of activity prior to admission into hospital  Goal: Absence of Hospital-Acquired Illness or Injury  Outcome: Progressing  Goal: Optimal Comfort and Wellbeing  Outcome: Progressing  Goal: Readiness for Transition of Care  Outcome: Progressing

## 2024-10-07 VITALS
RESPIRATION RATE: 18 BRPM | TEMPERATURE: 98 F | WEIGHT: 139.31 LBS | DIASTOLIC BLOOD PRESSURE: 64 MMHG | HEIGHT: 63 IN | SYSTOLIC BLOOD PRESSURE: 97 MMHG | OXYGEN SATURATION: 96 % | HEART RATE: 81 BPM | BODY MASS INDEX: 24.68 KG/M2

## 2024-10-07 PROCEDURE — 25000003 PHARM REV CODE 250: Performed by: INTERNAL MEDICINE

## 2024-10-07 PROCEDURE — A9577 INJ MULTIHANCE: HCPCS | Performed by: INTERNAL MEDICINE

## 2024-10-07 PROCEDURE — 25500020 PHARM REV CODE 255: Performed by: INTERNAL MEDICINE

## 2024-10-07 PROCEDURE — 94760 N-INVAS EAR/PLS OXIMETRY 1: CPT

## 2024-10-07 PROCEDURE — 97116 GAIT TRAINING THERAPY: CPT

## 2024-10-07 PROCEDURE — 99900031 HC PATIENT EDUCATION (STAT)

## 2024-10-07 PROCEDURE — 99900035 HC TECH TIME PER 15 MIN (STAT)

## 2024-10-07 PROCEDURE — 99233 SBSQ HOSP IP/OBS HIGH 50: CPT | Mod: ,,, | Performed by: PSYCHIATRY & NEUROLOGY

## 2024-10-07 PROCEDURE — 97535 SELF CARE MNGMENT TRAINING: CPT

## 2024-10-07 RX ORDER — LEVETIRACETAM 500 MG/1
500 TABLET ORAL 2 TIMES DAILY
Status: DISCONTINUED | OUTPATIENT
Start: 2024-10-07 | End: 2024-10-07 | Stop reason: HOSPADM

## 2024-10-07 RX ORDER — LEVETIRACETAM 500 MG/1
500 TABLET ORAL 2 TIMES DAILY
Qty: 60 TABLET | Refills: 0 | Status: SHIPPED | OUTPATIENT
Start: 2024-10-07 | End: 2024-11-06

## 2024-10-07 RX ORDER — AMLODIPINE BESYLATE 5 MG/1
5 TABLET ORAL DAILY
Qty: 90 TABLET | Refills: 3 | Status: SHIPPED | OUTPATIENT
Start: 2024-10-08 | End: 2025-10-08

## 2024-10-07 RX ADMIN — PREDNISOLONE ACETATE 1 DROP: 10 SUSPENSION/ DROPS OPHTHALMIC at 08:10

## 2024-10-07 RX ADMIN — MUPIROCIN: 20 OINTMENT TOPICAL at 08:10

## 2024-10-07 RX ADMIN — GADOBENATE DIMEGLUMINE 12 ML: 529 INJECTION, SOLUTION INTRAVENOUS at 10:10

## 2024-10-07 RX ADMIN — LEVETIRACETAM 500 MG: 500 TABLET, FILM COATED ORAL at 08:10

## 2024-10-07 RX ADMIN — AMLODIPINE BESYLATE 5 MG: 5 TABLET ORAL at 08:10

## 2024-10-07 NOTE — PROGRESS NOTES
"[facility] - [facility]  Neurology  Progress Note    Patient Name: Tali Beard  MRN: [id number]  Admission Date: 10/3/2024  Hospital Length of Stay: 4 days  Code Status: Full Code   Attending Provider: Cyrus Orellana MD  Primary Care Physician: Aurora Moe FNP   Principal Problem:Intraparenchymal hemorrhage of brain    HPI:   Tali Beard is a 88 y.o. female with past medical history of non-insulin-dependent diabetes, previous subarachnoid hemorrhage on 9/3/2024, hypertension, hyperlipidemia who presented to [facility] ED related to confusion over the past 2 days.  Son reported to ED in history that his mom is routinely very sharp.  On October 1st he noticed that his mom seemed to be more confused, she answered questions inappropriately.  Patient had no complaints.  Patient is currently undergoing treatment for wound delayed healing after skin cancer was removed from her left leg".  CT head without contrast 10/3/2024 when compared to CT head without contrast completed earlier the same day was no interval change her left temporoparietal occipital acute intraparenchymal hemorrhage measuring 3.5 cm.  The intraparenchymal hemorrhage appears round with surrounding edema, which may suggest evidence of underlying mass.  There is general most atrophy with chronic white matter ischemic changes.  There was no significant midline shift   Patient was admitted to ICU with intensivist service, sedated with Precedex drip.  Neurosurgery was consulted with no acute neurosurgical intervention recommended.  MRI of the brain with and without contrast was ordered to further assess the etiology of her acute intraparenchymal hemorrhage.  CT of the chest, abdomen and pelvis with contrast were also ordered to evaluate possible underlying neoplasm and/or metastasis. Stroke Neurology consulted.     Overview/Hospital Course:  No notes on file        Subjective:     Interval History: up in chair, family at " bedside     Current Neurological Medications: Keppra    Current Facility-Administered Medications   Medication Dose Route Frequency Provider Last Rate Last Admin    amLODIPine tablet 5 mg  5 mg Oral Daily Cyrus Orellana MD   5 mg at 10/07/24 0851    bisacodyL suppository 10 mg  10 mg Rectal Daily PRN Sierra Camarena, NP        dextrose 10% bolus 125 mL 125 mL  12.5 g Intravenous PRN . Pat Oneil, DO        dextrose 10% bolus 250 mL 250 mL  25 g Intravenous PRN StPat Ramos, DO        glucagon (human recombinant) injection 1 mg  1 mg Intramuscular PRN St. Thad, Pat, DO        glucose chewable tablet 16 g  16 g Oral PRN St. Thad, Pat, DO        glucose chewable tablet 24 g  24 g Oral PRN St. Thad, Pat, DO        haloperidol lactate injection 2 mg  2 mg Intravenous Q4H PRN Cyrus Orellana MD        labetaloL injection 10 mg  10 mg Intravenous Q4H PRN StPat Ramos, DO   10 mg at 10/06/24 1345    levETIRAcetam tablet 500 mg  500 mg Oral BID Cyrus Orellana MD   500 mg at 10/07/24 0851    mupirocin 2 % ointment   Nasal BID Jamil Robles MD   Given at 10/07/24 0851    naloxone 0.4 mg/mL injection 0.02 mg  0.02 mg Intravenous PRN . Pat Oneil, DO        prednisoLONE acetate 1 % ophthalmic suspension 1 drop  1 drop Both Eyes Q6H PRN Cyrus Orellana MD   1 drop at 10/07/24 0848    sodium chloride 0.9% flush 10 mL  10 mL Intravenous Q12H PRN Pat Heard,         sodium chloride 0.9% flush 10 mL  10 mL Intravenous PRN Sierra Camarena, NP           Review of Systems   Constitutional:  Negative for appetite change.   Neurological:  Negative for dizziness, tremors, seizures, syncope, facial asymmetry, weakness, light-headedness, numbness and headaches. Speech difficulty: no slurred speech.  Psychiatric/Behavioral:  Positive for decreased concentration.      Objective:     Vital Signs (Most Recent):  Temp: 98.1 °F (36.7 °C) (10/07/24  0800)  Pulse: 74 (10/07/24 0800)  Resp: 12 (10/07/24 0800)  BP: 131/63 (10/07/24 0851)  SpO2: 95 % (10/07/24 1015) Vital Signs (24h Range):  Temp:  [98.1 °F (36.7 °C)-98.3 °F (36.8 °C)] 98.1 °F (36.7 °C)  Pulse:  [69-95] 74  Resp:  [0-32] 12  SpO2:  [94 %-100 %] 95 %  BP: (107-154)/() 131/63     Weight: 63.2 kg (139 lb 5.3 oz)  Body mass index is 24.68 kg/m².     Physical Exam  Vitals and nursing note reviewed.   Constitutional:       General: She is not in acute distress.     Appearance: Normal appearance. She is well-developed. She is not ill-appearing or toxic-appearing.   HENT:      Head: Normocephalic.      Right Ear: External ear normal.      Left Ear: External ear normal.      Nose: Nose normal.   Eyes:      Conjunctiva/sclera: Conjunctivae normal.   Cardiovascular:      Rate and Rhythm: Normal rate.   Pulmonary:      Effort: Pulmonary effort is normal.      Breath sounds: No stridor.   Abdominal:      General: Abdomen is flat.   Musculoskeletal:      Cervical back: Normal range of motion and neck supple.   Lymphadenopathy:      Cervical: No cervical adenopathy.   Skin:     General: Skin is warm and dry.   Neurological:      Mental Status: She is alert.      Coordination: Finger-Nose-Finger Test normal.   Psychiatric:         Mood and Affect: Mood normal.         Speech: Speech normal.         Behavior: Behavior normal. Behavior is cooperative.          NEUROLOGICAL EXAMINATION:     MENTAL STATUS   Oriented to person.   Oriented to place.   Attention: normal. Concentration: normal.   Speech: speech is normal   Unable to repeat.        Unable to repeat sentence in correct order.     CRANIAL NERVES     CN II   Visual fields full to confrontation.     CN V   Facial sensation intact.     CN VII   Facial expression full, symmetric.     MOTOR EXAM   Muscle bulk: normal  Overall muscle tone: normal       No drift.  Strength is normal of the bilateral upper and lower extremity     SENSORY EXAM   Light touch  normal.     GAIT AND COORDINATION      Coordination   Finger to nose coordination: normal      Significant Labs:   Recent Lab Results         10/06/24  1338        POCT Glucose 112               Significant Imaging: I have reviewed all pertinent imaging results/findings within the past 24 hours.  Assessment and Plan:     * Intraparenchymal hemorrhage of brain  - presented with confusion   - Stroke RF: HTN, Diabetes, intraparenchymal hemorrhage appears round with surrounding edema with small left subarchnoid hemorrhage   - Intervention: 10/3/2024 no surgical intervention was advised  - Etiology: TBD    Stroke workup:  -CTh: 35 mm area of intraparenchymal hemorrhage in the left temporoparietal region.  Small volume left-sided subarachnoid hemorrhage as well.     CT head without contrast, 10/03/2024- when compared to a CT head without contrast completed earlier on this same day, there have been no changes in her left temporoparietal occipital acute intraparenchymal hemorrhage measuring 3.5 cm.  The intraparenchymal hemorrhage appears round with surrounding edema, which may be suggestive of an underlying mass.  There is generalized atrophy with chronic white matter ischemic changes.  There is no significant midline shift.       CT head without contrast, 09/30/2024- no acute abnormalities.     CT head without contrast, 09/02/2024- subtle left temporal occipital subarachnoid hemorrhage.  There is generalized atrophy with chronic white matter ischemic changes.     CTA head and neck, 09/03/2024- no significant vascular abnormalities.    -MRI brain w/wo: Impression:   2.9 cm x 2.3 cm x 1.9 cm subacute intraparenchymal hematoma of the left temporoparietal lobe.  Moderate adjacent vasogenic edema.  No evidence of abnormal enhancement.  Recommend a follow-up MR brain without and with IV contrast once the acute blood products resolve.   Subarachnoid hemorrhage interdigitating along the left cerebral convexity sulci.  Superficial  siderosis along the superior right cerebral convexity.  Mild generalized brain atrophy and mild-to-moderate chronic small vessel ischemic changes.  -ECHO: EF 55%. Left atrium is mildly dilated. Agitated saline study of the atrial septum is negative, suggesting absence of intracardiac shunt at the atrial level.   -CUS:   -LDL: 81  -A1c: 5.8  -TSH: Normal  -home medications include: lipitor 10 mg      IPH   - downgraded to floor   - q4 hr neuro checks ... notify neurology of any neuro change  - BP parameters less than 140/90  -avoid antiplatelet or anticoagulation at this time  -keppra, seizure precautions ... notify neurology of any seizure-like activity  - therapy evaluations with recommendations   -  Other recommendations may follow from MD             VTE Risk Mitigation (From admission, onward)           Ordered     Place sequential compression device  Until discontinued         10/04/24 0758     Reason for No Pharmacological VTE Prophylaxis  Once        Question:  Reasons:  Answer:  Active Bleeding    10/04/24 0758     IP VTE HIGH RISK PATIENT  Once         10/04/24 0758                    Sierra Camarena NP  Neurology  [facility]

## 2024-10-07 NOTE — PT/OT/SLP PROGRESS
Physical Therapy Treatment    Patient Name:  Tali Beard   MRN:  [id number]    Recommendations:     Discharge therapy intensity: Moderate Intensity Therapy   Discharge Equipment Recommendations: to be determined by next level of care  Barriers to discharge: Decreased caregiver support and Ongoing medical needs    Assessment:     Tali Beard is a 88 y.o. female admitted with a medical diagnosis of IPH in L temporoparietal region, small volume SAH, recent SAH x1 month ago.  She presents with the following impairments/functional limitations: weakness, impaired endurance, impaired self care skills, impaired functional mobility, gait instability, impaired cognition, decreased ROM, decreased upper extremity function, decreased lower extremity function, decreased safety awareness, impaired balance. Pt with improved cognition and command following this date. Still initially impulsive when going to stand but able to stand with Vy and ambulate 80' CGA with RW. Pt's family expressed interest in taking pt home. Pt would require 24/7 assist at this time with low intensity therapy if they continue to prefer this d/c plan.    Rehab Prognosis: Good; patient would benefit from acute skilled PT services to address these deficits and reach maximum level of function.    Recent Surgery: Procedure(s) (LRB):  MRI (Magnetic Resonance Imagine) (N/A)      Plan:     During this hospitalization, patient would benefit from acute PT services 6 x/week to address the identified rehab impairments via gait training, therapeutic exercises, neuromuscular re-education and progress toward the following goals:    Plan of Care Expires:  11/04/24    Subjective     Chief Complaint: none stated  Patient/Family Comments/goals: to go home  Pain/Comfort:  Pain Rating 1: 0/10      Objective:     Communicated with RN prior to session.  Patient found up in chair with  (with RN, family, tavon, vital monitoring) upon PT entry to room.     General  Precautions: Standard, fall  Orthopedic Precautions: N/A  Braces: N/A  Respiratory Status: Room air  Skin Integrity: Visible skin intact      Functional Mobility:  Transfers:     Sit to Stand:  minimum assistance with no AD. pt initially impulsive, attempting to stand prematurely and requiring cues for safety, attempted to remove gait belt but able to redirect.  Gait: Pt ambulated 80' CGA with RW, slow cortney t/o and with step-to gait   Balance: CGA standing balance, RW utilized for increased stability when walking    Therapeutic Activities/Exercises:      Education:  Patient and family were provided with verbal education education regarding PT role/goals/POC, fall prevention, safety awareness, and discharge/DME recommendations.  Understanding was verbalized, however additional teaching warranted.     Patient left sitting on toilet with  OT present    GOALS:   Multidisciplinary Problems       Physical Therapy Goals          Problem: Physical Therapy    Goal Priority Disciplines Outcome Interventions   Physical Therapy Goal     PT, PT/OT Progressing    Description: Goals to be met by: 24     Patient will increase functional independence with mobility by performin. Supine to sit with Modified [place]  2. Sit to supine with Modified [place]  3. Sit to stand with Stand-by Assistance  4. Bed to chair with Stand-by Assistance with LRAD  4. Gait x 200' with Stand-by Assistance using LRAD                         Time Tracking:     PT Received On: 10/07/24  PT Start Time: 1350     PT Stop Time: 1406  PT Total Time (min): 16 min     Billable Minutes: Gait Training 16    Treatment Type: Treatment  PT/PTA: PT     Number of PTA visits since last PT visit: 2     10/07/2024

## 2024-10-07 NOTE — PT/OT/SLP PROGRESS
[facility]  Speech Language Pathology Department  Diet Tolerance Follow-up    Patient Name:  Tali Beard   MRN:  [id number]    Recommendations     General recommendations:  Speech/Language and Cognitive Evaluation  Solid texture recommendation:  Regular Diet - IDDSI Level 7  Liquid consistency recommendation: Thin liquids - IDDSI Level 0   Medications: per patient preference  Swallow strategies/precautions: additional swallow per bite/sip and alternate solids/liquids  Precautions: aspiration    Diet Tolerance     Nursing reports no difficulty regarding diet tolerance.    Outcome Measures     Functional Oral Intake Scale: 7 - Total oral diet with no restrictions    Plan     SLP Follow-Up: Yes    Time Tracking     SLP Treatment Date: 10/07/2024

## 2024-10-07 NOTE — ASSESSMENT & PLAN NOTE
- presented with confusion   - Stroke RF: HTN, Diabetes, intraparenchymal hemorrhage appears round with surrounding edema with small left subarchnoid hemorrhage   - Intervention: 10/3/2024 no surgical intervention was advised  - Etiology: TBD    Stroke workup:  -CTh: 35 mm area of intraparenchymal hemorrhage in the left temporoparietal region.  Small volume left-sided subarachnoid hemorrhage as well.     CT head without contrast, 10/03/2024- when compared to a CT head without contrast completed earlier on this same day, there have been no changes in her left temporoparietal occipital acute intraparenchymal hemorrhage measuring 3.5 cm.  The intraparenchymal hemorrhage appears round with surrounding edema, which may be suggestive of an underlying mass.  There is generalized atrophy with chronic white matter ischemic changes.  There is no significant midline shift.       CT head without contrast, 09/30/2024- no acute abnormalities.     CT head without contrast, 09/02/2024- subtle left temporal occipital subarachnoid hemorrhage.  There is generalized atrophy with chronic white matter ischemic changes.     CTA head and neck, 09/03/2024- no significant vascular abnormalities.    -MRI brain w/wo: Impression:   2.9 cm x 2.3 cm x 1.9 cm subacute intraparenchymal hematoma of the left temporoparietal lobe.  Moderate adjacent vasogenic edema.  No evidence of abnormal enhancement.  Recommend a follow-up MR brain without and with IV contrast once the acute blood products resolve.   Subarachnoid hemorrhage interdigitating along the left cerebral convexity sulci.  Superficial siderosis along the superior right cerebral convexity.  Mild generalized brain atrophy and mild-to-moderate chronic small vessel ischemic changes.  -ECHO: EF 55%. Left atrium is mildly dilated. Agitated saline study of the atrial septum is negative, suggesting absence of intracardiac shunt at the atrial level.   -CUS:   -LDL: 81  -A1c: 5.8  -TSH: Normal  -home  medications include: lipitor 10 mg      IPH   - downgraded to floor   - q4 hr neuro checks ... notify neurology of any neuro change  - BP parameters less than 140/90  -avoid antiplatelet or anticoagulation at this time  -keppra, seizure precautions ... notify neurology of any seizure-like activity  - therapy evaluations with recommendations   -  Other recommendations may follow from MD

## 2024-10-07 NOTE — SUBJECTIVE & OBJECTIVE
Subjective:     Interval History: up in chair, family at bedside     Current Neurological Medications: Keppra    Current Facility-Administered Medications   Medication Dose Route Frequency Provider Last Rate Last Admin    amLODIPine tablet 5 mg  5 mg Oral Daily Cyrus Orellana MD   5 mg at 10/07/24 0851    bisacodyL suppository 10 mg  10 mg Rectal Daily PRN Sierra Camarena, NP        dextrose 10% bolus 125 mL 125 mL  12.5 g Intravenous PRN Pat Heard, DO        dextrose 10% bolus 250 mL 250 mL  25 g Intravenous PRN StPat Ramos, DO        glucagon (human recombinant) injection 1 mg  1 mg Intramuscular PRN St. Pat Oneil, DO        glucose chewable tablet 16 g  16 g Oral PRN Pat Heard,         glucose chewable tablet 24 g  24 g Oral PRN St. Thad, Pat, DO        haloperidol lactate injection 2 mg  2 mg Intravenous Q4H PRN Cyrus Orellana MD        labetaloL injection 10 mg  10 mg Intravenous Q4H PRN Pat Heard,    10 mg at 10/06/24 1345    levETIRAcetam tablet 500 mg  500 mg Oral BID Cyrus Orellana MD   500 mg at 10/07/24 0851    mupirocin 2 % ointment   Nasal BID Jamil Robles MD   Given at 10/07/24 0851    naloxone 0.4 mg/mL injection 0.02 mg  0.02 mg Intravenous PRN Pat Heard,         prednisoLONE acetate 1 % ophthalmic suspension 1 drop  1 drop Both Eyes Q6H PRN Cyrus Orellana MD   1 drop at 10/07/24 0848    sodium chloride 0.9% flush 10 mL  10 mL Intravenous Q12H PRN Pat Heard DO        sodium chloride 0.9% flush 10 mL  10 mL Intravenous PRN Sierra Camarena, NP           Review of Systems   Constitutional:  Negative for appetite change.   Neurological:  Negative for dizziness, tremors, seizures, syncope, facial asymmetry, weakness, light-headedness, numbness and headaches. Speech difficulty: no slurred speech.  Psychiatric/Behavioral:  Positive for decreased concentration.      Objective:      Vital Signs (Most Recent):  Temp: 98.1 °F (36.7 °C) (10/07/24 0800)  Pulse: 74 (10/07/24 0800)  Resp: 12 (10/07/24 0800)  BP: 131/63 (10/07/24 0851)  SpO2: 95 % (10/07/24 1015) Vital Signs (24h Range):  Temp:  [98.1 °F (36.7 °C)-98.3 °F (36.8 °C)] 98.1 °F (36.7 °C)  Pulse:  [69-95] 74  Resp:  [0-32] 12  SpO2:  [94 %-100 %] 95 %  BP: (107-154)/() 131/63     Weight: 63.2 kg (139 lb 5.3 oz)  Body mass index is 24.68 kg/m².     Physical Exam  Vitals and nursing note reviewed.   Constitutional:       General: She is not in acute distress.     Appearance: Normal appearance. She is well-developed. She is not ill-appearing or toxic-appearing.   HENT:      Head: Normocephalic.      Right Ear: External ear normal.      Left Ear: External ear normal.      Nose: Nose normal.   Eyes:      Conjunctiva/sclera: Conjunctivae normal.   Cardiovascular:      Rate and Rhythm: Normal rate.   Pulmonary:      Effort: Pulmonary effort is normal.      Breath sounds: No stridor.   Abdominal:      General: Abdomen is flat.   Musculoskeletal:      Cervical back: Normal range of motion and neck supple.   Lymphadenopathy:      Cervical: No cervical adenopathy.   Skin:     General: Skin is warm and dry.   Neurological:      Mental Status: She is alert.      Coordination: Finger-Nose-Finger Test normal.   Psychiatric:         Mood and Affect: Mood normal.         Speech: Speech normal.         Behavior: Behavior normal. Behavior is cooperative.          NEUROLOGICAL EXAMINATION:     MENTAL STATUS   Oriented to person.   Oriented to place.   Attention: normal. Concentration: normal.   Speech: speech is normal   Unable to repeat.        Unable to repeat sentence in correct order.     CRANIAL NERVES     CN II   Visual fields full to confrontation.     CN V   Facial sensation intact.     CN VII   Facial expression full, symmetric.     MOTOR EXAM   Muscle bulk: normal  Overall muscle tone: normal       No drift.  Strength is normal of the  bilateral upper and lower extremity     SENSORY EXAM   Light touch normal.     GAIT AND COORDINATION      Coordination   Finger to nose coordination: normal      Significant Labs:   Recent Lab Results         10/06/24  1338        POCT Glucose 112               Significant Imaging: I have reviewed all pertinent imaging results/findings within the past 24 hours.

## 2024-10-07 NOTE — PLAN OF CARE
Referral for SNF placement sent to [facility] bed sent via [facility]; awaiting response

## 2024-10-07 NOTE — PT/OT/SLP PROGRESS
Occupational Therapy   Treatment    Name: Tali Beard  MRN: [id number]  Admitting Diagnosis:  Intraparenchymal hemorrhage of brain       Recommendations:     Recommended therapy intensity at discharge: Moderate Intensity Therapy see below  Discharge Equipment Recommendations:   (tub bench if it fits in bathroom, RW)      Assessment:     Tali Beard is a 88 y.o. female with a medical diagnosis of Intraparenchymal hemorrhage of brain, SAH.  She presents orientedx4 with significant improvement in cognition.  Family interested in taking pt home.  Rec 24/7 assist for now and low intensity if they continue to prefer this discharge plan.   Son feels she is back to normal/baseline cognitively.  Performance deficits affecting function are weakness, impaired endurance, impaired self care skills, impaired functional mobility, impaired balance.     Rehab Prognosis:  Good; patient would benefit from acute skilled OT services to address these deficits and reach maximum level of function.       Plan:     Patient to be seen 5 x/week to address the above listed problems via self-care/home management, therapeutic exercises  Plan of Care Expires: 11/04/24  Plan of Care Reviewed with: patient    Subjective     Pain/Comfort:  Pain Rating 1: 0/10    Objective:     Communicated with: RN prior to session.  Patient found up in chair with  (with RN, family, jeremyck, vital monitoring) upon OT entry to room.    General Precautions: Standard, fall    Orthopedic Precautions:N/A  Braces: N/A  Respiratory Status: RA  Vital Signs: Blood Pressure: 116/58, HR 120s after ADLs, RN aware and monitoring     Occupational Performance:         Functional Mobility/Transfers:  Patient completed Sit <> Stand Transfer with minimum assistance  with  rolling walker   Patient completed Toilet Transfer Step Transfer technique with minimum assistance with  rolling walker  Functional Mobility: min A with RW, min cues for safe use of RW.  Gait belt utilized  for safety.  Pt tends to forget RW.    Activities of Daily Living:  Grooming: stand by assistance standing at sink  Toileting: stand by assistance +void on toilet, pt requesting to leave purewick off, RN aware and to assist to toilet as needed      Patient Education:  Patient provided with verbal education education regarding OT role/goals/POC.  Understanding was verbalized, however additional teaching warranted.      Patient left up in chair with all lines intact and chair alarm on.  Family present, discussed dc plan.    GOALS:   Multidisciplinary Problems       Occupational Therapy Goals          Problem: Occupational Therapy    Goal Priority Disciplines Outcome Interventions   Occupational Therapy Goal     OT, PT/OT Progressing    Description: Goals to be met by: 11/4/24     Patient will increase functional independence with ADLs by performing:    UE Dressing with Stand-by Assistance.  LE Dressing with Stand-by Assistance.  Grooming while standing at sink with Stand-by Assistance.  Toileting from toilet with Stand-by Assistance for hygiene and clothing management.   Toilet transfer to toilet with Stand-by Assistance.  Increased functional strength to 3/5 through therEx.                         Time Tracking:     OT Date of Treatment:    OT Start Time: 1350  OT Stop Time: 1423  OT Total Time (min): 33 min    Billable Minutes:Self Care/Home Management 2    OT/KESHIA: OT     Number of KESHIA visits since last OT visit: 2    10/7/2024

## 2024-10-07 NOTE — PLAN OF CARE
Home health referral sent to [facility] and walker referral sent to [facility].

## 2024-10-07 NOTE — PROGRESS NOTES
[facility] [address]  Neurosurgery  Progress Note    Subjective:     Interval History:    Follow up IPH/subarachnoid hemorrhage.    Patient transporting back from MRI at this time.    No headache or visual issues.  Blood pressure is controlled currently.  No headache.  Answering some questions appropriately.  History of dementia patient now at baseline per family.    Post-Op Info:  Procedure(s) (LRB):  MRI (Magnetic Resonance Imagine) (N/A)          Medications:  Continuous Infusions:      Scheduled Meds:   amLODIPine  5 mg Oral Daily    levETIRAcetam  500 mg Oral BID    mupirocin   Nasal BID     PRN Meds:  Current Facility-Administered Medications:     bisacodyL, 10 mg, Rectal, Daily PRN    dextrose 10%, 12.5 g, Intravenous, PRN    dextrose 10%, 25 g, Intravenous, PRN    glucagon (human recombinant), 1 mg, Intramuscular, PRN    glucose, 16 g, Oral, PRN    glucose, 24 g, Oral, PRN    haloperidol lactate, 2 mg, Intravenous, Q4H PRN    labetalol, 10 mg, Intravenous, Q4H PRN    naloxone, 0.02 mg, Intravenous, PRN    prednisoLONE acetate, 1 drop, Both Eyes, Q6H PRN    sodium chloride 0.9%, 10 mL, Intravenous, Q12H PRN    sodium chloride 0.9%, 10 mL, Intravenous, PRN     Review of Systems  Objective:     Weight: 63.2 kg (139 lb 5.3 oz)  Body mass index is 24.68 kg/m².  Vital Signs (Most Recent):  Temp: 98.1 °F (36.7 °C) (10/07/24 0800)  Pulse: 74 (10/07/24 0800)  Resp: 12 (10/07/24 0800)  BP: 131/63 (10/07/24 0851)  SpO2: 95 % (10/07/24 1015) Vital Signs (24h Range):  Temp:  [98.1 °F (36.7 °C)-98.3 °F (36.8 °C)] 98.1 °F (36.7 °C)  Pulse:  [69-95] 74  Resp:  [0-32] 12  SpO2:  [94 %-100 %] 95 %  BP: (107-154)/() 131/63     Date 10/07/24 0700 - 10/08/24 0659   Shift 4104-5727 6183-0239 5263-3606 24 Hour Total   INTAKE   P.O. 200   200   Shift Total(mL/kg) 200(3.2)   200(3.2)   OUTPUT   Urine(mL/kg/hr) 150   150   Shift Total(mL/kg) 150(2.4)   150(2.4)   Weight (kg) 63.2 63.2 63.2 63.2  "      Neurosurgery Physical Exam  General Awake, alert, no acute distress  GCS- 14  E-4, V-4, M-6     Answering some questions with yes and no but confused overall.  She does follow simple commands.      Cranial Nerves PERRL, brisk bilaterally  EOMI  Face symmetric  Tongue midline      Motor Follows commands in all ext. No deficits appreciated     Significant Labs:  No results for input(s): "GLU", "NA", "K", "CL", "CO2", "BUN", "CREATININE", "CALCIUM", "MG" in the last 48 hours.    No results for input(s): "WBC", "HGB", "HCT", "PLT" in the last 48 hours.    No results for input(s): "LABPT", "INR", "APTT" in the last 48 hours.    Microbiology Results (last 7 days)       ** No results found for the last 168 hours. **          Significant Diagnostics:                          MRI of the brain with and without contrast:     2.9 cm x 2.3 cm x 1.9 cm subacute intraparenchymal hematoma of the left temporoparietal lobe.  Moderate adjacent vasogenic edema.  No evidence of abnormal enhancement.  Recommend a follow-up MR brain without and with IV contrast once the acute blood products resolve.     Subarachnoid hemorrhage interdigitating along the left cerebral convexity sulci.     Superficial siderosis along the superior right cerebral convexity.     Mild generalized brain atrophy and mild-to-moderate chronic small vessel ischemic changes.       Assessment/Plan:    Downgraded to floor   Every 4 hour neurological exams   Blood pressure less than 140/90   Keppra, seizure precautions   Continue PT/OT/ST   SCDs   Case management assisting with discharge-patient may need snf placement.      Active Diagnoses:    Diagnosis Date Noted POA    PRINCIPAL PROBLEM:  Intraparenchymal hemorrhage of brain [I61.9] 10/04/2024 Yes      Problems Resolved During this Admission:       Dorothy Askew, [facility]  Neurosurgery  [facility] - [address] [place] [address]    "

## 2024-10-07 NOTE — DISCHARGE SUMMARY
HPI:   Tali Beard is a 88 y.o. female with a past medical history of hypertension, hyperlipidemia, and subarachnoid hemorrhage who presented to [facility] on 10/3/2024 transferred from [facility] for higher level of care.  Patient presented to outside facility secondary to confusion for the past 48 hours.  CT head revealed 35 mm area of intraparenchymal hemorrhage in the left temporoparietal region with small volume left-sided subarachnoid hemorrhage as well.  Patient was given 1 g of Keppra.  Patient was transferred to [facility] for neurosurgery services.  Patient was admitted to ICU for close monitoring.  Neurosurgery was consulted with recommendations of systolic blood pressure between 100-140 and q.2 hours neurochecks.  Patient was deemed not a surgical candidate.  Neurosurgery also recommended MRI brain and CT chest, abdomen, and pelvis with contrast to evaluate for possible underlying neoplasm and/or metastasis.  Patient became agitated overnight and required Precedex.  Repeat CT head revealed stable intracranial hemorrhages without new findings. Patient was cleared for downgrade out of ICU on 10/04/2024 and admitted to hospital medicine service.      Patient followed by neurosurgery team and recommended MRI brain. This was done and showed   Impression:     2.9 cm x 2.3 cm x 1.9 cm subacute intraparenchymal hematoma of the left temporoparietal lobe.  Moderate adjacent vasogenic edema.  No evidence of abnormal enhancement.  Recommend a follow-up MR brain without and with IV contrast once the acute blood products resolve.     Subarachnoid hemorrhage interdigitating along the left cerebral convexity sulci.     Superficial siderosis along the superior right cerebral convexity.     Mild generalized brain atrophy and mild-to-moderate chronic small vessel ischemic changes.        Patient doing well. Family want to take her home with HH and OT/PT service. Patient will be discharged when cleared by neurosurgery team.       Interval Hx:   Patient today awake and comfortable. Denies any headache, chest pain, fever or chills. She is eating well.      Family at bedside, explained in detail about the patients condition, diagnosis, vitals, labs and treatment plan. They understand and agree with the plan. All their questions were answered.       Case was discussed with patient's nurse and  on the floor.     Objective/physical exam:  General: In no acute distress, frail.   Chest: Clear to auscultation bilaterally  Heart: RRR, +S1, S2, no appreciable murmur  Abdomen: Soft, nontender, BS +  MSK: Warm, no lower extremity edema, no clubbing or cyanosis  Neurologic: Alert and oriented to self. Cranial nerve II-XII intact, Strength 5/5 in all 4 extremities     VITAL SIGNS: 24 HRS MIN & MAX LAST   Temp  Min: 98.1 °F (36.7 °C)  Max: 98.3 °F (36.8 °C) 98.1 °F (36.7 °C)   BP  Min: 107/60  Max: 154/61 131/63   Pulse  Min: 69  Max: 95  74   Resp  Min: 0  Max: 32 12   SpO2  Min: 94 %  Max: 100 % 95 %      I have reviewed the following labs:        Recent Labs   Lab 10/03/24  1551 10/04/24  0334 10/05/24  0418   WBC 5.58 4.91 5.67   RBC 5.13 5.07 5.10   HGB 12.1 11.9* 12.0   HCT 38.5 38.1 38.5   MCV 75.0* 75.1* 75.5*   MCH 23.6* 23.5* 23.5*   MCHC 31.4* 31.2* 31.2*   RDW 15.1 14.9 14.9    128* 145   MPV 11.3* 11.3* 10.9*             Recent Labs   Lab 10/03/24  0648 10/03/24  1550 10/04/24  0334 10/05/24  0418    139 138 140   K 4.2 4.4 3.8 4.4    107 106 110*   CO2 28 25 24 22*   BUN 17.4 15.2 13.5 13.0   CREATININE 1.17* 0.94 0.89 0.94   CALCIUM 9.9 9.2 9.1 9.0   MG 1.80  --  1.70  --    ALBUMIN 3.5 3.5 3.3*  --    ALKPHOS 72 66 60  --    ALT 14 14 14  --    AST 19 22 21  --    BILITOT 0.5 0.6 0.7  --       Microbiology Results (last 7 days)         ** No results found for the last 168 hours. **                See below for Radiology     Assessment/Plan:  IPH/SAH  Mild delirium: resolved      History of hypertension,  hyperlipidemia, and subarachnoid hemorrhage      Plan:  Patient has no new complaints. Looks very comfortable.   Sleeping well. Has been afebrile.   Mood is calm   Working OT/PT and ST   Cleared for pO diet      MRI brain reviewed, will f/u on neurosurgery recommendations      Dc to home with HH per family wishes.      VTE prophylaxis: SCD     Patient condition:  Fair     Anticipated discharge and Disposition:   Home when cleared by neurosurgery team,         All diagnosis and differential diagnosis have been reviewed; assessment and plan has been documented; I have personally reviewed the labs and test results that are presently available; I have reviewed the patients medication list; I have reviewed the consulting providers response and recommendations. I have reviewed or attempted to review medical records based upon their availability     All of the patient's questions have been  addressed and answered. Patient's is agreeable to the above stated plan. I will continue to monitor closely and make adjustments to medical management as needed.     Portions of this note dictated using EMR integrated voice recognition software, and may be subject to voice recognition errors not corrected at proofreading. Please contact writer for clarification if needed.   _____________________________________________________________________     Malnutrition Status:     Scheduled Med:   amLODIPine  5 mg Oral Daily    levETIRAcetam  500 mg Oral BID    mupirocin   Nasal BID      Continuous Infusions:        PRN Meds:     Current Facility-Administered Medications:     bisacodyL, 10 mg, Rectal, Daily PRN    dextrose 10%, 12.5 g, Intravenous, PRN    dextrose 10%, 25 g, Intravenous, PRN    glucagon (human recombinant), 1 mg, Intramuscular, PRN    glucose, 16 g, Oral, PRN    glucose, 24 g, Oral, PRN    haloperidol lactate, 2 mg, Intravenous, Q4H PRN    labetalol, 10 mg, Intravenous, Q4H PRN    naloxone, 0.02 mg, Intravenous, PRN     prednisoLONE acetate, 1 drop, Both Eyes, Q6H PRN    sodium chloride 0.9%, 10 mL, Intravenous, Q12H PRN    sodium chloride 0.9%, 10 mL, Intravenous, PRN      Radiology:  I have personally reviewed the following imaging and agree with the radiologist.      MRI Brain W WO Contrast  Narrative: EXAMINATION:  MRI BRAIN W WO CONTRAST     CLINICAL HISTORY:  L temporal parietal intraparenchymal hemorrhage;  Nontraumatic intracerebral hemorrhage, unspecified     TECHNIQUE:  Multiplanar multisequence MR imaging of the brain was performed before and after the administration of 12 mL MultiHance intravenous contrast.     COMPARISON:  CT head without contrast 10/05/2024.  CT angiogram head and neck 09/03/2024.     FINDINGS:  2.9 cm x 2.3 cm x 1.9 cm left temporoparietal intraparenchymal hematoma.  There is some intrinsic T1 shortening along the periphery of the hematoma concordant with methemoglobin blood products.  There is no abnormal enhancement associated with the hematoma.  There is some moderate vasogenic edema most pronounced along the medial margin of the hematoma.     Redemonstration of small volume subarachnoid hemorrhage interdigitating along the left cerebral convexity sulci.  There is also sulcal susceptibility artifact in the right frontoparietal convexity without corresponding FLAIR hyperintense signal suggestive of superficial siderosis.     Mild generalized brain atrophy.     Mild to moderate chronic small-vessel ischemic changes of the supratentorial white matter.     No hydrocephalus.     No herniation.  Impression: 2.9 cm x 2.3 cm x 1.9 cm subacute intraparenchymal hematoma of the left temporoparietal lobe.  Moderate adjacent vasogenic edema.  No evidence of abnormal enhancement.  Recommend a follow-up MR brain without and with IV contrast once the acute blood products resolve.     Subarachnoid hemorrhage interdigitating along the left cerebral convexity sulci.     Superficial siderosis along the superior right  cerebral convexity.     Mild generalized brain atrophy and mild-to-moderate chronic small vessel ischemic changes.     Electronically signed by:Herminio De La Fuente  Date:                                                  10/07/2024  Time:                                                  10:46        Cyrus Orellana MD  Department of Hospital Medicine   [facility]   10/07/2024           Dc to home with HH  Cleared by neurosurgery team     Dc 31 minutes

## 2024-10-07 NOTE — PLAN OF CARE
Problem: Adult Inpatient Plan of Care  Goal: Plan of Care Review  Outcome: Met  Goal: Patient-Specific Goal (Individualized)  Outcome: Met  Goal: Absence of Hospital-Acquired Illness or Injury  Outcome: Met  Goal: Optimal Comfort and Wellbeing  Outcome: Met  Goal: Readiness for Transition of Care  Outcome: Met     Problem: Diabetes Comorbidity  Goal: Blood Glucose Level Within Targeted Range  Outcome: Met     Problem: Skin Injury Risk Increased  Goal: Skin Health and Integrity  Outcome: Met     Problem: Wound  Goal: Optimal Coping  Outcome: Met  Goal: Optimal Functional Ability  Outcome: Met  Goal: Absence of Infection Signs and Symptoms  Outcome: Met  Goal: Improved Oral Intake  Outcome: Met  Goal: Optimal Pain Control and Function  Outcome: Met  Goal: Skin Health and Integrity  Outcome: Met  Goal: Optimal Wound Healing  Outcome: Met     Problem: Fall Injury Risk  Goal: Absence of Fall and Fall-Related Injury  Outcome: Met     Problem: Stroke, Intracerebral Hemorrhage  Goal: Optimal Coping  Outcome: Met  Goal: Effective Bowel Elimination  Outcome: Met  Goal: Optimal Cerebral Tissue Perfusion  Outcome: Met  Goal: Optimal Cognitive Function  Outcome: Met  Goal: Effective Communication Skills  Outcome: Met  Goal: Optimal Functional Ability  Outcome: Met  Goal: Optimal Nutrition Intake  Outcome: Met  Goal: Optimal Pain Control and Function  Outcome: Met  Goal: Effective Oxygenation and Ventilation  Outcome: Met  Goal: Improved Sensorimotor Function  Outcome: Met  Goal: Safe and Effective Swallow  Outcome: Met  Goal: Effective Urinary Elimination  Outcome: Met

## 2024-10-07 NOTE — PROGRESS NOTES
[facility]  Hospital Medicine Progress Note        Chief Complaint: Inpatient Follow-up for SAH    HPI:   Tali Beard is a 88 y.o. female with a past medical history of hypertension, hyperlipidemia, and subarachnoid hemorrhage who presented to [facility] on 10/3/2024 transferred from [facility] for higher level of care.  Patient presented to outside facility secondary to confusion for the past 48 hours.  CT head revealed 35 mm area of intraparenchymal hemorrhage in the left temporoparietal region with small volume left-sided subarachnoid hemorrhage as well.  Patient was given 1 g of Keppra.  Patient was transferred to [facility] for neurosurgery services.  Patient was admitted to ICU for close monitoring.  Neurosurgery was consulted with recommendations of systolic blood pressure between 100-140 and q.2 hours neurochecks.  Patient was deemed not a surgical candidate.  Neurosurgery also recommended MRI brain and CT chest, abdomen, and pelvis with contrast to evaluate for possible underlying neoplasm and/or metastasis.  Patient became agitated overnight and required Precedex.  Repeat CT head revealed stable intracranial hemorrhages without new findings. Patient was cleared for downgrade out of ICU on 10/04/2024 and admitted to hospital medicine service.     Patient followed by neurosurgery team and recommended MRI brain. This was done and showed   Impression:     2.9 cm x 2.3 cm x 1.9 cm subacute intraparenchymal hematoma of the left temporoparietal lobe.  Moderate adjacent vasogenic edema.  No evidence of abnormal enhancement.  Recommend a follow-up MR brain without and with IV contrast once the acute blood products resolve.     Subarachnoid hemorrhage interdigitating along the left cerebral convexity sulci.     Superficial siderosis along the superior right cerebral convexity.     Mild generalized brain atrophy and mild-to-moderate chronic small vessel ischemic changes.      Patient doing  well. Family want to take her home with HH and OT/PT service. Patient will be discharged when cleared by neurosurgery team.     Interval Hx:   Patient today awake and comfortable. Denies any headache, chest pain, fever or chills. She is eating well.     Family at bedside, explained in detail about the patients condition, diagnosis, vitals, labs and treatment plan. They understand and agree with the plan. All their questions were answered.      Case was discussed with patient's nurse and  on the floor.    Objective/physical exam:  General: In no acute distress, frail.   Chest: Clear to auscultation bilaterally  Heart: RRR, +S1, S2, no appreciable murmur  Abdomen: Soft, nontender, BS +  MSK: Warm, no lower extremity edema, no clubbing or cyanosis  Neurologic: Alert and oriented to self. Cranial nerve II-XII intact, Strength 5/5 in all 4 extremities    VITAL SIGNS: 24 HRS MIN & MAX LAST   Temp  Min: 98.1 °F (36.7 °C)  Max: 98.3 °F (36.8 °C) 98.1 °F (36.7 °C)   BP  Min: 107/60  Max: 154/61 131/63   Pulse  Min: 69  Max: 95  74   Resp  Min: 0  Max: 32 12   SpO2  Min: 94 %  Max: 100 % 95 %     I have reviewed the following labs:  Recent Labs   Lab 10/03/24  1551 10/04/24  0334 10/05/24  0418   WBC 5.58 4.91 5.67   RBC 5.13 5.07 5.10   HGB 12.1 11.9* 12.0   HCT 38.5 38.1 38.5   MCV 75.0* 75.1* 75.5*   MCH 23.6* 23.5* 23.5*   MCHC 31.4* 31.2* 31.2*   RDW 15.1 14.9 14.9    128* 145   MPV 11.3* 11.3* 10.9*     Recent Labs   Lab 10/03/24  0648 10/03/24  1550 10/04/24  0334 10/05/24  0418    139 138 140   K 4.2 4.4 3.8 4.4    107 106 110*   CO2 28 25 24 22*   BUN 17.4 15.2 13.5 13.0   CREATININE 1.17* 0.94 0.89 0.94   CALCIUM 9.9 9.2 9.1 9.0   MG 1.80  --  1.70  --    ALBUMIN 3.5 3.5 3.3*  --    ALKPHOS 72 66 60  --    ALT 14 14 14  --    AST 19 22 21  --    BILITOT 0.5 0.6 0.7  --      Microbiology Results (last 7 days)       ** No results found for the last 168 hours. **             See below for  Radiology    Assessment/Plan:  IPH/SAH  Mild delirium: resolved     History of hypertension, hyperlipidemia, and subarachnoid hemorrhage     Plan:  Patient has no new complaints. Looks very comfortable.   Sleeping well. Has been afebrile.   Mood is calm   Working OT/PT and ST   Cleared for pO diet     MRI brain reviewed, will f/u on neurosurgery recommendations     Dc to home with HH per family wishes.     VTE prophylaxis: SCD    Patient condition:  Fair    Anticipated discharge and Disposition:   Home when cleared by neurosurgery team,       All diagnosis and differential diagnosis have been reviewed; assessment and plan has been documented; I have personally reviewed the labs and test results that are presently available; I have reviewed the patients medication list; I have reviewed the consulting providers response and recommendations. I have reviewed or attempted to review medical records based upon their availability    All of the patient's questions have been  addressed and answered. Patient's is agreeable to the above stated plan. I will continue to monitor closely and make adjustments to medical management as needed.    Portions of this note dictated using EMR integrated voice recognition software, and may be subject to voice recognition errors not corrected at proofreading. Please contact writer for clarification if needed.   _____________________________________________________________________    Malnutrition Status:    Scheduled Med:   amLODIPine  5 mg Oral Daily    levETIRAcetam  500 mg Oral BID    mupirocin   Nasal BID      Continuous Infusions:       PRN Meds:    Current Facility-Administered Medications:     bisacodyL, 10 mg, Rectal, Daily PRN    dextrose 10%, 12.5 g, Intravenous, PRN    dextrose 10%, 25 g, Intravenous, PRN    glucagon (human recombinant), 1 mg, Intramuscular, PRN    glucose, 16 g, Oral, PRN    glucose, 24 g, Oral, PRN    haloperidol lactate, 2 mg, Intravenous, Q4H PRN    labetalol,  10 mg, Intravenous, Q4H PRN    naloxone, 0.02 mg, Intravenous, PRN    prednisoLONE acetate, 1 drop, Both Eyes, Q6H PRN    sodium chloride 0.9%, 10 mL, Intravenous, Q12H PRN    sodium chloride 0.9%, 10 mL, Intravenous, PRN     Radiology:  I have personally reviewed the following imaging and agree with the radiologist.     MRI Brain W WO Contrast  Narrative: EXAMINATION:  MRI BRAIN W WO CONTRAST    CLINICAL HISTORY:  L temporal parietal intraparenchymal hemorrhage;  Nontraumatic intracerebral hemorrhage, unspecified    TECHNIQUE:  Multiplanar multisequence MR imaging of the brain was performed before and after the administration of 12 mL MultiHance intravenous contrast.    COMPARISON:  CT head without contrast 10/05/2024.  CT angiogram head and neck 09/03/2024.    FINDINGS:  2.9 cm x 2.3 cm x 1.9 cm left temporoparietal intraparenchymal hematoma.  There is some intrinsic T1 shortening along the periphery of the hematoma concordant with methemoglobin blood products.  There is no abnormal enhancement associated with the hematoma.  There is some moderate vasogenic edema most pronounced along the medial margin of the hematoma.    Redemonstration of small volume subarachnoid hemorrhage interdigitating along the left cerebral convexity sulci.  There is also sulcal susceptibility artifact in the right frontoparietal convexity without corresponding FLAIR hyperintense signal suggestive of superficial siderosis.    Mild generalized brain atrophy.    Mild to moderate chronic small-vessel ischemic changes of the supratentorial white matter.    No hydrocephalus.    No herniation.  Impression: 2.9 cm x 2.3 cm x 1.9 cm subacute intraparenchymal hematoma of the left temporoparietal lobe.  Moderate adjacent vasogenic edema.  No evidence of abnormal enhancement.  Recommend a follow-up MR brain without and with IV contrast once the acute blood products resolve.    Subarachnoid hemorrhage interdigitating along the left cerebral convexity  sulci.    Superficial siderosis along the superior right cerebral convexity.    Mild generalized brain atrophy and mild-to-moderate chronic small vessel ischemic changes.    Electronically signed by: Herminio De La Fuente  Date:    10/07/2024  Time:    10:46      Cyrus Orellana MD  Department of Hospital Medicine   [facility]   10/07/2024

## 2024-10-08 ENCOUNTER — TELEPHONE (OUTPATIENT)
Dept: NEUROSURGERY | Facility: CLINIC | Age: 89
End: 2024-10-08
Payer: MEDICARE

## 2024-10-08 DIAGNOSIS — I61.9 INTRAPARENCHYMAL HEMORRHAGE OF BRAIN: Primary | ICD-10-CM

## 2024-10-08 NOTE — TELEPHONE ENCOUNTER
The patient is D/C. I spoke with her son, Jesus to get her 6wk F/U scheduled with [facility] with a repeat MRI B. The patient is scheduled with [facility] for 11/19/24 at 1:00. MRI is scheduled at [facility] for 11/12/24 at 10:00. He was compliant w date and time.

## 2024-10-09 ENCOUNTER — PATIENT OUTREACH (OUTPATIENT)
Dept: ADMINISTRATIVE | Facility: CLINIC | Age: 89
End: 2024-10-09
Payer: MEDICARE

## 2024-10-09 NOTE — PROGRESS NOTES
C3 nurse attempted to contact Tali Beard  for a TCC post hospital discharge follow up call. No answer. Left voicemail with callback information. The patient does not have a scheduled HOSFU appointment. Unable to route message to PCP staff.

## 2024-10-10 NOTE — PROGRESS NOTES
C3 nurse spoke with Tali Lee for a TCC post hospital discharge follow up call. The patient does not have a scheduled HOSFU appointment with Aurora Moe FNP  within 5-7 days post hospital discharge date 10/7/24. C3 nurse was unable to schedule HOSFU appointment in Epic. Unable to route message to PCP staff.

## 2024-10-16 ENCOUNTER — HOSPITAL ENCOUNTER (OUTPATIENT)
Dept: WOUND CARE | Facility: HOSPITAL | Age: 89
Discharge: HOME | End: 2024-10-16
Attending: PEDIATRICS
Payer: MEDICARE

## 2024-10-22 NOTE — PATIENT INSTRUCTIONS
Cleanse wound with wound cleanser or Normal saline  Pat dry  Apply 1/4 inch Nitro bid paste to debbi wound  (Do not apply on open wound bed)  Primary dressing: apply small amount of  gentamicin ointment then Aquacel to open area  Secondary dressing: cover with foam dressing   Frequency: every day     Edema control: may wear compression stockings for control of swelling.       Begin taking Vitamin C 1000mg by mouth twice daily  Vitamin E 400 iu daily  Zinc 50mg once daily  Vitamin D 1000 once daily     Home health: [facility]       Follow-up: 2 weeks

## 2024-10-23 ENCOUNTER — HOSPITAL ENCOUNTER (OUTPATIENT)
Dept: WOUND CARE | Facility: HOSPITAL | Age: 89
Discharge: HOME | End: 2024-10-23
Attending: PEDIATRICS
Payer: MEDICARE

## 2024-10-23 VITALS
TEMPERATURE: 98 F | WEIGHT: 139.31 LBS | RESPIRATION RATE: 18 BRPM | HEART RATE: 76 BPM | BODY MASS INDEX: 24.68 KG/M2 | HEIGHT: 63 IN | OXYGEN SATURATION: 100 % | SYSTOLIC BLOOD PRESSURE: 160 MMHG | DIASTOLIC BLOOD PRESSURE: 72 MMHG

## 2024-10-23 DIAGNOSIS — L97.522 FOOT ULCER WITH FAT LAYER EXPOSED, LEFT: Primary | ICD-10-CM

## 2024-10-23 DIAGNOSIS — E11.9 TYPE 2 DIABETES MELLITUS WITHOUT COMPLICATION, WITHOUT LONG-TERM CURRENT USE OF INSULIN: ICD-10-CM

## 2024-10-23 PROCEDURE — 99213 OFFICE O/P EST LOW 20 MIN: CPT

## 2024-10-23 PROCEDURE — 27000999 HC MEDICAL RECORD PHOTO DOCUMENTATION

## 2024-10-23 PROCEDURE — 99213 OFFICE O/P EST LOW 20 MIN: CPT | Mod: ,,, | Performed by: PEDIATRICS

## 2024-10-23 NOTE — PROGRESS NOTES
Subjective:       Patient ID: Tali Beard is a 88 y.o. female.    Chief Complaint: Venous Ulcer (Left foot venous ulceration. Here for re-evaluation and treatment with MD)    HPI  Review of Systems      Objective:      Temp:  [98.1 °F (36.7 °C)]   Pulse:  [76]   Resp:  [18]   BP: (160)/(72)   SpO2:  [100 %]   Physical Exam       Wound 05/07/24 0952 Ulceration Left medial Foot (Active)   05/07/24 0952   Present on Original Admission: Y   Primary Wound Type: Ulceration   Side: Left   Orientation: medial   Location: Foot   Wound Approximate Age at First Assessment (Weeks):    Wound Number:    Is this injury device related?:    Incision Type:    Closure Method:    Wound Description (Comments):    Type:    Additional Comments:    Ankle-Brachial Index:    Pulses: 2+ palpable DP and PT pulses, strong doppler signal to both   Removal Indication and Assessment:    Wound Outcome:    Wound Image    10/23/24 1400   Dressing Appearance Intact;Moist drainage 10/23/24 1400   Drainage Amount Small 10/23/24 1400   Drainage Characteristics/Odor Green 10/23/24 1400   Appearance Red;Granulating;Tan;Fibrin;Pink;Epithelialization 10/23/24 1400   Tissue loss description Full thickness 10/23/24 1400   Red (%), Wound Tissue Color 100 % 10/23/24 1400   Periwound Area Dry;Scar tissue;Hemosiderin Staining 10/23/24 1400   Wound Edges Callused 10/23/24 1400   Wound Length (cm) 0.8 cm 10/23/24 1400   Wound Width (cm) 0.6 cm 10/23/24 1400   Wound Depth (cm) 0.1 cm 10/23/24 1400   Wound Volume (cm^3) 0.048 cm^3 10/23/24 1400   Wound Surface Area (cm^2) 0.48 cm^2 10/23/24 1400   Care Cleansed with:;Antimicrobial agent;Wound cleanser 10/23/24 1400   Dressing Applied;Hydrofiber;Gauze;Non-adherent;Silicone;Foam 10/23/24 1400   Periwound Care Topical treatment applied;Other (see comments) 10/23/24 1400   Compression Compression Stocking (20-30 mmHg) 10/23/24 1400         Assessment:     Today wound Is reddened granulating with some pink  epithelialization.  0.8 cm x 0.6 cm with a depth of 0.1 cm.  Area was cleaned nitrobid pace was applied to the periwound area.  Gentamicin ointment and Aquacel was applied to the open area.  This was dressed with foam dressing.  Patient will continue to wear compression stockings.  Patient had recently been in the hospital for a hemorrhagic stroke.  Patient will return in 2 weeks for MD visit.    ICD-10-CM ICD-9-CM   1. Foot ulcer with fat layer exposed, left  L97.522 707.15   2. Type 2 diabetes mellitus without complication, without long-term current use of insulin  E11.9 250.00         Plan:   Tissue pathology and/or culture taken:  [] Yes [x] No   Sharp debridement performed:   [] Yes [x] No   Labs ordered this visit:   [] Yes [x] No   Imaging ordered this visit:   [] Yes [x] No           Orders Placed This Encounter   Procedures    Change dressing     Cleanse wound with wound cleanser or Normal saline  Pat dry  Apply 1/4 inch Nitro bid paste to debbi wound  (Do not apply on open wound bed)  Primary dressing: apply small amount of  gentamicin ointment then Aquacel to open area  Secondary dressing: cover with foam dressing   Frequency: every day     Edema control: may wear compression stockings for control of swelling.       Begin taking Vitamin C 1000mg by mouth twice daily  Vitamin E 400 iu daily  Zinc 50mg once daily  Vitamin D 1000 once daily     Home health: [facility]       Follow-up: 2 weeks        Follow up in about 2 weeks (around 11/6/2024) for MD visit.

## 2024-10-28 ENCOUNTER — TELEPHONE (OUTPATIENT)
Dept: NEUROLOGY | Facility: CLINIC | Age: 89
End: 2024-10-28
Payer: MEDICARE

## 2024-10-29 ENCOUNTER — HOSPITAL ENCOUNTER (INPATIENT)
Facility: HOSPITAL | Age: 89
LOS: 7 days | Discharge: NURSING, REHAB OR LONG-TERM CARE | DRG: 064 | End: 2024-11-06
Attending: STUDENT IN AN ORGANIZED HEALTH CARE EDUCATION/TRAINING PROGRAM | Admitting: INTERNAL MEDICINE
Payer: MEDICARE

## 2024-10-29 DIAGNOSIS — I61.9 ICH (INTRACEREBRAL HEMORRHAGE): ICD-10-CM

## 2024-10-29 DIAGNOSIS — R29.818 ACUTE FOCAL NEUROLOGICAL DEFICIT: ICD-10-CM

## 2024-10-29 DIAGNOSIS — I61.2 NONTRAUMATIC HEMORRHAGE OF RIGHT CEREBRAL HEMISPHERE: Primary | ICD-10-CM

## 2024-10-29 DIAGNOSIS — Z71.89 COUNSELING REGARDING ADVANCE CARE PLANNING AND GOALS OF CARE: ICD-10-CM

## 2024-10-29 DIAGNOSIS — R53.1 WEAKNESS: ICD-10-CM

## 2024-10-29 LAB
ALBUMIN SERPL-MCNC: 3.7 G/DL (ref 3.4–4.8)
ALBUMIN/GLOB SERPL: 0.9 RATIO (ref 1.1–2)
ALP SERPL-CCNC: 87 UNIT/L (ref 40–150)
ALT SERPL-CCNC: 18 UNIT/L (ref 0–55)
ANION GAP SERPL CALC-SCNC: 10 MEQ/L
ANION GAP SERPL CALC-SCNC: 13 MMOL/L (ref 8–16)
APTT PPP: 26.9 SECONDS (ref 23.2–33.7)
AST SERPL-CCNC: 23 UNIT/L (ref 5–34)
BASOPHILS # BLD AUTO: 0.03 X10(3)/MCL
BASOPHILS NFR BLD AUTO: 0.7 %
BILIRUB SERPL-MCNC: 0.4 MG/DL
BNP BLD-MCNC: 33.5 PG/ML
BUN SERPL-MCNC: 14.7 MG/DL (ref 9.8–20.1)
BUN SERPL-MCNC: 17 MG/DL (ref 6–30)
CALCIUM SERPL-MCNC: 9.9 MG/DL (ref 8.4–10.2)
CHLORIDE SERPL-SCNC: 108 MMOL/L (ref 95–110)
CHLORIDE SERPL-SCNC: 109 MMOL/L (ref 98–107)
CHOLEST SERPL-MCNC: 240 MG/DL
CHOLEST/HDLC SERPL: 3 {RATIO} (ref 0–5)
CO2 SERPL-SCNC: 24 MMOL/L (ref 23–31)
CREAT SERPL-MCNC: 0.9 MG/DL (ref 0.5–1.4)
CREAT SERPL-MCNC: 0.92 MG/DL (ref 0.55–1.02)
CREAT/UREA NIT SERPL: 16
EOSINOPHIL # BLD AUTO: 0.17 X10(3)/MCL (ref 0–0.9)
EOSINOPHIL NFR BLD AUTO: 3.8 %
ERYTHROCYTE [DISTWIDTH] IN BLOOD BY AUTOMATED COUNT: 15.7 % (ref 11.5–17)
GFR SERPLBLD CREATININE-BSD FMLA CKD-EPI: 60 ML/MIN/1.73/M2
GLOBULIN SER-MCNC: 4.1 GM/DL (ref 2.4–3.5)
GLUCOSE SERPL-MCNC: 108 MG/DL (ref 70–110)
GLUCOSE SERPL-MCNC: 108 MG/DL (ref 70–110)
GLUCOSE SERPL-MCNC: 111 MG/DL (ref 82–115)
HCT VFR BLD AUTO: 40.1 % (ref 37–47)
HCT VFR BLD CALC: 43 %PCV (ref 36–54)
HDLC SERPL-MCNC: 87 MG/DL (ref 35–60)
HGB BLD-MCNC: 12.5 G/DL (ref 12–16)
HGB BLD-MCNC: 15 G/DL
IMM GRANULOCYTES # BLD AUTO: 0.01 X10(3)/MCL (ref 0–0.04)
IMM GRANULOCYTES NFR BLD AUTO: 0.2 %
INR PPP: 0.9
LDLC SERPL CALC-MCNC: 141 MG/DL (ref 50–140)
LYMPHOCYTES # BLD AUTO: 1.1 X10(3)/MCL (ref 0.6–4.6)
LYMPHOCYTES NFR BLD AUTO: 24.6 %
MCH RBC QN AUTO: 23.3 PG (ref 27–31)
MCHC RBC AUTO-ENTMCNC: 31.2 G/DL (ref 33–36)
MCV RBC AUTO: 74.8 FL (ref 80–94)
MONOCYTES # BLD AUTO: 0.56 X10(3)/MCL (ref 0.1–1.3)
MONOCYTES NFR BLD AUTO: 12.5 %
NEUTROPHILS # BLD AUTO: 2.6 X10(3)/MCL (ref 2.1–9.2)
NEUTROPHILS NFR BLD AUTO: 58.2 %
NRBC BLD AUTO-RTO: 0 %
PLATELET # BLD AUTO: 174 X10(3)/MCL (ref 130–400)
PMV BLD AUTO: 10.7 FL (ref 7.4–10.4)
POC IONIZED CALCIUM: 1.19 MMOL/L (ref 1.06–1.42)
POC PTINR: 1 (ref 0.9–1.2)
POC TCO2 (MEASURED): 28 MMOL/L (ref 23–29)
POCT GLUCOSE: 105 MG/DL (ref 70–110)
POTASSIUM BLD-SCNC: 4.1 MMOL/L (ref 3.5–5.1)
POTASSIUM SERPL-SCNC: 4 MMOL/L (ref 3.5–5.1)
PROT SERPL-MCNC: 7.8 GM/DL (ref 5.8–7.6)
PROTHROMBIN TIME: 12.3 SECONDS (ref 12.5–14.5)
RBC # BLD AUTO: 5.36 X10(6)/MCL (ref 4.2–5.4)
SAMPLE: NORMAL
SAMPLE: NORMAL
SODIUM BLD-SCNC: 144 MMOL/L (ref 136–145)
SODIUM SERPL-SCNC: 143 MMOL/L (ref 136–145)
TRIGL SERPL-MCNC: 61 MG/DL (ref 37–140)
TROPONIN I SERPL-MCNC: <0.01 NG/ML (ref 0–0.04)
TSH SERPL-ACNC: 1.44 UIU/ML (ref 0.35–4.94)
VLDLC SERPL CALC-MCNC: 12 MG/DL
WBC # BLD AUTO: 4.47 X10(3)/MCL (ref 4.5–11.5)

## 2024-10-29 PROCEDURE — 84443 ASSAY THYROID STIM HORMONE: CPT | Performed by: STUDENT IN AN ORGANIZED HEALTH CARE EDUCATION/TRAINING PROGRAM

## 2024-10-29 PROCEDURE — 25500020 PHARM REV CODE 255: Performed by: STUDENT IN AN ORGANIZED HEALTH CARE EDUCATION/TRAINING PROGRAM

## 2024-10-29 PROCEDURE — 83880 ASSAY OF NATRIURETIC PEPTIDE: CPT | Performed by: STUDENT IN AN ORGANIZED HEALTH CARE EDUCATION/TRAINING PROGRAM

## 2024-10-29 PROCEDURE — 93010 ELECTROCARDIOGRAM REPORT: CPT | Mod: ,,, | Performed by: INTERNAL MEDICINE

## 2024-10-29 PROCEDURE — 80053 COMPREHEN METABOLIC PANEL: CPT | Performed by: STUDENT IN AN ORGANIZED HEALTH CARE EDUCATION/TRAINING PROGRAM

## 2024-10-29 PROCEDURE — 80061 LIPID PANEL: CPT | Performed by: STUDENT IN AN ORGANIZED HEALTH CARE EDUCATION/TRAINING PROGRAM

## 2024-10-29 PROCEDURE — 85730 THROMBOPLASTIN TIME PARTIAL: CPT | Performed by: STUDENT IN AN ORGANIZED HEALTH CARE EDUCATION/TRAINING PROGRAM

## 2024-10-29 PROCEDURE — 93005 ELECTROCARDIOGRAM TRACING: CPT

## 2024-10-29 PROCEDURE — 85025 COMPLETE CBC W/AUTO DIFF WBC: CPT | Performed by: STUDENT IN AN ORGANIZED HEALTH CARE EDUCATION/TRAINING PROGRAM

## 2024-10-29 PROCEDURE — 85610 PROTHROMBIN TIME: CPT | Performed by: STUDENT IN AN ORGANIZED HEALTH CARE EDUCATION/TRAINING PROGRAM

## 2024-10-29 PROCEDURE — 63600175 PHARM REV CODE 636 W HCPCS

## 2024-10-29 PROCEDURE — 84484 ASSAY OF TROPONIN QUANT: CPT | Performed by: STUDENT IN AN ORGANIZED HEALTH CARE EDUCATION/TRAINING PROGRAM

## 2024-10-29 RX ORDER — NICARDIPINE HYDROCHLORIDE 0.2 MG/ML
0-15 INJECTION INTRAVENOUS CONTINUOUS
Status: DISCONTINUED | OUTPATIENT
Start: 2024-10-30 | End: 2024-10-30

## 2024-10-29 RX ORDER — NICARDIPINE HYDROCHLORIDE 0.2 MG/ML
INJECTION INTRAVENOUS
Status: COMPLETED
Start: 2024-10-29 | End: 2024-10-29

## 2024-10-29 RX ADMIN — NICARDIPINE HYDROCHLORIDE 2.5 MG/HR: 0.2 INJECTION, SOLUTION INTRAVENOUS at 11:10

## 2024-10-29 RX ADMIN — NICARDIPINE HYDROCHLORIDE 2.5 MG/HR: 0.2 INJECTION INTRAVENOUS at 11:10

## 2024-10-29 RX ADMIN — IOHEXOL 65 ML: 350 INJECTION, SOLUTION INTRAVENOUS at 11:10

## 2024-10-30 PROBLEM — I65.23 ATHEROSCLEROSIS OF BOTH CAROTID ARTERIES: Status: ACTIVE | Noted: 2024-10-30

## 2024-10-30 LAB
ALBUMIN SERPL-MCNC: 3.4 G/DL (ref 3.4–4.8)
ALBUMIN/GLOB SERPL: 1.1 RATIO (ref 1.1–2)
ALP SERPL-CCNC: 77 UNIT/L (ref 40–150)
ALT SERPL-CCNC: 15 UNIT/L (ref 0–55)
ANION GAP SERPL CALC-SCNC: 8 MEQ/L
AST SERPL-CCNC: 20 UNIT/L (ref 5–34)
BASOPHILS # BLD AUTO: 0.04 X10(3)/MCL
BASOPHILS NFR BLD AUTO: 0.8 %
BILIRUB SERPL-MCNC: 0.4 MG/DL
BUN SERPL-MCNC: 11.8 MG/DL (ref 9.8–20.1)
CALCIUM SERPL-MCNC: 9.3 MG/DL (ref 8.4–10.2)
CHLORIDE SERPL-SCNC: 108 MMOL/L (ref 98–107)
CO2 SERPL-SCNC: 25 MMOL/L (ref 23–31)
CREAT SERPL-MCNC: 0.88 MG/DL (ref 0.55–1.02)
CREAT/UREA NIT SERPL: 13
CRP SERPL-MCNC: 1.2 MG/L
EOSINOPHIL # BLD AUTO: 0.11 X10(3)/MCL (ref 0–0.9)
EOSINOPHIL NFR BLD AUTO: 2.1 %
ERYTHROCYTE [DISTWIDTH] IN BLOOD BY AUTOMATED COUNT: 15.7 % (ref 11.5–17)
ERYTHROCYTE [SEDIMENTATION RATE] IN BLOOD: 49 MM/HR (ref 0–20)
GFR SERPLBLD CREATININE-BSD FMLA CKD-EPI: >60 ML/MIN/1.73/M2
GLOBULIN SER-MCNC: 3.2 GM/DL (ref 2.4–3.5)
GLUCOSE SERPL-MCNC: 103 MG/DL (ref 82–115)
HCT VFR BLD AUTO: 37.8 % (ref 37–47)
HGB BLD-MCNC: 11.6 G/DL (ref 12–16)
IMM GRANULOCYTES # BLD AUTO: 0.01 X10(3)/MCL (ref 0–0.04)
IMM GRANULOCYTES NFR BLD AUTO: 0.2 %
LYMPHOCYTES # BLD AUTO: 0.89 X10(3)/MCL (ref 0.6–4.6)
LYMPHOCYTES NFR BLD AUTO: 17.1 %
MCH RBC QN AUTO: 23.2 PG (ref 27–31)
MCHC RBC AUTO-ENTMCNC: 30.7 G/DL (ref 33–36)
MCV RBC AUTO: 75.6 FL (ref 80–94)
MONOCYTES # BLD AUTO: 0.6 X10(3)/MCL (ref 0.1–1.3)
MONOCYTES NFR BLD AUTO: 11.6 %
NEUTROPHILS # BLD AUTO: 3.54 X10(3)/MCL (ref 2.1–9.2)
NEUTROPHILS NFR BLD AUTO: 68.2 %
NRBC BLD AUTO-RTO: 0 %
PLATELET # BLD AUTO: 174 X10(3)/MCL (ref 130–400)
PMV BLD AUTO: 11.1 FL (ref 7.4–10.4)
POTASSIUM SERPL-SCNC: 3.8 MMOL/L (ref 3.5–5.1)
PROT SERPL-MCNC: 6.6 GM/DL (ref 5.8–7.6)
RBC # BLD AUTO: 5 X10(6)/MCL (ref 4.2–5.4)
SODIUM SERPL-SCNC: 141 MMOL/L (ref 136–145)
WBC # BLD AUTO: 5.19 X10(3)/MCL (ref 4.5–11.5)

## 2024-10-30 PROCEDURE — 80053 COMPREHEN METABOLIC PANEL: CPT

## 2024-10-30 PROCEDURE — 86036 ANCA SCREEN EACH ANTIBODY: CPT | Performed by: NURSE PRACTITIONER

## 2024-10-30 PROCEDURE — 63600175 PHARM REV CODE 636 W HCPCS: Performed by: STUDENT IN AN ORGANIZED HEALTH CARE EDUCATION/TRAINING PROGRAM

## 2024-10-30 PROCEDURE — 63600175 PHARM REV CODE 636 W HCPCS: Performed by: INTERNAL MEDICINE

## 2024-10-30 PROCEDURE — 86039 ANTINUCLEAR ANTIBODIES (ANA): CPT | Performed by: NURSE PRACTITIONER

## 2024-10-30 PROCEDURE — 99223 1ST HOSP IP/OBS HIGH 75: CPT | Mod: FS,,, | Performed by: NEUROLOGICAL SURGERY

## 2024-10-30 PROCEDURE — 63600175 PHARM REV CODE 636 W HCPCS

## 2024-10-30 PROCEDURE — 20000000 HC ICU ROOM

## 2024-10-30 PROCEDURE — 92610 EVALUATE SWALLOWING FUNCTION: CPT

## 2024-10-30 PROCEDURE — 86140 C-REACTIVE PROTEIN: CPT | Performed by: NURSE PRACTITIONER

## 2024-10-30 PROCEDURE — 87389 HIV-1 AG W/HIV-1&-2 AB AG IA: CPT | Performed by: NURSE PRACTITIONER

## 2024-10-30 PROCEDURE — 25000003 PHARM REV CODE 250: Performed by: NURSE PRACTITIONER

## 2024-10-30 PROCEDURE — 85652 RBC SED RATE AUTOMATED: CPT | Performed by: NURSE PRACTITIONER

## 2024-10-30 PROCEDURE — 83516 IMMUNOASSAY NONANTIBODY: CPT | Performed by: NURSE PRACTITIONER

## 2024-10-30 PROCEDURE — 92526 ORAL FUNCTION THERAPY: CPT

## 2024-10-30 PROCEDURE — 36415 COLL VENOUS BLD VENIPUNCTURE: CPT

## 2024-10-30 PROCEDURE — 85025 COMPLETE CBC W/AUTO DIFF WBC: CPT

## 2024-10-30 PROCEDURE — 36415 COLL VENOUS BLD VENIPUNCTURE: CPT | Performed by: NURSE PRACTITIONER

## 2024-10-30 PROCEDURE — 25000003 PHARM REV CODE 250: Performed by: INTERNAL MEDICINE

## 2024-10-30 PROCEDURE — 99223 1ST HOSP IP/OBS HIGH 75: CPT | Mod: ,,, | Performed by: PSYCHIATRY & NEUROLOGY

## 2024-10-30 PROCEDURE — 86235 NUCLEAR ANTIGEN ANTIBODY: CPT | Performed by: NURSE PRACTITIONER

## 2024-10-30 RX ORDER — ATORVASTATIN CALCIUM 40 MG/1
40 TABLET, FILM COATED ORAL NIGHTLY
Status: DISCONTINUED | OUTPATIENT
Start: 2024-10-30 | End: 2024-11-06 | Stop reason: HOSPADM

## 2024-10-30 RX ORDER — DEXAMETHASONE SODIUM PHOSPHATE 4 MG/ML
4 INJECTION, SOLUTION INTRA-ARTICULAR; INTRALESIONAL; INTRAMUSCULAR; INTRAVENOUS; SOFT TISSUE EVERY 6 HOURS
Status: DISCONTINUED | OUTPATIENT
Start: 2024-10-30 | End: 2024-10-30

## 2024-10-30 RX ORDER — LEVETIRACETAM 500 MG/5ML
750 INJECTION, SOLUTION, CONCENTRATE INTRAVENOUS EVERY 12 HOURS
Status: DISCONTINUED | OUTPATIENT
Start: 2024-10-30 | End: 2024-11-02

## 2024-10-30 RX ORDER — SODIUM CHLORIDE 9 MG/ML
INJECTION, SOLUTION INTRAVENOUS CONTINUOUS
Status: DISCONTINUED | OUTPATIENT
Start: 2024-10-30 | End: 2024-11-01

## 2024-10-30 RX ORDER — HYDRALAZINE HYDROCHLORIDE 20 MG/ML
20 INJECTION INTRAMUSCULAR; INTRAVENOUS EVERY 4 HOURS PRN
Status: DISCONTINUED | OUTPATIENT
Start: 2024-10-30 | End: 2024-11-06 | Stop reason: HOSPADM

## 2024-10-30 RX ORDER — MUPIROCIN 20 MG/G
OINTMENT TOPICAL 2 TIMES DAILY
Status: COMPLETED | OUTPATIENT
Start: 2024-10-31 | End: 2024-11-04

## 2024-10-30 RX ORDER — LEVETIRACETAM 15 MG/ML
1500 INJECTION INTRAVASCULAR
Status: COMPLETED | OUTPATIENT
Start: 2024-10-30 | End: 2024-10-30

## 2024-10-30 RX ORDER — LABETALOL HYDROCHLORIDE 5 MG/ML
10 INJECTION, SOLUTION INTRAVENOUS EVERY 4 HOURS PRN
Status: DISCONTINUED | OUTPATIENT
Start: 2024-10-30 | End: 2024-11-06 | Stop reason: HOSPADM

## 2024-10-30 RX ORDER — NICARDIPINE HYDROCHLORIDE 0.2 MG/ML
0-15 INJECTION INTRAVENOUS CONTINUOUS
Status: DISCONTINUED | OUTPATIENT
Start: 2024-10-30 | End: 2024-11-01

## 2024-10-30 RX ADMIN — HYDRALAZINE HYDROCHLORIDE 20 MG: 20 INJECTION INTRAMUSCULAR; INTRAVENOUS at 10:10

## 2024-10-30 RX ADMIN — SODIUM CHLORIDE: 9 INJECTION, SOLUTION INTRAVENOUS at 09:10

## 2024-10-30 RX ADMIN — LEVETIRACETAM 750 MG: 100 INJECTION, SOLUTION INTRAVENOUS at 08:10

## 2024-10-30 RX ADMIN — LEVETIRACETAM INJECTION 1500 MG: 15 INJECTION INTRAVENOUS at 12:10

## 2024-10-30 RX ADMIN — ATORVASTATIN CALCIUM 40 MG: 40 TABLET, FILM COATED ORAL at 08:10

## 2024-10-30 RX ADMIN — NICARDIPINE HYDROCHLORIDE 5 MG/HR: 0.2 INJECTION, SOLUTION INTRAVENOUS at 05:10

## 2024-10-30 NOTE — ASSESSMENT & PLAN NOTE
-CTh:  IMPRESSION  1. Newly identified acute hemorrhage along the right parietal convexity.  2. Interval improvement of previous left supratentorial hemorrhage.  3. No findings to suggest large vascular territory acute ischemic insult.Additional chronic secondary findings without significant interval change.  - CTh:repeat 10/30/2024 Impression: Stable exam without significant interval change.  -CTA h/n:  Impression:  1. No large vessel occlusion.  2. Moderate to severe narrowing of the bilateral supraclinoid segments, unchanged.  -MRI brain:   -ECHO: EF:55%. Agitated saline study of the atrial septum is negative, suggesting absence of intracardiac shunt at the atrial level.    -LDL: 141   -A1c: 5.8   -TSH: Normal   -home medications include: no antiplatelets or anticoagulant    Current NIH Stroke Score Values      Flowsheet Row Most Recent Value   Interval baseline   1a. Level of Consciousness 0-->Alert, keenly responsive   1b. LOC Questions 0-->Answers both questions correctly   1c. LOC Commands 0-->Performs both tasks correctly   2. Best Gaze 0-->Normal   3. Visual 0-->No visual loss   4. Facial Palsy 0-->Normal symmetrical movements   5a. Motor Arm, Left 2-->Some effort against gravity, limb cannot get to or maintain (if cued) 90 (or 45) degrees, drifts down to bed, but has some effort against gravity   5b. Motor Arm, Right 0-->No drift, limb holds 90 (or 45) degrees for full 10 secs   6a. Motor Leg, Left 2-->Some effort against gravity, leg falls to bed by 5 secs, but has some effort against gravity   6b. Motor Leg, Right 0-->No drift, leg holds 30 degree position for full 5 secs   7. Limb Ataxia 1-->Present in one limb   8. Sensory 0-->Normal, no sensory loss   9. Best Language 1-->Mild-to-moderate aphasia, some obvious loss of fluency or facility of comprehension, without significant limitation on ideas expressed or form of expression. Reduction of speech and/or comprehension, however, makes conversation. . .  (see row details)   10. Dysarthria 0-->Normal   11. Extinction and Inattention (formerly Neglect) 0-->No abnormality   Total (NIH Stroke Scale) 6             Assessment/Plan:  - no vascular intervention indicated  - begin statin   - okay to begin dual antiplatelet therapy with ASA and Plavix after 14 days.   - stroke vascular intervention will sign off, let us know if questions or concerns

## 2024-10-30 NOTE — CONSULTS
[facility] - 7th Floor ICU  Neurology  Consult Note    Patient Name: Tali Beard  MRN: [id number]  Admission Date: 10/29/2024  Hospital Length of Stay: 0 days  Code Status: Prior   Attending Provider: Eliel Guadarrama MD   Consulting Provider: Sierra Camarena NP  Primary Care Physician: Aurora Moe FNP  Principal Problem:<principal problem not specified>    IP CONSULT TO NEURO-INTERVENTIONAL  Consult performed by: Sierra Camarena NP  Consult ordered by: Alejandra Sorenson PA         Subjective:     Chief Complaint:  Left sided weakness. AMS.     HPI:   Tali Beard is a 88 y.o. 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  Patient's  reported on 10/29 at 6:00 p.m. patient began having left arm and leg weakness and confusion.  She is not on known anticoagulation.  Patient has had a recent admit to our facility on 10/3/2024 she was conservatively treated for intraparenchymal hemorrhage of the left temporoparietal region.  Patient's  good control of pressure and no recent falls.  CT head obtained and revealed newly developed hyperdensity at the right parietal convexity is consistent with interval acute hemorrhage measuring approximately 1.9 cm x 1.9 cm x 2.5 cm.  Mild surrounding white matter edema is also evident. Previously visualized hemorrhagic left parietal cortical contusion and scattered subarachnoid blood is improved in the interval. CTA revealed diminished caliber of the M1 to M2 segments of bilateral middle cerebral arteries. The M3  segments of middle cerebral arteries appear unremarkable. This may be developmental or related to vasospasm. Repeat CT 10/30/24 revealed stable exam without significant interval change. Interventional neurology consulted for review of CTA.          Past Medical History:   Diagnosis Date    Subarachnoid hemorrhage        No past surgical history on file.    Review of patient's allergies indicates:   Allergen Reactions    Hydroxyzine hcl      Other Reaction(s): BUMPS ALL OVER BODY    Other reaction(s): BUMPS ALL OVER BODY    Felodipine Rash     Edema in the legs       No current facility-administered medications on file prior to encounter.     Current Outpatient Medications on File Prior to Encounter   Medication Sig    amLODIPine (NORVASC) 5 MG tablet Take 1 tablet (5 mg total) by mouth once daily.    latanoprost 0.005 % ophthalmic solution Place into both eyes.    levETIRAcetam (KEPPRA) 500 MG Tab Take 1 tablet (500 mg total) by mouth 2 (two) times daily.    metFORMIN (GLUCOPHAGE) 500 MG tablet Take 500 mg by mouth.    prednisoLONE acetate (PRED MILD) 0.12 % ophthalmic suspension Place 1 drop into both eyes 4 (four) times daily.    timolol maleate 0.5% (TIMOPTIC) 0.5 % Drop Place into both eyes. (Patient not taking: Reported on 10/10/2024)     Family History    None       Tobacco Use    Smoking status: Never     Passive exposure: Never    Smokeless tobacco: Never   Substance and Sexual Activity    Alcohol use: Not Currently    Drug use: Never    Sexual activity: Not Currently     Review of Systems   Unable to perform ROS: Acuity of condition     Objective:     Vital Signs (Most Recent):  Temp: 98.6 °F (37 °C) (10/30/24 0400)  Pulse: 82 (10/30/24 1200)  Resp: (!) 23 (10/30/24 1200)  BP: 126/88 (10/30/24 1115)  SpO2: (!) 92 % (10/30/24 1200) Vital Signs (24h Range):  Temp:  [98.6 °F (37 °C)] 98.6 °F (37 °C)  Pulse:  [] 82  Resp:  [13-31] 23  SpO2:  [73 %-100 %] 92 %  BP: ()/() 126/88     Weight: 65.6  kg (144 lb 10 oz)  Body mass index is 25.62 kg/m².     Physical Exam  Vitals and nursing note reviewed.   Constitutional:       General: She is not in acute distress.     Appearance: She is well-developed. She is ill-appearing. She is not toxic-appearing.   HENT:      Head: Normocephalic.      Nose: Nose normal.   Eyes:      Conjunctiva/sclera: Conjunctivae normal.   Cardiovascular:      Rate and Rhythm: Normal rate.   Pulmonary:      Effort: Pulmonary effort is normal.   Abdominal:      General: Abdomen is flat.   Skin:     General: Skin is warm and dry.   Psychiatric:         Behavior: Behavior is cooperative.          NEUROLOGICAL EXAMINATION:     MENTAL STATUS        Patient answering yes to all question.      MOTOR EXAM        Moving all extremity spontaneous.  Weakness noted on left when with drawing to pain stimuli   Exam limited related to cognition        Significant Labs: All pertinent lab results from the past 24 hours have been reviewed.    Significant Imaging: I have reviewed all pertinent imaging results/findings within the past 24 hours.  Assessment and Plan:     Atherosclerosis of both carotid arteries  10/30/2024 CTA H/N:Impression:  1. No large vessel occlusion.  2. Moderate to severe narrowing of the bilateral supraclinoid segments, unchanged.    Assessment/Plan:  - Not cause of ICH  - no vascular intervention indicated   - recommend dual antiplatelet therapy with ASA and Plavix to begin after 14 days related to recent acute intraparenchymal hemorrhage.  - Other recommendations may follow from MD           Intraparenchymal hemorrhage of brain  -CTh:  IMPRESSION  1. Newly identified acute hemorrhage along the right parietal convexity.  2. Interval improvement of previous left supratentorial hemorrhage.  3. No findings to suggest large vascular territory acute ischemic insult.Additional chronic secondary findings without significant interval change.  - CTh:repeat 10/30/2024 Impression: Stable exam  without significant interval change.  -CTA h/n:  Impression:  1. No large vessel occlusion.  2. Moderate to severe narrowing of the bilateral supraclinoid segments, unchanged.  -MRI brain:   -ECHO: EF:55%. Agitated saline study of the atrial septum is negative, suggesting absence of intracardiac shunt at the atrial level.    -LDL: 141   -A1c: 5.8   -TSH: Normal   -home medications include: no antiplatelets or anticoagulant    Current NIH Stroke Score Values      Flowsheet Row Most Recent Value   Interval baseline   1a. Level of Consciousness 0-->Alert, keenly responsive   1b. LOC Questions 0-->Answers both questions correctly   1c. LOC Commands 0-->Performs both tasks correctly   2. Best Gaze 0-->Normal   3. Visual 0-->No visual loss   4. Facial Palsy 0-->Normal symmetrical movements   5a. Motor Arm, Left 2-->Some effort against gravity, limb cannot get to or maintain (if cued) 90 (or 45) degrees, drifts down to bed, but has some effort against gravity   5b. Motor Arm, Right 0-->No drift, limb holds 90 (or 45) degrees for full 10 secs   6a. Motor Leg, Left 2-->Some effort against gravity, leg falls to bed by 5 secs, but has some effort against gravity   6b. Motor Leg, Right 0-->No drift, leg holds 30 degree position for full 5 secs   7. Limb Ataxia 1-->Present in one limb   8. Sensory 0-->Normal, no sensory loss   9. Best Language 1-->Mild-to-moderate aphasia, some obvious loss of fluency or facility of comprehension, without significant limitation on ideas expressed or form of expression. Reduction of speech and/or comprehension, however, makes conversation. . . (see row details)   10. Dysarthria 0-->Normal   11. Extinction and Inattention (formerly Neglect) 0-->No abnormality   Total (NIH Stroke Scale) 6             Assessment/Plan:  - no vascular intervention indicated  - begin statin   - okay to begin dual antiplatelet therapy with ASA and Plavix after 14 days.   - stroke vascular intervention will sign off, let  us know if questions or concerns          VTE Risk Mitigation (From admission, onward)           Ordered     Place sequential compression device  Until discontinued         10/30/24 9836                    Thank you for your consult. I will sign off. Please contact us if you have any additional questions.    Sierra Camarena NP  Neurology  [facility] - 7th Floor ICU

## 2024-10-30 NOTE — ED PROVIDER NOTES
Encounter Date: 10/29/2024    SCRIBE #1 NOTE: I, Javed Rubalcava, am scribing for, and in the presence of,  aGgan Tenorio MD. I have scribed the following portions of the note - Other sections scribed: HPI,ROS,PE.       History     Chief Complaint   Patient presents with    Extremity Weakness     L arm weakness and confusion. LKN 1800 tonight. EMS reports had brain bleed from fall 2 weeks ago. Mild LUE drift and weaker hand grasp, pt oriented to self.      87 y/o female with a PMHx of subarachnoid hemorrhage, DM, HTN, and HLD presents to ED via EMS for left sided weakness onset 18:00. EMS reports on onset the patients family noticed the patient started to have left arm and left leg weakness. EMS reports pt fell ~2x weeks ago, was diagnosed with a brain bleed, and had surgery. EMS reports since the fall the patient has been having intermittent TIA's. EMS is unsure if the patient is on blood thinners.     History and ROS limited due to pt being confused.     The history is provided by the patient and the EMS personnel. History limited by: confusion. No  was used.     Review of patient's allergies indicates:   Allergen Reactions    Hydroxyzine hcl      Other Reaction(s): BUMPS ALL OVER BODY    Other reaction(s): BUMPS ALL OVER BODY    Felodipine Rash     Edema in the legs     Past Medical History:   Diagnosis Date    Subarachnoid hemorrhage      No past surgical history on file.  No family history on file.  Social History     Tobacco Use    Smoking status: Never     Passive exposure: Never    Smokeless tobacco: Never   Substance Use Topics    Alcohol use: Not Currently    Drug use: Never     Review of Systems   Unable to perform ROS: Other       Physical Exam     Initial Vitals [10/29/24 2303]   BP Pulse Resp Temp SpO2   (!) 176/76 75 18 98.6 °F (37 °C) 99 %      MAP       --         Physical Exam    Nursing note and vitals reviewed.  Constitutional: She appears well-developed and well-nourished.    HENT:   Head: Normocephalic and atraumatic.   Eyes: EOM are normal. Pupils are equal, round, and reactive to light.   Neck:   Normal range of motion.  Cardiovascular:  Normal rate, regular rhythm, normal heart sounds and intact distal pulses.           No murmur heard.  Pulmonary/Chest: Breath sounds normal. No respiratory distress. She has no wheezes. She has no rales.   Abdominal: Abdomen is soft. She exhibits no distension. There is no abdominal tenderness. There is no rebound.   Musculoskeletal:         General: No tenderness or edema. Normal range of motion.      Cervical back: Normal range of motion.     Neurological: She is alert. No cranial nerve deficit. GCS eye subscore is 4. GCS verbal subscore is 4. GCS motor subscore is 6.   4/5 strength to LUE. 3/5 strength to LLE. 5/5 strength to RLE and RUE.   Slow to respond to questions.  Oriented to person.  Disoriented to time and place.    Skin: Skin is warm and dry. Capillary refill takes less than 2 seconds. No rash noted. No erythema.   Psychiatric: She has a normal mood and affect.         ED Course   Critical Care    Date/Time: 10/29/2024 11:04 PM    Performed by: Gagan Tenorio MD  Authorized by: Gagan Tenorio MD  Direct patient critical care time: 15 minutes  Additional history critical care time: 4 minutes  Ordering / reviewing critical care time: 6 minutes  Documentation critical care time: 7 minutes  Consulting other physicians critical care time: 3 minutes  Total critical care time (exclusive of procedural time) : 35 minutes  Critical care time was exclusive of separately billable procedures and treating other patients.  Critical care was necessary to treat or prevent imminent or life-threatening deterioration of the following conditions: CNS failure or compromise and circulatory failure.  Critical care was time spent personally by me on the following activities: development of treatment plan with patient or surrogate, discussions with  consultants, interpretation of cardiac output measurements, evaluation of patient's response to treatment, examination of patient, ordering and review of radiographic studies, ordering and review of laboratory studies, ordering and performing treatments and interventions, obtaining history from patient or surrogate, pulse oximetry and re-evaluation of patient's condition.        Labs Reviewed   CBC WITH DIFFERENTIAL - Abnormal       Result Value    WBC 4.47 (*)     RBC 5.36      Hgb 12.5      Hct 40.1      MCV 74.8 (*)     MCH 23.3 (*)     MCHC 31.2 (*)     RDW 15.7      Platelet 174      MPV 10.7 (*)     Neut % 58.2      Lymph % 24.6      Mono % 12.5      Eos % 3.8      Basophil % 0.7      Lymph # 1.10      Neut # 2.60      Mono # 0.56      Eos # 0.17      Baso # 0.03      IG# 0.01      IG% 0.2      NRBC% 0.0     CBC W/ AUTO DIFFERENTIAL    Narrative:     The following orders were created for panel order CBC W/ AUTO DIFFERENTIAL.  Procedure                               Abnormality         Status                     ---------                               -----------         ------                     CBC with Differential[[id number]]       Abnormal            Final result                 Please view results for these tests on the individual orders.   COMPREHENSIVE METABOLIC PANEL   PROTIME-INR   TSH   LIPID PANEL   TROPONIN I   B-TYPE NATRIURETIC PEPTIDE   APTT   POCT GLUCOSE, HAND-HELD DEVICE   POCT GLUCOSE    POCT Glucose 105     ISTAT CHEM8    POC Glucose 108      POC BUN 17      POC Creatinine 0.9      POC Sodium 144      POC Potassium 4.1      POC Chloride 108      POC TCO2 (MEASURED) 28      POC Anion Gap 13      POC Ionized Calcium 1.19      POC Hematocrit 43      POC HEMOGLOBIN 15      Sample VENOUS       EKG Readings: (Independently Interpreted)   Initial Reading: No STEMI. Rhythm: Normal Sinus Rhythm. Heart Rate: 72. Ectopy: PACs. Conduction: Normal. ST Segments: Normal ST Segments. T Waves Flipped: III.  Clinical Impression: Left Ventricular Hypertrophy (LDH)   Taken at 23:42       Imaging Results    None          Medications - No data to display  Medical Decision Making  Amount and/or Complexity of Data Reviewed  Independent Historian: EMS     Details: EMS reports on onset the patients family noticed the patient started to have left arm and left leg weakness. EMS reports pt fell ~2x weeks ago, was diagnosed with a brain bleed, and had surgery. EMS reports since the fall the patient has been having intermittent TIA's. EMS is unsure if the patient is on blood thinners.     Labs: ordered. Decision-making details documented in ED Course.  Radiology: ordered. Decision-making details documented in ED Course.  ECG/medicine tests: ordered and independent interpretation performed.     Details: Initial Reading: No STEMI. Rhythm: Normal Sinus Rhythm. Heart Rate: 72. Ectopy: PACs. Conduction: Normal. ST Segments: Normal ST Segments. T Waves Flipped: III. Clinical Impression: Left Ventricular Hypertrophy (LDH)   Taken at 23:42     Risk  Prescription drug management.            Scribe Attestation:   Scribe #1: I performed the above scribed service and the documentation accurately describes the services I performed. I attest to the accuracy of the note.    Attending Attestation:           Physician Attestation for Scribe:  Physician Attestation Statement for Scribe #1: I, Gagan Tenorio MD, reviewed documentation, as scribed by Javed Rubalcava in my presence, and it is both accurate and complete.                                    Clinical Impression:  Final diagnoses:  [R29.818] Acute focal neurological deficit  [R53.1] Weakness

## 2024-10-30 NOTE — PT/OT/SLP EVAL
[facility]  Speech Language Pathology Department  Clinical Swallow Evaluation    Patient Name:  Tali Beard   MRN:  [id number]    Recommendations     General recommendations:  repeat Clinical Swallow Evaluation pending participation  Solid texture recommendation:  NPO  Liquid consistency recommendation: NPO   Medications: NPO    History     Patient was an 88-year-old female with a past medical history of hypertension, hyperlipidemia, diabetes mellitus, subarachnoid hemorrhage, and recent intraparenchymal hemorrhage, who presented to the ED via EMS for left-sided weakness and confusion noticed by family member. Patient was recently admitted to our facility on 10/3 where she was conservatively treated for intraparenchymal hemorrhage of the left temporoparietal region.    Past Medical History:   Diagnosis Date    Subarachnoid hemorrhage      No past surgical history on file.    Home diet texture/consistency: Regular and thin liquids  Current method of nutrition: NPO    Subjective     Patient lethargic.    Spiritual/Cultural/Uatsdin Beliefs/Practices that affect care: no    Pain/Comfort: Pain Rating 1: 0/10    Objective     ORAL MUSCULATURE  Secretion Management: adequate    PO TRIALS  Consistency Fed By Oral Symptoms Pharyngeal Symptoms   Thin liquid by cup SLP Bolus holding None     Patient refused further PO trials despite MAX encouragement from RN, SLP, and family.     Assessment     Limited assessment due to patient refusal. Will re-assess as appropriate.    Outcome Measures     Functional Oral Intake Scale: 1 - Nothing by mouth    Goals     Multidisciplinary Problems       SLP Goals       Not on file                  Education     Patient provided with verbal education regarding ST POC.  Additional teaching is warranted.    Plan     SLP Follow-Up:  Yes   Patient to be seen:  5 x/week   Plan of Care expires:  11/06/24  Plan of Care reviewed with:  patient, son     Time Tracking      SLP Treatment Date:   10/30/24  Speech Start Time:  0930  Speech Stop Time:  0940     Speech Total Time (min):  10 min    Billable minutes:  Swallow and Oral Function Evaluation, 10 minutes     10/30/2024

## 2024-10-30 NOTE — HPI
Tali Beard is a 88 y.o. 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  Patient's  reported on 10/29 at 6:00 p.m. patient began having left arm and leg weakness and confusion.  She is not on known anticoagulation.  Patient has had a recent admit to our facility on 10/3/2024 she was conservatively treated for intraparenchymal hemorrhage of the left temporoparietal region.  Patient's  good control of pressure and no recent falls.  CT head obtained and revealed newly developed hyperdensity at the right parietal convexity is consistent with interval acute hemorrhage measuring approximately 1.9 cm x 1.9 cm x 2.5 cm.  Mild surrounding white matter edema is also evident. Previously visualized hemorrhagic left parietal cortical contusion and scattered subarachnoid blood is improved in the interval. CTA revealed diminished caliber of the M1 to M2 segments of bilateral middle cerebral arteries. The M3 segments of middle cerebral arteries appear unremarkable. This may be developmental or related to vasospasm. Repeat CT 10/30/24 revealed stable exam without significant interval change. Interventional neurology consulted for review of CTA.

## 2024-10-30 NOTE — PROGRESS NOTES
Updated to Neurology consult note:  See Dr. Morfin's note regarding continued work up for other differential diagnosis considering the history of recurrent spontaneous intraparenchymal hemorrhages, consider inflammatory disease including secondary vasculitides, primary CNS vasculitis, inflammatory variant of cerebral amyloidosis (CAARI).  With recommendation for primary cerebral angiogram and lumbar puncture with HIV1 and 2 PRC, Lyme PCR, VDRL/treponema pallidum antibody, VZV PCR, oligoclonal bands and IgG index and synthesis rate studies. Blood work for secondary vasculitides, ESR, CRP, LAMINE, TYLOR, ANCA and anti histone proteins.     Vascular neurology will continue to follow.

## 2024-10-30 NOTE — H&P
[facility] - 7th Floor ICU  Pulmonary Critical Care Note    Patient Name: Tali Beard  MRN: [id number]  Admission Date: 10/29/2024  Hospital Length of Stay: 0 days  Code Status: Prior  Attending Provider: Eliel Guadarrama MD  Primary Care Provider: Aurora Moe FNP     Subjective:     HPI:   Patient was an 88-year-old female with a past medical history of hypertension, hyperlipidemia, diabetes mellitus, subarachnoid hemorrhage, and recent intraparenchymal hemorrhage who presented to the ED via EMS for left-sided weakness and confusion noticed by family member.  Patient is family noticed that patient started to have left arm and leg weakness and noticed that patient was confused.  Patient's  states that this occurred around 6:00 p.m. yesterday.  She is not on any known anticoagulation.  Of significant note, patient was recently admitted to our facility where she was treated for intraparenchymal hemorrhage of the left temporoparietal region.  Since, patient's  reports good control of home blood pressure and no recent falls.     CT head obtained in the ED displayed a acute hemorrhagic infarct of the right parietal lobe measuring 2 x 2 x 2.5.  CTA displayed diminished caliber of M1 and M2 segments of bilateral MCA possibly indicative of vasospasm.  Neurosurgery was consulted by ED. ICU consulted for admission.    Hospital Course/Significant events:  10/30/2024: Patient admitted to the ICU    24 Hour Interval History:  NA    Past Medical History:   Diagnosis Date    Subarachnoid hemorrhage        No past surgical history on file.    Social History     Socioeconomic History    Marital status:    Tobacco Use    Smoking status: Never     Passive exposure: Never    Smokeless tobacco: Never   Substance and Sexual Activity    Alcohol use: Not Currently    Drug use: Never    Sexual activity: Not Currently     Social Drivers of Health     Financial Resource Strain: Low Risk   (10/4/2024)    Overall Financial Resource Strain (CARDIA)     Difficulty of Paying Living Expenses: Not hard at all   Food Insecurity: No Food Insecurity (10/4/2024)    Hunger Vital Sign     Worried About Running Out of Food in the Last Year: Never true     Ran Out of Food in the Last Year: Never true   Transportation Needs: No Transportation Needs (10/4/2024)    TRANSPORTATION NEEDS     Transportation : No   Physical Activity: Inactive (10/4/2024)    Exercise Vital Sign     Days of Exercise per Week: 0 days     Minutes of Exercise per Session: 0 min   Stress: No Stress Concern Present (10/4/2024)    Citizen of [place] [employer] - Occupational Stress Questionnaire     Feeling of Stress : Not at all   Housing Stability: Low Risk  (10/4/2024)    Housing Stability Vital Sign     Unable to Pay for Housing in the Last Year: No     Homeless in the Last Year: No           Current Outpatient Medications   Medication Instructions    amLODIPine (NORVASC) 5 mg, Oral, Daily    latanoprost 0.005 % ophthalmic solution Place into both eyes.    levETIRAcetam (KEPPRA) 500 mg, Oral, 2 times daily    metFORMIN (GLUCOPHAGE) 500 mg    prednisoLONE acetate (PRED MILD) 0.12 % ophthalmic suspension 1 drop, 4 times daily    timolol maleate 0.5% (TIMOPTIC) 0.5 % Drop Place into both eyes.       Current Inpatient Medications   dexAMETHasone  4 mg Intravenous Q6H    levETIRAcetam (Keppra) IV (PEDS and ADULTS)  1,000 mg Intravenous Q12H    [START ON 10/31/2024] mupirocin   Nasal BID       Current Intravenous Infusions   nicardipine  0-15 mg/hr Intravenous Continuous 12.5 mL/hr at 10/30/24 0103 2.5 mg/hr at 10/30/24 0103         ROS       Objective:       Intake/Output Summary (Last 24 hours) at 10/30/2024 0329  Last data filed at 10/30/2024 0103  Gross per 24 hour   Intake 56.87 ml   Output --   Net 56.87 ml         Vital Signs (Most Recent):  Temp: 98.6 °F (37 °C) (10/30/24 0220)  Pulse: 86 (10/30/24 0300)  Resp: (!) 22 (10/30/24  0300)  BP: 138/67 (10/30/24 0300)  SpO2: 98 % (10/30/24 0300)  Body mass index is 25.62 kg/m².  Weight: 65.6 kg (144 lb 10 oz) Vital Signs (24h Range):  Temp:  [98.6 °F (37 °C)] 98.6 °F (37 °C)  Pulse:  [75-92] 86  Resp:  [15-30] 22  SpO2:  [91 %-100 %] 98 %  BP: (110-183)/() 138/67     Physical Exam  Vitals reviewed.   Constitutional:       General: She is not in acute distress.  Eyes:      Pupils: Pupils are equal, round, and reactive to light.   Cardiovascular:      Rate and Rhythm: Normal rate and regular rhythm.      Heart sounds: No murmur heard.     No friction rub. No gallop.   Pulmonary:      Breath sounds: No wheezing, rhonchi or rales.   Abdominal:      General: There is no distension.      Palpations: Abdomen is soft.      Tenderness: There is no abdominal tenderness.   Musculoskeletal:      Right lower leg: No edema.      Left lower leg: No edema.   Neurological:      Mental Status: She is alert.      Comments: AOx2  Patient with 5/5 MS of RUE and RLE. 3/5 on left side. Patient somewhat confused and with difficulty following commands.          Lines/Drains/Airways       Drain  Duration             Female External Urinary Catheter w/ Suction 10/29/24 2330 <1 day              Peripheral Intravenous Line  Duration                  Peripheral IV - Single Lumen 10/29/24 2306 20 G Right Antecubital <1 day         Peripheral IV - Single Lumen 10/29/24 2355 20 G Anterior;Left;Proximal Forearm <1 day         Peripheral IV - Single Lumen 10/30/24 0015 20 G Posterior;Right Forearm <1 day                    Significant Labs:    Lab Results   Component Value Date    WBC 4.47 (L) 10/29/2024    HGB 12.5 10/29/2024    HCT 40.1 10/29/2024    MCV 74.8 (L) 10/29/2024     10/29/2024           BMP  Lab Results   Component Value Date     10/29/2024    K 4.0 10/29/2024    CO2 24 10/29/2024    BUN 14.7 10/29/2024    CREATININE 0.92 10/29/2024    CALCIUM 9.9 10/29/2024    AGAP 10.0 10/29/2024    EGFRNONAA 50  "09/02/2021         ABG  No results for input(s): "PH", "PO2", "PCO2", "HCO3", "POCBASEDEF" in the last 168 hours.    Mechanical Ventilation Support:         Significant Imaging:  I have reviewed the pertinent imaging within the past 24 hours.        Assessment/Plan:     Assessment  Right parietal hemorrhagic infarct  Possible vasospasm of M1 and M2 segment of MCA  History of recent left temporoparietal intraparenchymal hemorrhage  History of subarachnoid hemorrhage  History of hypertension  History of hyperlipidemia    Plan  Admit to ICU  Q1hr neuro checks for the time being; STAT CT head for any acute neurological change  Continue Keppra BID  Maintain SBP < 140; patient currently on Cardene ggt  Decadron 4mg Q6hr   Neurosurgery consulted; appreciate recommendations  PT/OT as appropriate; SLP consulted    DVT Prophylaxis: SCD  GI Prophylaxis: NA     32 minutes of critical care was time spent personally by me on the following activities: development of treatment plan with patient or surrogate and bedside caregivers, discussions with consultants, evaluation of patient's response to treatment, examination of patient, ordering and performing treatments and interventions, ordering and review of laboratory studies, ordering and review of radiographic studies, pulse oximetry, re-evaluation of patient's condition.  This critical care time did not overlap with that of any other provider or involve time for any procedures.     Juan Celis,   Pulmonary Critical Care Medicine  [facility] - 7th Floor ICU  DOS: 10/30/2024   "

## 2024-10-30 NOTE — SUBJECTIVE & OBJECTIVE
Past Medical History:   Diagnosis Date    Subarachnoid hemorrhage        No past surgical history on file.    Review of patient's allergies indicates:   Allergen Reactions    Hydroxyzine hcl      Other Reaction(s): BUMPS ALL OVER BODY    Other reaction(s): BUMPS ALL OVER BODY    Felodipine Rash     Edema in the legs       No current facility-administered medications on file prior to encounter.     Current Outpatient Medications on File Prior to Encounter   Medication Sig    amLODIPine (NORVASC) 5 MG tablet Take 1 tablet (5 mg total) by mouth once daily.    latanoprost 0.005 % ophthalmic solution Place into both eyes.    levETIRAcetam (KEPPRA) 500 MG Tab Take 1 tablet (500 mg total) by mouth 2 (two) times daily.    metFORMIN (GLUCOPHAGE) 500 MG tablet Take 500 mg by mouth.    prednisoLONE acetate (PRED MILD) 0.12 % ophthalmic suspension Place 1 drop into both eyes 4 (four) times daily.    timolol maleate 0.5% (TIMOPTIC) 0.5 % Drop Place into both eyes. (Patient not taking: Reported on 10/10/2024)     Family History    None       Tobacco Use    Smoking status: Never     Passive exposure: Never    Smokeless tobacco: Never   Substance and Sexual Activity    Alcohol use: Not Currently    Drug use: Never    Sexual activity: Not Currently     Review of Systems   Unable to perform ROS: Acuity of condition     Objective:     Vital Signs (Most Recent):  Temp: 98.6 °F (37 °C) (10/30/24 0400)  Pulse: 82 (10/30/24 1200)  Resp: (!) 23 (10/30/24 1200)  BP: 126/88 (10/30/24 1115)  SpO2: (!) 92 % (10/30/24 1200) Vital Signs (24h Range):  Temp:  [98.6 °F (37 °C)] 98.6 °F (37 °C)  Pulse:  [] 82  Resp:  [13-31] 23  SpO2:  [73 %-100 %] 92 %  BP: ()/() 126/88     Weight: 65.6 kg (144 lb 10 oz)  Body mass index is 25.62 kg/m².     Physical Exam  Vitals and nursing note reviewed.   Constitutional:       General: She is not in acute distress.     Appearance: She is well-developed. She is ill-appearing. She is not  toxic-appearing.   HENT:      Head: Normocephalic.      Nose: Nose normal.   Eyes:      Conjunctiva/sclera: Conjunctivae normal.   Cardiovascular:      Rate and Rhythm: Normal rate.   Pulmonary:      Effort: Pulmonary effort is normal.   Abdominal:      General: Abdomen is flat.   Skin:     General: Skin is warm and dry.   Psychiatric:         Behavior: Behavior is cooperative.          NEUROLOGICAL EXAMINATION:     MENTAL STATUS        Patient answering yes to all question.      MOTOR EXAM        Moving all extremity spontaneous.  Weakness noted on left when with drawing to pain stimuli   Exam limited related to cognition        Significant Labs: All pertinent lab results from the past 24 hours have been reviewed.    Significant Imaging: I have reviewed all pertinent imaging results/findings within the past 24 hours.

## 2024-10-30 NOTE — ASSESSMENT & PLAN NOTE
10/30/2024 CTA H/N:Impression:  1. No large vessel occlusion.  2. Moderate to severe narrowing of the bilateral supraclinoid segments, unchanged.    Assessment/Plan:  - Not cause of ICH  - no vascular intervention indicated   - recommend dual antiplatelet therapy with ASA and Plavix to begin after 14 days related to recent acute intraparenchymal hemorrhage.  - Other recommendations may follow from MD

## 2024-10-30 NOTE — PT/OT/SLP PROGRESS
[facility]  Speech Language Pathology Department  Dysphagia Therapy Progress Note    Patient Name:  Tali Beard   MRN:  [id number]    Recommendations     General recommendations:  SLP follow up x1 and Speech/Language and Cognitive Evaluation  Solid texture recommendation:  Soft & Bite Sized Diet - IDDSI Level 6  Liquid consistency recommendation: Thin liquids - IDDSI Level 0   Medications: crushed in puree  Aspiration precautions: small bites/sips, slow rate, alternate solids/liquids, upright for PO intake, and assist with feeding as needed    Discharge therapy intensity:     Barriers to safe discharge:  acuity of illness and level of skilled assistance needed    Subjective     Patient awake and alert.  Spiritual/Cultural/Denominational Beliefs/Practices that affect care: no    Pain/Comfort: Pain Rating 1: 0/10    Objective     Patient more awake/alert and cooperative this PM. SLP following up for re-assessment.    Therapeutic PO Trials:  Consistency Amount Fed By Oral Symptoms Pharyngeal Symptoms   Thin liquid by straw Multiple trials Patient's son Bolus holding None   Chewable solid Multiple trials Patient's son Bolus holding  Prolonged bolus formation/mastication None     Assessment     Pt continues to present with oral dysphagia requiring diet modification to improve swallow safety and efficiency.    Outcome Measures     Functional Oral Intake Scale: 5 - Total oral diet with multiple consistencies, by requiring special preparation or compensations    Goals     Multidisciplinary Problems       SLP Goals       Not on file                  Patient Education     Patient and son/s were provided with verbal education regarding ST POC.  Additional teaching is warranted.    Plan     Will continue to follow and tx as appropriate.    SLP Follow-Up:  Yes   Patient to be seen:  5 x/week   Plan of Care expires:  11/06/24  Plan of Care reviewed with:  patient, son       Time Tracking     SLP  Treatment Date:   10/30/24  Speech Start Time:  1530  Speech Stop Time:  1540     Speech Total Time (min):  10 min    Billable minutes:  Treatment of Swallow Dysfunction, 10 minutes       10/30/2024

## 2024-10-30 NOTE — CONSULTS
[facility] - 7th Floor ICU  Neurosurgery  Consult Note    Inpatient consult to Neurosurgery  Consult performed by: Alejadnra Sorenson PA  Consult ordered by: Alejandra Sorenson PA        Subjective:     Chief Complaint/Reason for Admission: HA, AMS    History of Present Illness: Patient was an 88-year-old female with a past medical history of hypertension, hyperlipidemia, diabetes mellitus, subarachnoid hemorrhage, and recent intraparenchymal hemorrhage, who presented to the ED via EMS for left-sided weakness and confusion noticed by family member. Patient's family noticed that patient started to have left arm and leg weakness and noticed that patient was confused. Patient's  states that this occurred around 6:00 p.m. 10/29.  She is not on any known anticoagulation.  Of significant note, patient was recently admitted to our facility on 10/3 where she was conservatively treated for intraparenchymal hemorrhage of the left temporoparietal region by Dr. Quinones. Since then, patient's  reports good control of home blood pressure and no recent falls.      CT head obtained in the ED displayed a acute hemorrhagic infarct of the right parietal lobe measuring 2 x 2 x 2.5.  CTA displayed diminished caliber of M1 and M2 segments of bilateral MCA possibly indicative of vasospasm. Dr. Quinones was consulted for evaluation and treatment recommendations.    On PE this am she is sitting up in bed, NAD. She is alert, answers yes to all questions. She intermittently follows commands but is moving all ext spontaneously.    PTA Medications   Medication Sig    amLODIPine (NORVASC) 5 MG tablet Take 1 tablet (5 mg total) by mouth once daily.    latanoprost 0.005 % ophthalmic solution Place into both eyes.    levETIRAcetam (KEPPRA) 500 MG Tab Take 1 tablet (500 mg total) by mouth 2 (two) times daily.    metFORMIN (GLUCOPHAGE) 500 MG tablet Take 500 mg by mouth.    prednisoLONE acetate (PRED MILD) 0.12 % ophthalmic  suspension Place 1 drop into both eyes 4 (four) times daily.    timolol maleate 0.5% (TIMOPTIC) 0.5 % Drop Place into both eyes. (Patient not taking: Reported on 10/10/2024)       Review of patient's allergies indicates:   Allergen Reactions    Hydroxyzine hcl      Other Reaction(s): BUMPS ALL OVER BODY    Other reaction(s): BUMPS ALL OVER BODY    Felodipine Rash     Edema in the legs       Past Medical History:   Diagnosis Date    Subarachnoid hemorrhage      No past surgical history on file.  Family History    None       Tobacco Use    Smoking status: Never     Passive exposure: Never    Smokeless tobacco: Never   Substance and Sexual Activity    Alcohol use: Not Currently    Drug use: Never    Sexual activity: Not Currently     Review of Systems  Objective:   Unable to obtain d/t AMS. No family present    Weight: 65.6 kg (144 lb 10 oz)  Body mass index is 25.62 kg/m².  Vital Signs (Most Recent):  Temp: 98.6 °F (37 °C) (10/30/24 0400)  Pulse: 79 (10/30/24 0945)  Resp: 20 (10/30/24 0945)  BP: (!) 153/77 (10/30/24 0930)  SpO2: (!) 92 % (10/30/24 0945) Vital Signs (24h Range):  Temp:  [98.6 °F (37 °C)] 98.6 °F (37 °C)  Pulse:  [] 79  Resp:  [13-31] 20  SpO2:  [73 %-100 %] 92 %  BP: (100-183)/() 153/77                         Female External Urinary Catheter w/ Suction 10/29/24 2330 (Active)   Skin no redness;no breakdown 10/30/24 0800   Tolerance no signs/symptoms of discomfort 10/30/24 0800   Suction Continuous suction at 70 mmHg 10/30/24 0800   Date of last wick change 10/30/24 10/30/24 0800   Time of last wick change 0600 10/30/24 0800       Physical Exam:  Nursing note and vitals reviewed.    Constitutional: She appears well-developed and well-nourished. No distress.     Eyes: Pupils are equal, round, and reactive to light. EOM are normal.     Cardiovascular: Intact distal pulses.     Abdominal: Soft.     Psych/Behavior:   Alert, oriented to person at times. Answers yes to most questions.  Intermittently follows commands in right UE but is moving all ext spontaneously.     Musculoskeletal:      Comments: Moving all ext spontaneously, no deficits appreciated     Did not smile when asked  Unable to participate in pronator exam    Significant Labs:  Recent Labs   Lab 10/29/24  2313 10/30/24  0354    141   K 4.0 3.8   * 108*   CO2 24 25   BUN 14.7 11.8   CREATININE 0.92 0.88   CALCIUM 9.9 9.3     Recent Labs   Lab 10/29/24  2309 10/29/24  2313 10/30/24  0354   WBC  --  4.47* 5.19   HGB  --  12.5 11.6*   HCT 43 40.1 37.8   PLT  --  174 174     Recent Labs   Lab 10/29/24  2306 10/29/24  2313   INR 1.0 0.9   APTT  --  26.9     Microbiology Results (last 7 days)       ** No results found for the last 168 hours. **          Significant Diagnostics:  CT HEAD FOR STROKE [[id number]] Resulted: 10/30/24 1024   Order Status: Completed Updated: 10/30/24 1026   Narrative:     EXAMINATION  CT HEAD FOR STROKE    CLINICAL HISTORY  Neuro deficit, acute, stroke suspected;    TECHNIQUE  Non-contrast axial CT images were acquired and multiplanar reconstructions accomplished by a CT technologist at a separate workstation, pushed to PACS for physician review.    COMPARISON  5 October 2024    FINDINGS  Images were reviewed in subdural, brain, soft tissue, and bone windows.    Exam quality: Motion/streak artifact limits assessment of the posterior fossa.    Hemorrhage: Newly developed hyperdensity at the right parietal convexity is consistent with interval acute hemorrhage measuring approximately 1.9 cm x 1.9 cm x 2.5 cm.  Mild surrounding white matter edema is also evident.  Previously visualized hemorrhagic left parietal cortical contusion and scattered subarachnoid blood is improved in the interval.    Parenchyma: No development of discrete mass, localized mass-effect, or CT evidence of an acute territorial cortical-based ischemic insult. Gray-white differentiation is preserved.  Chronic periventricular white  "matter parenchymal changes and appearance of mild right occipital encephalomalacia is similar in comparison.    Midline shift: None.    CSF spaces: Normal ventricle size and configuration. No masses or expansile fluid collections.    Scalp/Skull: No abnormalities.    Skull base: Mastoid air cells are well aerated. No focal abnormality of the sella.    Vasculature: No hyperdense artery identified. Similar appearance of diffusely calcified intracranial ICA segments.    Facial structures: Unremarkable.    IMPRESSION  1. Newly identified acute hemorrhage along the right parietal convexity.  2. Interval improvement of previous left supratentorial hemorrhage.  3. No findings to suggest large vascular territory acute ischemic insult.Additional chronic secondary findings without significant interval change.  ==========    In accordance with the "Code FAST" protocol, above findings were reported to Dr. Tenorio (Emergency Dept) prior to completion of the final report (29 October 2024, 23:36).    RECOMMENDATION(S)  Additional assessment utilizing brain MRI would allow more sensitive evaluation if symptoms persist or there is other cause for elevated clinical concern.    RADIATION DOSE  Automated tube current modulation, weight-based exposure dosing, and/or iterative reconstruction technique utilized to reach lowest reasonably achievable exposure rate.    DLP: 2592 mGy*cm      Electronically signed by: Price Henning  Date: 10/30/2024  Time: 10:24   CTA Head and Neck (xpd) [[id number]] Resulted: 10/29/24 2355   Order Status: Completed Updated: 10/29/24 2355   Narrative:     START OF REPORT:  Technique: CT angiogram of the intracranial vessels was performed with intravenous contrast with direct axial as well as sagittal and coronal reformations. CT angiogram of the neck vessels was performed with intravenous contrast with direct axial as well as.    Comparison: None.    Clinical history: Transient ischemic attack " (TIA).    Findings:  Intracranial Vascular structures:  Internal carotid arteries: Mild atheromatous calcification of the clinoid and supraclinoid segments of the bilateral internal carotid arteries is seen with associated mild stenosis.  Middle cerebral arteries: There is diminished caliber of the M1 to M2 segments of bilateral middle cerebral arteries. The M3 segments of middle cerebral arteries appear unremarkable.  Anterior cerebral arteries: Unremarkable.  Vertebral arteries: Unremarkable.  Basilar artery: Unremarkable.  Posterior cerebral arteries: Unremarkable.  Posterior communicating arteries: Unremarkable.  Neck Vascular structures: The visualized aorta and origin of the great vessels of the neck appear unremarkable.  Carotids:  Common carotid arteries: Unremarkable.  Internal carotid artery: Unremarkable.  Vertebral arteries: Unremarkable.  Brain parenchyma: No abnormal intracranial enhancement is seen on the post contrast images.      Impression:  1. There is diminished caliber of the M1 to M2 segments of bilateral middle cerebral arteries. The M3 segments of middle cerebral arteries appear unremarkable. This may be developmental or related to vasospasm. Correlate clinically as regards further evaluation and follow-up.  2. Details and other findings as described above.       Assessment/Plan:     IPH  AMS  HTN    She is currently [id number]  CT head shows acute hemorrhage along the right parietal convexity with improved previous left supratentorial hemorrhage  CTA results noted; interventional neurology consulted  No plans for surgical intervention at this times  Continue Q1 hour neuro exams  Continue BP parameters below 140/90  Keppra BID  Repeat CT head this am     Thank you for your consult. I will follow-up with patient. Please contact us if you have any additional questions.    NIKUNJ Chávez  Neurosurgery  [facility] - 7th Floor ICU

## 2024-10-30 NOTE — PLAN OF CARE
10/30/24 1055   Discharge Assessment   Assessment Type Discharge Planning Assessment   Confirmed/corrected address, phone number and insurance Yes   Confirmed Demographics Correct on Facesheet   Source of Information family   When was your last doctors appointment? 07/23/24   Communicated SENDY with patient/caregiver Date not available/Unable to determine   Reason For Admission [facility]   People in Home alone   Facility Arrived From: none   Prior to hospitilization cognitive status: Alert/Oriented   Current cognitive status: Unable to Assess   Walking or Climbing Stairs Difficulty no   Dressing/Bathing Difficulty no   Home Accessibility wheelchair accessible   Equipment Currently Used at Home none   Patient currently being followed by outpatient case management? No   Do you currently have service(s) that help you manage your care at home? Yes   How Many hours does patient receive services 2   Name and Contact number of agency [facility]   Is the pt/caregiver preference to resume services with current agency Yes   Do you take prescription medications? Yes   Do you have prescription coverage? Yes   Coverage Medicaid   Do you have any problems affording any of your prescribed medications? No   Is the patient taking medications as prescribed? yes   Who is going to help you get home at discharge? family   How do you get to doctors appointments? family or friend will provide   Are you on dialysis? No   Do you take coumadin? No   Discharge Plan A Other  (TBD pending progress)   Discharge Plan B Other  (TBD)   DME Needed Upon Discharge  other (see comments)  (to be determined)   Discharge Plan discussed with: Adult children   Transition of Care Barriers None     Patient lives with alone. 3 adult sons live locally and 2 sons live out of [facility], [place] and [place].  She has [facility] currently for PT and SN, ST. Send updates via Care Port.  She was recently discharged from hospital.

## 2024-10-31 ENCOUNTER — ANESTHESIA EVENT (OUTPATIENT)
Dept: INTERVENTIONAL RADIOLOGY/VASCULAR | Facility: HOSPITAL | Age: 89
End: 2024-10-31
Payer: MEDICARE

## 2024-10-31 PROBLEM — I67.2 INTRACRANIAL ATHEROSCLEROSIS: Status: ACTIVE | Noted: 2024-10-30

## 2024-10-31 PROBLEM — I61.2 NONTRAUMATIC HEMORRHAGE OF RIGHT CEREBRAL HEMISPHERE: Status: ACTIVE | Noted: 2024-10-04

## 2024-10-31 PROBLEM — R29.818 ACUTE FOCAL NEUROLOGICAL DEFICIT: Status: ACTIVE | Noted: 2024-10-31

## 2024-10-31 LAB
ALBUMIN SERPL-MCNC: 3.2 G/DL (ref 3.4–4.8)
ALBUMIN/GLOB SERPL: 0.9 RATIO (ref 1.1–2)
ALP SERPL-CCNC: 64 UNIT/L (ref 40–150)
ALT SERPL-CCNC: 15 UNIT/L (ref 0–55)
ANION GAP SERPL CALC-SCNC: 12 MEQ/L
APPEARANCE CSF: CLEAR
AST SERPL-CCNC: 24 UNIT/L (ref 5–34)
BASOPHILS # BLD AUTO: 0.03 X10(3)/MCL
BASOPHILS NFR BLD AUTO: 0.5 %
BILIRUB SERPL-MCNC: 0.7 MG/DL
BUN SERPL-MCNC: 8.7 MG/DL (ref 9.8–20.1)
C GATTII+NEOFOR DNA CSF QL NAA+NON-PROBE: NOT DETECTED
CALCIUM SERPL-MCNC: 9.3 MG/DL (ref 8.4–10.2)
CHLORIDE SERPL-SCNC: 109 MMOL/L (ref 98–107)
CMV DNA CSF QL NAA+NON-PROBE: NOT DETECTED
CO2 SERPL-SCNC: 21 MMOL/L (ref 23–31)
COLOR CSF: COLORLESS
CREAT SERPL-MCNC: 0.8 MG/DL (ref 0.55–1.02)
CREAT/UREA NIT SERPL: 11
E COLI K1 DNA CSF QL NAA+NON-PROBE: NOT DETECTED
EOSINOPHIL # BLD AUTO: 0.05 X10(3)/MCL (ref 0–0.9)
EOSINOPHIL NFR BLD AUTO: 0.8 %
ERYTHROCYTE [DISTWIDTH] IN BLOOD BY AUTOMATED COUNT: 15.9 % (ref 11.5–17)
EV RNA CSF QL NAA+NON-PROBE: NOT DETECTED
GFR SERPLBLD CREATININE-BSD FMLA CKD-EPI: >60 ML/MIN/1.73/M2
GLOBULIN SER-MCNC: 3.5 GM/DL (ref 2.4–3.5)
GLUCOSE CSF-MCNC: 66 MG/DL (ref 40–70)
GLUCOSE SERPL-MCNC: 103 MG/DL (ref 82–115)
GP B STREP DNA CSF QL NAA+NON-PROBE: NOT DETECTED
HAEM INFLU DNA CSF QL NAA+NON-PROBE: NOT DETECTED
HCT VFR BLD AUTO: 39 % (ref 37–47)
HGB BLD-MCNC: 11.9 G/DL (ref 12–16)
HHV6 DNA CSF QL NAA+NON-PROBE: NOT DETECTED
HIV 1+2 AB+HIV1 P24 AG SERPL QL IA: NONREACTIVE
HSV1 DNA CSF QL NAA+NON-PROBE: NOT DETECTED
HSV2 DNA CSF QL NAA+NON-PROBE: NOT DETECTED
IMM GRANULOCYTES # BLD AUTO: 0.02 X10(3)/MCL (ref 0–0.04)
IMM GRANULOCYTES NFR BLD AUTO: 0.3 %
INDIA INK PREP CSF: NEGATIVE
L MONOCYTOG DNA CSF QL NAA+NON-PROBE: NOT DETECTED
LYMPHOCYTE MAN % CSF (OLG): 74 %
LYMPHOCYTES # BLD AUTO: 1.38 X10(3)/MCL (ref 0.6–4.6)
LYMPHOCYTES NFR BLD AUTO: 22 %
MCH RBC QN AUTO: 23.2 PG (ref 27–31)
MCHC RBC AUTO-ENTMCNC: 30.5 G/DL (ref 33–36)
MCV RBC AUTO: 76 FL (ref 80–94)
MONOCYTE MAN % CSF (OLG): 9 %
MONOCYTES # BLD AUTO: 0.78 X10(3)/MCL (ref 0.1–1.3)
MONOCYTES NFR BLD AUTO: 12.4 %
N MEN DNA CSF QL NAA+NON-PROBE: NOT DETECTED
NEUTROPHILS # BLD AUTO: 4.01 X10(3)/MCL (ref 2.1–9.2)
NEUTROPHILS MAN % CSF (OLG): 17 %
NEUTROPHILS NFR BLD AUTO: 64 %
NRBC BLD AUTO-RTO: 0 %
OHS QRS DURATION: 70 MS
OHS QTC CALCULATION: 437 MS
PARECHOVIRUS A RNA CSF QL NAA+NON-PROBE: NOT DETECTED
PLATELET # BLD AUTO: 161 X10(3)/MCL (ref 130–400)
PMV BLD AUTO: 10.3 FL (ref 7.4–10.4)
POTASSIUM SERPL-SCNC: 4 MMOL/L (ref 3.5–5.1)
PROT CSF-MCNC: 82.3 MG/DL (ref 15–45)
PROT SERPL-MCNC: 6.7 GM/DL (ref 5.8–7.6)
RBC # BLD AUTO: 5.13 X10(6)/MCL (ref 4.2–5.4)
RBC # CSF MANUAL: 2 /UL
S PNEUM DNA CSF QL NAA+NON-PROBE: NOT DETECTED
SODIUM SERPL-SCNC: 142 MMOL/L (ref 136–145)
TNC CSF (OLG): 1 /UL
TUBE NUMBER CSF (BEAKER): 3
VZV DNA CSF QL NAA+NON-PROBE: NOT DETECTED
WBC # BLD AUTO: 6.27 X10(3)/MCL (ref 4.5–11.5)

## 2024-10-31 PROCEDURE — 87070 CULTURE OTHR SPECIMN AEROBIC: CPT | Performed by: NURSE PRACTITIONER

## 2024-10-31 PROCEDURE — 82784 ASSAY IGA/IGD/IGG/IGM EACH: CPT

## 2024-10-31 PROCEDURE — 85025 COMPLETE CBC W/AUTO DIFF WBC: CPT

## 2024-10-31 PROCEDURE — 63600175 PHARM REV CODE 636 W HCPCS: Performed by: STUDENT IN AN ORGANIZED HEALTH CARE EDUCATION/TRAINING PROGRAM

## 2024-10-31 PROCEDURE — 87798 DETECT AGENT NOS DNA AMP: CPT | Performed by: NURSE PRACTITIONER

## 2024-10-31 PROCEDURE — 20000000 HC ICU ROOM

## 2024-10-31 PROCEDURE — 86592 SYPHILIS TEST NON-TREP QUAL: CPT | Performed by: NURSE PRACTITIONER

## 2024-10-31 PROCEDURE — 99233 SBSQ HOSP IP/OBS HIGH 50: CPT | Mod: FS,,, | Performed by: PSYCHIATRY & NEUROLOGY

## 2024-10-31 PROCEDURE — 97162 PT EVAL MOD COMPLEX 30 MIN: CPT

## 2024-10-31 PROCEDURE — 63600175 PHARM REV CODE 636 W HCPCS: Performed by: INTERNAL MEDICINE

## 2024-10-31 PROCEDURE — 95819 EEG AWAKE AND ASLEEP: CPT

## 2024-10-31 PROCEDURE — 25000003 PHARM REV CODE 250: Performed by: INTERNAL MEDICINE

## 2024-10-31 PROCEDURE — 84157 ASSAY OF PROTEIN OTHER: CPT | Performed by: PSYCHIATRY & NEUROLOGY

## 2024-10-31 PROCEDURE — 97166 OT EVAL MOD COMPLEX 45 MIN: CPT

## 2024-10-31 PROCEDURE — 87899 AGENT NOS ASSAY W/OPTIC: CPT

## 2024-10-31 PROCEDURE — 63600175 PHARM REV CODE 636 W HCPCS

## 2024-10-31 PROCEDURE — 25000003 PHARM REV CODE 250: Performed by: STUDENT IN AN ORGANIZED HEALTH CARE EDUCATION/TRAINING PROGRAM

## 2024-10-31 PROCEDURE — 009U3ZX DRAINAGE OF SPINAL CANAL, PERCUTANEOUS APPROACH, DIAGNOSTIC: ICD-10-PCS | Performed by: RADIOLOGY

## 2024-10-31 PROCEDURE — 87483 CNS DNA AMP PROBE TYPE 12-25: CPT

## 2024-10-31 PROCEDURE — 80053 COMPREHEN METABOLIC PANEL: CPT

## 2024-10-31 PROCEDURE — 89051 BODY FLUID CELL COUNT: CPT | Performed by: NURSE PRACTITIONER

## 2024-10-31 PROCEDURE — 36415 COLL VENOUS BLD VENIPUNCTURE: CPT

## 2024-10-31 PROCEDURE — 87210 SMEAR WET MOUNT SALINE/INK: CPT

## 2024-10-31 PROCEDURE — 87205 SMEAR GRAM STAIN: CPT | Performed by: NURSE PRACTITIONER

## 2024-10-31 PROCEDURE — 82945 GLUCOSE OTHER FLUID: CPT | Performed by: NURSE PRACTITIONER

## 2024-10-31 PROCEDURE — 83916 OLIGOCLONAL BANDS: CPT

## 2024-10-31 RX ORDER — GENTAMICIN SULFATE 1 MG/G
OINTMENT TOPICAL DAILY
Status: DISCONTINUED | OUTPATIENT
Start: 2024-10-31 | End: 2024-11-06 | Stop reason: HOSPADM

## 2024-10-31 RX ORDER — LIDOCAINE HYDROCHLORIDE 20 MG/ML
INJECTION INTRAVENOUS
Status: DISCONTINUED | OUTPATIENT
Start: 2024-10-31 | End: 2024-10-31

## 2024-10-31 RX ORDER — SODIUM CHLORIDE 0.9 % (FLUSH) 0.9 %
10 SYRINGE (ML) INJECTION
Status: DISCONTINUED | OUTPATIENT
Start: 2024-10-31 | End: 2024-11-06 | Stop reason: HOSPADM

## 2024-10-31 RX ORDER — ONDANSETRON HYDROCHLORIDE 2 MG/ML
INJECTION, SOLUTION INTRAVENOUS
Status: DISCONTINUED | OUTPATIENT
Start: 2024-10-31 | End: 2024-10-31

## 2024-10-31 RX ORDER — BISACODYL 10 MG/1
10 SUPPOSITORY RECTAL DAILY PRN
Status: DISCONTINUED | OUTPATIENT
Start: 2024-10-31 | End: 2024-11-06 | Stop reason: HOSPADM

## 2024-10-31 RX ORDER — ROCURONIUM BROMIDE 10 MG/ML
INJECTION, SOLUTION INTRAVENOUS
Status: DISCONTINUED | OUTPATIENT
Start: 2024-10-31 | End: 2024-10-31

## 2024-10-31 RX ORDER — PROPOFOL 10 MG/ML
VIAL (ML) INTRAVENOUS
Status: DISCONTINUED | OUTPATIENT
Start: 2024-10-31 | End: 2024-10-31

## 2024-10-31 RX ADMIN — ONDANSETRON 4 MG: 2 INJECTION INTRAMUSCULAR; INTRAVENOUS at 01:10

## 2024-10-31 RX ADMIN — LABETALOL HYDROCHLORIDE 10 MG: 5 INJECTION, SOLUTION INTRAVENOUS at 10:10

## 2024-10-31 RX ADMIN — LEVETIRACETAM 750 MG: 100 INJECTION, SOLUTION INTRAVENOUS at 08:10

## 2024-10-31 RX ADMIN — SUGAMMADEX 200 MG: 100 INJECTION, SOLUTION INTRAVENOUS at 01:10

## 2024-10-31 RX ADMIN — SODIUM CHLORIDE: 9 INJECTION, SOLUTION INTRAVENOUS at 01:10

## 2024-10-31 RX ADMIN — LEVETIRACETAM 750 MG: 100 INJECTION, SOLUTION INTRAVENOUS at 09:10

## 2024-10-31 RX ADMIN — GENTAMICIN SULFATE: 1 OINTMENT TOPICAL at 10:10

## 2024-10-31 RX ADMIN — PROPOFOL 70 MG: 10 INJECTION, EMULSION INTRAVENOUS at 01:10

## 2024-10-31 RX ADMIN — MUPIROCIN: 20 OINTMENT TOPICAL at 09:10

## 2024-10-31 RX ADMIN — LIDOCAINE HYDROCHLORIDE 60 MG: 20 INJECTION INTRAVENOUS at 01:10

## 2024-10-31 RX ADMIN — MUPIROCIN: 20 OINTMENT TOPICAL at 08:10

## 2024-10-31 RX ADMIN — ROCURONIUM BROMIDE 30 MG: 10 SOLUTION INTRAVENOUS at 01:10

## 2024-10-31 RX ADMIN — HYDRALAZINE HYDROCHLORIDE 20 MG: 20 INJECTION INTRAMUSCULAR; INTRAVENOUS at 06:10

## 2024-10-31 RX ADMIN — HYDRALAZINE HYDROCHLORIDE 20 MG: 20 INJECTION INTRAMUSCULAR; INTRAVENOUS at 10:10

## 2024-10-31 NOTE — PROGRESS NOTES
[facility] - 7th Floor ICU  Wound Care    Patient Name:  Tali Beard   MRN:  [id number]  Date: 10/31/2024  Diagnosis: <principal problem not specified>    History:     Past Medical History:   Diagnosis Date    Subarachnoid hemorrhage        Social History     Socioeconomic History    Marital status:    Tobacco Use    Smoking status: Never     Passive exposure: Never    Smokeless tobacco: Never   Substance and Sexual Activity    Alcohol use: Not Currently    Drug use: Never    Sexual activity: Not Currently     Social Drivers of Health     Financial Resource Strain: Low Risk  (10/30/2024)    Overall Financial Resource Strain (CARDIA)     Difficulty of Paying Living Expenses: Not hard at all   Food Insecurity: No Food Insecurity (10/30/2024)    Hunger Vital Sign     Worried About Running Out of Food in the Last Year: Never true     Ran Out of Food in the Last Year: Never true   Transportation Needs: No Transportation Needs (10/30/2024)    TRANSPORTATION NEEDS     Transportation : No   Physical Activity: Inactive (10/4/2024)    Exercise Vital Sign     Days of Exercise per Week: 0 days     Minutes of Exercise per Session: 0 min   Stress: No Stress Concern Present (10/30/2024)    [employer] - Occupational Stress Questionnaire     Feeling of Stress : Not at all   Housing Stability: Low Risk  (10/30/2024)    Housing Stability Vital Sign     Unable to Pay for Housing in the Last Year: No     Homeless in the Last Year: No       Precautions:     Allergies as of 10/29/2024 - Reviewed 10/29/2024   Allergen Reaction Noted    Hydroxyzine hcl  07/29/2019    Felodipine Rash 12/09/2013       WO Assessment Details/Treatment      10/31/24 1028   WOCN Assessment   Visit Date 10/31/24   Visit Time 1028   Consult Type New   [facility] Speciality Wound   Intervention chart review;assessed;applied;orders   Teaching on-going        Wound 05/07/24 0952 Ulceration Left medial Foot   Date  First Assessed/Time First Assessed: 05/07/24 0952   Present on Original Admission: Yes  Primary Wound Type: Ulceration  Side: Left  Orientation: medial  Location: Foot  Pulses: 2+ palpable DP and PT pulses, strong doppler signal to both   Wound Image     Dressing Appearance Intact;Dry   Drainage Amount None   Appearance Yellow   Tissue loss description Partial thickness   Black (%), Wound Tissue Color 0 %   Red (%), Wound Tissue Color 0 %   Yellow (%), Wound Tissue Color 100 %   Periwound Area Dry;Intact   Wound Edges Irregular   Wound Length (cm) 0.7 cm   Wound Width (cm) 0.6 cm   Wound Depth (cm) 0.1 cm   Wound Volume (cm^3) 0.042 cm^3   Wound Surface Area (cm^2) 0.42 cm^2   Care Cleansed with:;Antimicrobial agent   Dressing Applied;Other (comment)  (gentamicin ointment, aquacel ag, foam dressing.)     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 Introduced to myself to son at bedside. Son reported that patient was being followed outpatient for wound care at [facility]. Removed dressing in place. Cleansed, measured, photos obtained for EMR. Treatment recommendations to be continued per outpatient wound care clinic. Cleanse well with Vashe. Apply gentamicin ointment, Aquacel AG, cover with small foam dressing. Change qd and prn until healed. Pt tolerated well with no complaints of pain or discomfort. Pt is on REGINALD mattress. Nursing to continue with present recommendations. Will follow up.   10/31/2024

## 2024-10-31 NOTE — PT/OT/SLP EVAL
Occupational Therapy  Evaluation    Name: Tali Beard  MRN: [id number]  Admitting Diagnosis: L sided weakness   Recent Surgery: * No surgery found *      Recommendations:     Discharge therapy intensity: Moderate Intensity Therapy   Discharge Equipment Recommendations:  to be determined by next level of care  Barriers to discharge:  Other (Comment) (ongoing medical needs, severity of deficits)    Assessment:     Tali Beard is a 88 y.o. female with a medical diagnosis of L sided weakness, AMS, and R parietal acute hemorrhage. She has a hx of SAH and recent IPH. She tolerated OT evaluation fairly; presents lethargic and required max stimulation to awaken and engage. Pt not able to communicate this date and did not follow commands. Noted BLE and RUE spontaneous, non-purposeful movements throughout session. She presents with L sided neglect and did not track past midline to the L. She presents with the following performance deficits affecting function: weakness, impaired endurance, impaired self care skills, impaired functional mobility, impaired balance, decreased lower extremity function, decreased upper extremity function, decreased safety awareness, impaired cognition, impaired coordination. She required max A x2 for bed mobility and required min-max A for static sitting balance due to posterior lean. Recommend moderate intesity therapy upon d/c.     Rehab Prognosis: Good; patient would benefit from acute skilled OT services to address these deficits and reach maximum level of function.       Plan:     Patient to be seen 5 x/week to address the above listed problems via self-care/home management, therapeutic activities, therapeutic exercises, neuromuscular re-education  Plan of Care Expires: 11/30/24  Plan of Care Reviewed with: patient, son    Subjective     Chief Complaint: n/a  Patient/Family Comments/goals: to return to PLOF per son     Occupational Profile:  Living Environment: son present at bedside  and reports pt lived alone in a single level home with no steps to enter. Pt has a tub/shower combo with a shower chair.   Previous level of function: per son, pt was independent with ADLs prior.   Roles and Routines: mother   Equipment Used at Home: none  Assistance upon Discharge: Pt will have assist from her family upon d/c.     Pain/Comfort:  Pain Rating 1: 0/10    Patients cultural, spiritual, Congregational conflicts given the current situation: no    Objective:     OT communicated with RN prior to session.      Patient was found HOB elevated with blood pressure cuff, pulse ox (continuous), telemetry, SCD, peripheral IV (B mittens, roll belt, B wrist restraints) upon OT entry to room.    General Precautions: Standard, fall (BP<140/90)  Orthopedic Precautions: N/A  Braces: N/A    Vital Signs: Blood Pressure: 131/70  Respiratory Status: on room air    Bed Mobility:    Patient completed Rolling/Turning to Left with  maximal assistance  Patient completed Rolling/Turning to Right with maximal assistance  Patient completed Supine to Sit with maximal assistance and x2 persons  Patient completed Sit to Supine with maximal assistance and x2 persons    Functional Mobility/Transfers:  Functional Mobility: will progress mobility as appropriate.     Activities of Daily Living:  Lower Body Dressing: maximal assistance to don socks.   Toileting: maximal assistance for perineal hygiene from bed level.     AMPAC 6 Click ADL:  AMPAC Total Score: 11    Functional Cognition:  Pt did not verbalize or follow any commands.     Visual Perceptual Skills:  Pt did not track past midline to the L.     Upper Extremity Function:  Right Upper Extremity:   Will continue to assess: pt with spontaneous, non-purposeful movements. Appears pt to actively resist PROM, but will continue to assess to determine resisting vs tone.     Left Upper Extremity:  PROM WFL    Balance:   Max A initially due to posterior lean; able to progress to brief periods of  min-CGA for static sitting balance.     Therapeutic Positioning  Risk for acquired pressure injuries is increased due to relative decrease in mobility d/t hospitalization , impaired mobility, inability to communicate toileting needs, and severity of deficits resulting in prolonged immobility .    OT interventions performed during the course of today's session:   Positioning recommendations were communicated to care team     Skin assessment: all bony prominences were assessed    Findings:  Visible skin intact.     OT recommendations for therapeutic positioning throughout hospitalization:   Follow [facility] Pressure Injury Prevention Protocol    Patient Education:  Patient and family were provided with verbal education education regarding OT role/goals/POC, fall prevention, safety awareness, Discharge/DME recommendations, and pressure ulcer prevention.  Additional teaching is warranted.     Patient left HOB elevated with all lines intact, call button in reach, restraints reapplied at end of session, RN notified, and family present.    GOALS:   Multidisciplinary Problems       Occupational Therapy Goals          Problem: Occupational Therapy    Goal Priority Disciplines Outcome Interventions   Occupational Therapy Goal     OT, PT/OT Progressing    Description: LTG: Pt will perform basic ADLs and ADL t/fs with min A using LRAD by d/c.    STG: to be met by 11/30/24  1. Pt will follow >80% of simple one step commands  2. Pt will perform grooming EOB with min A.   3. Pt will perform functional grasp/reach/release of items >80% of trials in prep for increased participation in ADL tasks.   4. Pt will perform sit<>stand with mod A x 2 in prep for ADL t/fs.                        History:     Past Medical History:   Diagnosis Date    Subarachnoid hemorrhage        History reviewed. No pertinent surgical history.    Time Tracking:     OT Date of Treatment: 10/31/24  OT Start Time: 1053  OT Stop Time: 1121  OT Total Time (min): 28  min    Billable Minutes:Evaluation Moderate Complexity.     10/31/2024

## 2024-10-31 NOTE — ASSESSMENT & PLAN NOTE
10/30/2024 CTA H/N:Impression:  1. No large vessel occlusion.  2. Moderate to severe narrowing of the bilateral supraclinoid segments, unchanged.    Assessment/Plan:  - No intervention needed for M1 M2 stenoses at this time.    - Intracranial atherosclerotic disease is treated with dual antiplatelet therapy for a minimum of 6 months with high-dose statins.    - Holding antiplatelets for now because of intracranial hemorrhages.  DAPT with ASA and Plavix can be introduced in 15 days once hematoma is stable and blood-brain barrier recovery and started occurring.  - Continue Atorvastatin 40 mg daily.  - Other recommendations may follow from MD

## 2024-10-31 NOTE — ASSESSMENT & PLAN NOTE
-CTh:  IMPRESSION  1. Newly identified acute hemorrhage along the right parietal convexity.  2. Interval improvement of previous left supratentorial hemorrhage.  3. No findings to suggest large vascular territory acute ischemic insult.Additional chronic secondary findings without significant interval change.  - CTh:repeat 10/30/2024 Impression: Stable exam without significant interval change.  -CTA h/n:  Impression:  1. No large vessel occlusion.  2. Moderate to severe narrowing of the bilateral supraclinoid segments, unchanged.  -MRI brain:   -ECHO: EF:55%. Agitated saline study of the atrial septum is negative, suggesting absence of intracardiac shunt at the atrial level.    -LDL: 141   -A1c: 5.8   -TSH: Normal   -home medications include: no antiplatelets or anticoagulant    Current NIH Stroke Score Values      Flowsheet Row Most Recent Value   Interval baseline   1a. Level of Consciousness 0-->Alert, keenly responsive   1b. LOC Questions 0-->Answers both questions correctly   1c. LOC Commands 0-->Performs both tasks correctly   2. Best Gaze 0-->Normal   3. Visual 0-->No visual loss   4. Facial Palsy 0-->Normal symmetrical movements   5a. Motor Arm, Left 2-->Some effort against gravity, limb cannot get to or maintain (if cued) 90 (or 45) degrees, drifts down to bed, but has some effort against gravity   5b. Motor Arm, Right 0-->No drift, limb holds 90 (or 45) degrees for full 10 secs   6a. Motor Leg, Left 2-->Some effort against gravity, leg falls to bed by 5 secs, but has some effort against gravity   6b. Motor Leg, Right 0-->No drift, leg holds 30 degree position for full 5 secs   7. Limb Ataxia 1-->Present in one limb   8. Sensory 0-->Normal, no sensory loss   9. Best Language 1-->Mild-to-moderate aphasia, some obvious loss of fluency or facility of comprehension, without significant limitation on ideas expressed or form of expression. Reduction of speech and/or comprehension, however, makes conversation. . .  (see row details)   10. Dysarthria 0-->Normal   11. Extinction and Inattention (formerly Neglect) 0-->No abnormality   Total (NIH Stroke Scale) 6             Assessment/Plan:  - pending MRI brain   - no current antiplatelet therapy for now.   - ESR elevated at 49.  - CRP normal at 1.20  - Lumbar puncture with IR under flouro with sedation competed.  - no current plan for cerebral angiogram, comfort care measures to be discussed with family by palliative care.   - consult palliative care    -  Other recommendations may follow from MD

## 2024-10-31 NOTE — NURSING
Pt brought to cath lab 7 for IR on portable monitor. Patient's son accompanied in order to sign consents for patient.

## 2024-10-31 NOTE — ASSESSMENT & PLAN NOTE
-CTh:  IMPRESSION  1. Newly identified acute hemorrhage along the right parietal convexity.  2. Interval improvement of previous left supratentorial hemorrhage.  3. No findings to suggest large vascular territory acute ischemic insult.Additional chronic secondary findings without significant interval change.  - CTh:repeat 10/30/2024 Impression: Stable exam without significant interval change.  -CTA h/n:  Impression:  1. No large vessel occlusion.  2. Moderate to severe narrowing of the bilateral supraclinoid segments, unchanged.  -MRI brain:   -ECHO: EF:55%. Agitated saline study of the atrial septum is negative, suggesting absence of intracardiac shunt at the atrial level.    -LDL: 141   -A1c: 5.8   -TSH: Normal   -home medications include: no antiplatelets or anticoagulant        Assessment/Plan:  - pending MRI brain with [facility]  - continue Keppra   - no current antiplatelet therapy for now.   - ESR elevated at 49.  - CRP normal at 1.20  - CSF protein 82.3  - no current plan for cerebral angiogram, comfort care measures to be discussed with family by palliative care.   - DAPT with ASA and Plavix can be introduced in 15 days.   - continue Lipitor  - ok for PT/OT  - ok to downgrade to Hospitalist's  -Neuro checks every 4 hours.    Will follow peripherally through the weekend

## 2024-10-31 NOTE — PT/OT/SLP EVAL
Physical Therapy Evaluation    Patient Name:  Tali Beard   MRN:  [id number]    Recommendations:     Discharge therapy intensity: Moderate Intensity Therapy   Discharge Equipment Recommendations: to be determined by next level of care   Barriers to discharge:  medical dx, impaired mobility, decreased independence     Assessment:     Tali Beard is a 88 y.o. female admitted with a medical diagnosis of L sided weakness, AMS; R parietal acute hemorrhage.   PMH of SAH and recent IPH.      She presents with the following impairments/functional limitations: weakness, decreased upper extremity function, impaired endurance, impaired balance, decreased lower extremity function, impaired self care skills, impaired functional mobility.    Pt with fair tolerance to PT eval. Pt requires max stimulation to become awake and engage in session. Unable to make any formal verbalizations throughout session. Did not follow any commands. Noted to move B LE and R UE spontaneously/non-purposeful. Appears to have L sided neglect- limited tracking to the L. Requires maxAx2 for bed mobility; unable to progress mobility further as it is unsafe at this time. Will continue progressing as able.     Rehab Prognosis: Good; patient would benefit from acute skilled PT services to address these deficits and reach maximum level of function.    Recent Surgery: * No surgery found *      Plan:     During this hospitalization, patient would benefit from acute PT services 5 x/week to address the identified rehab impairments via therapeutic activities, therapeutic exercises, neuromuscular re-education and progress toward the following goals:    Plan of Care Expires:  11/30/24    Subjective     Chief Complaint: none stated  Patient/Family Comments/goals: to return PLOF   Pain/Comfort:  Pain Rating 1: 0/10    Patients cultural, spiritual, Protestant conflicts given the current situation: no    Living Environment:  Pt lives alone in a [facility]; one son  stays with her during the day and another stays at home   Prior to admission, patients level of function was independent.    Equipment used at home: none.  DME owned (not currently used): none.    Upon discharge, patient will have assistance from family.    Objective:     Communicated with NSG prior to session.  Patient found HOB elevated with blood pressure cuff, pulse ox (continuous), telemetry, peripheral IV, SCD (B mittens, roll belt, B wrist restraints)  upon PT entry to room.    General Precautions: Standard, fall (BP<140/90)  Orthopedic Precautions:N/A   Braces: N/A  Respiratory Status: Room air  Blood Pressure: 131/70      Exams:  Cognitive Exam:  Patient is  not oriented x4  BLE ROM: minimal AROM noted as pt moves spontaneously; limited PROM as pt appears to be resistive, but difficult to delineate versus tone- will continue to assess  BLE Strength: unable to formally assess 2/2 impaired command following   Skin integrity: Visible skin intact      Functional Mobility:  Bed Mobility:     Rolling Left:  maximal assistance  Rolling Right: maximal assistance  Supine to Sit: maximal assistance and of 2 persons  Sit to Supine: maximal assistance and of 2 persons  Balance: pt overall requires maxA for static sitting balance; pt with poor trunk control and a strong posterior lean; however, did have brief moments where she is CGA-Vy; unable to correct despite max TC/VC      AM-PAC 6 CLICK MOBILITY  Total Score:9       Treatment & Education:  Patient provided with verbal education regarding PT role/goals/POC, fall prevention, safety awareness, discharge/DME recommendations, and pressure ulcer prevention.  Understanding was verbalized, however additional teaching warranted.     Patient left HOB elevated with all lines intact, call button in reach, RN notified, sons present, and roll belt donned, SCDs donned,  B mittens and wrist restraints donned .    GOALS:   Multidisciplinary Problems       Physical Therapy Goals           Problem: Physical Therapy    Goal Priority Disciplines Outcome Interventions   Physical Therapy Goal     PT, PT/OT Progressing    Description: Goals to be met by: 24     Patient will increase functional independence with mobility by performin. Supine to sit with Moderate Assistance  2. Sit to supine with Moderate Assistance  3. Sit to stand transfer TBD  4. Bed to chair transfer TBD  5. Gait TBD  6. Sitting at edge of bed x20 minutes with Stand-by Assistance  7. Pt to follow 50% of commands                          History:     Past Medical History:   Diagnosis Date    Subarachnoid hemorrhage        History reviewed. No pertinent surgical history.    Time Tracking:     PT Received On: 10/31/24  PT Start Time: 1054     PT Stop Time: 1121  PT Total Time (min): 27 min     Billable Minutes: Evaluation mod      10/31/2024

## 2024-10-31 NOTE — SUBJECTIVE & OBJECTIVE
Subjective:     Interval History: no acute events over night. Aggitated at times.     Current Neurological Medications: Keppra 750 IV q12 hours    Current Facility-Administered Medications   Medication Dose Route Frequency Provider Last Rate Last Admin    0.9%  NaCl infusion   Intravenous Continuous Edouard Novak MD 50 mL/hr at 10/30/24 0939 New Bag at 10/30/24 0939    atorvastatin tablet 40 mg  40 mg Oral QHS Sierra Camarena NP   40 mg at 10/30/24 2021    hydrALAZINE injection 20 mg  20 mg Intravenous Q4H PRN Edouard Novak MD   20 mg at 10/30/24 1001    labetaloL injection 10 mg  10 mg Intravenous Q4H PRN Edouard Novak MD        levETIRAcetam injection 750 mg  750 mg Intravenous Q12H Juan Celis DO   750 mg at 10/30/24 2018    mupirocin 2 % ointment   Nasal BID Eliel Guadarrama MD        niCARdipine 40 mg/200 mL (0.2 mg/mL) infusion  0-15 mg/hr Intravenous Continuous CelisJuan bass DO 25 mL/hr at 10/30/24 0602 5 mg/hr at 10/30/24 0602       Review of Systems   Unable to perform ROS: Acuity of condition     Objective:     Vital Signs (Most Recent):  Temp: 99.8 °F (37.7 °C) (10/31/24 0000)  Pulse: 81 (10/31/24 0215)  Resp: 15 (10/31/24 0215)  BP: (!) 124/107 (10/31/24 0100)  SpO2: 96 % (10/31/24 0215) Vital Signs (24h Range):  Temp:  [97.9 °F (36.6 °C)-100.5 °F (38.1 °C)] 99.8 °F (37.7 °C)  Pulse:  [] 81  Resp:  [15-31] 15  SpO2:  [73 %-99 %] 96 %  BP: ()/() 124/107     Weight: 65.6 kg (144 lb 10 oz)  Body mass index is 25.62 kg/m².     Physical Exam  Vitals and nursing note reviewed.   Constitutional:       General: She is not in acute distress.     Appearance: She is well-developed. She is ill-appearing. She is not toxic-appearing.   HENT:      Head: Normocephalic.      Nose: Nose normal.   Eyes:      Conjunctiva/sclera: Conjunctivae normal.      Pupils: Pupils are equal, round, and reactive to light.   Cardiovascular:      Rate and Rhythm: Normal rate.    Pulmonary:      Effort: Pulmonary effort is normal.   Abdominal:      General: Abdomen is flat.   Skin:     General: Skin is warm and dry.   Psychiatric:         Behavior: Behavior is cooperative.          NEUROLOGICAL EXAMINATION:     MENTAL STATUS        Patient awakens to voice, not following commands.      CRANIAL NERVES     CN III, IV, VI   Pupils are equal, round, and reactive to light.  Right pupil: Size: 3 mm. Shape: regular. Reactivity: brisk.   Left pupil: Size: 3 mm. Shape: regular. Reactivity: brisk.     MOTOR EXAM        Moves all extremities spontaneous. Not following commands         Significant Labs: All pertinent lab results from the past 24 hours have been reviewed.    Significant Imaging: I have reviewed all pertinent imaging results/findings within the past 24 hours.

## 2024-10-31 NOTE — PT/OT/SLP PROGRESS
[facility]   Speech Language Pathology Department  Diet Tolerance Follow-up    Patient Name:  Tali Beard   MRN:  [id number]    Recommendations     General recommendations:  SLP follow up x1 and Speech/Language and Cognitive Evaluation  Solid texture recommendation:  Soft & Bite Sized Diet - IDDSI Level 6  Liquid consistency recommendation: Thin liquids - IDDSI Level 0   Medications: crushed in puree  Swallow strategies/precautions: small bites/sips, slow rate, alternate solids/liquids, upright for PO intake, and assist with feeding as needed    Diet Tolerance     Patient NPO for possible procedure. ST to follow up tomorrow regarding diet tolerance.    Outcome Measures     Functional Oral Intake Scale: 5 - Total oral diet with multiple consistencies, by requiring special preparation or compensations    Plan     SLP Follow-Up:  Yes    Patient to be seen:  5 x/week   Plan of Care expires:  11/06/24    Time Tracking     SLP Treatment Date: 10/31/2024

## 2024-10-31 NOTE — PLAN OF CARE
Problem: Adult Inpatient Plan of Care  Goal: Plan of Care Review  Outcome: Progressing  Goal: Patient-Specific Goal (Individualized)  Outcome: Progressing  Goal: Absence of Hospital-Acquired Illness or Injury  Outcome: Progressing  Goal: Optimal Comfort and Wellbeing  Outcome: Progressing  Goal: Readiness for Transition of Care  Outcome: Progressing     Problem: Diabetes Comorbidity  Goal: Blood Glucose Level Within Targeted Range  Outcome: Progressing     Problem: Wound  Goal: Optimal Coping  Outcome: Progressing  Goal: Optimal Functional Ability  Outcome: Progressing  Goal: Absence of Infection Signs and Symptoms  Outcome: Progressing  Goal: Improved Oral Intake  Outcome: Progressing  Goal: Optimal Pain Control and Function  Outcome: Progressing  Goal: Skin Health and Integrity  Outcome: Progressing  Goal: Optimal Wound Healing  Outcome: Progressing     Problem: Skin Injury Risk Increased  Goal: Skin Health and Integrity  Outcome: Progressing     Problem: Fall Injury Risk  Goal: Absence of Fall and Fall-Related Injury  Outcome: Progressing     Problem: Restraint, Nonviolent  Goal: Absence of Harm or Injury  Outcome: Progressing

## 2024-10-31 NOTE — PLAN OF CARE
Problem: Occupational Therapy  Goal: Occupational Therapy Goal  Description: LTG: Pt will perform basic ADLs and ADL t/fs with min A using LRAD by d/c.    STG: to be met by 11/30/24  1. Pt will follow >80% of simple one step commands  2. Pt will perform grooming EOB with min A.   3. Pt will perform functional grasp/reach/release of items >80% of trials in prep for increased participation in ADL tasks.   4. Pt will perform sit<>stand with mod A x 2 in prep for ADL t/fs.   Outcome: Progressing

## 2024-10-31 NOTE — PROGRESS NOTES
[facility] - 7th Floor ICU  Pulmonary Critical Care Note    Patient Name: Tali Beard  MRN: [id number]  Admission Date: 10/29/2024  Hospital Length of Stay: 1 days  Code Status: Full Code  Attending Provider: Eliel Guadarrama MD  Primary Care Provider: Aurora Moe FNP     Subjective:     HPI:   Patient was an 88-year-old female with a past medical history of hypertension, hyperlipidemia, diabetes mellitus, subarachnoid hemorrhage, and recent intraparenchymal hemorrhage who presented to the ED via EMS for left-sided weakness and confusion noticed by family member.  Patient is family noticed that patient started to have left arm and leg weakness and noticed that patient was confused.  Patient's  states that this occurred around 6:00 p.m. yesterday.  She is not on any known anticoagulation.  Of significant note, patient was recently admitted to our facility where she was treated for intraparenchymal hemorrhage of the left temporoparietal region.  Since, patient's  reports good control of home blood pressure and no recent falls.     CT head obtained in the ED displayed a acute hemorrhagic infarct of the right parietal lobe measuring 2 x 2 x 2.5.  CTA displayed diminished caliber of M1 and M2 segments of bilateral MCA possibly indicative of vasospasm.  Neurosurgery was consulted by ED. ICU consulted for admission.    Hospital Course/Significant events:  10/30/2024: Patient admitted to the ICU    24 Hour Interval History:  Patient just returned from angiography.  Case discussed with Interventional Neurology.  There believe is that we are seeing is probably more of an intracranial hemorrhage.  They are awaiting MRI that should be done soon together further information.  Family was updated at bedside as best as possible    Past Medical History:   Diagnosis Date    Subarachnoid hemorrhage        History reviewed. No pertinent surgical history.    Social History     Socioeconomic  History    Marital status:    Tobacco Use    Smoking status: Never     Passive exposure: Never    Smokeless tobacco: Never   Substance and Sexual Activity    Alcohol use: Not Currently    Drug use: Never    Sexual activity: Not Currently     Social Drivers of Health     Financial Resource Strain: Low Risk  (10/30/2024)    Overall Financial Resource Strain (CARDIA)     Difficulty of Paying Living Expenses: Not hard at all   Food Insecurity: No Food Insecurity (10/30/2024)    Hunger Vital Sign     Worried About Running Out of Food in the Last Year: Never true     Ran Out of Food in the Last Year: Never true   Transportation Needs: No Transportation Needs (10/30/2024)    TRANSPORTATION NEEDS     Transportation : No   Physical Activity: Inactive (10/4/2024)    Exercise Vital Sign     Days of Exercise per Week: 0 days     Minutes of Exercise per Session: 0 min   Stress: No Stress Concern Present (10/30/2024)    [employer] - Occupational Stress Questionnaire     Feeling of Stress : Not at all   Housing Stability: Low Risk  (10/30/2024)    Housing Stability Vital Sign     Unable to Pay for Housing in the Last Year: No     Homeless in the Last Year: No           Current Outpatient Medications   Medication Instructions    amLODIPine (NORVASC) 5 mg, Oral, Daily    latanoprost 0.005 % ophthalmic solution Place into both eyes.    levETIRAcetam (KEPPRA) 500 mg, Oral, 2 times daily    metFORMIN (GLUCOPHAGE) 500 mg    prednisoLONE acetate (PRED MILD) 0.12 % ophthalmic suspension 1 drop, 4 times daily    timolol maleate 0.5% (TIMOPTIC) 0.5 % Drop Place into both eyes.       Current Inpatient Medications   atorvastatin  40 mg Oral QHS    gentamicin   Topical (Top) Daily    levETIRAcetam (Keppra) IV (PEDS and ADULTS)  750 mg Intravenous Q12H    mupirocin   Nasal BID       Current Intravenous Infusions   0.9% NaCl   Intravenous Continuous 50 mL/hr at 10/30/24 0939 New Bag at 10/30/24 0939     nicardipine  0-15 mg/hr Intravenous Continuous 25 mL/hr at 10/30/24 0602 5 mg/hr at 10/30/24 0602               Objective:       Intake/Output Summary (Last 24 hours) at 10/31/2024 1510  Last data filed at 10/31/2024 1418  Gross per 24 hour   Intake 150 ml   Output 300 ml   Net -150 ml         Vital Signs (Most Recent):  Temp: 97.7 °F (36.5 °C) (10/31/24 1418)  Pulse: 85 (10/31/24 1418)  Resp: 15 (10/31/24 1418)  BP: (!) 161/74 (10/31/24 1418)  SpO2: 99 % (10/31/24 1418)  Body mass index is 25.62 kg/m².  Weight: 65.6 kg (144 lb 10 oz) Vital Signs (24h Range):  Temp:  [97.7 °F (36.5 °C)-100.5 °F (38.1 °C)] 97.7 °F (36.5 °C)  Pulse:  [] 85  Resp:  [15-29] 15  SpO2:  [91 %-99 %] 99 %  BP: ()/() 161/74     Physical Exam  Vitals reviewed.   Constitutional:       General: She is not in acute distress.  Eyes:      Pupils: Pupils are equal, round, and reactive to light.   Cardiovascular:      Rate and Rhythm: Normal rate and regular rhythm.      Heart sounds: No murmur heard.     No friction rub. No gallop.   Pulmonary:      Breath sounds: No wheezing, rhonchi or rales.   Abdominal:      General: There is no distension.      Palpations: Abdomen is soft.      Tenderness: There is no abdominal tenderness.   Musculoskeletal:      Right lower leg: No edema.      Left lower leg: No edema.   Neurological:      Mental Status: She is alert.      Comments: AOx2  Patient with 5/5 MS of RUE and RLE. 3/5 on left side. Patient somewhat confused and with difficulty following commands.          Lines/Drains/Airways       Drain  Duration             Female External Urinary Catheter w/ Suction 10/29/24 2330 1 day              Peripheral Intravenous Line  Duration                  Peripheral IV - Single Lumen 10/29/24 2306 20 G Right Antecubital 1 day         Peripheral IV - Single Lumen 10/29/24 2355 20 G Anterior;Left;Proximal Forearm 1 day         Peripheral IV - Single Lumen 10/30/24 0015 20 G Posterior;Right Forearm 1  "day                    Significant Labs:    Lab Results   Component Value Date    WBC 6.27 10/31/2024    HGB 11.9 (L) 10/31/2024    HCT 39.0 10/31/2024    MCV 76.0 (L) 10/31/2024     10/31/2024           BMP  Lab Results   Component Value Date     10/31/2024    K 4.0 10/31/2024    CO2 21 (L) 10/31/2024    BUN 8.7 (L) 10/31/2024    CREATININE 0.80 10/31/2024    CALCIUM 9.3 10/31/2024    AGAP 12.0 10/31/2024    EGFRNONAA 50 09/02/2021         ABG  No results for input(s): "PH", "PO2", "PCO2", "HCO3", "POCBASEDEF" in the last 168 hours.    Mechanical Ventilation Support:         Significant Imaging:  I have reviewed the pertinent imaging within the past 24 hours.        Assessment/Plan:     Assessment  Right parietal hemorrhagic infarct  Possible vasospasm of M1 and M2 segment of MCA  History of recent left temporoparietal intraparenchymal hemorrhage  History of subarachnoid hemorrhage  History of hypertension  History of hyperlipidemia    Plan  Awaiting MRI to be done to better characterize the area in question.  As of right now findings most consistent with a hemorrhagic area.  More to follow from Neurology following MRI.  Overall though her prognosis is poor in light of her history and advanced age.  This was explained to family as well as the importance of addressing and determining code status.  DNR would certainly be appropriate here      DVT Prophylaxis: SCD  GI Prophylaxis: NA     32 minutes of critical care was time spent personally by me on the following activities: development of treatment plan with patient or surrogate and bedside caregivers, discussions with consultants, evaluation of patient's response to treatment, examination of patient, ordering and performing treatments and interventions, ordering and review of laboratory studies, ordering and review of radiographic studies, pulse oximetry, re-evaluation of patient's condition.  This critical care time did not overlap with that of any " other provider or involve time for any procedures.     Edouard Novak MD  Pulmonary Critical Care Medicine  [facility] - 7th Floor ICU  DOS: 10/31/2024

## 2024-10-31 NOTE — PROGRESS NOTES
[facility] - 7th Floor ICU  Neurology  Progress Note    Patient Name: Tali Beard  MRN: [id number]  Admission Date: 10/29/2024  Hospital Length of Stay: 1 days  Code Status: Full Code   Attending Provider: Eliel Guadarrama MD  Primary Care Physician: Aurora Moe FNP   Principal Problem:<principal problem not specified>    HPI:   Tali Beard is a 88 y.o. 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  Patient's  reported on 10/29 at 6:00 p.m. patient began having left arm and leg weakness and confusion.  She is not on known anticoagulation.  Patient has had a recent admit to our facility on 10/3/2024 she was conservatively treated for intraparenchymal hemorrhage of the left temporoparietal region.  Patient's  good control of pressure and no recent falls.  CT head obtained and revealed newly developed hyperdensity at the right parietal convexity is consistent with interval acute hemorrhage measuring approximately 1.9 cm x 1.9 cm x 2.5 cm.  Mild surrounding white matter edema is also evident. Previously visualized hemorrhagic left parietal cortical contusion and scattered subarachnoid blood is improved in the interval. CTA revealed diminished caliber of the M1 to M2 segments of bilateral middle cerebral arteries. The M3 segments of middle cerebral arteries appear unremarkable. This may be developmental or related to vasospasm. Repeat CT 10/30/24 revealed stable exam without significant interval change. Interventional neurology consulted for review of CTA.          Overview/Hospital Course:  No notes on file        Subjective:     Interval History: no acute events over night. Aggitated at times.     Current Neurological Medications: Keppra 750 IV q12 hours    Current Facility-Administered Medications   Medication Dose Route Frequency Provider Last Rate Last Admin    0.9%  NaCl infusion   Intravenous Continuous Edouard Novak MD 50 mL/hr at 10/30/24 0939 New Bag at 10/30/24 0939    atorvastatin tablet 40 mg  40 mg Oral QHS Sierra Camarena NP   40 mg at 10/30/24 2021    hydrALAZINE injection 20 mg  20 mg Intravenous Q4H PRN Edouard Novak MD   20 mg at 10/30/24 1001    labetaloL injection 10 mg  10 mg Intravenous Q4H PRN Edouard Novak MD        levETIRAcetam injection 750 mg  750 mg Intravenous Q12H CelisJuan bass, DO   750 mg at 10/30/24 2018    mupirocin 2 % ointment   Nasal BID Eliel Guadarrama MD        niCARdipine 40 mg/200 mL (0.2 mg/mL) infusion  0-15 mg/hr Intravenous Continuous CelisTreeua, DO 25 mL/hr at 10/30/24 0602 5 mg/hr at 10/30/24 0602       Review of Systems   Unable to perform ROS: Acuity of condition     Objective:     Vital Signs (Most Recent):  Temp: 99.8 °F (37.7 °C) (10/31/24 0000)  Pulse: 81 (10/31/24 0215)  Resp: 15 (10/31/24 0215)  BP: (!) 124/107 (10/31/24 0100)  SpO2: 96 % (10/31/24 0215) Vital Signs (24h Range):  Temp:  [97.9 °F (36.6 °C)-100.5 °F (38.1 °C)] 99.8 °F (37.7 °C)  Pulse:  [] 81  Resp:  [15-31] 15  SpO2:  [73 %-99 %] 96 %  BP: ()/() 124/107     Weight: 65.6 kg (144 lb 10 oz)  Body mass index is 25.62 kg/m².     Physical Exam  Vitals and nursing note reviewed.   Constitutional:       General: She is not in acute distress.     Appearance: She is well-developed. She is ill-appearing. She is not toxic-appearing.   HENT:      Head: Normocephalic.      Nose: Nose normal.   Eyes:      Conjunctiva/sclera: Conjunctivae normal.      Pupils: Pupils are equal, round, and reactive to light.    Cardiovascular:      Rate and Rhythm: Normal rate.   Pulmonary:      Effort: Pulmonary effort is normal.   Abdominal:      General: Abdomen is flat.   Skin:     General: Skin is warm and dry.   Psychiatric:         Behavior: Behavior is cooperative.          NEUROLOGICAL EXAMINATION:     MENTAL STATUS        Patient awakens to voice, not following commands.      CRANIAL NERVES     CN III, IV, VI   Pupils are equal, round, and reactive to light.  Right pupil: Size: 3 mm. Shape: regular. Reactivity: brisk.   Left pupil: Size: 3 mm. Shape: regular. Reactivity: brisk.     MOTOR EXAM        Moves all extremities spontaneous. Not following commands         Significant Labs: All pertinent lab results from the past 24 hours have been reviewed.    Significant Imaging: I have reviewed all pertinent imaging results/findings within the past 24 hours.  Assessment and Plan:     Intracranial atherosclerosis  10/30/2024 CTA H/N:Impression:  1. No large vessel occlusion.  2. Moderate to severe narrowing of the bilateral supraclinoid segments, unchanged.    Assessment/Plan:  - No intervention needed for M1 M2 stenoses at this time.    - Intracranial atherosclerotic disease is treated with dual antiplatelet therapy for a minimum of 6 months with high-dose statins.    - Holding antiplatelets for now because of intracranial hemorrhages.  DAPT with ASA and Plavix can be introduced in 15 days once hematoma is stable and blood-brain barrier recovery and started occurring.  - Continue Atorvastatin 40 mg daily.  - Other recommendations may follow from MD           Intraparenchymal hemorrhage of brain  -CTh:  IMPRESSION  1. Newly identified acute hemorrhage along the right parietal convexity.  2. Interval improvement of previous left supratentorial hemorrhage.  3. No findings to suggest large vascular territory acute ischemic insult.Additional chronic secondary findings without significant interval change.  - CTh:repeat 10/30/2024  Impression: Stable exam without significant interval change.  -CTA h/n:  Impression:  1. No large vessel occlusion.  2. Moderate to severe narrowing of the bilateral supraclinoid segments, unchanged.  -MRI brain:   -ECHO: EF:55%. Agitated saline study of the atrial septum is negative, suggesting absence of intracardiac shunt at the atrial level.    -LDL: 141   -A1c: 5.8   -TSH: Normal   -home medications include: no antiplatelets or anticoagulant    Current NIH Stroke Score Values      Flowsheet Row Most Recent Value   Interval baseline   1a. Level of Consciousness 0-->Alert, keenly responsive   1b. LOC Questions 0-->Answers both questions correctly   1c. LOC Commands 0-->Performs both tasks correctly   2. Best Gaze 0-->Normal   3. Visual 0-->No visual loss   4. Facial Palsy 0-->Normal symmetrical movements   5a. Motor Arm, Left 2-->Some effort against gravity, limb cannot get to or maintain (if cued) 90 (or 45) degrees, drifts down to bed, but has some effort against gravity   5b. Motor Arm, Right 0-->No drift, limb holds 90 (or 45) degrees for full 10 secs   6a. Motor Leg, Left 2-->Some effort against gravity, leg falls to bed by 5 secs, but has some effort against gravity   6b. Motor Leg, Right 0-->No drift, leg holds 30 degree position for full 5 secs   7. Limb Ataxia 1-->Present in one limb   8. Sensory 0-->Normal, no sensory loss   9. Best Language 1-->Mild-to-moderate aphasia, some obvious loss of fluency or facility of comprehension, without significant limitation on ideas expressed or form of expression. Reduction of speech and/or comprehension, however, makes conversation. . . (see row details)   10. Dysarthria 0-->Normal   11. Extinction and Inattention (formerly Neglect) 0-->No abnormality   Total (NIH Stroke Scale) 6             Assessment/Plan:  - pending MRI brain   -EEG   -continue Keppra   - no current antiplatelet therapy for now.   - ESR elevated at 49.  - CRP normal at 1.20  - Lumbar puncture  with IR under flouro with sedation competed.  - no current plan for cerebral angiogram, comfort care measures to be discussed with family by palliative care.   - consult palliative care    -  Other recommendations may follow from MD          VTE Risk Mitigation (From admission, onward)           Ordered     Reason for No Pharmacological VTE Prophylaxis  Once        Question:  Reasons:  Answer:  Risk of Bleeding    10/31/24 1115     IP VTE HIGH RISK PATIENT  Once         10/31/24 1115     Place sequential compression device  Until discontinued         10/31/24 1115     Place sequential compression device  Until discontinued         10/30/24 0340                    Sierra Camarena NP  Neurology  [facility] - 7th Floor ICU

## 2024-10-31 NOTE — PLAN OF CARE
Problem: Physical Therapy  Goal: Physical Therapy Goal  Description: Goals to be met by: 24     Patient will increase functional independence with mobility by performin. Supine to sit with Moderate Assistance  2. Sit to supine with Moderate Assistance  3. Sit to stand transfer TBD  4. Bed to chair transfer TBD  5. Gait TBD  6. Sitting at edge of bed x20 minutes with Stand-by Assistance  7. Pt to follow 50% of commands     Outcome: Progressing

## 2024-11-01 PROBLEM — Z71.89 COUNSELING REGARDING ADVANCE CARE PLANNING AND GOALS OF CARE: Status: ACTIVE | Noted: 2024-11-01

## 2024-11-01 PROBLEM — R53.1 WEAKNESS: Status: ACTIVE | Noted: 2024-11-01

## 2024-11-01 LAB
ALBUMIN SERPL-MCNC: 3 G/DL (ref 3.4–4.8)
ALBUMIN/GLOB SERPL: 1 RATIO (ref 1.1–2)
ALP SERPL-CCNC: 56 UNIT/L (ref 40–150)
ALT SERPL-CCNC: 28 UNIT/L (ref 0–55)
ANION GAP SERPL CALC-SCNC: 10 MEQ/L
AST SERPL-CCNC: 100 UNIT/L (ref 5–34)
BASOPHILS # BLD AUTO: 0.04 X10(3)/MCL
BASOPHILS NFR BLD AUTO: 0.7 %
BILIRUB SERPL-MCNC: 0.7 MG/DL
BUN SERPL-MCNC: 13.3 MG/DL (ref 9.8–20.1)
C-ANCA TITR SER IF: NEGATIVE {TITER}
CALCIUM SERPL-MCNC: 8.8 MG/DL (ref 8.4–10.2)
CHLORIDE SERPL-SCNC: 113 MMOL/L (ref 98–107)
CO2 SERPL-SCNC: 22 MMOL/L (ref 23–31)
CREAT SERPL-MCNC: 0.83 MG/DL (ref 0.55–1.02)
CREAT/UREA NIT SERPL: 16
CRYPTOC AG CSF QL IA.RAPID: NEGATIVE
ENA JO1 IGG SER-ACNC: <0.2 U
ENA RNP IGG SER IA-ACNC: <0.2 U
ENA SCL70 IGG SER IA-ACNC: <0.2 U
ENA SM IGG SER-ACNC: <0.2 U
ENA SS-A IGG SER-ACNC: <0.2 U
ENA SS-B IGG SER-ACNC: <0.2 U
EOSINOPHIL # BLD AUTO: 0.04 X10(3)/MCL (ref 0–0.9)
EOSINOPHIL NFR BLD AUTO: 0.7 %
ERYTHROCYTE [DISTWIDTH] IN BLOOD BY AUTOMATED COUNT: 15.7 % (ref 11.5–17)
GFR SERPLBLD CREATININE-BSD FMLA CKD-EPI: >60 ML/MIN/1.73/M2
GLOBULIN SER-MCNC: 3.1 GM/DL (ref 2.4–3.5)
GLUCOSE SERPL-MCNC: 99 MG/DL (ref 82–115)
GRAM STN SPEC: NORMAL
HCT VFR BLD AUTO: 36.3 % (ref 37–47)
HGB BLD-MCNC: 11.5 G/DL (ref 12–16)
IMM GRANULOCYTES # BLD AUTO: 0.02 X10(3)/MCL (ref 0–0.04)
IMM GRANULOCYTES NFR BLD AUTO: 0.3 %
LYMPHOCYTES # BLD AUTO: 0.84 X10(3)/MCL (ref 0.6–4.6)
LYMPHOCYTES NFR BLD AUTO: 13.9 %
MCH RBC QN AUTO: 23.4 PG (ref 27–31)
MCHC RBC AUTO-ENTMCNC: 31.7 G/DL (ref 33–36)
MCV RBC AUTO: 73.9 FL (ref 80–94)
MONOCYTES # BLD AUTO: 0.64 X10(3)/MCL (ref 0.1–1.3)
MONOCYTES NFR BLD AUTO: 10.6 %
NEUTROPHILS # BLD AUTO: 4.46 X10(3)/MCL (ref 2.1–9.2)
NEUTROPHILS NFR BLD AUTO: 73.8 %
NRBC BLD AUTO-RTO: 0 %
P-ANCA SER QL IF: NEGATIVE
PLATELET # BLD AUTO: 159 X10(3)/MCL (ref 130–400)
PMV BLD AUTO: 10.9 FL (ref 7.4–10.4)
POTASSIUM SERPL-SCNC: 3.9 MMOL/L (ref 3.5–5.1)
PROT SERPL-MCNC: 6.1 GM/DL (ref 5.8–7.6)
RBC # BLD AUTO: 4.91 X10(6)/MCL (ref 4.2–5.4)
SODIUM SERPL-SCNC: 145 MMOL/L (ref 136–145)
WBC # BLD AUTO: 6.04 X10(3)/MCL (ref 4.5–11.5)

## 2024-11-01 PROCEDURE — 25000003 PHARM REV CODE 250

## 2024-11-01 PROCEDURE — 63600175 PHARM REV CODE 636 W HCPCS

## 2024-11-01 PROCEDURE — 99232 SBSQ HOSP IP/OBS MODERATE 35: CPT | Mod: FS,,, | Performed by: PSYCHIATRY & NEUROLOGY

## 2024-11-01 PROCEDURE — 97535 SELF CARE MNGMENT TRAINING: CPT | Mod: CO

## 2024-11-01 PROCEDURE — 95816 EEG AWAKE AND DROWSY: CPT | Mod: 26,,, | Performed by: PSYCHIATRY & NEUROLOGY

## 2024-11-01 PROCEDURE — 11000001 HC ACUTE MED/SURG PRIVATE ROOM

## 2024-11-01 PROCEDURE — 85025 COMPLETE CBC W/AUTO DIFF WBC: CPT

## 2024-11-01 PROCEDURE — 99497 ADVNCD CARE PLAN 30 MIN: CPT | Mod: 25,,, | Performed by: INTERNAL MEDICINE

## 2024-11-01 PROCEDURE — 99223 1ST HOSP IP/OBS HIGH 75: CPT | Mod: FS,,, | Performed by: INTERNAL MEDICINE

## 2024-11-01 PROCEDURE — 25000003 PHARM REV CODE 250: Performed by: INTERNAL MEDICINE

## 2024-11-01 PROCEDURE — 25000003 PHARM REV CODE 250: Performed by: NURSE PRACTITIONER

## 2024-11-01 PROCEDURE — 97530 THERAPEUTIC ACTIVITIES: CPT | Mod: CQ

## 2024-11-01 PROCEDURE — 36415 COLL VENOUS BLD VENIPUNCTURE: CPT

## 2024-11-01 PROCEDURE — 80053 COMPREHEN METABOLIC PANEL: CPT

## 2024-11-01 RX ORDER — AMLODIPINE BESYLATE 5 MG/1
5 TABLET ORAL DAILY
Status: DISCONTINUED | OUTPATIENT
Start: 2024-11-01 | End: 2024-11-06 | Stop reason: HOSPADM

## 2024-11-01 RX ORDER — SODIUM CHLORIDE 9 MG/ML
INJECTION, SOLUTION INTRAVENOUS CONTINUOUS
Status: ACTIVE | OUTPATIENT
Start: 2024-11-01 | End: 2024-11-02

## 2024-11-01 RX ADMIN — SODIUM CHLORIDE: 9 INJECTION, SOLUTION INTRAVENOUS at 09:11

## 2024-11-01 RX ADMIN — ATORVASTATIN CALCIUM 40 MG: 40 TABLET, FILM COATED ORAL at 08:11

## 2024-11-01 RX ADMIN — LEVETIRACETAM 750 MG: 100 INJECTION, SOLUTION INTRAVENOUS at 09:11

## 2024-11-01 RX ADMIN — SODIUM CHLORIDE: 9 INJECTION, SOLUTION INTRAVENOUS at 05:11

## 2024-11-01 RX ADMIN — MUPIROCIN: 20 OINTMENT TOPICAL at 08:11

## 2024-11-01 RX ADMIN — GENTAMICIN SULFATE: 1 OINTMENT TOPICAL at 09:11

## 2024-11-01 RX ADMIN — MUPIROCIN: 20 OINTMENT TOPICAL at 09:11

## 2024-11-01 RX ADMIN — LEVETIRACETAM 750 MG: 100 INJECTION, SOLUTION INTRAVENOUS at 08:11

## 2024-11-01 RX ADMIN — AMLODIPINE BESYLATE 5 MG: 5 TABLET ORAL at 05:11

## 2024-11-01 NOTE — PLAN OF CARE
Problem: Adult Inpatient Plan of Care  Goal: Plan of Care Review  10/31/2024 1918 by Salomón Fernandez RN  Outcome: Progressing  10/31/2024 1918 by Salomón Fernandez RN  Outcome: Progressing  Goal: Patient-Specific Goal (Individualized)  10/31/2024 1918 by Salomón Fernandez RN  Outcome: Progressing  10/31/2024 1918 by Salomón Fernandez RN  Outcome: Progressing  Goal: Absence of Hospital-Acquired Illness or Injury  10/31/2024 1918 by Salomón Fernandez RN  Outcome: Progressing  10/31/2024 1918 by Salomón Fernandez RN  Outcome: Progressing  Goal: Optimal Comfort and Wellbeing  10/31/2024 1918 by Salomón Frenandez RN  Outcome: Progressing  10/31/2024 1918 by Salomón Fernandez RN  Outcome: Progressing  Goal: Readiness for Transition of Care  10/31/2024 1918 by Salomón Fernandez RN  Outcome: Progressing  10/31/2024 1918 by Salomón Fernandez RN  Outcome: Progressing     Problem: Diabetes Comorbidity  Goal: Blood Glucose Level Within Targeted Range  10/31/2024 1918 by Salomón Fernandez RN  Outcome: Progressing  10/31/2024 1918 by Salomón Fernandez RN  Outcome: Progressing     Problem: Wound  Goal: Optimal Coping  10/31/2024 1918 by Salomón Fernandez RN  Outcome: Progressing  10/31/2024 1918 by Salomón Fernandez RN  Outcome: Progressing  Goal: Optimal Functional Ability  10/31/2024 1918 by Salomón Fernandez RN  Outcome: Progressing  10/31/2024 1918 by Salomón Fernandez RN  Outcome: Progressing  Goal: Absence of Infection Signs and Symptoms  10/31/2024 1918 by Salomón Fernandez RN  Outcome: Progressing  10/31/2024 1918 by Salomón Fernandez RN  Outcome: Progressing  Goal: Improved Oral Intake  10/31/2024 1918 by Salomón Fernandez RN  Outcome: Progressing  10/31/2024 1918 by Salomón Fernandez RN  Outcome: Progressing  Goal: Optimal Pain Control and Function  10/31/2024 1918 by Salomón Fernandez RN  Outcome: Progressing  10/31/2024 1918 by Salomón Fernandez RN  Outcome: Progressing  Goal: Skin Health and Integrity  10/31/2024 1918 by Salomón Fernandez RN  Outcome:  Progressing  10/31/2024 1918 by Salomón Fernandez RN  Outcome: Progressing  Goal: Optimal Wound Healing  10/31/2024 1918 by Salomón Fernandez RN  Outcome: Progressing  10/31/2024 1918 by Salomón Fernandez RN  Outcome: Progressing     Problem: Skin Injury Risk Increased  Goal: Skin Health and Integrity  10/31/2024 1918 by Salomón Fernandez RN  Outcome: Progressing  10/31/2024 1918 by Salomón Fernandez RN  Outcome: Progressing     Problem: Fall Injury Risk  Goal: Absence of Fall and Fall-Related Injury  10/31/2024 1918 by Salomón Fernandez RN  Outcome: Progressing  10/31/2024 1918 by Salomón Fernandez RN  Outcome: Progressing     Problem: Restraint, Nonviolent  Goal: Absence of Harm or Injury  10/31/2024 1918 by Salomón Fernandez RN  Outcome: Progressing  10/31/2024 1918 by Salomón Fernandez RN  Outcome: Progressing     Problem: Stroke, Intracerebral Hemorrhage  Goal: Optimal Coping  10/31/2024 1918 by Salomón Fernandez RN  Outcome: Progressing  10/31/2024 1918 by Salomón Fernandez RN  Outcome: Progressing  Goal: Effective Bowel Elimination  10/31/2024 1918 by Salomón Fernandez RN  Outcome: Progressing  10/31/2024 1918 by Salomón Fernandez RN  Outcome: Progressing  Goal: Optimal Cerebral Tissue Perfusion  10/31/2024 1918 by Salomón Fernandez RN  Outcome: Progressing  10/31/2024 1918 by Salomón Fernandez RN  Outcome: Progressing  Goal: Optimal Cognitive Function  10/31/2024 1918 by Salomón Fernandez RN  Outcome: Progressing  10/31/2024 1918 by Salomón Fernandez RN  Outcome: Progressing  Goal: Effective Communication Skills  10/31/2024 1918 by Salomón Fernandez RN  Outcome: Progressing  10/31/2024 1918 by Salomón Fernandez RN  Outcome: Progressing  Goal: Optimal Functional Ability  10/31/2024 1918 by Salomón Fernandez RN  Outcome: Progressing  10/31/2024 1918 by Salomón Fernandez RN  Outcome: Progressing  Goal: Optimal Nutrition Intake  10/31/2024 1918 by Salomón Fernandez RN  Outcome: Progressing  10/31/2024 1918 by Salomón Fernandez RN  Outcome:  Progressing  Goal: Optimal Pain Control and Function  10/31/2024 1918 by Salomón Fernandez RN  Outcome: Progressing  10/31/2024 1918 by Salomón Fernandez RN  Outcome: Progressing  Goal: Effective Oxygenation and Ventilation  10/31/2024 1918 by Salomón Fernandez RN  Outcome: Progressing  10/31/2024 1918 by Salomón Fernandez RN  Outcome: Progressing  Goal: Improved Sensorimotor Function  10/31/2024 1918 by Salomón Fernandez RN  Outcome: Progressing  10/31/2024 1918 by Salomón Fernandez RN  Outcome: Progressing  Goal: Safe and Effective Swallow  10/31/2024 1918 by Salomón Fernandez RN  Outcome: Progressing  10/31/2024 1918 by Salomón Fernandez RN  Outcome: Progressing  Goal: Effective Urinary Elimination  10/31/2024 1918 by Salomón Fernandez RN  Outcome: Progressing  10/31/2024 1918 by Salomón Fernandez RN  Outcome: Progressing     Problem: Coping Ineffective  Goal: Effective Coping  10/31/2024 1918 by Salomón Fernandez RN  Outcome: Progressing  10/31/2024 1918 by Salomón Fernandez RN  Outcome: Progressing

## 2024-11-01 NOTE — CONSULTS
Patient is very sick and weak.. She struggle to communicate.  Her family is there to render some assistance.  I provided a very good and comforting visit to them.  I prayed for her, gave her communion amd gave her the anointing of the sick.

## 2024-11-01 NOTE — PLAN OF CARE
Problem: Adult Inpatient Plan of Care  Goal: Plan of Care Review  Outcome: Progressing  Goal: Patient-Specific Goal (Individualized)  Outcome: Progressing  Goal: Absence of Hospital-Acquired Illness or Injury  Outcome: Progressing  Goal: Optimal Comfort and Wellbeing  Outcome: Progressing  Goal: Readiness for Transition of Care  Outcome: Progressing     Problem: Diabetes Comorbidity  Goal: Blood Glucose Level Within Targeted Range  Outcome: Progressing     Problem: Wound  Goal: Optimal Coping  Outcome: Progressing  Goal: Optimal Functional Ability  Outcome: Progressing  Goal: Absence of Infection Signs and Symptoms  Outcome: Progressing  Goal: Improved Oral Intake  Outcome: Progressing  Goal: Optimal Pain Control and Function  Outcome: Progressing  Goal: Skin Health and Integrity  Outcome: Progressing  Goal: Optimal Wound Healing  Outcome: Progressing     Problem: Skin Injury Risk Increased  Goal: Skin Health and Integrity  Outcome: Progressing     Problem: Fall Injury Risk  Goal: Absence of Fall and Fall-Related Injury  Outcome: Progressing     Problem: Restraint, Nonviolent  Goal: Absence of Harm or Injury  Outcome: Progressing     Problem: Stroke, Intracerebral Hemorrhage  Goal: Optimal Coping  Outcome: Progressing  Goal: Effective Bowel Elimination  Outcome: Progressing  Goal: Optimal Cerebral Tissue Perfusion  Outcome: Progressing  Goal: Optimal Cognitive Function  Outcome: Progressing  Goal: Effective Communication Skills  Outcome: Progressing  Goal: Optimal Functional Ability  Outcome: Progressing  Goal: Optimal Nutrition Intake  Outcome: Progressing  Goal: Optimal Pain Control and Function  Outcome: Progressing  Goal: Effective Oxygenation and Ventilation  Outcome: Progressing  Goal: Improved Sensorimotor Function  Outcome: Progressing  Goal: Safe and Effective Swallow  Outcome: Progressing  Goal: Effective Urinary Elimination  Outcome: Progressing     Problem: Coping Ineffective  Goal: Effective  Coping  Outcome: Progressing

## 2024-11-01 NOTE — PT/OT/SLP PROGRESS
[facility]  Speech Language Pathology Department  Diet Tolerance Follow-up    Patient Name:  Tali Beard   MRN:  [id number]    Recommendations     General recommendations:  Speech/Language and Cognitive Evaluation  Solid texture recommendation:  Soft & Bite Sized Diet - IDDSI Level 6  Liquid consistency recommendation: Thin liquids - IDDSI Level 0   Medications: crushed in puree  Swallow strategies/precautions: small bites/sips, slow rate, alternate solids/liquids, upright for PO intake, and assist with feeding as needed    Diet Tolerance     Nursing reports no difficulty regarding diet tolerance.    Outcome Measures     Functional Oral Intake Scale: 5 - Total oral diet with multiple consistencies, by requiring special preparation or compensations    Plan     SLP Follow-Up:  Yes    Patient to be seen:  5 x/week   Plan of Care expires:  11/06/24    Time Tracking     SLP Treatment Date: 11/01/2024

## 2024-11-01 NOTE — PROCEDURES
"Tali Beard is a 88 y.o. female patient.    Temp: 98.1 °F (36.7 °C) (11/01/24 0400)  Pulse: 79 (11/01/24 0500)  Resp: (!) 22 (11/01/24 0500)  BP: 112/85 (11/01/24 0500)  SpO2: 97 % (11/01/24 0500)  Weight: 65.6 kg (144 lb 10 oz) (10/29/24 2340)  Height: 5' 3" (160 cm) (10/29/24 2303)       EEG    Date/Time: 11/1/2024 8:37 AM    Performed by: Lanette Hi MD  Authorized by: Lucía Hernandez NP          11/1/2024    Reason for exam: altered mental status    Technical description:  A standard digital EEG was performed at [facility].  The 10 20 international system of electrode placement is used.  Standard montages were recorded.  Activation procedures were performed.    Description:  No posterior dominant rhythm was identified.  The record was dominated by admixed polymorphic theta and delta activity. stage 2 sleep was not identified.  There were no electrographic seizures or epileptiform discharges. The record was obscured by muscle artifact.     Impression:  This is an abnormal EEG due to mild moderate generalized slowing.  Generalized slowing can be seen with generalized cerebral dysfunction as seen in metabolic, toxic, infectious, or multifocal structural abnormalities.   "

## 2024-11-01 NOTE — PLAN OF CARE
Spoke to patient with patient's son, Heri at bedside. Discussed plan for patient. Heri says ultimate goal is for her to return home if able. Informed them that SNF rehab is being recommended at this time. Gave choice and he wants [facility] as first choice and [facility] as second. Will send referral to [place]. Notified DOC Manzano

## 2024-11-01 NOTE — PT/OT/SLP PROGRESS
Occupational Therapy   Treatment    Name: Tali Beard  MRN: [id number]  Admitting Diagnosis:  Acute focal neurological deficit       Recommendations:     Recommended therapy intensity at discharge: Moderate Intensity Therapy   Discharge Equipment Recommendations:  to be determined by next level of care  Barriers to discharge:       Assessment:     Tali Beard is a 88 y.o. female with a medical diagnosis of Acute focal neurological deficit.  She presents with increased R lateral lean, Max A for sitting balance. Poor activity tolerance noted. Recommending Mod intensity therapy pending progress. Performance deficits affecting function are weakness, impaired endurance, impaired self care skills, impaired functional mobility, impaired balance.     Rehab Prognosis:  Good; patient would benefit from acute skilled OT services to address these deficits and reach maximum level of function.       Plan:     Patient to be seen 5 x/week to address the above listed problems via self-care/home management, therapeutic activities, therapeutic exercises  Plan of Care Expires: 11/30/24  Plan of Care Reviewed with: patient    Subjective     Pain/Comfort:       Objective:     Communicated with: RN prior to session.  Patient found HOB elevated with   upon OT entry to room.    General Precautions: Standard, fall    Orthopedic Precautions:N/A  Braces: N/A  Respiratory Status: Room air  Vital Signs: Blood Pressure: 151/78- Ok by RN.      Occupational Performance:   (Bed Mobility- Max A)  (Sitting balance- Max A) R lateral lean requiring constant correction and orientation to midline.   Pt. Performing grooming task (washing face) using R UE for excursion to face.   AROM performed using L UE during digit flexion/extension, elbow flexion/extension.   (Sit to stand- Max A) R lateral lean.   Pt. Laid back down and repositioned in bed appropriately.   Therapeutic Positioning    OT interventions performed during the course of today's  session in an effort to prevent and/or reduce acquired pressure injuries:   Therapeutic positioning was provided at the conclusion of session to offload all bony prominences for the prevention and/or reduction of pressure injuries      [facility] 6 Click ADL:      Patient Education:  Patient provided with verbal education education regarding fall prevention, safety awareness, and pressure ulcer prevention.  Additional teaching is warranted.      Patient left HOB elevated with all lines intact and call button in reach.    GOALS:   Multidisciplinary Problems       Occupational Therapy Goals          Problem: Occupational Therapy    Goal Priority Disciplines Outcome Interventions   Occupational Therapy Goal     OT, PT/OT Progressing    Description: LTG: Pt will perform basic ADLs and ADL t/fs with min A using LRAD by d/c.    STG: to be met by 11/30/24  1. Pt will follow >80% of simple one step commands  2. Pt will perform grooming EOB with min A.   3. Pt will perform functional grasp/reach/release of items >80% of trials in prep for increased participation in ADL tasks.   4. Pt will perform sit<>stand with mod A x 2 in prep for ADL t/fs.                        Time Tracking:     OT Date of Treatment: 11/01/24  OT Start Time: 0920  OT Stop Time: 0955  OT Total Time (min): 35 min    Billable Minutes:Self Care/Home Management 2    OT/KESHIA: KESHIA     Number of KESHIA visits since last OT visit: 1 11/1/2024

## 2024-11-01 NOTE — PLAN OF CARE
Problem: Adult Inpatient Plan of Care  Goal: Plan of Care Review  Outcome: Progressing  Goal: Absence of Hospital-Acquired Illness or Injury  Outcome: Progressing  Goal: Optimal Comfort and Wellbeing  Outcome: Progressing  Goal: Readiness for Transition of Care  Outcome: Progressing     Problem: Diabetes Comorbidity  Goal: Blood Glucose Level Within Targeted Range  Outcome: Progressing     Problem: Wound  Goal: Optimal Coping  Outcome: Progressing  Goal: Optimal Functional Ability  Outcome: Progressing  Goal: Absence of Infection Signs and Symptoms  Outcome: Progressing  Goal: Improved Oral Intake  Outcome: Progressing  Goal: Optimal Pain Control and Function  Outcome: Progressing  Goal: Skin Health and Integrity  Outcome: Progressing  Goal: Optimal Wound Healing  Outcome: Progressing     Problem: Skin Injury Risk Increased  Goal: Skin Health and Integrity  Outcome: Progressing     Problem: Fall Injury Risk  Goal: Absence of Fall and Fall-Related Injury  Outcome: Progressing     Problem: Restraint, Nonviolent  Goal: Absence of Harm or Injury  Outcome: Progressing     Problem: Stroke, Intracerebral Hemorrhage  Goal: Optimal Coping  Outcome: Progressing  Goal: Effective Bowel Elimination  Outcome: Progressing  Goal: Optimal Cerebral Tissue Perfusion  Outcome: Progressing  Goal: Optimal Cognitive Function  Outcome: Progressing  Goal: Effective Communication Skills  Outcome: Progressing  Goal: Optimal Functional Ability  Outcome: Progressing  Goal: Optimal Nutrition Intake  Outcome: Progressing  Goal: Optimal Pain Control and Function  Outcome: Progressing  Goal: Effective Oxygenation and Ventilation  Outcome: Progressing  Goal: Improved Sensorimotor Function  Outcome: Progressing  Goal: Safe and Effective Swallow  Outcome: Progressing  Goal: Effective Urinary Elimination  Outcome: Progressing     Problem: Coping Ineffective  Goal: Effective Coping  Outcome: Progressing

## 2024-11-01 NOTE — SUBJECTIVE & OBJECTIVE
Subjective:     Interval History: RN reported no acute events overnight. Patient is more alert and following commands this morning. EEG showed diffuse generalized slowing with  no epileptiform discharges. Patient is currently on Keppra. ESR 49. CRP 1.20. CSF protein 82.3. MRI brain with SWI pending.     Current Neurological Medications:     Current Facility-Administered Medications   Medication Dose Route Frequency Provider Last Rate Last Admin    0.9%  NaCl infusion   Intravenous Continuous Edouard Novak MD 50 mL/hr at 11/01/24 0904 New Bag at 11/01/24 0904    atorvastatin tablet 40 mg  40 mg Oral QHS Sierra Camarena, NP   40 mg at 10/30/24 2021    bisacodyL suppository 10 mg  10 mg Rectal Daily PRN Sierra Camarena NP        gentamicin 0.1 % ointment   Topical (Top) Daily Eliel Guadarrama MD   Given at 11/01/24 0912    hydrALAZINE injection 20 mg  20 mg Intravenous Q4H PRN Edouard Novak MD   20 mg at 10/31/24 1848    labetaloL injection 10 mg  10 mg Intravenous Q4H PRN Edouard Novak MD   10 mg at 10/31/24 2254    levETIRAcetam injection 750 mg  750 mg Intravenous Q12H Juan Celis DO   750 mg at 11/01/24 0912    mupirocin 2 % ointment   Nasal BID Eliel Guadarrama MD   Given at 11/01/24 0912    niCARdipine 40 mg/200 mL (0.2 mg/mL) infusion  0-15 mg/hr Intravenous Continuous Juan Celis DO 25 mL/hr at 10/30/24 0602 5 mg/hr at 10/30/24 0602    sodium chloride 0.9% flush 10 mL  10 mL Intravenous PRN Sierra Camarena NP           Review of Systems  Objective:     Vital Signs (Most Recent):  Temp: 98.1 °F (36.7 °C) (11/01/24 0400)  Pulse: 79 (11/01/24 0500)  Resp: (!) 22 (11/01/24 0500)  BP: 112/85 (11/01/24 0500)  SpO2: 97 % (11/01/24 0500) Vital Signs (24h Range):  Temp:  [97.7 °F (36.5 °C)-99.6 °F (37.6 °C)] 98.1 °F (36.7 °C)  Pulse:  [69-91] 79  Resp:  [15-26] 22  SpO2:  [94 %-99 %] 97 %  BP: (112-161)/() 112/85     Weight: 65.6 kg (144 lb 10 oz)  Body mass index is  25.62 kg/m².     Physical Exam   Alert  Intermittently following commands  Tracking  VALVERDE's       Significant Labs: CBC:   Recent Labs   Lab 10/31/24  0247 11/01/24  0512   WBC 6.27 6.04   HGB 11.9* 11.5*   HCT 39.0 36.3*    159     CMP:   Recent Labs   Lab 10/31/24  0247 11/01/24  0512    145   K 4.0 3.9   * 113*   CO2 21* 22*   BUN 8.7* 13.3   CREATININE 0.80 0.83   CALCIUM 9.3 8.8   ALBUMIN 3.2* 3.0*   BILITOT 0.7 0.7   ALKPHOS 64 56   AST 24 100*   ALT 15 28       Significant Imaging: I have reviewed all pertinent imaging results/findings within the past 24 hours.

## 2024-11-01 NOTE — PROGRESS NOTES
[facility] - 7th Floor ICU  Pulmonary Critical Care Note    Patient Name: Tali Beard  MRN: [id number]  Admission Date: 10/29/2024  Hospital Length of Stay: 2 days  Code Status: Full Code  Attending Provider: Eliel Guadarrama MD  Primary Care Provider: Aurora Moe FNP     Subjective:     HPI:   Patient was an 88-year-old female with a past medical history of hypertension, hyperlipidemia, diabetes mellitus, subarachnoid hemorrhage, and recent intraparenchymal hemorrhage who presented to the ED via EMS for left-sided weakness and confusion noticed by family member.  Patient is family noticed that patient started to have left arm and leg weakness and noticed that patient was confused.  Patient's  states that this occurred around 6:00 p.m. yesterday.  She is not on any known anticoagulation.  Of significant note, patient was recently admitted to our facility where she was treated for intraparenchymal hemorrhage of the left temporoparietal region.  Since, patient's  reports good control of home blood pressure and no recent falls.     CT head obtained in the ED displayed a acute hemorrhagic infarct of the right parietal lobe measuring 2 x 2 x 2.5.  CTA displayed diminished caliber of M1 and M2 segments of bilateral MCA possibly indicative of vasospasm.  Neurosurgery was consulted by ED. ICU consulted for admission.    Hospital Course/Significant events:  10/30/2024: Patient admitted to the ICU    24 Hour Interval History:  Patient went for angiography yesterday. Per Dr mistry -- Case discussed with Interventional Neurology.  There believe is that we are seeing is probably more of an intracranial hemorrhage.  They are awaiting MRI that should be done soon together further information. MRI planned for today. Continues with left sided weakness.     Past Medical History:   Diagnosis Date    Subarachnoid hemorrhage        History reviewed. No pertinent surgical history.    Social  History     Socioeconomic History    Marital status:    Tobacco Use    Smoking status: Never     Passive exposure: Never    Smokeless tobacco: Never   Substance and Sexual Activity    Alcohol use: Not Currently    Drug use: Never    Sexual activity: Not Currently     Social Drivers of Health     Financial Resource Strain: Low Risk  (10/30/2024)    Overall Financial Resource Strain (CARDIA)     Difficulty of Paying Living Expenses: Not hard at all   Food Insecurity: No Food Insecurity (10/30/2024)    Hunger Vital Sign     Worried About Running Out of Food in the Last Year: Never true     Ran Out of Food in the Last Year: Never true   Transportation Needs: No Transportation Needs (10/30/2024)    TRANSPORTATION NEEDS     Transportation : No   Physical Activity: Inactive (10/4/2024)    Exercise Vital Sign     Days of Exercise per Week: 0 days     Minutes of Exercise per Session: 0 min   Stress: No Stress Concern Present (10/30/2024)    [employer] - Occupational Stress Questionnaire     Feeling of Stress : Not at all   Housing Stability: Low Risk  (10/30/2024)    Housing Stability Vital Sign     Unable to Pay for Housing in the Last Year: No     Homeless in the Last Year: No           Current Outpatient Medications   Medication Instructions    amLODIPine (NORVASC) 5 mg, Oral, Daily    latanoprost 0.005 % ophthalmic solution Place into both eyes.    levETIRAcetam (KEPPRA) 500 mg, Oral, 2 times daily    metFORMIN (GLUCOPHAGE) 500 mg    prednisoLONE acetate (PRED MILD) 0.12 % ophthalmic suspension 1 drop, 4 times daily    timolol maleate 0.5% (TIMOPTIC) 0.5 % Drop Place into both eyes.       Current Inpatient Medications   atorvastatin  40 mg Oral QHS    gentamicin   Topical (Top) Daily    levETIRAcetam (Keppra) IV (PEDS and ADULTS)  750 mg Intravenous Q12H    mupirocin   Nasal BID       Current Intravenous Infusions   0.9% NaCl   Intravenous Continuous 50 mL/hr at 11/01/24 0904 New Bag  at 11/01/24 0904    nicardipine  0-15 mg/hr Intravenous Continuous 25 mL/hr at 10/30/24 0602 5 mg/hr at 10/30/24 0602               Objective:       Intake/Output Summary (Last 24 hours) at 11/1/2024 0950  Last data filed at 11/1/2024 0646  Gross per 24 hour   Intake 2087.45 ml   Output 500 ml   Net 1587.45 ml         Vital Signs (Most Recent):  Temp: 98.1 °F (36.7 °C) (11/01/24 0400)  Pulse: 79 (11/01/24 0500)  Resp: (!) 22 (11/01/24 0500)  BP: 112/85 (11/01/24 0500)  SpO2: 97 % (11/01/24 0500)  Body mass index is 25.62 kg/m².  Weight: 65.6 kg (144 lb 10 oz) Vital Signs (24h Range):  Temp:  [97.7 °F (36.5 °C)-99.6 °F (37.6 °C)] 98.1 °F (36.7 °C)  Pulse:  [69-91] 79  Resp:  [15-26] 22  SpO2:  [94 %-99 %] 97 %  BP: (112-161)/() 112/85     Physical Exam  Vitals reviewed.   Constitutional:       General: She is not in acute distress.  Eyes:      Pupils: Pupils are equal, round, and reactive to light.   Cardiovascular:      Rate and Rhythm: Normal rate and regular rhythm.      Heart sounds: No murmur heard.     No friction rub. No gallop.   Pulmonary:      Breath sounds: No wheezing, rhonchi or rales.   Abdominal:      General: There is no distension.      Palpations: Abdomen is soft.      Tenderness: There is no abdominal tenderness.   Musculoskeletal:      Right lower leg: No edema.      Left lower leg: No edema.   Neurological:      Mental Status: She is alert.      Comments: AOx2  Patient with 5/5 MS of RUE and RLE. 3/5 on left side. Patient somewhat confused and with difficulty following commands.          Lines/Drains/Airways       Drain  Duration             Female External Urinary Catheter w/ Suction 10/29/24 2330 2 days              Peripheral Intravenous Line  Duration                  Peripheral IV - Single Lumen 10/29/24 2306 20 G Right Antecubital 2 days         Peripheral IV - Single Lumen 10/29/24 2355 20 G Anterior;Left;Proximal Forearm 2 days         Peripheral IV - Single Lumen 10/30/24 0015 20 G  "Posterior;Right Forearm 2 days                    Significant Labs:    Lab Results   Component Value Date    WBC 6.04 11/01/2024    HGB 11.5 (L) 11/01/2024    HCT 36.3 (L) 11/01/2024    MCV 73.9 (L) 11/01/2024     11/01/2024           BMP  Lab Results   Component Value Date     11/01/2024    K 3.9 11/01/2024    CO2 22 (L) 11/01/2024    BUN 13.3 11/01/2024    CREATININE 0.83 11/01/2024    CALCIUM 8.8 11/01/2024    AGAP 10.0 11/01/2024    EGFRNONAA 50 09/02/2021         ABG  No results for input(s): "PH", "PO2", "PCO2", "HCO3", "POCBASEDEF" in the last 168 hours.    Mechanical Ventilation Support:         Significant Imaging:  I have reviewed the pertinent imaging within the past 24 hours.        Assessment/Plan:     Assessment  Right parietal hemorrhagic infarct  Possible vasospasm of M1 and M2 segment of MCA  History of recent left temporoparietal intraparenchymal hemorrhage  History of subarachnoid hemorrhage  History of hypertension  History of hyperlipidemia    Plan  Awaiting MRI to be done to better characterize the area in question.  As of right now findings most consistent with a hemorrhagic area.  More to follow from Neurology following MRI.  OK to downgrade to floor bed. Continue PT/OT/ST      DVT Prophylaxis: SCD  GI Prophylaxis: NA     32 minutes of critical care was time spent personally by me on the following activities: development of treatment plan with patient or surrogate and bedside caregivers, discussions with consultants, evaluation of patient's response to treatment, examination of patient, ordering and performing treatments and interventions, ordering and review of laboratory studies, ordering and review of radiographic studies, pulse oximetry, re-evaluation of patient's condition.  This critical care time did not overlap with that of any other provider or involve time for any procedures.     Jessika Langley, ABHIJEET  Pulmonary Critical Care Medicine  [facility] - 7th " Floor ICU  DOS: 11/01/2024

## 2024-11-01 NOTE — H&P
[facility]  Hospital Medicine History & Physical Examination       Patient Name: Tali Beard  MRN: [id number]  Patient Class: IP- Inpatient   Admission Date: 11/01/2024   Admitting Service: Hospital Medicine   Length of Stay: 2  Attending Physician: Dr. Louie  Primary Care Provider: Aurora Moe FNP  Face-to-Face encounter date: 11/01/2024  Code Status: Full  Chief Complaint: Extremity Weakness (L arm weakness and confusion. LKN 1800 tonight. EMS reports had brain bleed from fall 2 weeks ago. Mild LUE drift and weaker hand grasp, pt oriented to self. )      Patient information was obtained from patient, patient's family, past medical records and ER records.    HISTORY OF PRESENT ILLNESS:   Tali Beard is a 88 y.o. female with a PMHx of essential hypertension, hyperlipidemia, subarachnoid hemorrhage, type 2 DM and recent intraparenchymal hemorrhage who presented to [facility] on 10/29/2024 via EMS with complaints of left-sided weakness and confusion he was noticed by a family member.  Denied any recent falls.  Of note, patient was recently admitted at our facility on 10/03/2024 where she was conservatively treated for intraparenchymal hemorrhage of the left temporoparietal region.    CT head demonstrated a newly identified acute hemorrhage along the right parietal convexity and interval improvement of previous left supratentorial hemorrhage.  CTA head/neck negative for LVO, moderate to severe narrowing of the bilateral supraclinoid segments unchanged.  She was admitted to ICU with q.1 hour neuro checks, BP parameters below 140/90 and prophylactic Keppra.  Neurosurgery was consulted.  Interventional Neurology was also consulted and recommended LP and diagnostic cerebral angiogram for continue workup due to recurrent spontaneous intraparenchymal hemorrhages.  Echocardiogram with EF 55%.  LP was done per IR with cytology pending, elevated protein with normal glucose, no  significant leukocytosis; CSF cultures negative to date.  EEG on 11/01/2024 abnormal due to mild-to-moderate generalized slowing.  Palliative Care was consulted on 11/01/2024.  PT/OT/SLP on board.  She was cleared for downgrade to the floor on 11/01/2024.  Hospital medicine consulted for assumption of care and further medical management.    REVIEW OF SYSTEMS:   Except as documented, all other systems reviewed and negative.    PAST MEDICAL HISTORY:   Essential hypertension   Hyperlipidemia   Subarachnoid hemorrhage  Type 2 DM    PAST SURGICAL HISTORY:       FAMILY HISTORY:   Reviewed and negative    SOCIAL HISTORY:   Denied alcohol, tobacco or illicit drug use.     SCREENING FOR SOCIAL DRIVERS FOR HEALTH:   Patient screened for food insecurity, housing instability, transportation needs, utility difficulties, and interpersonal safety (select all that apply as identified as concern):  []Housing or Food  []Transportation Needs  []Utility Difficulties  []Interpersonal safety  [x]None    ALLERGIES:   Hydroxyzine hcl and Felodipine    HOME MEDICATIONS:     Prior to Admission medications    Medication Sig Start Date End Date Taking? Authorizing Provider   amLODIPine (NORVASC) 5 MG tablet Take 1 tablet (5 mg total) by mouth once daily. 10/8/24 10/8/25 Yes Cyrus Orellana MD   latanoprost 0.005 % ophthalmic solution Place into both eyes. 12/11/23  Yes Provider, Historical   levETIRAcetam (KEPPRA) 500 MG Tab Take 1 tablet (500 mg total) by mouth 2 (two) times daily. 10/7/24 11/6/24 Yes Cyrus Orellana MD   metFORMIN (GLUCOPHAGE) 500 MG tablet Take 500 mg by mouth.   Yes Provider, Historical   prednisoLONE acetate (PRED MILD) 0.12 % ophthalmic suspension Place 1 drop into both eyes 4 (four) times daily.   Yes Provider, Historical   timolol maleate 0.5% (TIMOPTIC) 0.5 % Drop Place into both eyes.  Patient not taking: Reported on 10/10/2024 11/15/23   Provider, Historical      ________________________________________________________________________  INPATIENT LIST OF MEDICATIONS     Current Facility-Administered Medications:     0.9%  NaCl infusion, , Intravenous, Continuous, Edouard Novak MD, Last Rate: 50 mL/hr at 11/01/24 1011, Rate Change at 11/01/24 1011    atorvastatin tablet 40 mg, 40 mg, Oral, QHS, Sierra Camarena, NP, 40 mg at 10/30/24 2021    bisacodyL suppository 10 mg, 10 mg, Rectal, Daily PRN, Sierra Camarena, NP    gentamicin 0.1 % ointment, , Topical (Top), Daily, Eliel Guadarrama MD, Given at 11/01/24 0912    hydrALAZINE injection 20 mg, 20 mg, Intravenous, Q4H PRN, Edouard Novak MD, 20 mg at 10/31/24 1848    labetaloL injection 10 mg, 10 mg, Intravenous, Q4H PRN, Edouard Novak MD, 10 mg at 10/31/24 2254    levETIRAcetam injection 750 mg, 750 mg, Intravenous, Q12H, Juan Celis DO, 750 mg at 11/01/24 0912    mupirocin 2 % ointment, , Nasal, BID, Eliel Guadarrama MD, Given at 11/01/24 0912    sodium chloride 0.9% flush 10 mL, 10 mL, Intravenous, PRN, Sierra Camarena, NP    Scheduled Meds:   atorvastatin  40 mg Oral QHS    gentamicin   Topical (Top) Daily    levETIRAcetam (Keppra) IV (PEDS and ADULTS)  750 mg Intravenous Q12H    mupirocin   Nasal BID     Continuous Infusions:   0.9% NaCl   Intravenous Continuous 50 mL/hr at 11/01/24 1011 Rate Change at 11/01/24 1011     PRN Meds:.  Current Facility-Administered Medications:     bisacodyL, 10 mg, Rectal, Daily PRN    hydrALAZINE, 20 mg, Intravenous, Q4H PRN    labetalol, 10 mg, Intravenous, Q4H PRN    sodium chloride 0.9%, 10 mL, Intravenous, PRN    PHYSICAL EXAM:     VITAL SIGNS: 24 HRS MIN & MAX LAST   Temp  Min: 97.8 °F (36.6 °C)  Max: 98.5 °F (36.9 °C) 98.5 °F (36.9 °C)   BP  Min: 99/71  Max: 169/71 (!) 143/63   Pulse  Min: 69  Max: 89  70   Resp  Min: 13  Max: 28 (!) 21   SpO2  Min: 94 %  Max: 98 % 95 %       General appearance: Well-developed female in no apparent  distress.  HENT: Atraumatic head. Moist mucous membranes of oral cavity.  Eyes: Normal extraocular movements.   Neck: Supple.   Lungs: Clear to auscultation bilaterally.   Heart: Regular rate and rhythm. S1 and S2 present. No pedal edema.  Abdomen: Soft, non-distended, non-tender.  Extremities: No cyanosis, clubbing, or edema.  Skin: No Rash.   Neuro: Left sided weakness  Psych/mental status: Awake and alert. Follows some commands,    LABS AND IMAGING:     Recent Labs   Lab 10/28/24  0826 10/29/24  2309 10/30/24  0354 10/31/24  0247 11/01/24  0512   WBC 3.9   < > 5.19 6.27 6.04   RBC  --    < > 5.00 5.13 4.91   HGB 11.4   < > 11.6* 11.9* 11.5*   HCT 37.2   < > 37.8 39.0 36.3*   MCV  --    < > 75.6* 76.0* 73.9*   MCH 23.7*   < > 23.2* 23.2* 23.4*   MCHC  --    < > 30.7* 30.5* 31.7*   RDW 15.4   < > 15.7 15.9 15.7      < > 174 161 159   MPV  --    < > 11.1* 10.3 10.9*   BASO 0.0  --   --   --   --     < > = values in this interval not displayed.       Recent Labs   Lab 10/30/24  0354 10/31/24  0247 11/01/24  0512    142 145   K 3.8 4.0 3.9   * 109* 113*   CO2 25 21* 22*   BUN 11.8 8.7* 13.3   CREATININE 0.88 0.80 0.83   CALCIUM 9.3 9.3 8.8   ALBUMIN 3.4 3.2* 3.0*   ALKPHOS 77 64 56   ALT 15 15 28   AST 20 24 100*   BILITOT 0.4 0.7 0.7       Microbiology Results (last 7 days)       Procedure Component Value Units Date/Time    Cerebrospinal Fluid Culture [[id number]] Collected: 10/31/24 1354    Order Status: Completed Specimen: CSF (Spinal Fluid) from Cerebrospinal Fluid Updated: 11/01/24 0817     CULTURE, CSF No Growth At 24 Hours     GRAM STAIN No WBC seen      No bacteria seen    Gram Stain [[id number]] Collected: 10/31/24 1354    Order Status: Completed Specimen: CSF (Spinal Fluid) from Lumbar Updated: 11/01/24 0816     GRAM STAIN No WBCs, No bacteria seen    Chantal Ink Prep CSF [[id number]] Collected: 10/31/24 1354    Order Status: Completed Specimen: CSF (Spinal Fluid) from Cerebrospinal Fluid  Updated: 10/31/24 1618     HOMER INK PREP CSF ([facility]) Negative    Fungal Culture [[id number]] Collected: 10/31/24 1354    Order Status: Sent Specimen: CSF (Spinal Fluid) from Lumbar Updated: 10/31/24 1414    Cryptococcal antigen, CSF [[id number]] Collected: 10/31/24 1354    Order Status: Sent Specimen: CSF (Spinal Fluid) from CSF Tap, Tube 1 Updated: 10/31/24 1414             IR LUMBAR PUNCTURE DIAGNOSTIC WITH IMAGING  Narrative: EXAMINATION:  IR LUMBAR PUNCTURE DIAGNOSTIC WITH IMAGING    CLINICAL HISTORY:  r/o cns vasculitis;    TECHNIQUE:  The risks and benefits of the procedure were explained to the patient's son and written informed consent was obtained. All questions were answered. A suitable skin entry site was chosen under fluoroscopy. The lower back was then prepped and draped in sterile fashion. 1% lidocaine was used for local anesthesia.    COMPARISON:  No relevant comparison studies available at the time of dictation.    FINDINGS:  Under intermittent fluoroscopic guidance, a 22 gauge spinal needle was advanced into the thecal sac at the L3-L4 interlaminar space with position confirmed by flow of CSF. Approximately 22 mL of xanthochromic CSF was collected and sent to the lab for further evaluation. The needle was removed and hemostasis achieved at the skin puncture site. No immediate complication. Estimated blood loss was negligible.    Opening pressure 13 cm water    Closing pressure 9 cm water    Total fluoroscopy time is 0.1 minute with reference air kerma of 6.83 mGy.  Impression: Successful fluoroscopic guided lumbar puncture.    Electronically signed by: Viky Lee  Date:    10/31/2024  Time:    14:33        ASSESSMENT:   Acute Right Parietal Hemorrhagic CVA  Recent Left temporoparietal intraparenchymal hemorrhage   History of subarachnoid hemorrhage     History:   essential hypertension, hyperlipidemia, subarachnoid hemorrhage, type 2 DM       PLAN:   Interventional Neurology is following    Awaiting MRI brain   [facility]  Patient was s/p lumbar puncture on 10/31/2024, cytology pending   Cultures negative   Continue PT/OT/SLP   Palliative Care is following  Fall precautions  Labs in AM    VTE Prophylaxis: SCDs    Discharge Planning and Disposition: ЕКАТЕРИНА CACERES, Coral Sandoval, NP have reviewed and discussed the case with Dr. Louie.  Please see the attending MD's addendum for further assessment and plan.    Coral Sandoval, [facility]  Department of Hospital Medicine   [facility]   11/01/2024    This note was created with the assistance of [employer] Voice Recognition Software. There may be transcription errors as a result of using this technology however minimal, and effort has been made to assure accuracy of transcription, but any obvious errors or omissions should be clarified with the author of the document.    _______________________________________________________________________________        11/01/2024

## 2024-11-01 NOTE — PROGRESS NOTES
[facility] - 7th Floor ICU  Neurology  Progress Note    Patient Name: Tali Beard  MRN: [id number]  Admission Date: 10/29/2024  Hospital Length of Stay: 2 days  Code Status: Full Code   Attending Provider: Eliel Guadarrama MD  Primary Care Physician: Aurora Moe FNP   Principal Problem:<principal problem not specified>    HPI:   Tali Beard is a 88 y.o. 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  Patient's  reported on 10/29 at 6:00 p.m. patient began having left arm and leg weakness and confusion.  She is not on known anticoagulation.  Patient has had a recent admit to our facility on 10/3/2024 she was conservatively treated for intraparenchymal hemorrhage of the left temporoparietal region.  Patient's  good control of pressure and no recent falls.  CT head obtained and revealed newly developed hyperdensity at the right parietal convexity is consistent with interval acute hemorrhage measuring approximately 1.9 cm x 1.9 cm x 2.5 cm.  Mild surrounding white matter edema is also evident. Previously visualized hemorrhagic left parietal cortical contusion and scattered subarachnoid blood is improved in the interval. CTA revealed diminished caliber of the M1 to M2 segments of bilateral middle cerebral arteries. The M3 segments of middle cerebral arteries appear unremarkable. This may be developmental or related to vasospasm. Repeat CT 10/30/24 revealed stable exam without significant interval change. Interventional neurology consulted for review of CTA.          Overview/Hospital Course:  No notes on file        Subjective:     Interval History: RN reported no acute events overnight. Patient is more alert and following commands this morning. EEG showed diffuse generalized slowing with  no epileptiform discharges. Patient is currently on Keppra. ESR 49. CRP 1.20. CSF protein 82.3. MRI brain with SWI pending.     Current Neurological Medications:     Current Facility-Administered Medications   Medication Dose Route Frequency Provider Last Rate Last Admin    0.9%  NaCl infusion   Intravenous Continuous Edouard Novak MD 50 mL/hr at 11/01/24 0904 New Bag at 11/01/24 0904    atorvastatin tablet 40 mg  40 mg Oral QHS Sierra Camarena, NP   40 mg at 10/30/24 2021    bisacodyL suppository 10 mg  10 mg Rectal Daily PRN Sierra Camarena NP        gentamicin 0.1 % ointment   Topical (Top) Daily Eliel Guadarrama MD   Given at 11/01/24 0912    hydrALAZINE injection 20 mg  20 mg Intravenous Q4H PRN Edouard Novak MD   20 mg at 10/31/24 1848    labetaloL injection 10 mg  10 mg Intravenous Q4H PRN Edouard Novak MD   10 mg at 10/31/24 2254    levETIRAcetam injection 750 mg  750 mg Intravenous Q12H Juan Celis DO   750 mg at 11/01/24 0912    mupirocin 2 % ointment   Nasal BID Eliel Guadarrama MD   Given at 11/01/24 0912    niCARdipine 40 mg/200 mL (0.2 mg/mL) infusion  0-15 mg/hr Intravenous Continuous Juan Celis DO 25 mL/hr at 10/30/24 0602 5 mg/hr at 10/30/24 0602    sodium chloride 0.9% flush 10 mL  10 mL Intravenous PRN Sierra Camarena NP           Review of Systems  Objective:     Vital Signs (Most Recent):  Temp: 98.1 °F (36.7 °C) (11/01/24 0400)  Pulse: 79 (11/01/24 0500)  Resp: (!) 22 (11/01/24 0500)  BP: 112/85 (11/01/24 0500)  SpO2: 97 % (11/01/24 0500) Vital Signs (24h Range):  Temp:  [97.7 °F (36.5 °C)-99.6 °F (37.6 °C)] 98.1 °F (36.7 °C)  Pulse:  [69-91] 79  Resp:  [15-26] 22  SpO2:  [94 %-99 %] 97 %  BP: (112-161)/()  112/85     Weight: 65.6 kg (144 lb 10 oz)  Body mass index is 25.62 kg/m².     Physical Exam   Alert  Intermittently following commands  Tracking  VALVERDE's       Significant Labs: CBC:   Recent Labs   Lab 10/31/24  0247 11/01/24  0512   WBC 6.27 6.04   HGB 11.9* 11.5*   HCT 39.0 36.3*    159     CMP:   Recent Labs   Lab 10/31/24  0247 11/01/24  0512    145   K 4.0 3.9   * 113*   CO2 21* 22*   BUN 8.7* 13.3   CREATININE 0.80 0.83   CALCIUM 9.3 8.8   ALBUMIN 3.2* 3.0*   BILITOT 0.7 0.7   ALKPHOS 64 56   AST 24 100*   ALT 15 28       Significant Imaging: I have reviewed all pertinent imaging results/findings within the past 24 hours.  Assessment and Plan:     Intracranial atherosclerosis  10/30/2024 CTA H/N:Impression:  1. No large vessel occlusion.  2. Moderate to severe narrowing of the bilateral supraclinoid segments, unchanged.    Assessment/Plan:  - No intervention needed for M1 M2 stenoses at this time.    - Intracranial atherosclerotic disease is treated with dual antiplatelet therapy for a minimum of 6 months with high-dose statins.    - Holding antiplatelets for now because of intracranial hemorrhages.  DAPT with ASA and Plavix can be introduced in 15 days once hematoma is stable and blood-brain barrier recovery and started occurring.  - Continue Atorvastatin 40 mg daily.  - Other recommendations may follow from MD           Nontraumatic hemorrhage of right cerebral hemisphere  -CTh:  IMPRESSION  1. Newly identified acute hemorrhage along the right parietal convexity.  2. Interval improvement of previous left supratentorial hemorrhage.  3. No findings to suggest large vascular territory acute ischemic insult.Additional chronic secondary findings without significant interval change.  - CTh:repeat 10/30/2024 Impression: Stable exam without significant interval change.  -CTA h/n:  Impression:  1. No large vessel occlusion.  2. Moderate to severe narrowing of the bilateral supraclinoid segments,  unchanged.  -MRI brain:   -ECHO: EF:55%. Agitated saline study of the atrial septum is negative, suggesting absence of intracardiac shunt at the atrial level.    -LDL: 141   -A1c: 5.8   -TSH: Normal   -home medications include: no antiplatelets or anticoagulant        Assessment/Plan:  - pending MRI brain with SWI  - continue Keppra   - ESR elevated at 49.  - CRP normal at 1.20  - CSF protein 82.3  - no current plan for cerebral angiogram, comfort care measures to be discussed with family by palliative care.   - continue to hold antiplatelets. Ok for DAPT with ASA  81 mg and Plavix can be introduced in 15 days.   - continue Lipitor  - ok for PT/OT  - ok to downgrade to Hospitalist's  -Neuro checks every 4 hours.    Will follow peripherally through the weekend              VTE Risk Mitigation (From admission, onward)           Ordered     Reason for No Pharmacological VTE Prophylaxis  Once        Question:  Reasons:  Answer:  Risk of Bleeding    10/31/24 1115     IP VTE HIGH RISK PATIENT  Once         10/31/24 1115     Place sequential compression device  Until discontinued         10/31/24 1115     Place sequential compression device  Until discontinued         10/30/24 0340                    Lucía Hernandez NP  Neurology  [facility] - 7th Floor ICU

## 2024-11-01 NOTE — CONSULTS
Patient Name: Tali Beard   MRN: [id number]   Admission Date: 10/29/2024   Hospital Length of Stay: 2   Attending Provider: Eliel Guadarrama MD   Consulting Provider: Jerry Leon M.D.  Reason for Consult: Goals of Care  Primary Care Physician: Aurora Moe FNP     Principal Problem: Acute focal neurological deficit     Patient information was obtained from patient, relative(s), and ER records.      Final diagnoses:  [R29.818] Acute focal neurological deficit  [R53.1] Weakness  [I61.9] ICH (intracerebral hemorrhage)     We reviewed the patient's current clinical status with the nurse. We reviewed clinical documentation, labs and imaging.     Advance Care Planning     Date: 11/01/2024    Power of   I initiated the process of voluntary advance care planning today and explained the importance of this process to the patient.  I introduced the concept of advance directives to the patient, as well. Then the patient received detailed information about the importance of designating a Health Care Power of  (HCPOA). She was also instructed to communicate with this person about their wishes for future healthcare, should she become sick and lose decision-making capacity. The patient has not previously appointed a HCPOA.  We discussed with son, Heri, at bedside and states that he and the patient's other son, Jesus, collectively makes medical decisions for the patient, but they are not been appointed power of attorneys.  We encourage the patient and son to complete necessary documentation about their wishes for future healthcare, should she become sick and lose decision-making capacity.  Patient and son reported understanding.    Code Status  I reviewed the risks and benefits of aggressive cardiopulmonary resuscitative measures taken in the event of a cardiopulmonary arrest event. I discussed the high risk of non-survival from such an event.  The explained that statistically, only about 1 in  15 patients survive a cardiopulmonary arrest to return home, despite aggressive CPR and life saving measures. I discussed the risk of increased morbidity and a potential decline in quality of life with survival from such an event. Finally I reviewed the risks to the patient who survives such an event, including chest wall injuries from CPR, the risk of persistent ventilator needs and the risk of anoxic brain injuries.  Patient reported understanding, but additional time was allowed for son, Heri, at the side to translate for the patient in case there were language barriers as she also speaks Cajun Syrian.  Patient's son reports that she understood the risks, benefits, and alternatives, and he reported that she elects full code status.    [facility]  I engaged the patient and family in a voluntary conversation about advance care planning and we specifically addressed what the goals of care would be moving forward, in light of the patient's change in clinical status, specifically multiple recent hospitalizations for intracranial hemorrhages with this current admission resulting in left-sided deficits secondary to acute intraparenchymal hemorrhage of the right parietal lobe.  We discussed with patient and son at bedside, Heri, that there is a possibility of the patient improving with skilled rehab, but it's not a guarantee that she will have full functionality in the future. The son is hopeful that skilled rehab will improve her function enough to go home in which his family will be able to take care of her. He reports that patient is being monitored essentially 24/7 since he takes care of the patient during the day and his brother, Jesus, takes care of her at night.  He also endorses that there is video monitoring of her if they are unable to be there.  In addition, the son reports multiple family members who are healthcare providers, such as a sister-in-law who is a nurse practitioner, and multiple grandchildren who  are CNAs.  He endorses that they are able to take care of the patient when he and his other brother are not available.  We did specifically address the patient's likely prognosis, which is poor.  We explored the patient's values and preferences for future care.  The patient and family endorses that what is most important right now is to focus on spending time at home, quality of life, even if it means sacrificing a little time, and extending life as long as possible, even it it means sacrificing quality    Accordingly, we have decided that the best plan to meet the patient's goals includes continuing with treatment      Artifical nutrition:  I reviewed the patient's wishes concerning the option for or against a future placement of a PEG for the purpose of long term artificial nutrition. I reviewed the benefits and risks with patient and son.  Son provided translation to patient, and reported that 1 of his daughter-in-laws previously had PEG tube, which she had no problems with.  Son reported patient was amenable for PEG tube placement should she require it in the future.    Symptom review:  Patient denies pain, shortness of breath, nausea, constipation, and anxiety.    Assessment and Plan:  Goals of care discussion and counseling   Acute intraparenchymal hemorrhage of the right parietal lobe  Recent history of left temporoparietal intraparenchymal hemorrhage with small left-sided subarachnoid hemorrhage (10/2024)  History of right frontoparietal subarachnoid hemorrhage (10/2023)  History of hypertension, hyperlipidemia, and type 2 diabetes mellitus    - Goals of care was discussed with patient and son at bedside.  Patient states that in the event of unexpected cardiopulmonary arrest, patient elects to undergo CPR, mechanical ventilation, and other life-sustaining measures.  She also reports that she would also like to undergo PEG tube placement for artificial nutrition should she require it in the future. Patient  remains at full code.  - We will continue to follow patient for continued conversations regarding goals of care and advanced care planning.    History of Present Illness:   Tali Beard is a 88 y.o. female with a PMHx of hypertension, hyperlipidemia, type 2 diabetes mellitus, right frontoparietal subarachnoid hemorrhage, recent left temporoparietal intraparenchymal hemorrhage who presented to [facility] on [facility]30[facility]24 for left-sided weakness and confusion that started around 6:00 p.m. on the night prior to presentation.  Patient was present recently admitted from 10/03 to 10/7 to the ICU for altered mental status with subsequent workup showing left temporoparietal intraparenchymal hemorrhage measuring 35 mm in diameter with a small left-sided subarachnoid hemorrhage.  Workup on this admission showed improvement of prior hemorrhage but developed new acute intraparenchymal hemorrhage of the right parietal convexity with possible vasospasm of the M1 and M2 segments of the MCA.  Patient underwent lumbar puncture yesterday for evaluation of amyloidosis/vascular workup.  Due to recent histories of multiple intracranial hemorrhages as well as advanced age, palliative Medicine was consulted for goals of care and advanced care planning discussions.    Active Ambulatory Problems     Diagnosis Date Noted    Type 2 diabetes mellitus without complication, without long-term current use of insulin 12/19/2023    Primary hypertension 12/19/2023    Other hyperlipidemia 12/19/2023    Foot ulcer with fat layer exposed, left 07/02/2024    Nontraumatic hemorrhage of right cerebral hemisphere 10/04/2024     Resolved Ambulatory Problems     Diagnosis Date Noted    No Resolved Ambulatory Problems     Past Medical History:   Diagnosis Date    Subarachnoid hemorrhage         History reviewed. No pertinent surgical history.     Review of patient's allergies indicates:   Allergen Reactions    Hydroxyzine hcl      Other Reaction(s): BUMPS ALL OVER  BODY    Other reaction(s): BUMPS ALL OVER BODY    Felodipine Rash     Edema in the legs        Current Facility-Administered Medications:     0.9%  NaCl infusion, , Intravenous, Continuous, Edouard Novak MD, Last Rate: 50 mL/hr at 11/01/24 0904, New Bag at 11/01/24 0904    atorvastatin tablet 40 mg, 40 mg, Oral, QHS, Sierra Camarena, NP, 40 mg at 10/30/24 2021    bisacodyL suppository 10 mg, 10 mg, Rectal, Daily PRN, Sierra Camarena NP    gentamicin 0.1 % ointment, , Topical (Top), Daily, Eliel Guadarrama MD, Given at 11/01/24 0912    hydrALAZINE injection 20 mg, 20 mg, Intravenous, Q4H PRN, Edouard Novak MD, 20 mg at 10/31/24 1848    labetaloL injection 10 mg, 10 mg, Intravenous, Q4H PRN, Edouard Novak MD, 10 mg at 10/31/24 2254    levETIRAcetam injection 750 mg, 750 mg, Intravenous, Q12H, Juan Celis DO, 750 mg at 11/01/24 0912    mupirocin 2 % ointment, , Nasal, BID, Eliel Guadarrama MD, Given at 11/01/24 0912    niCARdipine 40 mg/200 mL (0.2 mg/mL) infusion, 0-15 mg/hr, Intravenous, Continuous, Juan Celis DO, Last Rate: 25 mL/hr at 10/30/24 0602, 5 mg/hr at 10/30/24 0602    sodium chloride 0.9% flush 10 mL, 10 mL, Intravenous, PRN, Sierra Camarena NP       Current Facility-Administered Medications:     bisacodyL, 10 mg, Rectal, Daily PRN    hydrALAZINE, 20 mg, Intravenous, Q4H PRN    labetalol, 10 mg, Intravenous, Q4H PRN    sodium chloride 0.9%, 10 mL, Intravenous, PRN     History reviewed. No pertinent family history.     Review of Systems   HENT:  Negative for sore throat.    Respiratory:  Negative for shortness of breath.    Cardiovascular:  Negative for chest pain.   Gastrointestinal:  Negative for abdominal pain, constipation, diarrhea, nausea and vomiting.   Neurological:  Positive for weakness (Left upper and lower extremity).   Psychiatric/Behavioral:  The patient is not nervous/anxious.       12 point review of systems conducted, negative except as stated  "in the history of present illness. See HPI for details.      Objective:   BP (!) 143/63   Pulse 70   Temp 98.5 °F (36.9 °C) (Oral)   Resp (!) 21   Ht 5' 3" (1.6 m)   Wt 65.6 kg (144 lb 10 oz)   SpO2 95%   BMI 25.62 kg/m²      Physical Exam  Vitals and nursing note reviewed.   Constitutional:       General: She is not in acute distress.     Appearance: Normal appearance. She is not ill-appearing or toxic-appearing.   HENT:      Head: Normocephalic.   Eyes:      General: No scleral icterus.     Conjunctiva/sclera: Conjunctivae normal.   Cardiovascular:      Rate and Rhythm: Normal rate and regular rhythm.      Pulses: Normal pulses.      Heart sounds: Normal heart sounds. No murmur heard.  Pulmonary:      Effort: Pulmonary effort is normal. No respiratory distress.      Breath sounds: Normal breath sounds.   Abdominal:      General: Abdomen is flat. Bowel sounds are normal. There is no distension.      Palpations: Abdomen is soft.      Tenderness: There is no abdominal tenderness.   Skin:     General: Skin is warm and dry.      Capillary Refill: Capillary refill takes 2 to 3 seconds.   Neurological:      Mental Status: She is alert.      Motor: Weakness (Left upper and lower extremity) present.      Comments: Answers questions appropriately, but quiet speech            Review of Symptoms  Review of Symptoms      Symptom Assessment (ESAS 0-10 Scale)  Pain:  0  Dyspnea:  0  Anxiety:  0  Nausea:  0  Depression:  0  Anorexia:  0  Fatigue:  0  Insomnia:  0  Restlessness:  0  Agitation:  0     Constipation:  Negative  Diarrhea:  Negative    Anxiety:  Is not nervous/anxious  Constipation:  No constipation    Bowel Management Plan (BMP):  Yes (Bisacodyl suppository PRN)      Pain Assessment:  OME in 24 hours:  0  Location(s):      Performance Status:  40    Living Arrangements:  Lives with family    Psychosocial/Cultural:   See Palliative Psychosocial Note: Yes  Patient lives with 2 sons, Heri and Jesus.  Heri " "takes care patient during the day while Jesus takes care of the patient at night.  Son reports that the family has remote camera monitoring of the patient while they are away to check on the patient.  She also has a daughter-in-law who is a nurse practitioner as well as multiple grandchildren who are CNAs.  Son at bedside reports that they are able to take care of the patient "at a moment's notice." In addition, patient has a strong Religious elizabeth.  **Primary  to Follow**  Palliative Care  Consult: No      Advance Care Planning   Advance Directives:   Living Will: No    [place]POST: No    Do Not Resuscitate Status: No      Decision Making:  Patient answered questions and Family answered questions  Goals of Care: The patient and family endorses that what is most important right now is to focus on extending life as long as possible, even it it means sacrificing quality and curative/life-prolongation (regardless of treatment burdens)    Accordingly, we have decided that the best plan to meet the patient's goals includes continuing with treatment.        Caregiver burden formerly assessed: Yes     Case was seen and discussed with Dr. Leon.  Please appreciate attending's attestation to follow.    Michael Villalta MD  PGY-1 Resident  [facility]  11/01/2024    "

## 2024-11-01 NOTE — PT/OT/SLP PROGRESS
Physical Therapy Treatment    Patient Name:  Tali Beard   MRN:  [id number]    Recommendations:     Discharge therapy intensity: Moderate Intensity Therapy   Discharge Equipment Recommendations: to be determined by next level of care  Barriers to discharge: Impaired mobility and Ongoing medical needs    Assessment:     Tali Beard is a 88 y.o. female admitted with a medical diagnosis of L sided weakness, AMS; R parietal acute hemorrhage.   PMH of SAH and recent IPH.  She presents with the following impairments/functional limitations: weakness, impaired endurance, impaired balance, gait instability, impaired self care skills, impaired functional mobility, impaired cognition, decreased safety awareness, decreased lower extremity function, decreased upper extremity function.    Rehab Prognosis: Fair; patient would benefit from acute skilled PT services to address these deficits and reach maximum level of function.    Recent Surgery: * No surgery found *      Plan:     During this hospitalization, patient would benefit from acute PT services 5 x/week to address the identified rehab impairments via therapeutic activities, therapeutic exercises, neuromuscular re-education and progress toward the following goals:    Plan of Care Expires:  11/30/24    Subjective     Chief Complaint: no complaints  Patient/Family Comments/goals: none stated  Pain/Comfort:  Pain Rating 1: 0/10      Objective:     Communicated with RN prior to session.  Patient found HOB elevated with blood pressure cuff, pulse ox (continuous), telemetry, SCD, peripheral IV upon PT entry to room.     General Precautions: Standard, fall  Orthopedic Precautions: N/A  Braces: N/A  Respiratory Status: Room air  Blood Pressure: 151/78, HR 75, Spo2 96%-97%; BP post tx 140/56  Skin Integrity: Visible skin intact      Functional Mobility:  Bed Mobility:    Supine to sit; sit to supine with total assist  Transfers:    Sit<->stand with MAX cues and assistance  to keep feet on the floor, ASHLEIGH knees blocked.   X 2 trials  Flexed posture on first trial  Pt pre-occupied with closing the back of her gown requiring max redirection to task and cues to improve posture.   Unable to progress to side step  Balance:   Max to mod assist with R lateral posterior lean  Placed on L elbow x 3 trials with improvement in midline orientation however unable to maintain    Therapeutic Activities/Exercises:      Education:  Patient and son/s were provided with verbal education education regarding PT role/goals/POC, fall prevention, and safety awareness.  Additional teaching is warranted.     Patient left HOB elevated with all lines intact, call button in reach, wedge under R side, RN notified, and son present    GOALS:   Multidisciplinary Problems       Physical Therapy Goals          Problem: Physical Therapy    Goal Priority Disciplines Outcome Interventions   Physical Therapy Goal     PT, PT/OT Progressing    Description: Goals to be met by: 24     Patient will increase functional independence with mobility by performin. Supine to sit with Moderate Assistance  2. Sit to supine with Moderate Assistance  3. Sit to stand transfer TBD  4. Bed to chair transfer TBD  5. Gait TBD  6. Sitting at edge of bed x20 minutes with Stand-by Assistance                         Time Tracking:     PT Received On: 24  PT Start Time: 927     PT Stop Time: 956  PT Total Time (min): 29 min     Billable Minutes: Therapeutic Activity 2 units    Treatment Type: Treatment  PT/PTA: PTA     Number of PTA visits since last PT visit: 2024

## 2024-11-01 NOTE — CONSULTS
Inpatient Nutrition Assessment    Admit Date: 10/29/2024   Total duration of encounter: 3 days   Patient Age: 88 y.o.    Nutrition Recommendation/Prescription     Continue regular diet with consistency per SLP.  Add Boost Plus (provides 360 kcal, 14 g protein per serving) BID.    Communication of Recommendations: reviewed with nurse, reviewed with patient, and reviewed with family    Nutrition Assessment     Chart Review    Reason Seen: continuous nutrition monitoring and physician consult for 'assess dietary needs'    Malnutrition Screening Tool Results   Have you recently lost weight without trying?: No  Have you been eating poorly because of a decreased appetite?: No   MST Score: 0   Diagnosis:  Right parietal hemorrhagic infarct  Possible vasospasm of M1 and M2 segment of MCA    Relevant Medical History: hypertension, hyperlipidemia, diabetes mellitus, subarachnoid hemorrhage, and recent intraparenchymal hemorrhage     Scheduled Medications:  atorvastatin, 40 mg, QHS  gentamicin, , Daily  levETIRAcetam (Keppra) IV (PEDS and ADULTS), 750 mg, Q12H  mupirocin, , BID    Continuous Infusions:  0.9% NaCl, Last Rate: 50 mL/hr at 11/01/24 0904  nicardipine, Last Rate: 5 mg/hr (10/30/24 0602)    PRN Medications:   bisacodyL, 10 mg, Daily PRN  hydrALAZINE, 20 mg, Q4H PRN  labetalol, 10 mg, Q4H PRN  sodium chloride 0.9%, 10 mL, PRN    Calorie Containing IV Medications: no significant kcals from medications at this time    Recent Labs   Lab 10/28/24  0826 10/29/24  2309 10/29/24  2313 10/30/24  0354 10/31/24  0247 11/01/24  0512   NA  --   --  143 141 142 145   K  --   --  4.0 3.8 4.0 3.9   CALCIUM  --   --  9.9 9.3 9.3 8.8   CL  --   --  109* 108* 109* 113*   CO2  --   --  24 25 21* 22*   BUN  --   --  14.7 11.8 8.7* 13.3   CREATININE  --   --  0.92 0.88 0.80 0.83   EGFRNORACEVR  --   --  60 >60 >60 >60   GLUCOSE  --   --  111 103 103 99   BILITOT  --   --  0.4 0.4 0.7 0.7   ALKPHOS  --   --  87 77 64 56   ALT  --   --  18  "15 15 28   AST  --   --  23 20 24 100*   ALBUMIN  --   --  3.7 3.4 3.2* 3.0*   CRP  --   --   --  1.20  --   --    TRIG 55  --  61  --   --   --    HGBA1C 5.8*  --   --   --   --   --    WBC 3.9  --  4.47* 5.19 6.27 6.04   HGB 11.4  --  12.5 11.6* 11.9* 11.5*   HCT 37.2 43 40.1 37.8 39.0 36.3*     Nutrition Orders:  Diet Easy to Chew (IDDSI Level 7) Supervision with Meals      Appetite/Oral Intake: good/50-75% of meals  Factors Affecting Nutritional Intake: none identified  Social Needs Impacting Access to Food: none identified  Food/Judaism/Cultural Preferences: none reported  Food Allergies: no known food allergies  Last Bowel Movement: 10/29/24  Wound(s):     Wound 05/07/24 0952 Ulceration Left medial Foot-Tissue loss description: Partial thickness     Comments    11/1/24 Spoke with nurse and family members at bedside. Family reports patient has a good appetite currently and prior to admission; they deny weight loss. Family reports providing assistance with meals, taking it slow at first, fair intake currently, agreeable to vanilla Boost. Family denies food allergies/intolerances.    Anthropometrics    Height: 5' 3" (160 cm), Height Method: Stated  Last Weight: 65.6 kg (144 lb 10 oz) (10/29/24 2340), Weight Method: Bed Scale  BMI (Calculated): 25.6  BMI Classification: overweight (BMI 25-29.9)     Ideal Body Weight (IBW), Female: 115 lb                                   Usual Weight Provided By: family/caregiver denies unintentional weight loss    Wt Readings from Last 5 Encounters:   10/29/24 65.6 kg (144 lb 10 oz)   10/23/24 63.2 kg (139 lb 5.3 oz)   10/03/24 63.2 kg (139 lb 5.3 oz)   10/03/24 66.7 kg (147 lb)   09/25/24 64.9 kg (143 lb 1.3 oz)     Weight Change(s) Since Admission: new admit  Wt Readings from Last 1 Encounters:   10/29/24 2340 65.6 kg (144 lb 10 oz)   Admit Weight: 65.6 kg (144 lb 10 oz) (10/29/24 8750), Weight Method: Bed Scale    Nutrition Goals & Monitoring     Dietitian will monitor: food " and beverage intake  Discharge planning: resume home regimen  Nutrition Risk/Follow-Up: low (follow-up in 5-7 days)   Please consult if re-assessment needed sooner.

## 2024-11-02 LAB
ALBUMIN SERPL-MCNC: 2.7 G/DL (ref 3.4–4.8)
ALBUMIN/GLOB SERPL: 0.9 RATIO (ref 1.1–2)
ALP SERPL-CCNC: 57 UNIT/L (ref 40–150)
ALT SERPL-CCNC: 31 UNIT/L (ref 0–55)
ANION GAP SERPL CALC-SCNC: 9 MEQ/L
AST SERPL-CCNC: 76 UNIT/L (ref 5–34)
BASOPHILS # BLD AUTO: 0.03 X10(3)/MCL
BASOPHILS NFR BLD AUTO: 0.6 %
BILIRUB SERPL-MCNC: 0.6 MG/DL
BUN SERPL-MCNC: 16.3 MG/DL (ref 9.8–20.1)
CALCIUM SERPL-MCNC: 8.4 MG/DL (ref 8.4–10.2)
CHLORIDE SERPL-SCNC: 113 MMOL/L (ref 98–107)
CO2 SERPL-SCNC: 21 MMOL/L (ref 23–31)
CREAT SERPL-MCNC: 0.78 MG/DL (ref 0.55–1.02)
CREAT/UREA NIT SERPL: 21
EOSINOPHIL # BLD AUTO: 0.21 X10(3)/MCL (ref 0–0.9)
EOSINOPHIL NFR BLD AUTO: 4 %
ERYTHROCYTE [DISTWIDTH] IN BLOOD BY AUTOMATED COUNT: 15.7 % (ref 11.5–17)
FERRITIN SERPL-MCNC: 284.13 NG/ML (ref 4.63–204)
GFR SERPLBLD CREATININE-BSD FMLA CKD-EPI: >60 ML/MIN/1.73/M2
GLOBULIN SER-MCNC: 3.1 GM/DL (ref 2.4–3.5)
GLUCOSE SERPL-MCNC: 98 MG/DL (ref 82–115)
HCT VFR BLD AUTO: 35.3 % (ref 37–47)
HGB BLD-MCNC: 11 G/DL (ref 12–16)
HISTONE IGG SER IA-ACNC: 0.2 UNITS
IMM GRANULOCYTES # BLD AUTO: 0.01 X10(3)/MCL (ref 0–0.04)
IMM GRANULOCYTES NFR BLD AUTO: 0.2 %
IRON SATN MFR SERPL: 21 % (ref 20–50)
IRON SERPL-MCNC: 30 UG/DL (ref 50–170)
LYMPHOCYTES # BLD AUTO: 0.99 X10(3)/MCL (ref 0.6–4.6)
LYMPHOCYTES NFR BLD AUTO: 18.9 %
MCH RBC QN AUTO: 23.1 PG (ref 27–31)
MCHC RBC AUTO-ENTMCNC: 31.2 G/DL (ref 33–36)
MCV RBC AUTO: 74.2 FL (ref 80–94)
MONOCYTES # BLD AUTO: 0.69 X10(3)/MCL (ref 0.1–1.3)
MONOCYTES NFR BLD AUTO: 13.2 %
NEUTROPHILS # BLD AUTO: 3.31 X10(3)/MCL (ref 2.1–9.2)
NEUTROPHILS NFR BLD AUTO: 63.1 %
NRBC BLD AUTO-RTO: 0 %
PLATELET # BLD AUTO: 129 X10(3)/MCL (ref 130–400)
PLATELETS.RETICULATED NFR BLD AUTO: 2.9 % (ref 0.9–11.2)
PMV BLD AUTO: 11 FL (ref 7.4–10.4)
POTASSIUM SERPL-SCNC: 4.1 MMOL/L (ref 3.5–5.1)
PROT SERPL-MCNC: 5.8 GM/DL (ref 5.8–7.6)
RBC # BLD AUTO: 4.76 X10(6)/MCL (ref 4.2–5.4)
SODIUM SERPL-SCNC: 143 MMOL/L (ref 136–145)
TIBC SERPL-MCNC: 113 UG/DL (ref 70–310)
TIBC SERPL-MCNC: 143 UG/DL (ref 250–450)
TRANSFERRIN SERPL-MCNC: 124 MG/DL
WBC # BLD AUTO: 5.24 X10(3)/MCL (ref 4.5–11.5)

## 2024-11-02 PROCEDURE — 25000003 PHARM REV CODE 250: Performed by: INTERNAL MEDICINE

## 2024-11-02 PROCEDURE — 82728 ASSAY OF FERRITIN: CPT

## 2024-11-02 PROCEDURE — 80053 COMPREHEN METABOLIC PANEL: CPT

## 2024-11-02 PROCEDURE — 99232 SBSQ HOSP IP/OBS MODERATE 35: CPT | Mod: FS,,, | Performed by: PSYCHIATRY & NEUROLOGY

## 2024-11-02 PROCEDURE — 25500020 PHARM REV CODE 255

## 2024-11-02 PROCEDURE — 36415 COLL VENOUS BLD VENIPUNCTURE: CPT

## 2024-11-02 PROCEDURE — 85025 COMPLETE CBC W/AUTO DIFF WBC: CPT

## 2024-11-02 PROCEDURE — 25000003 PHARM REV CODE 250

## 2024-11-02 PROCEDURE — A9577 INJ MULTIHANCE: HCPCS

## 2024-11-02 PROCEDURE — 83540 ASSAY OF IRON: CPT

## 2024-11-02 PROCEDURE — 25000003 PHARM REV CODE 250: Performed by: NURSE PRACTITIONER

## 2024-11-02 PROCEDURE — 21400001 HC TELEMETRY ROOM

## 2024-11-02 RX ORDER — LEVETIRACETAM 100 MG/ML
750 SOLUTION ORAL 2 TIMES DAILY
Status: DISCONTINUED | OUTPATIENT
Start: 2024-11-02 | End: 2024-11-03

## 2024-11-02 RX ADMIN — LEVETIRACETAM 750 MG: 500 SOLUTION ORAL at 08:11

## 2024-11-02 RX ADMIN — GADOBENATE DIMEGLUMINE 15 ML: 529 INJECTION, SOLUTION INTRAVENOUS at 05:11

## 2024-11-02 RX ADMIN — LEVETIRACETAM 750 MG: 500 SOLUTION ORAL at 09:11

## 2024-11-02 RX ADMIN — AMLODIPINE BESYLATE 5 MG: 5 TABLET ORAL at 08:11

## 2024-11-02 RX ADMIN — MUPIROCIN: 20 OINTMENT TOPICAL at 09:11

## 2024-11-02 RX ADMIN — MUPIROCIN: 20 OINTMENT TOPICAL at 08:11

## 2024-11-02 RX ADMIN — ATORVASTATIN CALCIUM 40 MG: 40 TABLET, FILM COATED ORAL at 09:11

## 2024-11-02 RX ADMIN — GENTAMICIN SULFATE: 1 OINTMENT TOPICAL at 08:11

## 2024-11-02 NOTE — PROGRESS NOTES
[facility] Medicine Progress Note        Chief Complaint: Inpatient Follow-up for     HPI:   88 year old woman with PMHx of HTN, HLD, T2DM, and recent admission of IPH in the left left temporoparietal region with small volume left-sided subarachnoid hemorrhage presented on 10/29/2024 for left sided confusion and AMS. CT head showed acute hemorrhage along the right parietal convexity and interval improvement of previous left supratentorial hemorrhage.  CTA head/neck negative for LVO, moderate to severe narrowing of the bilateral supraclinoid segments unchanged. Admitted to ICU for closer monitoring and neurology and neurosurgery following. Vasculitis and cerebral amyloidosis work up in process. LP was performed. EEG done was negative for seizures but showed diffuse cerebral slowing. Patient was also seen by palliative.     Patient had MRI done and pending read.    Interval Hx:   Patient had MRI done pending read. Seen and examined. Multiple family members at bedside. They would like to proceed with cerebral angiogram if offered again by neurology. Patient can speak both English and Creole    Case was discussed with patient's nurse and  on the floor.    Objective/physical exam:  General: In no acute distress, afebrile  Chest: Clear to auscultation bilaterally  Heart: RRR, +S1, S2, no appreciable murmur  Abdomen: Soft, nontender, BS +  MSK: Warm, no lower extremity edema, no clubbing or cyanosis  Neurologic: A&Ox3, LUE 3/5, LLE 2-3/5, RUE 4/5 and RLE 4/5     VITAL SIGNS: 24 HRS MIN & MAX LAST   Temp  Min: 97.4 °F (36.3 °C)  Max: 99 °F (37.2 °C) 98.2 °F (36.8 °C)   BP  Min: 117/78  Max: 146/66 (!) 146/66   Pulse  Min: 69  Max: 87  76   Resp  Min: 17  Max: 31 18   SpO2  Min: 94 %  Max: 100 % 98 %     I have reviewed the following labs:  Recent Labs   Lab 10/28/24  0826 10/29/24  2309 10/31/24  0247 11/01/24  0512 11/02/24  0348   WBC 3.9   < > 6.27 6.04 5.24   RBC  --     < > 5.13 4.91 4.76   HGB 11.4   < > 11.9* 11.5* 11.0*   HCT 37.2   < > 39.0 36.3* 35.3*   MCV  --    < > 76.0* 73.9* 74.2*   MCH 23.7*   < > 23.2* 23.4* 23.1*   MCHC  --    < > 30.5* 31.7* 31.2*   RDW 15.4   < > 15.9 15.7 15.7      < > 161 159 129*   MPV  --    < > 10.3 10.9* 11.0*   BASO 0.0  --   --   --   --     < > = values in this interval not displayed.     Recent Labs   Lab 10/31/24  0247 11/01/24  0512 11/02/24  0348    145 143   K 4.0 3.9 4.1   * 113* 113*   CO2 21* 22* 21*   BUN 8.7* 13.3 16.3   CREATININE 0.80 0.83 0.78   CALCIUM 9.3 8.8 8.4   ALBUMIN 3.2* 3.0* 2.7*   ALKPHOS 64 56 57   ALT 15 28 31   AST 24 100* 76*   BILITOT 0.7 0.7 0.6     Microbiology Results (last 7 days)       Procedure Component Value Units Date/Time    Cerebrospinal Fluid Culture [[id number]] Collected: 10/31/24 1354    Order Status: Completed Specimen: CSF (Spinal Fluid) from Cerebrospinal Fluid Updated: 11/02/24 0807     CULTURE, CSF No Growth At 48 Hours     GRAM STAIN No WBC seen      No bacteria seen    Cryptococcal antigen, CSF [[id number]]  (Normal) Collected: 10/31/24 1354    Order Status: Completed Specimen: CSF (Spinal Fluid) from CSF Tap, Tube 1 Updated: 11/01/24 1608     Cryptococcal Antigen, CSF Negative    Gram Stain [[id number]] Collected: 10/31/24 1354    Order Status: Completed Specimen: CSF (Spinal Fluid) from Lumbar Updated: 11/01/24 0816     GRAM STAIN No WBCs, No bacteria seen    Chantal Ink Prep CSF [[id number]] Collected: 10/31/24 1354    Order Status: Completed Specimen: CSF (Spinal Fluid) from Cerebrospinal Fluid Updated: 10/31/24 1618     CHANTAL INK PREP CSF ([facility]) Negative    Fungal Culture [[id number]] Collected: 10/31/24 1350    Order Status: Sent Specimen: CSF (Spinal Fluid) from Lumbar Updated: 10/31/24 1414             See below for Radiology    Assessment/Plan:  # Left sided weakness 2/2 likely hypertensive ICH vs lobar hemorrhage due to amyloid angiopathy vs vasculitis   # Hx of  IPH in the left temporoparietal region with small volume left-sided subarachnoid hemorrhage   # Microcytic Anemia  # HTN  # HLD   # T2DM  # Elevated AST - improving     - iron studies  - follow up LP final results; normal glucose, elevated protein. Cytology pending, further blood work pending  - follow up vasculitis work up  - keppra for seizure ppx  - SBP goal < 140  - MRI brain - done pending read  - hold antiplatelets for now  - palliative recs appreciated  - neurology/NGSY recs appreciated  - neurology and family to potentially consider angiogram if patient continues to make recovery (previously refused by family)  - PT/OT    VTE prophylaxis: SCD    Patient condition:  Stable    Anticipated discharge and Disposition:         All diagnosis and differential diagnosis have been reviewed; assessment and plan has been documented; I have personally reviewed the labs and test results that are presently available; I have reviewed the patients medication list; I have reviewed the consulting providers response and recommendations. I have reviewed or attempted to review medical records based upon their availability    All of the patient's questions have been  addressed and answered. Patient's is agreeable to the above stated plan. I will continue to monitor closely and make adjustments to medical management as needed.    _____________________________________________________________________    Malnutrition Status:    Scheduled Med:   amLODIPine  5 mg Oral Daily    atorvastatin  40 mg Oral QHS    gentamicin   Topical (Top) Daily    levetiracetam  750 mg Oral BID    mupirocin   Nasal BID      Continuous Infusions:     PRN Meds:    Current Facility-Administered Medications:     bisacodyL, 10 mg, Rectal, Daily PRN    hydrALAZINE, 20 mg, Intravenous, Q4H PRN    labetalol, 10 mg, Intravenous, Q4H PRN    sodium chloride 0.9%, 10 mL, Intravenous, PRN     Radiology:  I have personally reviewed the following imaging and agree with  the radiologist.     IR LUMBAR PUNCTURE DIAGNOSTIC WITH IMAGING  Narrative: EXAMINATION:  IR LUMBAR PUNCTURE DIAGNOSTIC WITH IMAGING    CLINICAL HISTORY:  r/o cns vasculitis;    TECHNIQUE:  The risks and benefits of the procedure were explained to the patient's son and written informed consent was obtained. All questions were answered. A suitable skin entry site was chosen under fluoroscopy. The lower back was then prepped and draped in sterile fashion. 1% lidocaine was used for local anesthesia.    COMPARISON:  No relevant comparison studies available at the time of dictation.    FINDINGS:  Under intermittent fluoroscopic guidance, a 22 gauge spinal needle was advanced into the thecal sac at the L3-L4 interlaminar space with position confirmed by flow of CSF. Approximately 22 mL of xanthochromic CSF was collected and sent to the lab for further evaluation. The needle was removed and hemostasis achieved at the skin puncture site. No immediate complication. Estimated blood loss was negligible.    Opening pressure 13 cm water    Closing pressure 9 cm water    Total fluoroscopy time is 0.1 minute with reference air kerma of 6.83 mGy.  Impression: Successful fluoroscopic guided lumbar puncture.    Electronically signed by: Viky Lee  Date:    10/31/2024  Time:    14:33      Russell Louie MD  Department of Hospital Medicine   [facility]   11/02/2024

## 2024-11-02 NOTE — ASSESSMENT & PLAN NOTE
-CTh:  IMPRESSION  1. Newly identified acute hemorrhage along the right parietal convexity.  2. Interval improvement of previous left supratentorial hemorrhage.  3. No findings to suggest large vascular territory acute ischemic insult.Additional chronic secondary findings without significant interval change.  - CTh:repeat 10/30/2024 Impression: Stable exam without significant interval change.  -CTA h/n:  Impression:  1. No large vessel occlusion.  2. Moderate to severe narrowing of the bilateral supraclinoid segments, unchanged.  -MRI brain: new right parietal hemorrhage, official read pending  -ECHO: EF:55%. Agitated saline study of the atrial septum is negative, suggesting absence of intracardiac shunt at the atrial level.    -LDL: 141   -A1c: 5.8   -TSH: Normal   -home medications include: no antiplatelets or anticoagulant        Assessment/Plan:    - continue Keppra 750 mg bid  - continue to hold antiplatelets  - recommend SBP less than 140. Encouraged family to monitor closely  - PT/OT appreciate recommendations. Would likely benefit from some inpatient rehab.  - continue Lipitor  - No diagnostic cerebral angiogram at this time.   - needs outpatient MRI Brain w/wo with SWI sequence in month.  - follow up in stroke clinic in 1 month with ABHIJEET Murguia in one month to evaluate MRI.    No further recommendations from a stroke stand point. Will sign off.

## 2024-11-02 NOTE — ASSESSMENT & PLAN NOTE
-CTh:  IMPRESSION  1. Newly identified acute hemorrhage along the right parietal convexity.  2. Interval improvement of previous left supratentorial hemorrhage.  3. No findings to suggest large vascular territory acute ischemic insult.Additional chronic secondary findings without significant interval change.  - CTh:repeat 10/30/2024 Impression: Stable exam without significant interval change.  -CTA h/n:  Impression:  1. No large vessel occlusion.  2. Moderate to severe narrowing of the bilateral supraclinoid segments, unchanged.  -MRI brain: new right parietal hemorrhage, official read pending  -ECHO: EF:55%. Agitated saline study of the atrial septum is negative, suggesting absence of intracardiac shunt at the atrial level.    -LDL: 141   -A1c: 5.8   -TSH: Normal   -home medications include: no antiplatelets or anticoagulant        Assessment/Plan:    - continue Keppra 750 mg bid  - continue to hold antiplatelets  - recommend SBP less than 140. Encouraged family to monitor closely  - PT/OT appreciate recommendations. Would likely benefit from some inpatient rehab.  - continue Lipitor  - No diagnostic cerebral angiogram at this time.   - will follow CSF results peripherally.  - needs outpatient MRI Brain w/wo with SWI sequence in month.  - follow up in stroke clinic in 1 month with ABHIJEET Murguia in one month to evaluate MRI.    No further recommendations from a stroke stand point. Will sign off.

## 2024-11-02 NOTE — PROGRESS NOTES
[facility]  Neurology  Neurology  Progress Note    Patient Name: Tali Beard  MRN: [id number]  Admission Date: 10/29/2024  Hospital Length of Stay: 3 days  Code Status: Full Code   Attending Provider: Russell Louie,*  Primary Care Physician: Aurora Moe FNP   Principal Problem:Acute focal neurological deficit    HPI:   Tali Beard is a 88 y.o. 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  Patient's  reported on 10/29 at 6:00 p.m. patient began having left arm and leg weakness and confusion.  She is not on known anticoagulation.  Patient has had a recent admit to our facility on 10/3/2024 she was conservatively treated for intraparenchymal hemorrhage of the left temporoparietal region.  Patient's  good control of pressure and no recent falls.  CT head obtained and revealed newly developed hyperdensity at the right parietal convexity is consistent with interval acute hemorrhage measuring approximately 1.9 cm x 1.9 cm x 2.5 cm.  Mild surrounding white matter edema is also evident. Previously visualized hemorrhagic left parietal cortical contusion and scattered subarachnoid blood is improved in the interval. CTA revealed diminished caliber of the M1 to M2 segments of bilateral middle cerebral arteries. The M3 segments of middle cerebral arteries appear unremarkable. This may be developmental or related to vasospasm. Repeat CT 10/30/24 revealed stable exam without significant interval change. Interventional neurology consulted for review of CTA.          Overview/Hospital Course:  No notes on file        Subjective:     Interval History: No acute events overnight. Patient sitting up in bed in NAD. MRI w/wo revealing for new right parietal hemorrhage.   Discussed plan of care with patient and son at bedside. All questions and concerns answered.     Current Neurological Medications: Keppra 750 mg bid    Current Facility-Administered Medications   Medication Dose Route Frequency Provider Last Rate Last Admin    amLODIPine tablet 5 mg  5 mg Oral Daily Russell Louie MD   5 mg at 11/02/24 0812    atorvastatin tablet 40 mg  40 mg Oral QHS Sierra Camarena, NP   40 mg at 11/01/24 2006    bisacodyL suppository 10 mg  10 mg Rectal Daily PRN Sierra Camarena NP        gentamicin 0.1 % ointment   Topical (Top) Daily Eliel Guadarrama MD   Given at 11/02/24 0812    hydrALAZINE injection 20 mg  20 mg Intravenous Q4H PRN Edouard Novak MD   20 mg at 10/31/24 1848    labetaloL injection 10 mg  10 mg Intravenous Q4H PRN Edouard Novak MD   10 mg at 10/31/24 2254    levetiracetam 500 mg/5 mL (5 mL) liquid Soln 750 mg  750 mg Oral BID Russell Louie MD   750 mg at 11/02/24 0829    mupirocin 2 % ointment   Nasal BID Eliel Guadarrama MD   Given at 11/02/24 0813    sodium chloride 0.9% flush 10 mL  10 mL Intravenous PRN Sierra Camarena NP           Review of Systems  Objective:     Vital Signs (Most Recent):  Temp: 98.6 °F (37 °C) (11/02/24 1100)  Pulse: 78 (11/02/24 1100)  Resp: 18 (11/02/24 1100)  BP: (!) 142/67 (11/02/24 1100)  SpO2: 98 % (11/02/24 1100) Vital Signs (24h Range):  Temp:  [97.4 °F (36.3 °C)-99 °F (37.2 °C)] 98.6 °F (37 °C)  Pulse:  [72-87] 78  Resp:  [17-31] 18  SpO2:  [94 %-100 %] 98 %  BP: (117-146)/(59-78) 142/67     Weight: 65.6 kg (144 lb 10 oz)  Body mass index is 25.62 kg/m².     Physical Exam  Constitutional:       Appearance: Normal appearance.   HENT:      Head: Normocephalic and atraumatic.       Mouth/Throat:      Mouth: Mucous membranes are moist.   Eyes:      Extraocular Movements: Extraocular movements intact.      Pupils: Pupils are equal, round, and reactive to light.   Pulmonary:      Effort: Pulmonary effort is normal.   Abdominal:      Palpations: Abdomen is soft.   Musculoskeletal:         General: Normal range of motion.      Cervical back: Normal range of motion and neck supple.   Skin:     General: Skin is warm and dry.   Neurological:      Mental Status: She is alert.      Comments: Oriented   Following commands  Intermittent dysarthria, delayed responses  Antigravity in all extremities  Sensation intact            NEUROLOGICAL EXAMINATION:     CRANIAL NERVES     CN III, IV, VI   Pupils are equal, round, and reactive to light.    NIHSS:  1a  Level of consciousness: 0=alert; keenly responsive   1b. LOC questions:  0=Answers both tasks correctly   1c. LOC commands: 0=Answers both tasks correctly   2.  Best Gaze: 0=normal   3.  Visual: 0=No visual loss   4. Facial Palsy: 0=Normal symmetric movement   5a.  Motor left arm: 0=No drift, limb holds 90 (or 45) degrees for full 10 seconds   5b.  Motor right arm: 0=No drift, limb holds 90 (or 45) degrees for full 10 seconds   6a. motor left le=No drift, limb holds 90 (or 45) degrees for full 10 seconds   6b  Motor right le=No drift, limb holds 90 (or 45) degrees for full 10 seconds   7. Limb Ataxia: 0=Absent   8.  Sensory: 0=Normal; no sensory loss   9. Best Language:  1=Mild to moderate aphasia; some obvious loss of fluency or facility of comprehension without significant limitation on ideas expressed or form of expression.   10. Dysarthria: 1=Mild to moderate, patient slurs at least some words and at worst, can be understood with some difficulty   11. Extinction and Inattention: 0=No abnormality           NIH 2    Significant Labs: All pertinent lab results from the past 24 hours have been reviewed.    Significant Imaging: I have reviewed all  pertinent imaging results/findings within the past 24 hours.  Assessment and Plan:     Presented with left sided weakness and confusion.   -suspicion fro hypertensive bleed, although there was also some concern for amyloid angiopathy vs vasculitis.    Stork workup:  -CTh:    1. Newly identified acute hemorrhage along the right parietal convexity.  2. Interval improvement of previous left supratentorial hemorrhage.  3. No findings to suggest large vascular territory acute ischemic insult.Additional chronic secondary findings without significant interval change.  - CTh:repeat 10/30/2024 Impression: Stable exam without significant interval change.  -CTA h/n:  Impression:  1. No large vessel occlusion.  2. Moderate to severe narrowing of the bilateral supraclinoid segments, unchanged.  -MRI brain: new right parietal hemorrhage, official read pending  -ECHO: EF:55%. Agitated saline study of the atrial septum is negative, suggesting absence of intracardiac shunt at the atrial level.    -LDL: 141   -A1c: 5.8   -TSH: Normal   -home medications include: no antiplatelets or anticoagulant        Assessment/Plan:    - continue Keppra 750 mg bid  - continue to hold antiplatelets  - recommend SBP less than 140. Encouraged family to monitor closely  - PT/OT appreciate recommendations. Would likely benefit from some inpatient rehab.  - continue Lipitor  - No diagnostic cerebral angiogram at this time.   - will follow CSF results peripherally.  - needs outpatient MRI Brain w/wo with SWI sequence in month.  - follow up in stroke clinic in 1 month with ABHIJEET Murguia in one month to evaluate MRI.    No further recommendations from a stroke stand point. Will sign off.              VTE Risk Mitigation (From admission, onward)           Ordered     Reason for No Pharmacological VTE Prophylaxis  Once        Question:  Reasons:  Answer:  Risk of Bleeding    10/31/24 1115     IP VTE HIGH RISK PATIENT  Once         10/31/24 1115      Place sequential compression device  Until discontinued         10/31/24 1115     Place sequential compression device  Until discontinued         10/30/24 3690                    Lucía Hernandez NP  Neurology  [facility] - Neurology

## 2024-11-02 NOTE — SUBJECTIVE & OBJECTIVE
Subjective:     Interval History: No acute events overnight. Patient sitting up in bed in NAD. MRI w/wo revealing for new right parietal hemorrhage.   Discussed plan of care with patient and son at bedside. All questions and concerns answered.     Current Neurological Medications: Keppra 750 mg bid    Current Facility-Administered Medications   Medication Dose Route Frequency Provider Last Rate Last Admin    amLODIPine tablet 5 mg  5 mg Oral Daily Russell Louie MD   5 mg at 11/02/24 0812    atorvastatin tablet 40 mg  40 mg Oral QHS Sierra Camarena NP   40 mg at 11/01/24 2006    bisacodyL suppository 10 mg  10 mg Rectal Daily PRN Sierra Camarena NP        gentamicin 0.1 % ointment   Topical (Top) Daily Eliel Guadarrama MD   Given at 11/02/24 0812    hydrALAZINE injection 20 mg  20 mg Intravenous Q4H PRN Edouard Novak MD   20 mg at 10/31/24 1848    labetaloL injection 10 mg  10 mg Intravenous Q4H PRN Edouard Novak MD   10 mg at 10/31/24 2254    levetiracetam 500 mg/5 mL (5 mL) liquid Soln 750 mg  750 mg Oral BID Russell Louie MD   750 mg at 11/02/24 0829    mupirocin 2 % ointment   Nasal BID Eliel Guadarrama MD   Given at 11/02/24 0813    sodium chloride 0.9% flush 10 mL  10 mL Intravenous PRN Sierra Camarena NP           Review of Systems  Objective:     Vital Signs (Most Recent):  Temp: 98.6 °F (37 °C) (11/02/24 1100)  Pulse: 78 (11/02/24 1100)  Resp: 18 (11/02/24 1100)  BP: (!) 142/67 (11/02/24 1100)  SpO2: 98 % (11/02/24 1100) Vital Signs (24h Range):  Temp:  [97.4 °F (36.3 °C)-99 °F (37.2 °C)] 98.6 °F (37 °C)  Pulse:  [72-87] 78  Resp:  [17-31] 18  SpO2:  [94 %-100 %] 98 %  BP: (117-146)/(59-78) 142/67     Weight: 65.6 kg (144 lb 10 oz)  Body mass index is 25.62 kg/m².     Physical Exam  Constitutional:       Appearance: Normal appearance.   HENT:      Head: Normocephalic and atraumatic.      Mouth/Throat:      Mouth: Mucous membranes are moist.   Eyes:       Extraocular Movements: Extraocular movements intact.      Pupils: Pupils are equal, round, and reactive to light.   Pulmonary:      Effort: Pulmonary effort is normal.   Abdominal:      Palpations: Abdomen is soft.   Musculoskeletal:         General: Normal range of motion.      Cervical back: Normal range of motion and neck supple.   Skin:     General: Skin is warm and dry.   Neurological:      Mental Status: She is alert.      Comments: Oriented   Following commands  Intermittent dysarthria, delayed responses  Antigravity in all extremities  Sensation intact            NEUROLOGICAL EXAMINATION:     CRANIAL NERVES     CN III, IV, VI   Pupils are equal, round, and reactive to light.    NIHSS:  1a  Level of consciousness: 0=alert; keenly responsive   1b. LOC questions:  0=Answers both tasks correctly   1c. LOC commands: 0=Answers both tasks correctly   2.  Best Gaze: 0=normal   3.  Visual: 0=No visual loss   4. Facial Palsy: 0=Normal symmetric movement   5a.  Motor left arm: 0=No drift, limb holds 90 (or 45) degrees for full 10 seconds   5b.  Motor right arm: 0=No drift, limb holds 90 (or 45) degrees for full 10 seconds   6a. motor left le=No drift, limb holds 90 (or 45) degrees for full 10 seconds   6b  Motor right le=No drift, limb holds 90 (or 45) degrees for full 10 seconds   7. Limb Ataxia: 0=Absent   8.  Sensory: 0=Normal; no sensory loss   9. Best Language:  1=Mild to moderate aphasia; some obvious loss of fluency or facility of comprehension without significant limitation on ideas expressed or form of expression.   10. Dysarthria: 1=Mild to moderate, patient slurs at least some words and at worst, can be understood with some difficulty   11. Extinction and Inattention: 0=No abnormality           NIH 2    Significant Labs: All pertinent lab results from the past 24 hours have been reviewed.    Significant Imaging: I have reviewed all pertinent imaging results/findings within the past 24 hours.

## 2024-11-03 LAB
ALBUMIN SERPL-MCNC: 2.8 G/DL (ref 3.4–4.8)
ALBUMIN/GLOB SERPL: 0.9 RATIO (ref 1.1–2)
ALP SERPL-CCNC: 63 UNIT/L (ref 40–150)
ALT SERPL-CCNC: 33 UNIT/L (ref 0–55)
ANION GAP SERPL CALC-SCNC: 8 MEQ/L
AST SERPL-CCNC: 59 UNIT/L (ref 5–34)
BASOPHILS # BLD AUTO: 0.03 X10(3)/MCL
BASOPHILS NFR BLD AUTO: 0.6 %
BILIRUB SERPL-MCNC: 0.5 MG/DL
BUN SERPL-MCNC: 15.3 MG/DL (ref 9.8–20.1)
CALCIUM SERPL-MCNC: 8.8 MG/DL (ref 8.4–10.2)
CHLORIDE SERPL-SCNC: 110 MMOL/L (ref 98–107)
CO2 SERPL-SCNC: 23 MMOL/L (ref 23–31)
CREAT SERPL-MCNC: 0.73 MG/DL (ref 0.55–1.02)
CREAT/UREA NIT SERPL: 21
EOSINOPHIL # BLD AUTO: 0.09 X10(3)/MCL (ref 0–0.9)
EOSINOPHIL NFR BLD AUTO: 1.8 %
ERYTHROCYTE [DISTWIDTH] IN BLOOD BY AUTOMATED COUNT: 15.7 % (ref 11.5–17)
GFR SERPLBLD CREATININE-BSD FMLA CKD-EPI: >60 ML/MIN/1.73/M2
GLOBULIN SER-MCNC: 3.2 GM/DL (ref 2.4–3.5)
GLUCOSE SERPL-MCNC: 110 MG/DL (ref 82–115)
HCT VFR BLD AUTO: 36.1 % (ref 37–47)
HGB BLD-MCNC: 11.3 G/DL (ref 12–16)
IMM GRANULOCYTES # BLD AUTO: 0.02 X10(3)/MCL (ref 0–0.04)
IMM GRANULOCYTES NFR BLD AUTO: 0.4 %
LYMPHOCYTES # BLD AUTO: 0.91 X10(3)/MCL (ref 0.6–4.6)
LYMPHOCYTES NFR BLD AUTO: 18 %
MCH RBC QN AUTO: 23.6 PG (ref 27–31)
MCHC RBC AUTO-ENTMCNC: 31.3 G/DL (ref 33–36)
MCV RBC AUTO: 75.4 FL (ref 80–94)
MONOCYTES # BLD AUTO: 0.59 X10(3)/MCL (ref 0.1–1.3)
MONOCYTES NFR BLD AUTO: 11.7 %
NEUTROPHILS # BLD AUTO: 3.42 X10(3)/MCL (ref 2.1–9.2)
NEUTROPHILS NFR BLD AUTO: 67.5 %
NRBC BLD AUTO-RTO: 0 %
PLATELET # BLD AUTO: 132 X10(3)/MCL (ref 130–400)
PLATELETS.RETICULATED NFR BLD AUTO: 4.5 % (ref 0.9–11.2)
PMV BLD AUTO: 11.9 FL (ref 7.4–10.4)
POTASSIUM SERPL-SCNC: 3.9 MMOL/L (ref 3.5–5.1)
PROT SERPL-MCNC: 6 GM/DL (ref 5.8–7.6)
RBC # BLD AUTO: 4.79 X10(6)/MCL (ref 4.2–5.4)
SODIUM SERPL-SCNC: 141 MMOL/L (ref 136–145)
WBC # BLD AUTO: 5.06 X10(3)/MCL (ref 4.5–11.5)

## 2024-11-03 PROCEDURE — 25000003 PHARM REV CODE 250

## 2024-11-03 PROCEDURE — 36415 COLL VENOUS BLD VENIPUNCTURE: CPT

## 2024-11-03 PROCEDURE — 21400001 HC TELEMETRY ROOM

## 2024-11-03 PROCEDURE — 25000003 PHARM REV CODE 250: Performed by: NURSE PRACTITIONER

## 2024-11-03 PROCEDURE — 85025 COMPLETE CBC W/AUTO DIFF WBC: CPT

## 2024-11-03 PROCEDURE — 25000003 PHARM REV CODE 250: Performed by: INTERNAL MEDICINE

## 2024-11-03 PROCEDURE — 80053 COMPREHEN METABOLIC PANEL: CPT

## 2024-11-03 RX ORDER — LEVETIRACETAM 500 MG/1
500 TABLET ORAL 2 TIMES DAILY
Status: DISCONTINUED | OUTPATIENT
Start: 2024-11-03 | End: 2024-11-06 | Stop reason: HOSPADM

## 2024-11-03 RX ORDER — LANOLIN ALCOHOL/MO/W.PET/CERES
1 CREAM (GRAM) TOPICAL DAILY
Status: DISCONTINUED | OUTPATIENT
Start: 2024-11-03 | End: 2024-11-06 | Stop reason: HOSPADM

## 2024-11-03 RX ADMIN — GENTAMICIN SULFATE: 1 OINTMENT TOPICAL at 09:11

## 2024-11-03 RX ADMIN — LEVETIRACETAM 500 MG: 500 TABLET, FILM COATED ORAL at 09:11

## 2024-11-03 RX ADMIN — MUPIROCIN: 20 OINTMENT TOPICAL at 09:11

## 2024-11-03 RX ADMIN — FERROUS SULFATE TAB 325 MG (65 MG ELEMENTAL FE) 1 EACH: 325 (65 FE) TAB at 12:11

## 2024-11-03 RX ADMIN — AMLODIPINE BESYLATE 5 MG: 5 TABLET ORAL at 09:11

## 2024-11-03 RX ADMIN — ATORVASTATIN CALCIUM 40 MG: 40 TABLET, FILM COATED ORAL at 09:11

## 2024-11-03 NOTE — PROGRESS NOTES
[facility] Hospital Medicine Progress Note        Chief Complaint: Inpatient Follow-up for     HPI:   88 year old woman with PMHx of HTN, HLD, T2DM, and recent admission of IPH in the left left temporoparietal region with small volume left-sided subarachnoid hemorrhage presented on 10/29/2024 for left sided confusion and AMS. CT head showed acute hemorrhage along the right parietal convexity and interval improvement of previous left supratentorial hemorrhage.  CTA head/neck negative for LVO, moderate to severe narrowing of the bilateral supraclinoid segments unchanged. Admitted to ICU for closer monitoring and neurology and neurosurgery following. Vasculitis and cerebral amyloidosis work up in process. LP was performed. EEG done was negative for seizures but showed diffuse cerebral slowing. Patient was also seen by palliative.     Patient had MRI done which showed left parietal late subacute and right parietal early subacute cortical hemorrhage.  No additional areas of new or worsened intracranial hemorrhage identified and new punctate focus of suspected ischemic cortical infarct at the left occipital lobe.    Likely source of ICH is amyloid angiopathy. Not recommended for antiplatelets or anticoagulation per neurology. Recommended for outpatient MRI in 4 weeks per neurology. Pending rehab placement.     Interval Hx:   NAEO. Seen and examined. Patient much more alert and talking. Spoke with family member at bedside and answered all questions. Able to move all extremities today and follow commands.     Case was discussed with patient's nurse and  on the floor.    Objective/physical exam:  General: In no acute distress, afebrile  Chest: Clear to auscultation bilaterally  Heart: RRR, +S1, S2, no appreciable murmur  Abdomen: Soft, nontender, BS +  MSK: Warm, no lower extremity edema, no clubbing or cyanosis  Neurologic: A&Ox3, LUE 4/5, LLE 4/5, RUE 4/5 and RLE 4/5     VITAL  SIGNS: 24 HRS MIN & MAX LAST   Temp  Min: 98 °F (36.7 °C)  Max: 98.5 °F (36.9 °C) 98 °F (36.7 °C)   BP  Min: 125/61  Max: 153/72 (!) 144/63   Pulse  Min: 77  Max: 95  77   Resp  Min: 18  Max: 18 18   SpO2  Min: 92 %  Max: 100 % 100 %     I have reviewed the following labs:  Recent Labs   Lab 10/28/24  0826 10/29/24  2309 11/01/24  0512 11/02/24  0348 11/03/24  0409   WBC 3.9   < > 6.04 5.24 5.06   RBC  --    < > 4.91 4.76 4.79   HGB 11.4   < > 11.5* 11.0* 11.3*   HCT 37.2   < > 36.3* 35.3* 36.1*   MCV  --    < > 73.9* 74.2* 75.4*   MCH 23.7*   < > 23.4* 23.1* 23.6*   MCHC  --    < > 31.7* 31.2* 31.3*   RDW 15.4   < > 15.7 15.7 15.7      < > 159 129* 132   MPV  --    < > 10.9* 11.0* 11.9*   BASO 0.0  --   --   --   --     < > = values in this interval not displayed.     Recent Labs   Lab 11/01/24  0512 11/02/24 0348 11/03/24  0409    143 141   K 3.9 4.1 3.9   * 113* 110*   CO2 22* 21* 23   BUN 13.3 16.3 15.3   CREATININE 0.83 0.78 0.73   CALCIUM 8.8 8.4 8.8   ALBUMIN 3.0* 2.7* 2.8*   ALKPHOS 56 57 63   ALT 28 31 33   * 76* 59*   BILITOT 0.7 0.6 0.5     Microbiology Results (last 7 days)       Procedure Component Value Units Date/Time    Cerebrospinal Fluid Culture [[id number]] Collected: 10/31/24 135    Order Status: Completed Specimen: CSF (Spinal Fluid) from Cerebrospinal Fluid Updated: 11/03/24 0813     CULTURE, CSF No Growth At 72 Hours     GRAM STAIN No WBC seen      No bacteria seen    Cryptococcal antigen, CSF [[id number]]  (Normal) Collected: 10/31/24 1354    Order Status: Completed Specimen: CSF (Spinal Fluid) from CSF Tap, Tube 1 Updated: 11/01/24 1608     Cryptococcal Antigen, CSF Negative    Gram Stain [[id number]] Collected: 10/31/24 1354    Order Status: Completed Specimen: CSF (Spinal Fluid) from Lumbar Updated: 11/01/24 0816     GRAM STAIN No WBCs, No bacteria seen    Chantal Ink Prep CSF [[id number]] Collected: 10/31/24 1354    Order Status: Completed Specimen: CSF (Spinal  Fluid) from Cerebrospinal Fluid Updated: 10/31/24 1618     HOMER INK PREP CSF ([facility]) Negative    Fungal Culture [[id number]] Collected: 10/31/24 1354    Order Status: Sent Specimen: CSF (Spinal Fluid) from Lumbar Updated: 10/31/24 1414             See below for Radiology    Assessment/Plan:  # Left sided weakness 2/2 likely hypertensive ICH vs lobar hemorrhage due to amyloid angiopathy vs vasculitis   # Left parietal late subacute and right parietal early subacute cortical hemorrhage  # Ischemic cortical infarct at the left occipital lobe  # Hx of IPH in the left temporoparietal region with small volume left-sided subarachnoid hemorrhage   # Microcytic Anemia  # HTN  # HLD   # T2DM  # Elevated AST - improving     - monitor anemia outpatient  - follow up LP final results; normal glucose, elevated protein. Cytology pending, further blood work pending  - follow up vasculitis work up  - keppra for seizure ppx  - SBP goal < 140  - MRI brain - imagine reviewed  - repeat MRI brain w/wo contrast and with SWI sequence as outpatient in 4 weeks to look for any microhemorrhages   - follow up with Yue Rodriguez NP in 1 month for MRI and CSF labs   - hold antiplatelets for now  - palliative recs appreciated  - neurology/NGSY recs appreciated  - PT/OT    VTE prophylaxis: SCD    Patient condition:  Stable    Anticipated discharge and Disposition:         All diagnosis and differential diagnosis have been reviewed; assessment and plan has been documented; I have personally reviewed the labs and test results that are presently available; I have reviewed the patients medication list; I have reviewed the consulting providers response and recommendations. I have reviewed or attempted to review medical records based upon their availability    All of the patient's questions have been  addressed and answered. Patient's is agreeable to the above stated plan. I will continue to monitor closely and make adjustments to medical management as  needed.    _____________________________________________________________________    Malnutrition Status:    Scheduled Med:   amLODIPine  5 mg Oral Daily    atorvastatin  40 mg Oral QHS    gentamicin   Topical (Top) Daily    levETIRAcetam  500 mg Oral BID    mupirocin   Nasal BID      Continuous Infusions:     PRN Meds:    Current Facility-Administered Medications:     bisacodyL, 10 mg, Rectal, Daily PRN    hydrALAZINE, 20 mg, Intravenous, Q4H PRN    labetalol, 10 mg, Intravenous, Q4H PRN    sodium chloride 0.9%, 10 mL, Intravenous, PRN     Radiology:  I have personally reviewed the following imaging and agree with the radiologist.     MRI Brain W WO Contrast  EXAMINATION  MRI BRAIN W WO CONTRAST    CLINICAL HISTORY  Stroke, follow up;    TECHNIQUE  Pre- and post-contrast multiplanar, multisequence MR images of the brain were obtained.    CONTRAST  IV: MultiHance, 15 mL    COMPARISON  *30 October 2024 head CT  *7 October 2024 brain MR    FINDINGS  Exam quality: Limited secondary to patient movement throughout image acquisition, with resulting artifact.    Brain parenchyma: No mass-like focal enhancement or otherwise suspicious post-contrast findings are appreciated. There is localized increased diffusion weighted signal intensity in the region of previously described left posterior parietal cortical hemorrhage, with corresponding hypointense signal changes on the ADC map, T1, T2, and FLAIR hyperintensity, and hyperintense T2* signal with thin hypointense rim; these findings are in keeping with expected evolution in degradation of the previously visualized hematoma and consistent with late subacute stage.  There is newly appreciated localized cortical T1 hyperintensity at the right parietal lobe with associated signal hypointensity on the T2-weighted, FLAIR, DWI, ADC, and T2* sequences; signal pattern is consistent with early subacute hemorrhage and corresponds to abnormality seen on the recent head CT.  No other  definite areas of new or worsened focal intracranial hemorrhage identified.    Punctate focus of restricted diffusion signal measuring approximately 3-4 mm noted at the left occipital lobe (series 3, image 18), with corresponding hypointense signal on the ADC map and T2/FLAIR hyperintensity.  No other sites of restricted diffusion suggestive of ischemic changes identified.  White matter T2/FLAIR hyperintense signal without associated restricted diffusion is stable in the interval and again consistent with chronic microvascular disease.  No localized mass effect or midline shift. Differentiation of the gray-white border is grossly preserved.    CSF spaces: No abnormal extra-axial fluid. The basal cisterns are preserved. Sulcal volume appears normal for patient age and proportional to the ventricles.    Sella/Suprasellar regions: No abnormalities.    Other findings: Normal flow signal voids within the large intracranial vessels or dural sinuses. No focal abnormalities of the regional osseous structures and extracranial soft tissues. Mastoid air cells are well aerated. Paranasal sinuses are clear, with no fluid level.    IMPRESSION  1. Interval signal changes consistent with evolving left parietal late subacute and right parietal early subacute cortical hemorrhage.  No additional areas of new or worsened intracranial hemorrhage identified.  2. New punctate focus of suspected ischemic cortical infarct at the left occipital lobe, with no additional sites of abnormal diffusion signal appreciated.  3. No other convincing new or worsened intracranial process identified, within limitations of motion artifact.  ==========  This report was flagged in Epic as abnormal.    Electronically signed by: Price Henning  Date:    11/02/2024  Time:    15:00      Russell Louie MD  Department of Hospital Medicine   [facility]   11/03/2024

## 2024-11-04 LAB
ALBUMIN CSF-MCNC: 48.5 MG/DL
ALBUMIN SERPL-MCNC: 2.6 G/DL (ref 3.4–4.8)
ALBUMIN SERPL-MCNC: 3800 MG/DL (ref 3500–5000)
ALBUMIN/GLOB SERPL: 0.8 RATIO (ref 1.1–2)
ALP SERPL-CCNC: 59 UNIT/L (ref 40–150)
ALT SERPL-CCNC: 27 UNIT/L (ref 0–55)
ANA SER QL HEP2 SUBST: NORMAL
ANION GAP SERPL CALC-SCNC: 8 MEQ/L
AST SERPL-CCNC: 38 UNIT/L (ref 5–34)
BASOPHILS # BLD AUTO: 0.03 X10(3)/MCL
BASOPHILS NFR BLD AUTO: 0.7 %
BILIRUB SERPL-MCNC: 0.4 MG/DL
BUN SERPL-MCNC: 16 MG/DL (ref 9.8–20.1)
CALCIUM SERPL-MCNC: 8.7 MG/DL (ref 8.4–10.2)
CHLORIDE SERPL-SCNC: 111 MMOL/L (ref 98–107)
CO2 SERPL-SCNC: 23 MMOL/L (ref 23–31)
CREAT SERPL-MCNC: 0.75 MG/DL (ref 0.55–1.02)
CREAT/UREA NIT SERPL: 21
EOSINOPHIL # BLD AUTO: 0.13 X10(3)/MCL (ref 0–0.9)
EOSINOPHIL NFR BLD AUTO: 2.9 %
ERYTHROCYTE [DISTWIDTH] IN BLOOD BY AUTOMATED COUNT: 15.4 % (ref 11.5–17)
GFR SERPLBLD CREATININE-BSD FMLA CKD-EPI: >60 ML/MIN/1.73/M2
GLOBULIN SER-MCNC: 3.1 GM/DL (ref 2.4–3.5)
GLUCOSE SERPL-MCNC: 110 MG/DL (ref 82–115)
HCT VFR BLD AUTO: 34.8 % (ref 37–47)
HGB BLD-MCNC: 11 G/DL (ref 12–16)
IGG CSF-MCNC: 9.5 MG/DL
IGG SERPL-MCNC: 1250 MG/DL (ref 767–1590)
IGG SYNTH RATE SER+CSF CALC-MRATE: 10.77 MG/24 H
IGG/ALB CLEAR SER+CSF-RTO: 0.61
IGG/ALB CSF: 0.2 {RATIO}
IGG/ALB SER: 0.33 {RATIO}
IMM GRANULOCYTES # BLD AUTO: 0.02 X10(3)/MCL (ref 0–0.04)
IMM GRANULOCYTES NFR BLD AUTO: 0.4 %
LYMPHOCYTES # BLD AUTO: 1.01 X10(3)/MCL (ref 0.6–4.6)
LYMPHOCYTES NFR BLD AUTO: 22.3 %
MCH RBC QN AUTO: 23.5 PG (ref 27–31)
MCHC RBC AUTO-ENTMCNC: 31.6 G/DL (ref 33–36)
MCV RBC AUTO: 74.4 FL (ref 80–94)
MONOCYTES # BLD AUTO: 0.54 X10(3)/MCL (ref 0.1–1.3)
MONOCYTES NFR BLD AUTO: 11.9 %
NEUTROPHILS # BLD AUTO: 2.79 X10(3)/MCL (ref 2.1–9.2)
NEUTROPHILS NFR BLD AUTO: 61.8 %
NRBC BLD AUTO-RTO: 0 %
OLIGOCLONAL BANDS CSF IEF-IMP: 0 BANDS
OLIGOCLONAL BANDS CSF IEF: 4 BANDS
OLIGOCLONAL BANDS SERPL IEF: 4 BANDS
PLATELET # BLD AUTO: 133 X10(3)/MCL (ref 130–400)
PLATELETS.RETICULATED NFR BLD AUTO: 3.9 % (ref 0.9–11.2)
PMV BLD AUTO: 11.2 FL (ref 7.4–10.4)
POTASSIUM SERPL-SCNC: 3.9 MMOL/L (ref 3.5–5.1)
PROT SERPL-MCNC: 5.7 GM/DL (ref 5.8–7.6)
RBC # BLD AUTO: 4.68 X10(6)/MCL (ref 4.2–5.4)
SODIUM SERPL-SCNC: 142 MMOL/L (ref 136–145)
WBC # BLD AUTO: 4.52 X10(3)/MCL (ref 4.5–11.5)

## 2024-11-04 PROCEDURE — 36415 COLL VENOUS BLD VENIPUNCTURE: CPT

## 2024-11-04 PROCEDURE — 25000003 PHARM REV CODE 250

## 2024-11-04 PROCEDURE — 80053 COMPREHEN METABOLIC PANEL: CPT

## 2024-11-04 PROCEDURE — 25000003 PHARM REV CODE 250: Performed by: NURSE PRACTITIONER

## 2024-11-04 PROCEDURE — 92523 SPEECH SOUND LANG COMPREHEN: CPT

## 2024-11-04 PROCEDURE — 21400001 HC TELEMETRY ROOM

## 2024-11-04 PROCEDURE — 85025 COMPLETE CBC W/AUTO DIFF WBC: CPT

## 2024-11-04 PROCEDURE — 25000003 PHARM REV CODE 250: Performed by: INTERNAL MEDICINE

## 2024-11-04 RX ADMIN — MUPIROCIN: 20 OINTMENT TOPICAL at 10:11

## 2024-11-04 RX ADMIN — FERROUS SULFATE TAB 325 MG (65 MG ELEMENTAL FE) 1 EACH: 325 (65 FE) TAB at 09:11

## 2024-11-04 RX ADMIN — LEVETIRACETAM 500 MG: 500 TABLET, FILM COATED ORAL at 10:11

## 2024-11-04 RX ADMIN — GENTAMICIN SULFATE: 1 OINTMENT TOPICAL at 10:11

## 2024-11-04 RX ADMIN — AMLODIPINE BESYLATE 5 MG: 5 TABLET ORAL at 11:11

## 2024-11-04 RX ADMIN — ATORVASTATIN CALCIUM 40 MG: 40 TABLET, FILM COATED ORAL at 10:11

## 2024-11-04 NOTE — PROGRESS NOTES
[facility] Hospital Medicine Progress Note        Chief Complaint: Inpatient Follow-up     HPI:   88 year old woman with PMHx of HTN, HLD, T2DM, and recent admission of IPH in the left left temporoparietal region with small volume left-sided subarachnoid hemorrhage presented on 10/29/2024 for left sided confusion and AMS. CT head showed acute hemorrhage along the right parietal convexity and interval improvement of previous left supratentorial hemorrhage.  CTA head/neck negative for LVO, moderate to severe narrowing of the bilateral supraclinoid segments unchanged. Admitted to ICU for closer monitoring and neurology and neurosurgery following. Vasculitis and cerebral amyloidosis work up in process. LP was performed. EEG done was negative for seizures but showed diffuse cerebral slowing. Patient was also seen by palliative.     Patient had MRI done which showed left parietal late subacute and right parietal early subacute cortical hemorrhage.  No additional areas of new or worsened intracranial hemorrhage identified and new punctate focus of suspected ischemic cortical infarct at the left occipital lobe.    Likely source of ICH is amyloid angiopathy. Not recommended for antiplatelets or anticoagulation per neurology. Recommended for outpatient MRI in 4 weeks per neurology. Pending rehab placement.     Interval Hx:   NAEO. Seen and examined. Following commands. Pending placement. No acute distress. Family at bedside and answered all questions.     Case was discussed with patient's nurse and  on the floor.    Objective/physical exam:  General: In no acute distress, afebrile  Chest: Clear to auscultation bilaterally  Heart: RRR, +S1, S2, no appreciable murmur  Abdomen: Soft, nontender, BS +  MSK: Warm, no lower extremity edema, no clubbing or cyanosis  Neurologic: A&Ox3, LUE 4/5, LLE 4/5, RUE 4/5 and RLE 4/5     VITAL SIGNS: 24 HRS MIN & MAX LAST   Temp  Min: 97.7 °F (36.5 °C)   Max: 99.2 °F (37.3 °C) 99.2 °F (37.3 °C)   BP  Min: 115/68  Max: 132/69 121/72   Pulse  Min: 82  Max: 84  82   Resp  Min: 18  Max: 18 18   SpO2  Min: 96 %  Max: 98 % 98 %     I have reviewed the following labs:  Recent Labs   Lab 11/02/24 0348 11/03/24 0409 11/04/24  0433   WBC 5.24 5.06 4.52   RBC 4.76 4.79 4.68   HGB 11.0* 11.3* 11.0*   HCT 35.3* 36.1* 34.8*   MCV 74.2* 75.4* 74.4*   MCH 23.1* 23.6* 23.5*   MCHC 31.2* 31.3* 31.6*   RDW 15.7 15.7 15.4   * 132 133   MPV 11.0* 11.9* 11.2*     Recent Labs   Lab 11/02/24 0348 11/03/24 0409 11/04/24  0433    141 142   K 4.1 3.9 3.9   * 110* 111*   CO2 21* 23 23   BUN 16.3 15.3 16.0   CREATININE 0.78 0.73 0.75   CALCIUM 8.4 8.8 8.7   ALBUMIN 2.7* 2.8* 2.6*   ALKPHOS 57 63 59   ALT 31 33 27   AST 76* 59* 38*   BILITOT 0.6 0.5 0.4     Microbiology Results (last 7 days)       Procedure Component Value Units Date/Time    Cerebrospinal Fluid Culture [[id number]] Collected: 10/31/24 1354    Order Status: Completed Specimen: CSF (Spinal Fluid) from Cerebrospinal Fluid Updated: 11/04/24 1013     CULTURE, CSF No growth at 4 days     GRAM STAIN No WBC seen      No bacteria seen    Cryptococcal antigen, CSF [[id number]]  (Normal) Collected: 10/31/24 1354    Order Status: Completed Specimen: CSF (Spinal Fluid) from CSF Tap, Tube 1 Updated: 11/01/24 1608     Cryptococcal Antigen, CSF Negative    Gram Stain [[id number]] Collected: 10/31/24 1354    Order Status: Completed Specimen: CSF (Spinal Fluid) from Lumbar Updated: 11/01/24 0816     GRAM STAIN No WBCs, No bacteria seen    Chantal Ink Prep CSF [[id number]] Collected: 10/31/24 1354    Order Status: Completed Specimen: CSF (Spinal Fluid) from Cerebrospinal Fluid Updated: 10/31/24 1618     CHANTAL INK PREP CSF ([facility]) Negative    Fungal Culture [[id number]] Collected: 10/31/24 1352    Order Status: Sent Specimen: CSF (Spinal Fluid) from Lumbar Updated: 10/31/24 1414             See below for  Radiology    Assessment/Plan:  # Left sided weakness 2/2 likely hypertensive ICH vs lobar hemorrhage due to amyloid angiopathy vs vasculitis   # Left parietal late subacute and right parietal early subacute cortical hemorrhage  # Ischemic cortical infarct at the left occipital lobe  # Hx of IPH in the left temporoparietal region with small volume left-sided subarachnoid hemorrhage   # Microcytic Anemia  # HTN  # HLD   # T2DM  # Elevated AST - improving     - monitor anemia outpatient  - follow up LP final results; normal glucose, elevated protein. Cytology pending, further blood work pending  - follow up vasculitis work up  - keppra for seizure ppx  - SBP goal < 140  - MRI brain - imagine reviewed  - repeat MRI brain w/wo contrast and with SWI sequence as outpatient in 4 weeks to look for any microhemorrhages   - follow up with Yue Rodriguez NP in 1 month for MRI and CSF labs   - hold antiplatelets for now  - palliative recs appreciated  - neurology/NGSY recs appreciated  -  working on placement  - PT/OT    VTE prophylaxis: SCD    Patient condition:  Stable    Anticipated discharge and Disposition:         All diagnosis and differential diagnosis have been reviewed; assessment and plan has been documented; I have personally reviewed the labs and test results that are presently available; I have reviewed the patients medication list; I have reviewed the consulting providers response and recommendations. I have reviewed or attempted to review medical records based upon their availability    All of the patient's questions have been  addressed and answered. Patient's is agreeable to the above stated plan. I will continue to monitor closely and make adjustments to medical management as needed.    _____________________________________________________________________    Malnutrition Status:    Scheduled Med:   amLODIPine  5 mg Oral Daily    atorvastatin  40 mg Oral QHS    ferrous sulfate  1 tablet Oral Daily     gentamicin   Topical (Top) Daily    levETIRAcetam  500 mg Oral BID    mupirocin   Nasal BID      Continuous Infusions:     PRN Meds:    Current Facility-Administered Medications:     bisacodyL, 10 mg, Rectal, Daily PRN    hydrALAZINE, 20 mg, Intravenous, Q4H PRN    labetalol, 10 mg, Intravenous, Q4H PRN    sodium chloride 0.9%, 10 mL, Intravenous, PRN     Radiology:  I have personally reviewed the following imaging and agree with the radiologist.     MRI Brain W WO Contrast  EXAMINATION  MRI BRAIN W WO CONTRAST    CLINICAL HISTORY  Stroke, follow up;    TECHNIQUE  Pre- and post-contrast multiplanar, multisequence MR images of the brain were obtained.    CONTRAST  IV: MultiHance, 15 mL    COMPARISON  *30 October 2024 head CT  *7 October 2024 brain MR    FINDINGS  Exam quality: Limited secondary to patient movement throughout image acquisition, with resulting artifact.    Brain parenchyma: No mass-like focal enhancement or otherwise suspicious post-contrast findings are appreciated. There is localized increased diffusion weighted signal intensity in the region of previously described left posterior parietal cortical hemorrhage, with corresponding hypointense signal changes on the ADC map, T1, T2, and FLAIR hyperintensity, and hyperintense T2* signal with thin hypointense rim; these findings are in keeping with expected evolution in degradation of the previously visualized hematoma and consistent with late subacute stage.  There is newly appreciated localized cortical T1 hyperintensity at the right parietal lobe with associated signal hypointensity on the T2-weighted, FLAIR, DWI, ADC, and T2* sequences; signal pattern is consistent with early subacute hemorrhage and corresponds to abnormality seen on the recent head CT.  No other definite areas of new or worsened focal intracranial hemorrhage identified.    Punctate focus of restricted diffusion signal measuring approximately 3-4 mm noted at the left occipital lobe  (series 3, image 18), with corresponding hypointense signal on the ADC map and T2/FLAIR hyperintensity.  No other sites of restricted diffusion suggestive of ischemic changes identified.  White matter T2/FLAIR hyperintense signal without associated restricted diffusion is stable in the interval and again consistent with chronic microvascular disease.  No localized mass effect or midline shift. Differentiation of the gray-white border is grossly preserved.    CSF spaces: No abnormal extra-axial fluid. The basal cisterns are preserved. Sulcal volume appears normal for patient age and proportional to the ventricles.    Sella/Suprasellar regions: No abnormalities.    Other findings: Normal flow signal voids within the large intracranial vessels or dural sinuses. No focal abnormalities of the regional osseous structures and extracranial soft tissues. Mastoid air cells are well aerated. Paranasal sinuses are clear, with no fluid level.    IMPRESSION  1. Interval signal changes consistent with evolving left parietal late subacute and right parietal early subacute cortical hemorrhage.  No additional areas of new or worsened intracranial hemorrhage identified.  2. New punctate focus of suspected ischemic cortical infarct at the left occipital lobe, with no additional sites of abnormal diffusion signal appreciated.  3. No other convincing new or worsened intracranial process identified, within limitations of motion artifact.  ==========  This report was flagged in Epic as abnormal.    Electronically signed by: Price Henning  Date:    11/02/2024  Time:    15:00      Russell Louie MD  Department of Hospital Medicine   [facility]   11/04/2024

## 2024-11-04 NOTE — PROGRESS NOTES
[facility] - Neurology  Wound Care    Patient Name:  Tali Beard   MRN:  [id number]  Date: 11/4/2024  Diagnosis: Acute focal neurological deficit    History:     Past Medical History:   Diagnosis Date    Subarachnoid hemorrhage        Social History     Socioeconomic History    Marital status:    Tobacco Use    Smoking status: Never     Passive exposure: Never    Smokeless tobacco: Never   Substance and Sexual Activity    Alcohol use: Not Currently    Drug use: Never    Sexual activity: Not Currently     Social Drivers of Health     Financial Resource Strain: Low Risk  (10/30/2024)    Overall Financial Resource Strain (CARDIA)     Difficulty of Paying Living Expenses: Not hard at all   Food Insecurity: No Food Insecurity (10/30/2024)    Hunger Vital Sign     Worried About Running Out of Food in the Last Year: Never true     Ran Out of Food in the Last Year: Never true   Transportation Needs: No Transportation Needs (10/30/2024)    TRANSPORTATION NEEDS     Transportation : No   Physical Activity: Inactive (10/4/2024)    Exercise Vital Sign     Days of Exercise per Week: 0 days     Minutes of Exercise per Session: 0 min   Stress: No Stress Concern Present (10/30/2024)    [facility] - Occupational Stress Questionnaire     Feeling of Stress : Not at all   Housing Stability: Low Risk  (10/30/2024)    Housing Stability Vital Sign     Unable to Pay for Housing in the Last Year: No     Homeless in the Last Year: No       Precautions:     Allergies as of 10/29/2024 - Reviewed 10/29/2024   Allergen Reaction Noted    Hydroxyzine hcl  07/29/2019    Felodipine Rash 12/09/2013       [facility] Assessment Details/Treatment      11/04/24 1150   WOCN Assessment   Visit Date 11/04/24   Visit Time 1150   Consult Type Follow Up   [facility] Wound   Intervention chart review;assessed   Teaching on-going        Wound 05/07/24 0952 Ulceration Left medial Foot   Date First Assessed/Time  First Assessed: 05/07/24 0952   Present on Original Admission: Yes  Primary Wound Type: Ulceration  Side: Left  Orientation: medial  Location: Foot  Pulses: 2+ palpable DP and PT pulses, strong doppler signal to both   Wound Image    Dressing Appearance Moist drainage   Drainage Amount Scant   Drainage Characteristics/Odor Serous   Appearance Tan;White;Moist   Tissue loss description Partial thickness   Black (%), Wound Tissue Color 0 %   Red (%), Wound Tissue Color 70 %   Yellow (%), Wound Tissue Color 30 %   Periwound Area Dry;Gray   Wound Edges Irregular   Wound Length (cm) 0.9 cm   Wound Width (cm) 1 cm   Wound Depth (cm) 0.1 cm   Wound Volume (cm^3) 0.09 cm^3   Wound Surface Area (cm^2) 0.9 cm^2   Care Cleansed with:;Other (see comments)  (vashe)   Dressing Applied;Other (comment)  (gentamicin ointment , aquacel, foam)     WOCN follow up. Introduced myself to sons at bedside and patient. Pt awake, alert, consented to wound care evaluation and treatment. Removed present dressing , cleansed wound , measurements obtained, and photos taken. No signs or symptoms of wound erosion or infection present. Treatment recommendations to be continued. Nursing to continue. Pt stated her appetite is good. Sons confirmed at bedside that pt is eating well. Education provided on wound healing and nutrition needs. Pt is on REGINALD mattress. Will follow up. Plan of care discussed with nurseKinza.   11/04/2024

## 2024-11-04 NOTE — PLAN OF CARE
Problem: Adult Inpatient Plan of Care  Goal: Plan of Care Review  11/3/2024 1815 by Kinza Ross RN  Outcome: Progressing  11/3/2024 1815 by Kinza Ross RN  Outcome: Progressing  Goal: Patient-Specific Goal (Individualized)  11/3/2024 1815 by Kinza Ross RN  Outcome: Progressing  11/3/2024 1815 by Kinza Ross RN  Outcome: Progressing  Goal: Absence of Hospital-Acquired Illness or Injury  11/3/2024 1815 by Kinza Ross RN  Outcome: Progressing  11/3/2024 1815 by Kinza Ross RN  Outcome: Progressing  Goal: Optimal Comfort and Wellbeing  11/3/2024 1815 by Kinza Ross RN  Outcome: Progressing  11/3/2024 1815 by Kinza Ross RN  Outcome: Progressing  Goal: Readiness for Transition of Care  11/3/2024 1815 by Kinza Ross RN  Outcome: Progressing  11/3/2024 1815 by Kinza Ross RN  Outcome: Progressing     Problem: Diabetes Comorbidity  Goal: Blood Glucose Level Within Targeted Range  11/3/2024 1815 by Kinza Ross RN  Outcome: Progressing  11/3/2024 1815 by Kinza Ross RN  Outcome: Progressing     Problem: Wound  Goal: Optimal Coping  11/3/2024 1815 by Kinza Ross RN  Outcome: Progressing  11/3/2024 1815 by Kinza Ross RN  Outcome: Progressing  Goal: Optimal Functional Ability  11/3/2024 1815 by Kinza Ross RN  Outcome: Progressing  11/3/2024 1815 by Kinza Ross RN  Outcome: Progressing  Goal: Absence of Infection Signs and Symptoms  11/3/2024 1815 by Kinza Ross RN  Outcome: Progressing  11/3/2024 1815 by Kinza Ross RN  Outcome: Progressing  Goal: Improved Oral Intake  11/3/2024 1815 by Kinza Ross RN  Outcome: Progressing  11/3/2024 1815 by Kinza Ross RN  Outcome: Progressing  Goal: Optimal Pain Control and Function  11/3/2024 1815 by Kinza Ross, RN  Outcome: Progressing  11/3/2024 1815 by Kinza Ross, RN  Outcome: Progressing  Goal: Skin Health and Integrity  11/3/2024  1815 by Kinza Ross RN  Outcome: Progressing  11/3/2024 1815 by Kinza Ross RN  Outcome: Progressing  Goal: Optimal Wound Healing  11/3/2024 1815 by Kinza Ross RN  Outcome: Progressing  11/3/2024 1815 by Kinza Ross RN  Outcome: Progressing     Problem: Skin Injury Risk Increased  Goal: Skin Health and Integrity  11/3/2024 1815 by Kinza Ross, RN  Outcome: Progressing  11/3/2024 1815 by Kinza Ross RN  Outcome: Progressing     Problem: Fall Injury Risk  Goal: Absence of Fall and Fall-Related Injury  11/3/2024 1815 by Kinza Ross RN  Outcome: Progressing  11/3/2024 1815 by Kinza Ross RN  Outcome: Progressing     Problem: Restraint, Nonviolent  Goal: Absence of Harm or Injury  11/3/2024 1815 by Kinza Ross RN  Outcome: Progressing  11/3/2024 1815 by Kinza Ross RN  Outcome: Progressing     Problem: Stroke, Intracerebral Hemorrhage  Goal: Optimal Coping  11/3/2024 1815 by Kinza Ross RN  Outcome: Progressing  11/3/2024 1815 by Kinza Ross RN  Outcome: Progressing  Goal: Effective Bowel Elimination  11/3/2024 1815 by Kinza Ross RN  Outcome: Progressing  11/3/2024 1815 by Kinza Ross RN  Outcome: Progressing  Goal: Optimal Cerebral Tissue Perfusion  11/3/2024 1815 by Kinza Ross RN  Outcome: Progressing  11/3/2024 1815 by Kinza Ross RN  Outcome: Progressing  Goal: Optimal Cognitive Function  11/3/2024 1815 by Kinza Ross RN  Outcome: Progressing  11/3/2024 1815 by Kinza Ross RN  Outcome: Progressing  Goal: Effective Communication Skills  11/3/2024 1815 by Kinza Ross, RN  Outcome: Progressing  11/3/2024 1815 by Kinza Ross, RN  Outcome: Progressing  Goal: Optimal Functional Ability  11/3/2024 1815 by Kinza Ross, RN  Outcome: Progressing  11/3/2024 1815 by Kinza Ross, RN  Outcome: Progressing  Goal: Optimal Nutrition Intake  11/3/2024 1815 by Kinza Ross,  RN  Outcome: Progressing  11/3/2024 1815 by Kinza Ross RN  Outcome: Progressing  Goal: Optimal Pain Control and Function  11/3/2024 1815 by Kinza Ross RN  Outcome: Progressing  11/3/2024 1815 by Kinza Ross RN  Outcome: Progressing  Goal: Effective Oxygenation and Ventilation  11/3/2024 1815 by Kinza Ross RN  Outcome: Progressing  11/3/2024 1815 by Kinza Ross RN  Outcome: Progressing  Goal: Improved Sensorimotor Function  11/3/2024 1815 by Kinza Ross RN  Outcome: Progressing  11/3/2024 1815 by Kinza Ross RN  Outcome: Progressing  Goal: Safe and Effective Swallow  11/3/2024 1815 by Kinza Ross RN  Outcome: Progressing  11/3/2024 1815 by Kinza Ross RN  Outcome: Progressing  Goal: Effective Urinary Elimination  11/3/2024 1815 by Kinza Ross RN  Outcome: Progressing  11/3/2024 1815 by Kinza Ross RN  Outcome: Progressing     Problem: Coping Ineffective  Goal: Effective Coping  11/3/2024 1815 by Kinza Ross RN  Outcome: Progressing  11/3/2024 1815 by Kinza Ross RN  Outcome: Progressing     Problem: Adult Inpatient Plan of Care  Goal: Plan of Care Review  11/3/2024 1815 by Kinza Ross RN  Outcome: Progressing  11/3/2024 1815 by Kinza Ross RN  Outcome: Progressing  Goal: Patient-Specific Goal (Individualized)  11/3/2024 1815 by Kinza Ross RN  Outcome: Progressing  11/3/2024 1815 by Kinza Ross RN  Outcome: Progressing  Goal: Absence of Hospital-Acquired Illness or Injury  11/3/2024 1815 by Kinza Ross RN  Outcome: Progressing  11/3/2024 1815 by Kinza Ross RN  Outcome: Progressing  Goal: Optimal Comfort and Wellbeing  11/3/2024 1815 by Kinza Ross RN  Outcome: Progressing  11/3/2024 1815 by Kinza Ross, RN  Outcome: Progressing  Goal: Readiness for Transition of Care  11/3/2024 1815 by Kinza Ross, RN  Outcome: Progressing  11/3/2024 1815 by Kinza Ross,  RN  Outcome: Progressing     Problem: Diabetes Comorbidity  Goal: Blood Glucose Level Within Targeted Range  11/3/2024 1815 by Kinza Ross RN  Outcome: Progressing  11/3/2024 1815 by Kinza Ross RN  Outcome: Progressing     Problem: Wound  Goal: Optimal Coping  11/3/2024 1815 by Kinza Ross RN  Outcome: Progressing  11/3/2024 1815 by Kinza Ross RN  Outcome: Progressing  Goal: Optimal Functional Ability  11/3/2024 1815 by Kinza Ross RN  Outcome: Progressing  11/3/2024 1815 by Kinza Ross RN  Outcome: Progressing  Goal: Absence of Infection Signs and Symptoms  11/3/2024 1815 by Kinza Ross RN  Outcome: Progressing  11/3/2024 1815 by Kinza Ross RN  Outcome: Progressing  Goal: Improved Oral Intake  11/3/2024 1815 by Kinza Ross RN  Outcome: Progressing  11/3/2024 1815 by Kinza Ross RN  Outcome: Progressing  Goal: Optimal Pain Control and Function  11/3/2024 1815 by Kinza Ross RN  Outcome: Progressing  11/3/2024 1815 by Kinza Ross RN  Outcome: Progressing  Goal: Skin Health and Integrity  11/3/2024 1815 by Kinza Ross RN  Outcome: Progressing  11/3/2024 1815 by Kinza Ross RN  Outcome: Progressing  Goal: Optimal Wound Healing  11/3/2024 1815 by Kinza Ross RN  Outcome: Progressing  11/3/2024 1815 by Kinza Ross RN  Outcome: Progressing     Problem: Skin Injury Risk Increased  Goal: Skin Health and Integrity  11/3/2024 1815 by Kinza Ross RN  Outcome: Progressing  11/3/2024 1815 by Kinza Ross RN  Outcome: Progressing     Problem: Fall Injury Risk  Goal: Absence of Fall and Fall-Related Injury  11/3/2024 1815 by Kinza Ross, RN  Outcome: Progressing  11/3/2024 1815 by Kinza Ross RN  Outcome: Progressing     Problem: Restraint, Nonviolent  Goal: Absence of Harm or Injury  11/3/2024 1815 by Kinza Ross RN  Outcome: Progressing  11/3/2024 1815 by Kinza Ross,  RN  Outcome: Progressing     Problem: Stroke, Intracerebral Hemorrhage  Goal: Optimal Coping  11/3/2024 1815 by Kinza Ross RN  Outcome: Progressing  11/3/2024 1815 by Kinza Ross, RN  Outcome: Progressing  Goal: Effective Bowel Elimination  11/3/2024 1815 by Kinza Ross RN  Outcome: Progressing  11/3/2024 1815 by Kinza Ross, RN  Outcome: Progressing  Goal: Optimal Cerebral Tissue Perfusion  11/3/2024 1815 by Kinza Ross, RN  Outcome: Progressing  11/3/2024 1815 by Kinza Ross, RN  Outcome: Progressing  Goal: Optimal Cognitive Function  11/3/2024 1815 by Kinza Ross, RN  Outcome: Progressing  11/3/2024 1815 by Kinza Ross, RN  Outcome: Progressing  Goal: Effective Communication Skills  11/3/2024 1815 by Kinza Ross, RN  Outcome: Progressing  11/3/2024 1815 by Kinza Ross, RN  Outcome: Progressing  Goal: Optimal Functional Ability  11/3/2024 1815 by Kinza Ross RN  Outcome: Progressing  11/3/2024 1815 by Kinza Ross RN  Outcome: Progressing  Goal: Optimal Nutrition Intake  11/3/2024 1815 by Kinza Ross, RN  Outcome: Progressing  11/3/2024 1815 by Kinza Ross, RN  Outcome: Progressing  Goal: Optimal Pain Control and Function  11/3/2024 1815 by Kinza Ross, RN  Outcome: Progressing  11/3/2024 1815 by Kinza Ross, RN  Outcome: Progressing  Goal: Effective Oxygenation and Ventilation  11/3/2024 1815 by Kinza Ross, RN  Outcome: Progressing  11/3/2024 1815 by Kinza Ross, RN  Outcome: Progressing  Goal: Improved Sensorimotor Function  11/3/2024 1815 by Kinza Rsos, RN  Outcome: Progressing  11/3/2024 1815 by Kinza Ross, RN  Outcome: Progressing  Goal: Safe and Effective Swallow  11/3/2024 1815 by Kinza Ross, RN  Outcome: Progressing  11/3/2024 1815 by Kinza Ross, RN  Outcome: Progressing  Goal: Effective Urinary Elimination  11/3/2024 1815 by Kinza Ross, DAVID  Outcome:  Progressing  11/3/2024 1815 by Kinza Ross RN  Outcome: Progressing     Problem: Coping Ineffective  Goal: Effective Coping  11/3/2024 1815 by Kinza Ross RN  Outcome: Progressing  11/3/2024 1815 by Kinza Ross RN  Outcome: Progressing     Problem: Adult Inpatient Plan of Care  Goal: Plan of Care Review  11/3/2024 1815 by Kinza Ross RN  Outcome: Progressing  11/3/2024 1815 by Kinza Ross RN  Outcome: Progressing  Goal: Patient-Specific Goal (Individualized)  11/3/2024 1815 by Kinza Ross RN  Outcome: Progressing  11/3/2024 1815 by Kinza Ross RN  Outcome: Progressing  Goal: Absence of Hospital-Acquired Illness or Injury  11/3/2024 1815 by Kinza Ross RN  Outcome: Progressing  11/3/2024 1815 by Kinza Ross RN  Outcome: Progressing  Goal: Optimal Comfort and Wellbeing  11/3/2024 1815 by Kinza Ross RN  Outcome: Progressing  11/3/2024 1815 by Kinza Ross RN  Outcome: Progressing  Goal: Readiness for Transition of Care  11/3/2024 1815 by Kinza Ross RN  Outcome: Progressing  11/3/2024 1815 by Kinza Ross RN  Outcome: Progressing     Problem: Diabetes Comorbidity  Goal: Blood Glucose Level Within Targeted Range  11/3/2024 1815 by Kinza Ross RN  Outcome: Progressing  11/3/2024 1815 by Kinza Ross RN  Outcome: Progressing     Problem: Wound  Goal: Optimal Coping  11/3/2024 1815 by Kinza Ross RN  Outcome: Progressing  11/3/2024 1815 by Kinza Ross RN  Outcome: Progressing  Goal: Optimal Functional Ability  11/3/2024 1815 by Kinza Ross RN  Outcome: Progressing  11/3/2024 1815 by Kinza Ross RN  Outcome: Progressing  Goal: Absence of Infection Signs and Symptoms  11/3/2024 1815 by Kinza Ross RN  Outcome: Progressing  11/3/2024 1815 by Kinza Ross RN  Outcome: Progressing  Goal: Improved Oral Intake  11/3/2024 1815 by Kinza Ross RN  Outcome: Progressing  11/3/2024 1815  by Kinza Ross RN  Outcome: Progressing  Goal: Optimal Pain Control and Function  11/3/2024 1815 by Kinza Ross RN  Outcome: Progressing  11/3/2024 1815 by Kinza Ross RN  Outcome: Progressing  Goal: Skin Health and Integrity  11/3/2024 1815 by Kinza Ross RN  Outcome: Progressing  11/3/2024 1815 by Kinza Ross, RN  Outcome: Progressing  Goal: Optimal Wound Healing  11/3/2024 1815 by Kinza Ross RN  Outcome: Progressing  11/3/2024 1815 by Kinza Ross, RN  Outcome: Progressing     Problem: Skin Injury Risk Increased  Goal: Skin Health and Integrity  11/3/2024 1815 by Kinza Ross, RN  Outcome: Progressing  11/3/2024 1815 by Kinza Ross RN  Outcome: Progressing     Problem: Fall Injury Risk  Goal: Absence of Fall and Fall-Related Injury  11/3/2024 1815 by Kinza Ross RN  Outcome: Progressing  11/3/2024 1815 by Kinza Ross RN  Outcome: Progressing     Problem: Restraint, Nonviolent  Goal: Absence of Harm or Injury  11/3/2024 1815 by Kinza Ross RN  Outcome: Progressing  11/3/2024 1815 by Kinza Ross RN  Outcome: Progressing     Problem: Stroke, Intracerebral Hemorrhage  Goal: Optimal Coping  11/3/2024 1815 by Kinza Ross RN  Outcome: Progressing  11/3/2024 1815 by Kinza Ross RN  Outcome: Progressing  Goal: Effective Bowel Elimination  11/3/2024 1815 by Kinza Ross RN  Outcome: Progressing  11/3/2024 1815 by Kinza Ross RN  Outcome: Progressing  Goal: Optimal Cerebral Tissue Perfusion  11/3/2024 1815 by Kinza Ross, RN  Outcome: Progressing  11/3/2024 1815 by Kinza Ross RN  Outcome: Progressing  Goal: Optimal Cognitive Function  11/3/2024 1815 by Kinza Ross, RN  Outcome: Progressing  11/3/2024 1815 by Kinza Ross, RN  Outcome: Progressing  Goal: Effective Communication Skills  11/3/2024 1815 by Kinza Ross, RN  Outcome: Progressing  11/3/2024 1815 by Kinza Ross,  RN  Outcome: Progressing  Goal: Optimal Functional Ability  11/3/2024 1815 by Kinza Ross RN  Outcome: Progressing  11/3/2024 1815 by iKnza Ross RN  Outcome: Progressing  Goal: Optimal Nutrition Intake  11/3/2024 1815 by Kinza Ross RN  Outcome: Progressing  11/3/2024 1815 by Kinza Ross, RN  Outcome: Progressing  Goal: Optimal Pain Control and Function  11/3/2024 1815 by Kinza Ross RN  Outcome: Progressing  11/3/2024 1815 by Kinza Ross RN  Outcome: Progressing  Goal: Effective Oxygenation and Ventilation  11/3/2024 1815 by Kinza Ross RN  Outcome: Progressing  11/3/2024 1815 by Kinza Ross RN  Outcome: Progressing  Goal: Improved Sensorimotor Function  11/3/2024 1815 by Kinza Ross, RN  Outcome: Progressing  11/3/2024 1815 by Kinza Ross RN  Outcome: Progressing  Goal: Safe and Effective Swallow  11/3/2024 1815 by Kinza Ross RN  Outcome: Progressing  11/3/2024 1815 by Kinza Ross RN  Outcome: Progressing  Goal: Effective Urinary Elimination  11/3/2024 1815 by Kinza Ross, RN  Outcome: Progressing  11/3/2024 1815 by Kinza Ross RN  Outcome: Progressing     Problem: Coping Ineffective  Goal: Effective Coping  11/3/2024 1815 by Kinza Ross, RN  Outcome: Progressing  11/3/2024 1815 by Kinza Ross RN  Outcome: Progressing

## 2024-11-04 NOTE — PT/OT/SLP EVAL
[facility]  Speech Language Pathology Department  Cognitive-Communication Evaluation    Patient Name:  aTli Beard   MRN:  [id number]    Recommendations     General recommendations:  cognitive-linguistic therapy  Communication strategies:  provide increased time to answer and go to room if call light pushed    Discharge therapy intensity: Moderate Intensity Therapy  Barriers to safe discharge: safety awareness and level of skilled assistance needed    History     Tali Beard is a 88 y.o. female with a medical diagnosis of L sided weakness, AMS, and R parietal acute hemorrhage. She has a hx of SAH and recent IPH.     Past Medical History:   Diagnosis Date    Subarachnoid hemorrhage      History reviewed. No pertinent surgical history.    Previous level of Function  Lives: alone  Home Responsibilities:  independent for ADL's per patient's family    Subjective     Patient awake, alert, and cooperative.    Spiritual/Cultural/Christianity Beliefs/Practices that affect care: no    Pain/Comfort: Pain Rating 1: 0/10    Objective     ORAL MUSCULATURE  Dentition: own teeth  Facial Movement: WFL  Buccal Strength & Mobility: impaired  Mandibular Strength & Mobility: WFL  Oral Labial Strength & Mobility: impaired retraction  Lingual Strength & Mobility: WFL    SPEECH PRODUCTION  Phoneme Production: imprecise consonant production  Voice Quality: adequate  Voice Production: adequate  Speech Rate: slow  Loudness: reduced  Respiration: reduced for speech  Resonance: adequate  Prosody: reduced stress  Speech Intelligibility  Known Context: 75%-90%  Unknown Context: 75%-90%    AUDITORY COMPREHENSION  Following Directions:  1-Step: Within Functional Limits  2-Step: 60%  Yes/No Questions:  Simple: Within Functional Limits  Complex: 50%    VERBAL EXPRESSION  Automatic Speech:  Functional needs: Within Functional Limits  Confrontation Naming  Objects: Within Functional Limits  Wh- Questions:  Object  name: Within Functional Limits  Object function: Within Functional Limits    COGNITION  Orientation:  Person: yes  Place: yes  Time: yes  Situation: yes   Attention:  Sustained: Impaired  Memory:  Short Term: 50%  Long Term: Within Functional Limits  Problem Solving  Functional simple: Impaired  Organization:  Divergent thinking: Impaired    Assessment     Patient presents with mild-moderate cognitive and language impairments warranting further ST intervention.    Goals     Multidisciplinary Problems       SLP Goals          Problem: SLP    Goal Priority Disciplines Outcome   SLP Goal     SLP    Description: LTG: Patient will improve cognitive-linguistic skills in order to return to OF.  STG: Complex yes/no questions 90% accuracy.  STG: Short term memory 90% accuracy.  ST step commands 90% accuracy.                     Patient Education     Patient and family were provided with verbal education regarding ST POC.  Understanding was verbalized, however additional teaching warranted.    Plan     SLP Follow-Up:  Yes   Patient to be seen:  5 x/week   Plan of Care expires:  24  Plan of Care reviewed with:  patient, family      Time Tracking     SLP Treatment Date:   24  Speech Start Time:  1030  Speech Stop Time:  1050     Speech Total Time (min):  20 min    Billable minutes:  Evaluation of Speech Sound Production with Comprehension and Expression, 20 minutes     2024

## 2024-11-05 PROBLEM — I62.9 INTRACRANIAL HEMORRHAGE: Status: ACTIVE | Noted: 2024-11-05

## 2024-11-05 LAB
ALBUMIN SERPL-MCNC: 2.6 G/DL (ref 3.4–4.8)
ALBUMIN/GLOB SERPL: 0.8 RATIO (ref 1.1–2)
ALP SERPL-CCNC: 60 UNIT/L (ref 40–150)
ALT SERPL-CCNC: 26 UNIT/L (ref 0–55)
ANION GAP SERPL CALC-SCNC: 9 MEQ/L
AST SERPL-CCNC: 27 UNIT/L (ref 5–34)
BACTERIA CSF CULT: NORMAL
BASOPHILS # BLD AUTO: 0.04 X10(3)/MCL
BASOPHILS NFR BLD AUTO: 0.8 %
BILIRUB SERPL-MCNC: 0.5 MG/DL
BUN SERPL-MCNC: 17.1 MG/DL (ref 9.8–20.1)
CALCIUM SERPL-MCNC: 8.9 MG/DL (ref 8.4–10.2)
CHLORIDE SERPL-SCNC: 109 MMOL/L (ref 98–107)
CO2 SERPL-SCNC: 24 MMOL/L (ref 23–31)
CREAT SERPL-MCNC: 0.74 MG/DL (ref 0.55–1.02)
CREAT/UREA NIT SERPL: 23
EOSINOPHIL # BLD AUTO: 0.15 X10(3)/MCL (ref 0–0.9)
EOSINOPHIL NFR BLD AUTO: 3 %
ERYTHROCYTE [DISTWIDTH] IN BLOOD BY AUTOMATED COUNT: 15.4 % (ref 11.5–17)
GFR SERPLBLD CREATININE-BSD FMLA CKD-EPI: >60 ML/MIN/1.73/M2
GLOBULIN SER-MCNC: 3.4 GM/DL (ref 2.4–3.5)
GLUCOSE SERPL-MCNC: 105 MG/DL (ref 82–115)
GRAM STN SPEC: NORMAL
GRAM STN SPEC: NORMAL
HCT VFR BLD AUTO: 34.2 % (ref 37–47)
HGB BLD-MCNC: 10.6 G/DL (ref 12–16)
IMM GRANULOCYTES # BLD AUTO: 0.01 X10(3)/MCL (ref 0–0.04)
IMM GRANULOCYTES NFR BLD AUTO: 0.2 %
LYMPHOCYTES # BLD AUTO: 1.06 X10(3)/MCL (ref 0.6–4.6)
LYMPHOCYTES NFR BLD AUTO: 21.5 %
MCH RBC QN AUTO: 23.3 PG (ref 27–31)
MCHC RBC AUTO-ENTMCNC: 31 G/DL (ref 33–36)
MCV RBC AUTO: 75.2 FL (ref 80–94)
MONOCYTES # BLD AUTO: 0.63 X10(3)/MCL (ref 0.1–1.3)
MONOCYTES NFR BLD AUTO: 12.8 %
NEUTROPHILS # BLD AUTO: 3.03 X10(3)/MCL (ref 2.1–9.2)
NEUTROPHILS NFR BLD AUTO: 61.7 %
NRBC BLD AUTO-RTO: 0 %
PLATELET # BLD AUTO: 141 X10(3)/MCL (ref 130–400)
PLATELETS.RETICULATED NFR BLD AUTO: 3.3 % (ref 0.9–11.2)
PMV BLD AUTO: 11.2 FL (ref 7.4–10.4)
POTASSIUM SERPL-SCNC: 3.7 MMOL/L (ref 3.5–5.1)
PROT SERPL-MCNC: 6 GM/DL (ref 5.8–7.6)
RBC # BLD AUTO: 4.55 X10(6)/MCL (ref 4.2–5.4)
SODIUM SERPL-SCNC: 142 MMOL/L (ref 136–145)
WBC # BLD AUTO: 4.92 X10(3)/MCL (ref 4.5–11.5)

## 2024-11-05 PROCEDURE — 97129 THER IVNTJ 1ST 15 MIN: CPT

## 2024-11-05 PROCEDURE — 97530 THERAPEUTIC ACTIVITIES: CPT

## 2024-11-05 PROCEDURE — 25000003 PHARM REV CODE 250: Performed by: INTERNAL MEDICINE

## 2024-11-05 PROCEDURE — 80053 COMPREHEN METABOLIC PANEL: CPT

## 2024-11-05 PROCEDURE — 21400001 HC TELEMETRY ROOM

## 2024-11-05 PROCEDURE — 25000003 PHARM REV CODE 250: Performed by: NURSE PRACTITIONER

## 2024-11-05 PROCEDURE — 97535 SELF CARE MNGMENT TRAINING: CPT | Mod: CO

## 2024-11-05 PROCEDURE — 25000003 PHARM REV CODE 250

## 2024-11-05 PROCEDURE — 97116 GAIT TRAINING THERAPY: CPT

## 2024-11-05 PROCEDURE — 85025 COMPLETE CBC W/AUTO DIFF WBC: CPT

## 2024-11-05 PROCEDURE — 36415 COLL VENOUS BLD VENIPUNCTURE: CPT

## 2024-11-05 RX ADMIN — LEVETIRACETAM 500 MG: 500 TABLET, FILM COATED ORAL at 09:11

## 2024-11-05 RX ADMIN — FERROUS SULFATE TAB 325 MG (65 MG ELEMENTAL FE) 1 EACH: 325 (65 FE) TAB at 09:11

## 2024-11-05 RX ADMIN — ATORVASTATIN CALCIUM 40 MG: 40 TABLET, FILM COATED ORAL at 08:11

## 2024-11-05 RX ADMIN — GENTAMICIN SULFATE: 1 OINTMENT TOPICAL at 09:11

## 2024-11-05 RX ADMIN — AMLODIPINE BESYLATE 5 MG: 5 TABLET ORAL at 09:11

## 2024-11-05 RX ADMIN — BISACODYL 10 MG: 10 SUPPOSITORY RECTAL at 09:11

## 2024-11-05 RX ADMIN — LEVETIRACETAM 500 MG: 500 TABLET, FILM COATED ORAL at 08:11

## 2024-11-05 NOTE — PT/OT/SLP PROGRESS
Physical Therapy Treatment    Patient Name:  Tali Beard   MRN:  [id number]    Recommendations:     Discharge therapy intensity: Moderate Intensity Therapy   Discharge Equipment Recommendations: to be determined by next level of care  Barriers to discharge: Impaired mobility and Ongoing medical needs    Assessment:     Tali Beard is a 88 y.o. 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  She presents with the following impairments/functional limitations: weakness, impaired endurance, impaired balance, gait instability, impaired self care skills, impaired functional mobility, impaired cognition, decreased safety awareness, decreased lower extremity function, decreased upper extremity function . Pt with significant improvements in activity tolerance and functional mobility, able to ambulate in unit hallway and transfer to toilet in room with Vy. Presents with L-sided inattention and impaired sequencing ability with motor tasks. Increasing POC to 6x/week due to improved participation. Continue to recommend moderate intensity therapy at discharge.    Rehab Prognosis: Good; patient would benefit from acute skilled PT services to address these deficits and reach maximum level of function.    Recent Surgery: * No surgery found *      Plan:     During this hospitalization, patient would benefit from acute PT services 6 x/week to address the identified rehab impairments via gait training, therapeutic activities, therapeutic exercises, neuromuscular re-education and progress toward the following goals:    Plan of Care Expires:  11/30/24    Subjective     Chief Complaint: none  Patient/Family Comments/goals: family wants pt to try using the toilet today  Pain/Comfort:  Pain Rating 1: 0/10      Objective:     Communicated with RN prior to session.  Patient found  HOB elevated with blood pressure cuff, pulse ox (continuous), telemetry, SCD, peripheral IV, PureWick upon PT entry to room.     General Precautions: Standard, fall (BP<140/90)  Orthopedic Precautions: N/A  Braces: N/A  Respiratory Status: Room air  Blood Pressure: 114/66  Skin Integrity: Visible skin intact      Functional Mobility:  Bed Mobility:     Rolling Left:  maximal assistance  Rolling Right: maximal assistance  Supine to Sit: moderate assistance and of 1 persons  Transfers:     Sit to Stand:  minimum assistance with rolling walker  Toilet Transfer: minimum assistance with  rolling walker  using  Stand Pivot  Gait: 38ft, 15ft with RW with Vy, cues to maintain (B) grasp on RW, shortened steps (B) with narrow ROSA, mild R knee buckling and L-sided inattention. Distractible with environment.  Balance: Static standing balance = Vy due to posterior lean    Therapeutic Activities/Exercises:  2 STS from EOB with Vy  Standing marches with RW with Vy 2x10  Pt ambulated in unit hallway , seated rest, then ambulated again from room to toilet.  Vy for toilet t/f, SBA for sitting balance on toilet. Vy for balance with pericare due to excessive A/P lean. Pt able to perform hygiene without physical assistance but did require cues for sequencing with task. Noted fatigue with prolonged activity.    Education:  Patient and family were provided with verbal education education regarding PT role/goals/POC, fall prevention, safety awareness, and discharge/DME recommendations.  Understanding was verbalized, however additional teaching warranted.     Patient left up in chair with all lines intact, call button in reach, chair alarm on, RN notified, and family present    GOALS:   Multidisciplinary Problems       Physical Therapy Goals          Problem: Physical Therapy    Goal Priority Disciplines Outcome Interventions   Physical Therapy Goal     PT, PT/OT Progressing    Description: Goals to be met by: 11/30/24      Patient will increase functional independence with mobility by performin. Supine to sit with Moderate Assistance MET 24  2. Sit to supine with Moderate Assistance  3. Sit to stand transfer standby assistance with RW.  4. Bed to chair transfer standby assistance with RW.  5. Gait x 150ft with RW with standby assistance.  6. Sitting at edge of bed x20 minutes with Stand-by Assistance                         Time Tracking:     PT Received On: 24  PT Start Time: 1040     PT Stop Time: 1126  PT Total Time (min): 46 min     Billable Minutes: Gait Training 16 and Therapeutic Activity 30    Treatment Type: Treatment  PT/PTA: PT     Number of PTA visits since last PT visit: 2     2024

## 2024-11-05 NOTE — PT/OT/SLP PROGRESS
Occupational Therapy   Treatment    Name: Tali Beard  MRN: [id number]  Admitting Diagnosis:  Acute focal neurological deficit       Recommendations:     Recommended therapy intensity at discharge: Moderate Intensity Therapy   Discharge Equipment Recommendations:  to be determined by next level of care  Barriers to discharge:       Assessment:     Tali Beard is a 88 y.o. female with a medical diagnosis of Acute focal neurological deficit.  She presents with good motivation and participation. Performance deficits affecting function are weakness, impaired endurance, impaired self care skills, impaired functional mobility, impaired balance.     Rehab Prognosis:  Good; patient would benefit from acute skilled OT services to address these deficits and reach maximum level of function.       Plan:     Patient to be seen 5 x/week to address the above listed problems via self-care/home management, therapeutic activities, therapeutic exercises  Plan of Care Expires: 11/30/24  Plan of Care Reviewed with: patient, family, son    Subjective     Pain/Comfort:  No complaints of pain    Objective:     Communicated with: nurse prior to session.  Patient found HOB elevated with blood pressure cuff, pulse ox (continuous), telemetry, SCD, peripheral IV, PureWick upon OT entry to room.    General Precautions: Standard, fall    Orthopedic Precautions:N/A  Braces: N/A  Respiratory Status: Room air  Vital Signs: Blood Pressure: 114/66     Occupational Performance:     Bed Mobility:    Patient completed Scooting/Bridging with minimum assistance  Patient completed Supine to Sit with minimum assistance     Functional Mobility/Transfers:  Patient completed Sit <> Stand Transfer with minimum assistance  with  rolling walker   Patient completed Toilet Transfer Step Transfer technique with minimum assistance with  rolling walker, posterior lean during ambulation, assistance required with RW and objects    Activities of Daily  Living:  Lower Body Dressing: contact guard assistance don/doff socks with cues for completion  Toileting: minimum assistance and moderate assistance standing in bathroom with RW for hygiene and clothing manipulation    Therapeutic Positioning    OT interventions performed during the course of today's session in an effort to prevent and/or reduce acquired pressure injuries:   Therapeutic positioning was provided at the conclusion of session to offload all bony prominences for the prevention and/or reduction of pressure injuries    [facility] 6 Click ADL: 11    Patient Education:  Patient and family were provided with verbal education and demonstrations education regarding fall prevention and safety awareness.  Understanding was verbalized, however additional teaching warranted.      Patient left up in chair with all lines intact, call button in reach, and family present.    GOALS:   Multidisciplinary Problems       Occupational Therapy Goals          Problem: Occupational Therapy    Goal Priority Disciplines Outcome Interventions   Occupational Therapy Goal     OT, PT/OT Progressing    Description: LTG: Pt will perform basic ADLs and ADL t/fs with min A using LRAD by d/c.    STG: to be met by 11/30/24  1. Pt will follow >80% of simple one step commands  2. Pt will perform grooming EOB with min A.   3. Pt will perform functional grasp/reach/release of items >80% of trials in prep for increased participation in ADL tasks.   4. Pt will perform sit<>stand with mod A x 2 in prep for ADL t/fs.                        Time Tracking:     OT Date of Treatment: 11/05/24  OT Start Time: 1040  OT Stop Time: 1130  OT Total Time (min): 50 min    Billable Minutes:Self Care/Home Management 50    OT/KESHIA: KESHIA     Number of KESHIA visits since last OT visit: 2    11/5/2024

## 2024-11-05 NOTE — PLAN OF CARE
CM sent updates to [facility] for SNF referral. Request response on referral .. Awaiting to hear from [facility] referral status.

## 2024-11-05 NOTE — PROGRESS NOTES
[facility] Hospital Medicine Progress Note        Chief Complaint: Inpatient Follow-up     HPI:   88 year old woman with PMHx of HTN, HLD, T2DM, and recent admission of IPH in the left left temporoparietal region with small volume left-sided subarachnoid hemorrhage presented on 10/29/2024 for left sided confusion and AMS. CT head showed acute hemorrhage along the right parietal convexity and interval improvement of previous left supratentorial hemorrhage.  CTA head/neck negative for LVO, moderate to severe narrowing of the bilateral supraclinoid segments unchanged. Admitted to ICU for closer monitoring and neurology and neurosurgery following. Vasculitis and cerebral amyloidosis work up in process. LP was performed. EEG done was negative for seizures but showed diffuse cerebral slowing. Patient was also seen by palliative.     Patient had MRI done which showed left parietal late subacute and right parietal early subacute cortical hemorrhage.  No additional areas of new or worsened intracranial hemorrhage identified and new punctate focus of suspected ischemic cortical infarct at the left occipital lobe.    Likely source of ICH is amyloid angiopathy. Not recommended for antiplatelets or anticoagulation per neurology. Recommended for outpatient MRI in 4 weeks per neurology. Pending rehab placement.     Interval Hx:   NAEO. Seen and examined. Following commands and eating breakfast. Multiple family members at bedside. Requesting [facility] placement    Case was discussed with patient's nurse and  on the floor.    Objective/physical exam:  General: In no acute distress, afebrile  Chest: Clear to auscultation bilaterally  Heart: RRR, +S1, S2, no appreciable murmur  Abdomen: Soft, nontender, BS +  MSK: Warm, no lower extremity edema, no clubbing or cyanosis  Neurologic: A&Ox3, LUE 4/5, LLE 4/5, RUE 4/5 and RLE 4/5     VITAL SIGNS: 24 HRS MIN & MAX LAST   Temp  Min: 97.5 °F  (36.4 °C)  Max: 99.2 °F (37.3 °C) 97.5 °F (36.4 °C)   BP  Min: 107/71  Max: 130/73 118/68   Pulse  Min: 80  Max: 90  80   Resp  Min: 17  Max: 17 17   SpO2  Min: 96 %  Max: 99 % 96 %     I have reviewed the following labs:  Recent Labs   Lab 11/03/24 0409 11/04/24 0433 11/05/24  0353   WBC 5.06 4.52 4.92   RBC 4.79 4.68 4.55   HGB 11.3* 11.0* 10.6*   HCT 36.1* 34.8* 34.2*   MCV 75.4* 74.4* 75.2*   MCH 23.6* 23.5* 23.3*   MCHC 31.3* 31.6* 31.0*   RDW 15.7 15.4 15.4    133 141   MPV 11.9* 11.2* 11.2*     Recent Labs   Lab 11/03/24 0409 11/04/24 0433 11/05/24  0353    142 142   K 3.9 3.9 3.7   * 111* 109*   CO2 23 23 24   BUN 15.3 16.0 17.1   CREATININE 0.73 0.75 0.74   CALCIUM 8.8 8.7 8.9   ALBUMIN 2.8* 2.6* 2.6*   ALKPHOS 63 59 60   ALT 33 27 26   AST 59* 38* 27   BILITOT 0.5 0.4 0.5     Microbiology Results (last 7 days)       Procedure Component Value Units Date/Time    Cerebrospinal Fluid Culture [[id number]] Collected: 10/31/24 1354    Order Status: Completed Specimen: CSF (Spinal Fluid) from Cerebrospinal Fluid Updated: 11/04/24 1013     CULTURE, CSF No growth at 4 days     GRAM STAIN No WBC seen      No bacteria seen    Cryptococcal antigen, CSF [[id number]]  (Normal) Collected: 10/31/24 1354    Order Status: Completed Specimen: CSF (Spinal Fluid) from CSF Tap, Tube 1 Updated: 11/01/24 1608     Cryptococcal Antigen, CSF Negative    Gram Stain [[id number]] Collected: 10/31/24 1354    Order Status: Completed Specimen: CSF (Spinal Fluid) from Lumbar Updated: 11/01/24 0816     GRAM STAIN No WBCs, No bacteria seen    Chantal Ink Prep CSF [[id number]] Collected: 10/31/24 1354    Order Status: Completed Specimen: CSF (Spinal Fluid) from Cerebrospinal Fluid Updated: 10/31/24 1618     CHANTAL INK PREP CSF ([facility]) Negative    Fungal Culture [[id number]] Collected: 10/31/24 1354    Order Status: Sent Specimen: CSF (Spinal Fluid) from Lumbar Updated: 10/31/24 1414             See below for  Radiology    Assessment/Plan:  # Left sided weakness 2/2 likely hypertensive ICH vs lobar hemorrhage due to amyloid angiopathy vs vasculitis - IMPROVING  # Left parietal late subacute and right parietal early subacute cortical hemorrhage  # Ischemic cortical infarct at the left occipital lobe  # Hx of IPH in the left temporoparietal region with small volume left-sided subarachnoid hemorrhage   # Microcytic Anemia  # HTN  # HLD   # T2DM  # Elevated AST - resolved     - monitor anemia outpatient  - follow up LP final results; normal glucose, elevated protein. Negative oligoclonal bands. LAMINE, ANCA negative. Cytology pending, further blood work pending  - follow up vasculitis work up  - keppra for seizure ppx  - SBP goal < 140  - MRI brain - imagine reviewed  - repeat MRI brain w/wo contrast and with SWI sequence as outpatient in 4 weeks to look for any microhemorrhages   - follow up with Yue Rodriguez NP in 1 month for MRI and CSF labs   - hold antiplatelets for now  - palliative recs appreciated  - neurology/NGSY recs appreciated  -  working on placement  - PT/OT    VTE prophylaxis: SCD    Patient condition:  Stable    Anticipated discharge and Disposition:         All diagnosis and differential diagnosis have been reviewed; assessment and plan has been documented; I have personally reviewed the labs and test results that are presently available; I have reviewed the patients medication list; I have reviewed the consulting providers response and recommendations. I have reviewed or attempted to review medical records based upon their availability    All of the patient's questions have been  addressed and answered. Patient's is agreeable to the above stated plan. I will continue to monitor closely and make adjustments to medical management as needed.    _____________________________________________________________________    Malnutrition Status:    Scheduled Med:   amLODIPine  5 mg Oral Daily     atorvastatin  40 mg Oral QHS    ferrous sulfate  1 tablet Oral Daily    gentamicin   Topical (Top) Daily    levETIRAcetam  500 mg Oral BID      Continuous Infusions:     PRN Meds:    Current Facility-Administered Medications:     bisacodyL, 10 mg, Rectal, Daily PRN    hydrALAZINE, 20 mg, Intravenous, Q4H PRN    labetalol, 10 mg, Intravenous, Q4H PRN    sodium chloride 0.9%, 10 mL, Intravenous, PRN     Radiology:  I have personally reviewed the following imaging and agree with the radiologist.     MRI Brain W WO Contrast  EXAMINATION  MRI BRAIN W WO CONTRAST    CLINICAL HISTORY  Stroke, follow up;    TECHNIQUE  Pre- and post-contrast multiplanar, multisequence MR images of the brain were obtained.    CONTRAST  IV: MultiHance, 15 mL    COMPARISON  *30 October 2024 head CT  *7 October 2024 brain MR    FINDINGS  Exam quality: Limited secondary to patient movement throughout image acquisition, with resulting artifact.    Brain parenchyma: No mass-like focal enhancement or otherwise suspicious post-contrast findings are appreciated. There is localized increased diffusion weighted signal intensity in the region of previously described left posterior parietal cortical hemorrhage, with corresponding hypointense signal changes on the ADC map, T1, T2, and FLAIR hyperintensity, and hyperintense T2* signal with thin hypointense rim; these findings are in keeping with expected evolution in degradation of the previously visualized hematoma and consistent with late subacute stage.  There is newly appreciated localized cortical T1 hyperintensity at the right parietal lobe with associated signal hypointensity on the T2-weighted, FLAIR, DWI, ADC, and T2* sequences; signal pattern is consistent with early subacute hemorrhage and corresponds to abnormality seen on the recent head CT.  No other definite areas of new or worsened focal intracranial hemorrhage identified.    Punctate focus of restricted diffusion signal measuring  approximately 3-4 mm noted at the left occipital lobe (series 3, image 18), with corresponding hypointense signal on the ADC map and T2/FLAIR hyperintensity.  No other sites of restricted diffusion suggestive of ischemic changes identified.  White matter T2/FLAIR hyperintense signal without associated restricted diffusion is stable in the interval and again consistent with chronic microvascular disease.  No localized mass effect or midline shift. Differentiation of the gray-white border is grossly preserved.    CSF spaces: No abnormal extra-axial fluid. The basal cisterns are preserved. Sulcal volume appears normal for patient age and proportional to the ventricles.    Sella/Suprasellar regions: No abnormalities.    Other findings: Normal flow signal voids within the large intracranial vessels or dural sinuses. No focal abnormalities of the regional osseous structures and extracranial soft tissues. Mastoid air cells are well aerated. Paranasal sinuses are clear, with no fluid level.    IMPRESSION  1. Interval signal changes consistent with evolving left parietal late subacute and right parietal early subacute cortical hemorrhage.  No additional areas of new or worsened intracranial hemorrhage identified.  2. New punctate focus of suspected ischemic cortical infarct at the left occipital lobe, with no additional sites of abnormal diffusion signal appreciated.  3. No other convincing new or worsened intracranial process identified, within limitations of motion artifact.  ==========  This report was flagged in Epic as abnormal.    Electronically signed by: Price Henning  Date:    11/02/2024  Time:    15:00      Russell Louie MD  Department of Hospital Medicine   [facility]   11/05/2024

## 2024-11-05 NOTE — PT/OT/SLP PROGRESS
[facility]  Speech Language Pathology Department  Therapy Progress Note    Patient Name:  Tali Beard   MRN:  [id number]    Recommendations     General recommendations:  cognitive-linguistic therapy  Communication strategies:  provide increased time to answer and go to room if call light pushed    Discharge therapy intensity: Moderate Intensity Therapy   Barriers to safe discharge: level of skilled assistance needed    Subjective     Patient awake, alert, and cooperative.    Pain/Comfort: Pain Rating 1: 0/10  Spiritual/Cultural/Advent Beliefs/Practices that affect care: no    Objective     Memory:  Short Term: 25% independently, 100% given max cues    Comprehension  Complex yes/no questions: 70% independently    Assessment     Pt continues to present with mild-moderate cognitive and language impairments warranting further ST intervention.     Goals     Multidisciplinary Problems       SLP Goals          Problem: SLP    Goal Priority Disciplines Outcome   SLP Goal     SLP    Description: LTG: Patient will improve cognitive-linguistic skills in order to return to PLOF.  STG: Complex yes/no questions 90% accuracy.  STG: Short term memory 90% accuracy.  ST step commands 90% accuracy.                     Patient Education     Patient provided with verbal education regarding ST POC.  Understanding was verbalized, however additional teaching warranted.    Plan     Will continue to follow and tx as appropriate.    SLP Follow-Up:  Yes   Patient to be seen:  5 x/week   Plan of Care expires:  24  Plan of Care reviewed with:  patient     Time Tracking     SLP Treatment Date:   24  Speech Start Time:  0845  Speech Stop Time:  0900     Speech Total Time (min):  15 min    Billable minutes:  Cognitive Skills Intervention, 15 minutes     2024

## 2024-11-05 NOTE — PLAN OF CARE
Problem: Physical Therapy  Goal: Physical Therapy Goal  Description: Goals to be met by: 24     Patient will increase functional independence with mobility by performin. Supine to sit with Moderate Assistance MET 24  2. Sit to supine with Moderate Assistance  3. Sit to stand transfer standby assistance with RW.  4. Bed to chair transfer standby assistance with RW.  5. Gait x 150ft with RW with standby assistance.  6. Sitting at edge of bed x20 minutes with Stand-by Assistance    Outcome: Progressing

## 2024-11-06 ENCOUNTER — HOSPITAL ENCOUNTER (INPATIENT)
Facility: HOSPITAL | Age: 89
LOS: 14 days | Discharge: HOME | DRG: 057 | End: 2024-11-20
Attending: INTERNAL MEDICINE | Admitting: INTERNAL MEDICINE
Payer: MEDICARE

## 2024-11-06 VITALS
SYSTOLIC BLOOD PRESSURE: 135 MMHG | HEART RATE: 114 BPM | BODY MASS INDEX: 25.62 KG/M2 | DIASTOLIC BLOOD PRESSURE: 71 MMHG | WEIGHT: 144.63 LBS | RESPIRATION RATE: 17 BRPM | HEIGHT: 63 IN | OXYGEN SATURATION: 95 % | TEMPERATURE: 99 F

## 2024-11-06 DIAGNOSIS — R07.9 CHEST PAIN: ICD-10-CM

## 2024-11-06 DIAGNOSIS — I62.9 INTRACRANIAL HEMORRHAGE: ICD-10-CM

## 2024-11-06 DIAGNOSIS — I61.2 NONTRAUMATIC HEMORRHAGE OF RIGHT CEREBRAL HEMISPHERE: Primary | ICD-10-CM

## 2024-11-06 LAB
BDY SITE: NORMAL
M VARICELLA-ZOSTER VIRUS PCR: NEGATIVE
POCT GLUCOSE: 138 MG/DL (ref 70–110)
POCT GLUCOSE: 167 MG/DL (ref 70–110)
VDRL CSF QL: NEGATIVE
WNV RNA CSF QL NAA+PROBE: NEGATIVE

## 2024-11-06 PROCEDURE — 11000004 HC SNF PRIVATE

## 2024-11-06 PROCEDURE — 25000003 PHARM REV CODE 250: Performed by: PHYSICIAN ASSISTANT

## 2024-11-06 PROCEDURE — 97530 THERAPEUTIC ACTIVITIES: CPT | Mod: CQ

## 2024-11-06 PROCEDURE — 25000003 PHARM REV CODE 250: Performed by: INTERNAL MEDICINE

## 2024-11-06 PROCEDURE — 25000003 PHARM REV CODE 250

## 2024-11-06 RX ORDER — LANOLIN ALCOHOL/MO/W.PET/CERES
1 CREAM (GRAM) TOPICAL DAILY
Status: DISCONTINUED | OUTPATIENT
Start: 2024-11-06 | End: 2024-11-20 | Stop reason: HOSPADM

## 2024-11-06 RX ORDER — ACETAMINOPHEN 325 MG/1
650 TABLET ORAL EVERY 4 HOURS PRN
Status: DISCONTINUED | OUTPATIENT
Start: 2024-11-06 | End: 2024-11-20 | Stop reason: HOSPADM

## 2024-11-06 RX ORDER — NALOXONE HCL 0.4 MG/ML
0.02 VIAL (ML) INJECTION
Status: DISCONTINUED | OUTPATIENT
Start: 2024-11-06 | End: 2024-11-20 | Stop reason: HOSPADM

## 2024-11-06 RX ORDER — GENTAMICIN SULFATE 1 MG/G
OINTMENT TOPICAL DAILY
Status: DISCONTINUED | OUTPATIENT
Start: 2024-11-06 | End: 2024-11-14

## 2024-11-06 RX ORDER — POLYETHYLENE GLYCOL 3350 17 G/17G
17 POWDER, FOR SOLUTION ORAL 3 TIMES DAILY PRN
Status: DISCONTINUED | OUTPATIENT
Start: 2024-11-06 | End: 2024-11-20 | Stop reason: HOSPADM

## 2024-11-06 RX ORDER — SIMETHICONE 80 MG
1 TABLET,CHEWABLE ORAL 4 TIMES DAILY PRN
Status: DISCONTINUED | OUTPATIENT
Start: 2024-11-06 | End: 2024-11-20 | Stop reason: HOSPADM

## 2024-11-06 RX ORDER — TALC
9 POWDER (GRAM) TOPICAL NIGHTLY PRN
Status: DISCONTINUED | OUTPATIENT
Start: 2024-11-06 | End: 2024-11-20 | Stop reason: HOSPADM

## 2024-11-06 RX ORDER — AMLODIPINE BESYLATE 5 MG/1
5 TABLET ORAL DAILY
Status: DISCONTINUED | OUTPATIENT
Start: 2024-11-07 | End: 2024-11-20 | Stop reason: HOSPADM

## 2024-11-06 RX ORDER — LATANOPROST 50 UG/ML
1 SOLUTION/ DROPS OPHTHALMIC DAILY
Status: DISCONTINUED | OUTPATIENT
Start: 2024-11-06 | End: 2024-11-20 | Stop reason: HOSPADM

## 2024-11-06 RX ORDER — BISACODYL 10 MG/1
10 SUPPOSITORY RECTAL DAILY PRN
Status: DISCONTINUED | OUTPATIENT
Start: 2024-11-06 | End: 2024-11-20 | Stop reason: HOSPADM

## 2024-11-06 RX ORDER — LEVETIRACETAM 500 MG/1
500 TABLET ORAL 2 TIMES DAILY
Status: DISCONTINUED | OUTPATIENT
Start: 2024-11-06 | End: 2024-11-20 | Stop reason: HOSPADM

## 2024-11-06 RX ORDER — IBUPROFEN 200 MG
16 TABLET ORAL
Status: DISCONTINUED | OUTPATIENT
Start: 2024-11-06 | End: 2024-11-20 | Stop reason: HOSPADM

## 2024-11-06 RX ORDER — IBUPROFEN 200 MG
24 TABLET ORAL
Status: DISCONTINUED | OUTPATIENT
Start: 2024-11-06 | End: 2024-11-20 | Stop reason: HOSPADM

## 2024-11-06 RX ORDER — ATORVASTATIN CALCIUM 40 MG/1
40 TABLET, FILM COATED ORAL NIGHTLY
Status: DISCONTINUED | OUTPATIENT
Start: 2024-11-06 | End: 2024-11-20 | Stop reason: HOSPADM

## 2024-11-06 RX ORDER — ALUMINUM HYDROXIDE, MAGNESIUM HYDROXIDE, AND SIMETHICONE 1200; 120; 1200 MG/30ML; MG/30ML; MG/30ML
30 SUSPENSION ORAL 4 TIMES DAILY PRN
Status: DISCONTINUED | OUTPATIENT
Start: 2024-11-06 | End: 2024-11-20 | Stop reason: HOSPADM

## 2024-11-06 RX ORDER — IPRATROPIUM BROMIDE AND ALBUTEROL SULFATE 2.5; .5 MG/3ML; MG/3ML
3 SOLUTION RESPIRATORY (INHALATION) EVERY 6 HOURS PRN
Status: DISCONTINUED | OUTPATIENT
Start: 2024-11-06 | End: 2024-11-20 | Stop reason: HOSPADM

## 2024-11-06 RX ORDER — SODIUM CHLORIDE 0.9 % (FLUSH) 0.9 %
10 SYRINGE (ML) INJECTION
Status: DISCONTINUED | OUTPATIENT
Start: 2024-11-06 | End: 2024-11-20 | Stop reason: HOSPADM

## 2024-11-06 RX ORDER — GLUCAGON 1 MG
1 KIT INJECTION
Status: DISCONTINUED | OUTPATIENT
Start: 2024-11-06 | End: 2024-11-20 | Stop reason: HOSPADM

## 2024-11-06 RX ORDER — LEVETIRACETAM 500 MG/1
500 TABLET ORAL 2 TIMES DAILY
Qty: 60 TABLET | Refills: 0 | Status: ON HOLD | OUTPATIENT
Start: 2024-11-06 | End: 2024-12-06

## 2024-11-06 RX ORDER — HYDROCODONE BITARTRATE AND ACETAMINOPHEN 5; 325 MG/1; MG/1
1 TABLET ORAL EVERY 6 HOURS PRN
Status: DISCONTINUED | OUTPATIENT
Start: 2024-11-06 | End: 2024-11-20 | Stop reason: HOSPADM

## 2024-11-06 RX ORDER — ONDANSETRON HYDROCHLORIDE 2 MG/ML
4 INJECTION, SOLUTION INTRAVENOUS EVERY 8 HOURS PRN
Status: DISCONTINUED | OUTPATIENT
Start: 2024-11-06 | End: 2024-11-20 | Stop reason: HOSPADM

## 2024-11-06 RX ORDER — MORPHINE SULFATE 4 MG/ML
2 INJECTION, SOLUTION INTRAMUSCULAR; INTRAVENOUS EVERY 6 HOURS PRN
Status: DISCONTINUED | OUTPATIENT
Start: 2024-11-06 | End: 2024-11-20 | Stop reason: HOSPADM

## 2024-11-06 RX ORDER — ONDANSETRON 4 MG/1
8 TABLET, ORALLY DISINTEGRATING ORAL EVERY 8 HOURS PRN
Status: DISCONTINUED | OUTPATIENT
Start: 2024-11-06 | End: 2024-11-20 | Stop reason: HOSPADM

## 2024-11-06 RX ORDER — ATORVASTATIN CALCIUM 40 MG/1
40 TABLET, FILM COATED ORAL NIGHTLY
Qty: 30 TABLET | Refills: 0 | Status: ON HOLD | OUTPATIENT
Start: 2024-11-06 | End: 2024-12-06

## 2024-11-06 RX ORDER — INSULIN ASPART 100 [IU]/ML
0-5 INJECTION, SOLUTION INTRAVENOUS; SUBCUTANEOUS
Status: DISCONTINUED | OUTPATIENT
Start: 2024-11-06 | End: 2024-11-20 | Stop reason: HOSPADM

## 2024-11-06 RX ADMIN — LEVETIRACETAM 500 MG: 500 TABLET, FILM COATED ORAL at 08:11

## 2024-11-06 RX ADMIN — FERROUS SULFATE TAB 325 MG (65 MG ELEMENTAL FE) 1 EACH: 325 (65 FE) TAB at 10:11

## 2024-11-06 RX ADMIN — GENTAMICIN SULFATE: 1 OINTMENT TOPICAL at 10:11

## 2024-11-06 RX ADMIN — ATORVASTATIN CALCIUM 40 MG: 40 TABLET, FILM COATED ORAL at 08:11

## 2024-11-06 RX ADMIN — LEVETIRACETAM 500 MG: 500 TABLET, FILM COATED ORAL at 10:11

## 2024-11-06 RX ADMIN — AMLODIPINE BESYLATE 5 MG: 5 TABLET ORAL at 10:11

## 2024-11-06 NOTE — DISCHARGE SUMMARY
[facility] Medicine Discharge Summary    Admit Date: 10/29/2024  Discharge Date and Time: 11/6/202410:22 AM  Admitting Physician: YURIY Team  Discharging Physician: Russell Louie MD.  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 due to amyloid angiopathy vs vasculitis - IMPROVING  # Left parietal late subacute and right parietal early subacute cortical hemorrhage  # Ischemic cortical infarct at the left occipital lobe  # Hx of IPH in the left temporoparietal region with small volume left-sided subarachnoid hemorrhage   # Microcytic Anemia - stable  # HTN  # HLD   # T2DM  # Elevated AST - resolved    Hospital Course:   88 year old woman with PMHx of HTN, HLD, T2DM, and recent admission of IPH in the left left temporoparietal region with small volume left-sided subarachnoid hemorrhage presented on 10/29/2024 for left sided confusion and AMS. CT head showed acute hemorrhage along the right parietal convexity and interval improvement of previous left supratentorial hemorrhage.  CTA head/neck negative for LVO, moderate to severe narrowing of the bilateral supraclinoid segments unchanged. Admitted to ICU for closer monitoring and neurology and neurosurgery following. Vasculitis and cerebral amyloidosis work up in process. LP was performed. EEG done was negative for seizures but showed diffuse cerebral slowing. Patient was also seen by palliative.      Patient had MRI done which showed left parietal late subacute and right parietal early subacute cortical hemorrhage.  No additional areas of new or worsened intracranial hemorrhage identified and new punctate focus of suspected ischemic cortical infarct at the left occipital lobe.     Likely source of ICH is amyloid angiopathy. Not recommended for antiplatelets or anticoagulation per  neurology. Recommended for outpatient MRI in 4 weeks per neurology - they will follow up the final vasculitis and LP studies. Repeat CT head showed overall decreasing size of right parietal lobe hematoma. No new acute intracranial findings      Pt was seen and examined on the day of discharge. Vital signs and neuro exam stable. Stable for discharge to [facility] with PCP and neurology follow up to have repeat MRI brain in approximately 4 weeks. All questions answered.      Vitals:  VITAL SIGNS: 24 HRS MIN & MAX LAST   Temp  Min: 97.7 °F (36.5 °C)  Max: 99.3 °F (37.4 °C) 99.3 °F (37.4 °C)   BP  Min: 124/71  Max: 149/69 131/76   Pulse  Min: 102  Max: 115  102   Resp  Min: 17  Max: 18 17   SpO2  Min: 92 %  Max: 97 % (!) 94 %       Physical Exam:  General: In no acute distress, afebrile  Chest: Clear to auscultation bilaterally  Heart: RRR, +S1, S2, no appreciable murmur  Abdomen: Soft, nontender, BS +  MSK: Warm, no lower extremity edema, no clubbing or cyanosis  Neurologic: A&Ox3, LUE 4/5, LLE 4/5, RUE 5/5 and RLE 4/5     Procedures Performed: No admission procedures for hospital encounter.     Significant Diagnostic Studies: See Full reports for all details    Recent Labs   Lab 11/03/24 0409 11/04/24 0433 11/05/24  0353   WBC 5.06 4.52 4.92   RBC 4.79 4.68 4.55   HGB 11.3* 11.0* 10.6*   HCT 36.1* 34.8* 34.2*   MCV 75.4* 74.4* 75.2*   MCH 23.6* 23.5* 23.3*   MCHC 31.3* 31.6* 31.0*   RDW 15.7 15.4 15.4    133 141   MPV 11.9* 11.2* 11.2*       Recent Labs   Lab 11/03/24 0409 11/04/24 0433 11/05/24  0353    142 142   K 3.9 3.9 3.7   * 111* 109*   CO2 23 23 24   BUN 15.3 16.0 17.1   CREATININE 0.73 0.75 0.74   CALCIUM 8.8 8.7 8.9   ALBUMIN 2.8* 2.6* 2.6*   ALKPHOS 63 59 60   ALT 33 27 26   AST 59* 38* 27   BILITOT 0.5 0.4 0.5        Microbiology Results (last 7 days)       Procedure Component Value Units Date/Time    Cerebrospinal Fluid Culture [[id number]] Collected: 10/31/24 1354    Order Status:  Completed Specimen: CSF (Spinal Fluid) from Cerebrospinal Fluid Updated: 11/05/24 1144     CULTURE, CSF Final Report: At 5 days. No growth     GRAM STAIN No WBC seen      No bacteria seen    Cryptococcal antigen, CSF [[id number]]  (Normal) Collected: 10/31/24 1354    Order Status: Completed Specimen: CSF (Spinal Fluid) from CSF Tap, Tube 1 Updated: 11/01/24 1608     Cryptococcal Antigen, CSF Negative    Gram Stain [[id number]] Collected: 10/31/24 1354    Order Status: Completed Specimen: CSF (Spinal Fluid) from Lumbar Updated: 11/01/24 0816     GRAM STAIN No WBCs, No bacteria seen    Chantal Ink Prep CSF [[id number]] Collected: 10/31/24 1354    Order Status: Completed Specimen: CSF (Spinal Fluid) from Cerebrospinal Fluid Updated: 10/31/24 1618     CHANTAL INK PREP CSF ([facility]) Negative    Fungal Culture [[id number]] Collected: 10/31/24 1354    Order Status: Sent Specimen: CSF (Spinal Fluid) from Lumbar Updated: 10/31/24 1414             CT Head Without Contrast  Narrative: EXAMINATION:  CT HEAD WITHOUT CONTRAST    CLINICAL HISTORY:  Mental status change, unknown cause;    TECHNIQUE:  Axial scans were obtained from skull base to the vertex.    Coronal and sagittal reconstructions obtained from the axial data.    Automatic exposure control was utilized to limit radiation dose.    Contrast: None    Radiation Dose:    Total DLP: 860 mGy*cm    COMPARISON:  CT head dated 10/30/2024    FINDINGS:  There is overall decreasing volume and density of hemorrhage in the right parietal lobe with surrounding edema.  There is similar appearance of chronic evolving hematoma in the left parietal lobe.  There is encephalomalacia in the left frontal lobe.  There is no new or progressive acute intracranial hemorrhage.  There is no mass effect or midline shift.  The basal cisterns are patent.  The ventricles are stable in size.  The calvarium and skull base are intact.  Impression: Overall decreasing size of right parietal lobe hematoma.  No new  acute intracranial findings.    Electronically signed by: Kya Leon  Date:    11/06/2024  Time:    10:02         Medication List        START taking these medications      atorvastatin 40 MG tablet  Commonly known as: LIPITOR  Take 1 tablet (40 mg total) by mouth every evening.            CONTINUE taking these medications      amLODIPine 5 MG tablet  Commonly known as: NORVASC  Take 1 tablet (5 mg total) by mouth once daily.     latanoprost 0.005 % ophthalmic solution     levETIRAcetam 500 MG Tab  Commonly known as: KEPPRA  Take 1 tablet (500 mg total) by mouth 2 (two) times daily.     metFORMIN 500 MG tablet  Commonly known as: GLUCOPHAGE     prednisoLONE acetate 0.12 % ophthalmic suspension  Commonly known as: PRED MILD            STOP taking these medications      timolol maleate 0.5% 0.5 % Drop  Commonly known as: TIMOPTIC               Where to Get Your Medications        These medications were sent to [facility][id number] at [address] [address]      Phone: [phone]   atorvastatin 40 MG tablet  levETIRAcetam 500 MG Tab          Explained in detail to the patient about the discharge plan, medications, and follow-up visits. Pt understands and agrees with the treatment plan  Discharge Disposition: SNF  Discharged Condition: stable  Diet-   Dietary Orders (From admission, onward)       Start     Ordered    11/01/24 1319  Dietary nutrition supplements BID; Boost Plus Nutritional Drink - Very Vanilla  Continuous        Question Answer Comment   Frequency: BID    Select PO Supplement: Boost Plus Nutritional Drink - Very Vanilla        11/01/24 1319    10/30/24 1556  Diet Easy to Chew (IDDSI Level 7) Supervision with Meals  Diet effective now        Question:  Diet Modifier:  Answer:  Supervision with Meals    10/30/24 1555                   Medications Per DC med rec  Activities as tolerated   Follow-up Information        Zina Yanez [facility] Follow up.    Specialty: Home Health Services  Contact information:  [address] [facility]. [facility]. [place]  [place] [address]  [phone]               Yue Rodriguez FNP Follow up in 1 month(s).    Specialty: Neurology  Why: Please follow up in stroke clinic in 1 month with Yue Rodriguez, or Dr. Hannah to review MRI brain with SWI sequence.  Contact information:  [address] [place] [address] [address] [place] [address]  [phone]               Aurora Moe FNP. Call in 1 week(s).    Specialty: Family Medicine  Contact information:  [address] [place] [address]  [place] [address]  [phone]                           For further questions contact hospitalist office    Discharge time 33 minutes    For worsening symptoms, chest pain, shortness of breath, increased abdominal pain, high grade fever, stroke or stroke like symptoms, immediately go to the nearest Emergency Room or call 911 as soon as possible.      Russell Jones M.D, on 11/6/2024. at 10:22 AM.

## 2024-11-06 NOTE — PLAN OF CARE
Problem: Adult Inpatient Plan of Care  Goal: Plan of Care Review  11/6/2024 0329 by Waldo Varela LPN  Outcome: Progressing  11/6/2024 0329 by Waldo Varela LPN  Outcome: Progressing  Goal: Patient-Specific Goal (Individualized)  11/6/2024 0329 by Waldo Varela LPN  Outcome: Progressing  11/6/2024 0329 by Waldo Varela LPN  Outcome: Progressing  Goal: Absence of Hospital-Acquired Illness or Injury  11/6/2024 0329 by Waldo Varela LPN  Outcome: Progressing  11/6/2024 0329 by Waldo Varela LPN  Outcome: Progressing  Goal: Optimal Comfort and Wellbeing  11/6/2024 0329 by Waldo Varela LPN  Outcome: Progressing  11/6/2024 0329 by Waldo Varela LPN  Outcome: Progressing  Goal: Readiness for Transition of Care  11/6/2024 0329 by Waldo Varela LPN  Outcome: Progressing  11/6/2024 0329 by Waldo Varela LPN  Outcome: Progressing     Problem: Diabetes Comorbidity  Goal: Blood Glucose Level Within Targeted Range  11/6/2024 0329 by Waldo Varela LPN  Outcome: Progressing  11/6/2024 0329 by Waldo Varela LPN  Outcome: Progressing

## 2024-11-06 NOTE — PLAN OF CARE
ELSA phoned ELSA [facility] for transport with [facility] in transport van. [facility] transport van is completely booked for today , unable to transport patient. ELSA phoned [facility] to swing bed. Ambulance will  with in the hour  ELSA notified son

## 2024-11-06 NOTE — PT/OT/SLP PROGRESS
Physical Therapy Treatment    Patient Name:  Tali Beard   MRN:  [id number]    Recommendations:     Discharge therapy intensity: Moderate Intensity Therapy   Discharge Equipment Recommendations: to be determined by next level of care  Barriers to discharge: Impaired mobility    Assessment:     Tali Beard is a 88 y.o. female admitted with a medical diagnosis of hypertensive ICH 2/2 amyloid angiopathy vs. Vasculitis, L parietal subacute and R parietal subacute hemorrhage, ischemic cortical infarct L occipital lobe .  She presents with the following impairments/functional limitations: weakness, impaired endurance, impaired balance, gait instability, impaired self care skills, impaired functional mobility, impaired cognition, decreased safety awareness, decreased lower extremity function, decreased upper extremity function .    Rehab Prognosis: Good; patient would benefit from acute skilled PT services to address these deficits and reach maximum level of function.    Recent Surgery: * No surgery found *      Plan:     During this hospitalization, patient would benefit from acute PT services 6 x/week to address the identified rehab impairments via gait training, therapeutic activities, therapeutic exercises, neuromuscular re-education and progress toward the following goals:    Plan of Care Expires:  11/30/24    Subjective     Chief Complaint: none expressed  Patient/Family Comments/goals: to have pt get stronger to return home  Pain/Comfort:  Pain Rating 1: 0/10      Objective:     Communicated with pts nurse prior to session.  Patient found HOB elevated with blood pressure cuff, pulse ox (continuous), telemetry, SCD, peripheral IV, PureWick upon PT entry to room.     General Precautions: Standard, fall (BP<140/90)  Orthopedic Precautions: N/A  Braces: N/A  Respiratory Status: Room air  Blood Pressure: 134/68  Skin Integrity: Visible skin intact      Functional Mobility:  Bed Mobility:     Rolling Left:   moderate assistance  Scooting: moderate assistance  Supine to Sit: moderate assistance  Sit to Supine: moderate assistance  Balance: Sitting EOB, worked on sitting balance and balance recovery using UE's,  Pt required CG to Min A for balance and occasional mod A when lost balance backward    Education:  Patient and family were provided with verbal education education regarding PT role/goals/POC.  Understanding was verbalized.     Patient left HOB elevated with all lines intact and CNA present    GOALS:   Multidisciplinary Problems       Physical Therapy Goals          Problem: Physical Therapy    Goal Priority Disciplines Outcome Interventions   Physical Therapy Goal     PT, PT/OT Progressing    Description: Goals to be met by: 24     Patient will increase functional independence with mobility by performin. Supine to sit with Moderate Assistance MET 24  2. Sit to supine with Moderate Assistance  3. Sit to stand transfer standby assistance with RW.  4. Bed to chair transfer standby assistance with RW.  5. Gait x 150ft with RW with standby assistance.  6. Sitting at edge of bed x20 minutes with Stand-by Assistance                         Time Tracking:     PT Received On: 24  PT Start Time: 1157     PT Stop Time: 1213  PT Total Time (min): 16 min     Billable Minutes: Therapeutic Activity 16    Treatment Type: Treatment  PT/PTA: PTA     Number of PTA visits since last PT visit: 3     2024

## 2024-11-06 NOTE — PLAN OF CARE
Problem: Adult Inpatient Plan of Care  Goal: Plan of Care Review  Outcome: Progressing  Flowsheets (Taken 11/6/2024 1707)  Plan of Care Reviewed With:   patient   family  Goal: Patient-Specific Goal (Individualized)  Outcome: Progressing  Flowsheets (Taken 11/6/2024 1707)  Individualized Care Needs: Therapy, MOnitor VS and CBGs  Anxieties, Fears or Concerns: Hospitalization  Patient/Family-Specific Goals (Include Timeframe): return to baseline  Goal: Absence of Hospital-Acquired Illness or Injury  Outcome: Progressing  Intervention: Identify and Manage Fall Risk  Flowsheets (Taken 11/6/2024 1707)  Safety Promotion/Fall Prevention:   bed alarm set   side rails raised x 3   nonskid shoes/socks when out of bed   instructed to call staff for mobility   lighting adjusted   medications reviewed  Intervention: Prevent Skin Injury  Flowsheets (Taken 11/6/2024 1707)  Body Position:   position changed independently   supine   weight shifting  Skin Protection:   transparent dressing maintained   skin sealant/moisture barrier applied   silicone foam dressing in place  Device Skin Pressure Protection:   absorbent pad utilized/changed   adhesive use limited  Intervention: Prevent and Manage VTE (Venous Thromboembolism) Risk  Flowsheets (Taken 11/6/2024 1707)  VTE Prevention/Management:   ambulation promoted   bleeding precautions maintained   fluids promoted  Intervention: Prevent Infection  Flowsheets (Taken 11/6/2024 1707)  Infection Prevention:   cohorting utilized   equipment surfaces disinfected   environmental surveillance performed   single patient room provided   rest/sleep promoted  Goal: Optimal Comfort and Wellbeing  Outcome: Progressing  Intervention: Monitor Pain and Promote Comfort  Flowsheets (Taken 11/6/2024 1707)  Pain Management Interventions:   care clustered   pillow support provided  Intervention: Provide Person-Centered Care  Flowsheets (Taken 11/6/2024 1707)  Trust Relationship/Rapport:   care explained    questions encouraged  Goal: Readiness for Transition of Care  Outcome: Progressing     Problem: Diabetes Comorbidity  Goal: Blood Glucose Level Within Targeted Range  Outcome: Progressing  Intervention: Monitor and Manage Glycemia  Flowsheets (Taken 11/6/2024 1707)  Glycemic Management:   blood glucose monitored   oral hydration promoted     Problem: Infection  Goal: Absence of Infection Signs and Symptoms  Outcome: Progressing  Intervention: Prevent or Manage Infection  Flowsheets (Taken 11/6/2024 1707)  Infection Management: aseptic technique maintained  Isolation Precautions:   protective   precautions initiated     Problem: Wound  Goal: Optimal Coping  Outcome: Progressing  Goal: Optimal Functional Ability  Outcome: Progressing  Intervention: Optimize Functional Ability  Flowsheets (Taken 11/6/2024 1707)  Activity Management:   Rolling - L1   Arm raise - L1  Goal: Absence of Infection Signs and Symptoms  Outcome: Progressing  Intervention: Prevent or Manage Infection  Flowsheets (Taken 11/6/2024 1707)  Infection Management: aseptic technique maintained  Isolation Precautions:   protective   precautions initiated  Goal: Improved Oral Intake  Outcome: Progressing  Intervention: Promote and Optimize Oral Intake  Flowsheets (Taken 11/6/2024 1707)  Oral Nutrition Promotion:   rest periods promoted   calorie-dense foods provided   adaptive equipment use encouraged  Goal: Optimal Pain Control and Function  Outcome: Progressing  Intervention: Prevent or Manage Pain  Flowsheets (Taken 11/6/2024 1707)  Pain Management Interventions:   care clustered   pillow support provided  Goal: Skin Health and Integrity  Outcome: Progressing  Intervention: Optimize Skin Protection  Flowsheets (Taken 11/6/2024 1707)  Pressure Reduction Techniques:   frequent weight shift encouraged   rest period provided between sit times   weight shift assistance provided  Skin Protection:   transparent dressing maintained   skin sealant/moisture  barrier applied   silicone foam dressing in place  Activity Management:   Rolling - L1   Arm raise - L1  Head of Bed (HOB) Positioning: HOB at 30-45 degrees  Goal: Optimal Wound Healing  Outcome: Progressing     Problem: Fall Injury Risk  Goal: Absence of Fall and Fall-Related Injury  Outcome: Progressing  Intervention: Identify and Manage Contributors  Flowsheets (Taken 11/6/2024 1707)  Self-Care Promotion:   independence encouraged   BADL personal objects within reach   BADL personal routines maintained   meal set-up provided  Intervention: Promote Injury-Free Environment  Flowsheets (Taken 11/6/2024 1707)  Safety Promotion/Fall Prevention:   bed alarm set   side rails raised x 3   nonskid shoes/socks when out of bed   instructed to call staff for mobility   lighting adjusted   medications reviewed     Problem: Skin Injury Risk Increased  Goal: Skin Health and Integrity  Outcome: Progressing  Intervention: Optimize Skin Protection  Flowsheets (Taken 11/6/2024 1707)  Pressure Reduction Techniques:   frequent weight shift encouraged   rest period provided between sit times   weight shift assistance provided  Skin Protection:   transparent dressing maintained   skin sealant/moisture barrier applied   silicone foam dressing in place  Activity Management:   Rolling - L1   Arm raise - L1  Head of Bed (HOB) Positioning: HOB at 30-45 degrees  Intervention: Promote and Optimize Oral Intake  Flowsheets (Taken 11/6/2024 1707)  Oral Nutrition Promotion:   rest periods promoted   calorie-dense foods provided   adaptive equipment use encouraged

## 2024-11-07 LAB
ALBUMIN SERPL-MCNC: 2.6 G/DL (ref 3.4–4.8)
ALBUMIN/GLOB SERPL: 0.8 RATIO (ref 1.1–2)
ALP SERPL-CCNC: 94 UNIT/L (ref 40–150)
ALT SERPL-CCNC: 26 UNIT/L (ref 0–55)
ANION GAP SERPL CALC-SCNC: 6 MEQ/L
AST SERPL-CCNC: 19 UNIT/L (ref 5–34)
BACTERIA #/AREA URNS AUTO: ABNORMAL /HPF
BASOPHILS # BLD AUTO: 0.03 X10(3)/MCL
BASOPHILS NFR BLD AUTO: 0.3 %
BILIRUB SERPL-MCNC: 0.6 MG/DL
BILIRUB UR QL STRIP.AUTO: NEGATIVE
BUN SERPL-MCNC: 17.1 MG/DL (ref 9.8–20.1)
CALCIUM SERPL-MCNC: 9 MG/DL (ref 8.4–10.2)
CHLORIDE SERPL-SCNC: 106 MMOL/L (ref 98–107)
CLARITY UR: CLEAR
CO2 SERPL-SCNC: 27 MMOL/L (ref 23–31)
COLOR UR AUTO: YELLOW
CREAT SERPL-MCNC: 0.82 MG/DL (ref 0.55–1.02)
CREAT/UREA NIT SERPL: 21
EOSINOPHIL # BLD AUTO: 0.05 X10(3)/MCL (ref 0–0.9)
EOSINOPHIL NFR BLD AUTO: 0.5 %
ERYTHROCYTE [DISTWIDTH] IN BLOOD BY AUTOMATED COUNT: 15.4 % (ref 11.5–17)
GFR SERPLBLD CREATININE-BSD FMLA CKD-EPI: >60 ML/MIN/1.73/M2
GLOBULIN SER-MCNC: 3.4 GM/DL (ref 2.4–3.5)
GLUCOSE SERPL-MCNC: 111 MG/DL (ref 82–115)
GLUCOSE SERPL-MCNC: 148 MG/DL (ref 70–110)
GLUCOSE UR QL STRIP: 100
HCT VFR BLD AUTO: 34.7 % (ref 37–47)
HGB BLD-MCNC: 11 G/DL (ref 12–16)
HGB UR QL STRIP: ABNORMAL
IMM GRANULOCYTES # BLD AUTO: 0.03 X10(3)/MCL (ref 0–0.04)
IMM GRANULOCYTES NFR BLD AUTO: 0.3 %
KETONES UR QL STRIP: NEGATIVE
LEUKOCYTE ESTERASE UR QL STRIP: NEGATIVE
LYMPHOCYTES # BLD AUTO: 1.11 X10(3)/MCL (ref 0.6–4.6)
LYMPHOCYTES NFR BLD AUTO: 11 %
MCH RBC QN AUTO: 23.6 PG (ref 27–31)
MCHC RBC AUTO-ENTMCNC: 31.7 G/DL (ref 33–36)
MCV RBC AUTO: 74.5 FL (ref 80–94)
MONOCYTES # BLD AUTO: 1.19 X10(3)/MCL (ref 0.1–1.3)
MONOCYTES NFR BLD AUTO: 11.8 %
NEUTROPHILS # BLD AUTO: 7.69 X10(3)/MCL (ref 2.1–9.2)
NEUTROPHILS NFR BLD AUTO: 76.1 %
NITRITE UR QL STRIP: NEGATIVE
PH UR STRIP: 5.5 [PH]
PLATELET # BLD AUTO: 164 X10(3)/MCL (ref 130–400)
PMV BLD AUTO: 11.2 FL (ref 7.4–10.4)
POCT GLUCOSE: 106 MG/DL (ref 70–110)
POCT GLUCOSE: 107 MG/DL (ref 70–110)
POCT GLUCOSE: 148 MG/DL (ref 70–110)
POCT GLUCOSE: 159 MG/DL (ref 70–110)
POTASSIUM SERPL-SCNC: 3.9 MMOL/L (ref 3.5–5.1)
PROT SERPL-MCNC: 6 GM/DL (ref 5.8–7.6)
PROT UR QL STRIP: ABNORMAL
RBC # BLD AUTO: 4.66 X10(6)/MCL (ref 4.2–5.4)
RBC #/AREA URNS AUTO: ABNORMAL /HPF
SODIUM SERPL-SCNC: 139 MMOL/L (ref 136–145)
SP GR UR STRIP.AUTO: 1.02 (ref 1–1.03)
SQUAMOUS #/AREA URNS AUTO: ABNORMAL /HPF
UROBILINOGEN UR STRIP-ACNC: 0.2
WBC # BLD AUTO: 10.1 X10(3)/MCL (ref 4.5–11.5)
WBC #/AREA URNS AUTO: ABNORMAL /HPF

## 2024-11-07 PROCEDURE — 99900035 HC TECH TIME PER 15 MIN (STAT)

## 2024-11-07 PROCEDURE — 97116 GAIT TRAINING THERAPY: CPT

## 2024-11-07 PROCEDURE — 36415 COLL VENOUS BLD VENIPUNCTURE: CPT | Performed by: PHYSICIAN ASSISTANT

## 2024-11-07 PROCEDURE — 81003 URINALYSIS AUTO W/O SCOPE: CPT | Performed by: PHYSICIAN ASSISTANT

## 2024-11-07 PROCEDURE — 85025 COMPLETE CBC W/AUTO DIFF WBC: CPT | Performed by: PHYSICIAN ASSISTANT

## 2024-11-07 PROCEDURE — 97166 OT EVAL MOD COMPLEX 45 MIN: CPT

## 2024-11-07 PROCEDURE — 94760 N-INVAS EAR/PLS OXIMETRY 1: CPT

## 2024-11-07 PROCEDURE — 97110 THERAPEUTIC EXERCISES: CPT

## 2024-11-07 PROCEDURE — 97162 PT EVAL MOD COMPLEX 30 MIN: CPT

## 2024-11-07 PROCEDURE — 80053 COMPREHEN METABOLIC PANEL: CPT | Performed by: PHYSICIAN ASSISTANT

## 2024-11-07 PROCEDURE — 25000003 PHARM REV CODE 250: Performed by: PHYSICIAN ASSISTANT

## 2024-11-07 PROCEDURE — 97129 THER IVNTJ 1ST 15 MIN: CPT

## 2024-11-07 PROCEDURE — 51701 INSERT BLADDER CATHETER: CPT

## 2024-11-07 PROCEDURE — 92610 EVALUATE SWALLOWING FUNCTION: CPT

## 2024-11-07 PROCEDURE — 11000004 HC SNF PRIVATE

## 2024-11-07 PROCEDURE — 51798 US URINE CAPACITY MEASURE: CPT

## 2024-11-07 PROCEDURE — 92523 SPEECH SOUND LANG COMPREHEN: CPT

## 2024-11-07 PROCEDURE — 97530 THERAPEUTIC ACTIVITIES: CPT

## 2024-11-07 RX ADMIN — LATANOPROST 1 DROP: 50 SOLUTION/ DROPS OPHTHALMIC at 09:11

## 2024-11-07 RX ADMIN — GENTAMICIN SULFATE: 1 OINTMENT TOPICAL at 09:11

## 2024-11-07 RX ADMIN — LEVETIRACETAM 500 MG: 500 TABLET, FILM COATED ORAL at 08:11

## 2024-11-07 RX ADMIN — AMLODIPINE BESYLATE 5 MG: 5 TABLET ORAL at 09:11

## 2024-11-07 RX ADMIN — LEVETIRACETAM 500 MG: 500 TABLET, FILM COATED ORAL at 09:11

## 2024-11-07 RX ADMIN — ATORVASTATIN CALCIUM 40 MG: 40 TABLET, FILM COATED ORAL at 08:11

## 2024-11-07 RX ADMIN — FERROUS SULFATE TAB 325 MG (65 MG ELEMENTAL FE) 1 EACH: 325 (65 FE) TAB at 09:11

## 2024-11-07 NOTE — PROGRESS NOTES
Inpatient Nutrition Assessment    Admit Date: 11/6/2024   Total duration of encounter: 1 day   Patient Age: 88 y.o.    Nutrition Recommendation/Prescription     Continue regular diet. 2. Monitor intake,wt, labs,medications    Communication of Recommendations: reviewed with patient, reviewed with family, and new [facility] bed    Nutrition Assessment     Malnutrition Assessment/Nutrition-Focused Physical Exam    Malnutrition Context: acute illness or injury (11/07/24 1354)  Malnutrition Level: mild (11/07/24 1354)  Energy Intake (Malnutrition): less than or equal to 50% for greater than or equal to 5 days (11/07/24 1353)  Weight Loss (Malnutrition): other (see comments) (wt loss unsure family reports) (11/07/24 1353)  Subcutaneous Fat (Malnutrition): mild depletion (11/07/24 1354)  Orbital Region (Subcutaneous Fat Loss): other (see comments) (doesn't meet criteria)  Upper Arm Region (Subcutaneous Fat Loss): other (see comments) (doesn't meet criteria)     Muscle Mass (Malnutrition): mild depletion (11/07/24 1354)  [place] Region (Muscle Loss): other (see comments) (doesn't meet criteria)  Clavicle Bone Region (Muscle Loss): other (see comments) (doesn't meet criteria)                    Fluid Accumulation (Malnutrition): other (see comments) (unable to assess) (11/07/24 1354)        A minimum of two characteristics is recommended for diagnosis of either severe or non-severe malnutrition.    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 (followed by [facility]), and prior subarachnoid hemorrhage and intraparenchymal hemorrhage     Scheduled Medications:  amLODIPine, 5 mg, Daily  atorvastatin, 40 mg, QHS  ferrous sulfate, 1 tablet, Daily  gentamicin, , Daily  latanoprost, 1 drop, Daily  levETIRAcetam, 500 mg, BID    Continuous Infusions:   PRN Medications:  acetaminophen, 650 mg, Q4H PRN  albuterol-ipratropium, 3 mL, Q6H PRN  aluminum-magnesium hydroxide-simethicone, 30 mL, QID PRN  bisacodyL, 10 mg, Daily PRN  dextrose 10%, 12.5 g, PRN  dextrose 10%, 25 g, PRN  glucagon (human recombinant), 1 mg, PRN  glucose, 16 g, PRN  glucose, 24 g, PRN  HYDROcodone-acetaminophen, 1 tablet, Q6H PRN  insulin aspart U-100, 0-5 Units, QID (AC + HS) PRN  melatonin, 9 mg, Nightly PRN  morphine, 2 mg, Q6H PRN  naloxone, 0.02 mg, PRN  ondansetron, 8 mg, Q8H PRN  ondansetron, 4 mg, Q8H PRN  polyethylene glycol, 17 g, TID PRN  simethicone, 1 tablet, QID PRN  sodium chloride 0.9%, 10 mL, PRN    Calorie Containing IV Medications: no significant kcals from medications at this time    Recent Labs   Lab 11/01/24  0512 11/02/24  0348 11/03/24  0409 11/04/24  0433 11/05/24  0353 11/07/24  0524    143 141 142 142 139   K 3.9 4.1 3.9 3.9 3.7 3.9   CALCIUM 8.8 8.4 8.8 8.7 8.9 9.0   * 113* 110* 111* 109* 106   CO2 22* 21* 23 23 24 27   BUN 13.3 16.3 15.3 16.0 17.1 17.1   CREATININE 0.83 0.78 0.73 0.75 0.74 0.82   EGFRNORACEVR >60 >60 >60 >60 >60 >60   GLUCOSE 99 98 110 110 105 111   BILITOT 0.7 0.6 0.5 0.4 0.5 0.6   ALKPHOS 56 57 63 59 60 94   ALT 28 31 33 27 26 26   * 76* 59* 38* 27 19   ALBUMIN 3.0* 2.7* 2.8* 2.6* 2.6* 2.6*   WBC 6.04 5.24 5.06 4.52 4.92 10.10   HGB 11.5* 11.0* 11.3* 11.0* 10.6* 11.0*   HCT 36.3* 35.3* 36.1* 34.8* 34.2* 34.7*     Nutrition Orders:  Diet Adult Regular      Appetite/Oral Intake: good/50-75% of meals  Factors Affecting Nutritional Intake: none identified  Social Needs  "Impacting Access to Food: none identified  Food/Rastafari/Cultural Preferences: none reported  Food Allergies: none reported  Last Bowel Movement: 11/06/24  Wound(s):     Wound 05/07/24 0952 Ulceration Left medial Foot-Tissue loss description: Partial thickness       Wound 11/06/24 1548 Incontinence associated dermatitis Perirectal-Tissue loss description: Partial thickness     Comments    11/07/24: Pt has short term recall congitivie issues, SLP. SLP complete swallow jose, upgraded diet to regular consistency. Family and patient reports intake improved, as well as, intake has been good PTA. Family unsure of wt loss. #. Complete NFPE, mild muscle waisting noted. PO intake 50-75% of meals today. Pt has Hx of DM. Blood glucose WNL. Adjust diet as need, due to advance age and to promote good PO intake. Will monitor intake and wt. Labs/meds review. +BM per EMR.     Anthropometrics    Height: 5' 3" (160 cm), Height Method: Stated  Last Weight: 57.3 kg (126 lb 5.2 oz) (11/07/24 1348), Weight Method: Bed Scale  BMI (Calculated): 22.4  BMI Classification: normal (BMI 18.5-24.9)     Ideal Body Weight (IBW), Female: 115 lb     % Ideal Body Weight, Female (lb): 109.84 %                             Usual Weight Provided By:  wt loss fermín.     Wt Readings from Last 5 Encounters:   11/07/24 57.3 kg (126 lb 5.2 oz)   10/29/24 65.6 kg (144 lb 10 oz)   10/23/24 63.2 kg (139 lb 5.3 oz)   10/03/24 63.2 kg (139 lb 5.3 oz)   10/03/24 66.7 kg (147 lb)     Weight Change(s) Since Admission:   Wt Readings from Last 1 Encounters:   11/07/24 1348 57.3 kg (126 lb 5.2 oz)   11/06/24 1530 57.3 kg (126 lb 5.2 oz)   Admit Weight: 57.3 kg (126 lb 5.2 oz) (11/06/24 1530), Weight Method: Bed Scale    Estimated Needs    Weight Used For Calorie Calculations: 57.3 kg (126 lb 5.2 oz)  Energy Calorie Requirements (kcal): 1719 kcal (30 kcal/kg/CBW)  Energy Need Method: Kcal/kg  Weight Used For Protein Calculations: 57.3 kg (126 lb 5.2 oz)  Protein " Requirements: 68.9 gm (1.2 gm/kg/CBW)  Fluid Requirements (mL): 1719 mL (1 mL/kcal)        Enteral Nutrition     Patient not receiving enteral nutrition at this time.    Parenteral Nutrition     Patient not receiving parenteral nutrition support at this time.    Evaluation of Received Nutrient Intake    Calories: meeting estimated needs  Protein: meeting estimated needs    Patient Education     Not applicable.    Nutrition Diagnosis       PES:  Mild malnutrition related to acute illness as evidenced by less than or equal to 50% needs met for greater than or equal to 5 days, mild muscle depletion, and wt loss unsure . (new)    Nutrition Interventions     Intervention(s): general/healthful diet    Goal: Consume % of meals/snacks by follow-up. (new)  Goal: Maintain weight throughout hospitalization. (new)    Nutrition Goals & Monitoring     Dietitian will monitor: food and beverage intake, weight, weight change, electrolyte/renal panel, glucose/endocrine profile, and gastrointestinal profile  Discharge planning: continue regular diet  Nutrition Risk/Follow-Up: moderate (follow-up in 3-5 days)   Please consult if re-assessment needed sooner.

## 2024-11-07 NOTE — PT/OT/SLP EVAL
Speech Language Pathology Evaluation  Cognitive/Bedside Swallow    Patient Name:  Tali Beard   MRN:  [id number]  Admitting Diagnosis: Intracranial hemorrhage    Recommendations:                  General Recommendations:  Cognitive-linguistic therapy  Diet recommendations:  Regular Diet - IDDSI Level 7, Thin liquids - IDDSI Level 0   Aspiration Precautions: HOB to 90 degrees   General Precautions: Standard, fall  Communication strategies:  go to room if call light pushed    Assessment:     Tali Beard is a 88 y.o. female with an SLP diagnosis of Cognitive-Linguistic Impairment.  She presents with impaired SHORT-TERM MEMORY recall, long term recall, problem solving, thought organization, and word finding. Patient will benefit from skilled SLP intervention to target above stated deficits and promote a return to PLOF.    History:     Past Medical History:   Diagnosis Date    Subarachnoid hemorrhage        No past surgical history on file.    Social History: Patient lives alone. Patient's son reports that recently he and his brother has been taking turns staying w/ their mother to provide 24 hour supervision.    Prior Intubation HX:  n/a    Modified Barium Swallow: n/a    Chest X-Rays: none recent    Prior diet: easy to chew/thin.    Occupation/hobbies/homemaking: Patient was independent prior to this admission. Patient was no longer driving. Patient walked without AD.    Subjective     Patient seen at bedside w/ son present. Patient pleasant and in good spirits.    Patient goals: none stated     Pain/Comfort:       Respiratory Status: Room air    Objective:     Cognitive Status:    Orientation Person, Place, and Time  Memory Immediate Recall impaired, Delayedimpaired, and long term recall 25% accuracy   Problem Solving Categories impaired, Solutions 50% accuracy , and Compare/contrast impaired       Receptive Language:   Comprehension:      Questions Complex yes/no 100% accuracy   Commands  two step basic  commands 100% accuracy       Expressive Language:  Verbal:    Wh questions: 0% accuracy     Divergent naming items in a concrete category w/I 1 minute: Animals x2    Motor Speech:  WFL    Voice:   WFL    Visual-Spatial:  Not assessed    Reading:   Not assessed      Written Expression:   Not assessed    Brief Interview for Mental Status (BIMS) administered to which Patient scored 9/15.     Oral Musculature Evaluation  Dentition: upper dentures, lower dentures, present and adequate  Secretion Management: adequate  Mucosal Quality: adequate  Lingual Strength and Mobility: impaired anterior elevation, impaired strength  Voice Prior to PO Intake: WNL, soft vocal quality    Bedside Swallow Eval:   Consistencies Assessed:  Thin liquids via straw  Puree pudding  Solids jacob cracker      Oral Phase:   WFL    Pharyngeal Phase:   no overt clinical signs/symptoms of pharyngeal dysphagia    Compensatory Strategies  None    Functional Oral Intake Scale (FOIS) (Vitaliy et al., 2005)  The FOIS is a clinical assessment measure used the define the functional oral intake of patients.     FOIS level   Description   1  Nothing by mouth.   2  Tube dependent with minimal attempts of food or liquid.   3  Tube dependent with consistent oral intake of food or liquid.   4  Total oral diet of a single consistency.   5  Total oral diet with multiple consistencies, but required special    preparation or compensation.   6  Total oral diet with multiple consistencies without special preparation    but with specific food limitations.   7  Total oral diet with no restrictions.     Patient's current FOIS score =  7    Treatment: none provided. SLP reviewed PLAN OF CARE w/ Patient and Patient's son. Both agreeable to the plan.    Goals:   Multidisciplinary Problems       SLP Goals          Problem: SLP    Goal Priority Disciplines Outcome   SLP Goal     SLP Progressing   Description: LTG: Patient will improve cognitive linguistic skills to SPV to  ensure safety upon discharge home.    STG:  Patient will name x10 items in a concrete category w/I 1 minute to improve word generation Vy.  Patient will recall 3 EOD Vy.  Patient will answer LTM questions w/ 80% accuracy Vy.  Patient will provide solutions to common problems w/ 80% accuracy Vy.                       Plan:     Patient to be seen:  5 x/week   Plan of Care expires:  11/29/24  Plan of Care reviewed with:  patient, son   SLP Follow-Up:  Yes       Discharge recommendations:      Barriers to Discharge:  None    Time Tracking:     SLP Treatment Date:      Speech Start Time:  0907  Speech Stop Time:  0940     Speech Total Time (min):  33 min    Billable Minutes: Eval 20 speech,language,cognition; 13 swallowing     Sara Davila M.S., CCC-SLP   11/07/2024

## 2024-11-07 NOTE — PLAN OF CARE
Problem: Adult Inpatient Plan of Care  Goal: Plan of Care Review  Outcome: Progressing  Flowsheets (Taken 11/6/2024 2000)  Plan of Care Reviewed With: patient  Goal: Patient-Specific Goal (Individualized)  Outcome: Progressing  Flowsheets (Taken 11/6/2024 2000)  Anxieties, Fears or Concerns: None expressed at this time  Goal: Absence of Hospital-Acquired Illness or Injury  Outcome: Progressing  Intervention: Identify and Manage Fall Risk  Flowsheets (Taken 11/6/2024 2000)  Safety Promotion/Fall Prevention:   assistive device/personal item within reach   bed alarm set   nonskid shoes/socks when out of bed  Intervention: Prevent Skin Injury  Flowsheets (Taken 11/6/2024 2000)  Body Position: position changed independently  Skin Protection: incontinence pads utilized  Device Skin Pressure Protection: absorbent pad utilized/changed  Intervention: Prevent and Manage VTE (Venous Thromboembolism) Risk  Flowsheets (Taken 11/6/2024 2000)  VTE Prevention/Management:   bleeding precautions maintained   bleeding risk assessed  Intervention: Prevent Infection  Flowsheets (Taken 11/6/2024 2000)  Infection Prevention:   cohorting utilized   environmental surveillance performed   equipment surfaces disinfected   hand hygiene promoted   personal protective equipment utilized   rest/sleep promoted   single patient room provided  Goal: Optimal Comfort and Wellbeing  Outcome: Progressing  Intervention: Monitor Pain and Promote Comfort  Flowsheets (Taken 11/6/2024 2000)  Pain Management Interventions: care clustered  Intervention: Provide Person-Centered Care  Flowsheets (Taken 11/6/2024 2000)  Trust Relationship/Rapport:   care explained   choices provided   emotional support provided   empathic listening provided   questions answered   questions encouraged   reassurance provided   thoughts/feelings acknowledged  Goal: Readiness for Transition of Care  Outcome: Progressing     Problem: Diabetes Comorbidity  Goal: Blood Glucose Level  Within Targeted Range  Outcome: Progressing  Intervention: Monitor and Manage Glycemia  Flowsheets (Taken 11/6/2024 2000)  Glycemic Management: blood glucose monitored     Problem: Infection  Goal: Absence of Infection Signs and Symptoms  Outcome: Progressing  Intervention: Prevent or Manage Infection  Flowsheets (Taken 11/6/2024 2000)  Fever Reduction/Comfort Measures:   lightweight bedding   lightweight clothing  Infection Management: aseptic technique maintained  Isolation Precautions:   precautions maintained   protective

## 2024-11-07 NOTE — PT/OT/SLP EVAL
Physical Therapy Swing Evaluation     Patient Name: Tali Beard   MRN: [id number]  Recent Surgery: * No surgery found *      Recommendations:     Discharge Recommendations: Low Intensity Therapy   Discharge Equipment Recommendations: other (see comments) (TBD)   Barriers to discharge: Increased level of assist    Assessment:     Tali Beard is a 88 y.o. female admitted with a medical diagnosis of Intracranial hemorrhage. She presents with the following impairments/functional limitations: impaired functional mobility, impaired cognition, decreased coordination, decreased safety awareness, poor focus to task.  Per MD H&P; PMHx of HTN, HLD, T2DM, and recent admission of IPH in the left left temporoparietal region with small volume left-sided subarachnoid hemorrhage presented on 10/29/2024 for left sided confusion and AMS.     Rehab Prognosis: Fair; patient would benefit from acute PT services to address these deficits and reach maximum level of function.    Plan:     During this hospitalization, patient to be seen  (5-6 days per week, 1-2 times per day.) to address the above listed problems via gait training, therapeutic activities, therapeutic exercises    Plan of Care Expires: 12/05/24    Subjective     Chief Complaint: Unable to determine.   Patient Comments/Goals: Per son, return home with 24/7 care from family.   Pain/Comfort:  Pain Rating 1: 0/10  Pain Rating Post-Intervention 1: 0/10    Social History:  Living Environment: Patient lives alone in a single story home with tub-shower combo - but family will be alternating and staying with her 24/7 upon DC.   Prior Level of Function: Prior to admission, patient was independent  Equipment Used at Home: none  DME owned (not currently used): none  Assistance Upon Discharge: family    Objective:     Communicated with Nursing prior to session. Patient found HOB elevated with peripheral IV, PureWick upon PT entry to room.    General Precautions: Standard, fall,  aspiration   Orthopedic Precautions:     Braces:      Respiratory Status: Room air    Exams:  Cognition: Patient is oriented to Person  RLE ROM: WNL  RLE Strength:  Pt unable to complete isolated MMT testing due to cognition impairments, but 3+ to 4/5 was demonstrated functionally.   LLE ROM: WNL  LLE Strength:  Pt unable to complete isolated MMT testing due to cognition impairments, but 3+ to 4/5 was demonstrated functionally.      Functional Mobility:  Gait belt applied - Yes  Bed Mobility - extra time and cues needed for completion of all tasks due to pt being easily distracted; poor attention to task  Rolling Left: minimum assistance  Rolling Right: minimum assistance  Supine to Sit: minimum assistance for trunk management  Sit to Supine: minimum assistance for trunk management  Transfers- extra time and cues needed for completion of all tasks due to pt being easily distracted; poor attention to task  Sit to Stand: minimum assistance and of 2 persons with rolling walker  Bed to Chair: minimum assistance with rolling walker using Stand Pivot  Gait  Patient ambulated 125 feet with rolling walker and contact guard assistance and minimum assistance. Patient demonstrates  difficulty with RW negotiation .  WC following for safety. Poor safety awareness - often letting go of RW to grab items. Easily distracted.   Balance  Sitting: minimum assistance - severe L lateral lean and losses in balance, but pt able to recover.   Standing: contact guard assistance      Therapeutic Activities and Exercises:   Patient educated on role of acute care PT and PT POC, safety while in hospital including calling nurse for mobility, and call light usage  Patient educated about importance of OOB mobility and remaining up in chair most of the day.  Ok to get up with staff using RW and MIN A.     AM-PAC 6 CLICK MOBILITY  Total Score:16    Patient left up in chair with all lines intact, call button in reach, RN notified, chair alarm on, and  family present.    GOALS:   Multidisciplinary Problems       Physical Therapy Goals          Problem: Physical Therapy    Goal Priority Disciplines Outcome Interventions   Physical Therapy Goal     PT, PT/OT Progressing    Description: Goals to be met by: Discharge      Patient will increase functional independence with mobility by performin. Supine <> sit with Supervision, remaining focused to task 100% of the time and only needing 25% cues.   2. Sit to stand transfer with Supervision, remaining focused to task 100% of the time and only needing 25% cues  3. Bed to chair transfer with Supervision using Rolling Walker vs No AD; remaining focused to task 100% of the time and only needing 25% cues  4. Gait  x 350 feet with Supervision using Rolling Walker vs no AD; remaining focused to task 100% of the time and only needing 25% cues.                          History:     Past Medical History:   Diagnosis Date    Subarachnoid hemorrhage        No past surgical history on file.    Time Tracking:     PT Received On: 24  PT Start Time: 1030  PT Stop Time: 1105  PT Total Time (min): 35 min     Billable Minutes: Evaluation moderate     2024

## 2024-11-07 NOTE — MEDICAL/APP STUDENT
[facility]  HOSPITAL MEDICINE - H&P ADMISSION NOTE    Patient Name: Tali Beard  MRN: [id number]  Patient Class: IP- Swing   Admission Date: 11/6/2024   Admitting Physician: YURIY Service   Attending Physician: Quyen Greer  Primary Care Provider: Aurora Moe FNP  Face-to-Face encounter date: 11/07/2024      CHIEF COMPLAINT     No chief complaint on file.    HISTORY OF PRESENTING ILLNESS     Patient is an 88 year old female with PMHx of HTN, HLD, DM2, Kaposi Sarcoma s/p resection and radiation to left ankle and submental node (followed by [facility]), and prior subarachnoid hemorrhage and intraparenchymal hemorrhage presents to [facility] for swing following right parietal intracranial hemorrhage.  She initially presented to [facility] on 10/30/2024 due to left sided weakness and confusion.  CT head revealed acute hemorrhagic infarct of the right parietal lobe.  CTA head and neck displayed diminished caliber of M1 and M2 segments bilaterally MCA possible indicative of vasospasm.  MRI revealed evolving left parietal late subacute and right parietal early subacute cortical hemorrhage, new punctate focus of suspected ischemic cortical infarct at the left occipital lobe, with no additional sites of abnormal diffusion signal appreciated.    Neurology does not recommend anticoagulation or antiplatelets at this time due to spontaneous bleeding events. Suspecting primary intracranial hemorrhage due to amyloid angiopathy. EEG was negative for seizures, showed diffuse cerebral slowing - will continue keppra 500mg PO BID for seizure prophylaxis.     Patient lives alone and was completely independent prior to this hospitalization. Has plans for 24 hour care upon returning home between 2 of her sons. She lives in a 1 story home without stairs and a climb in bathtub. Patient's mentation was improving prior to admission to [place]. Patient is oriented x3 with slow response time -  patient's son report she is worse than her baseline mentation. Patient denies headache, N/V, or vision changes.     PAST MEDICAL HISTORY     Past Medical History:   Diagnosis Date    Subarachnoid hemorrhage      PAST SURGICAL HISTORY     No past surgical history on file.    FAMILY HISTORY     Reviewed and noncontributory to this case    SOCIAL HISTORY     Social History     Socioeconomic History    Marital status:    Tobacco Use    Smoking status: Never     Passive exposure: Never    Smokeless tobacco: Never   Substance and Sexual Activity    Alcohol use: Not Currently    Drug use: Never    Sexual activity: Not Currently     Social Drivers of Health     Financial Resource Strain: Low Risk  (11/6/2024)    Overall Financial Resource Strain (CARDIA)     Difficulty of Paying Living Expenses: Not hard at all   Food Insecurity: No Food Insecurity (11/6/2024)    Hunger Vital Sign     Worried About Running Out of Food in the Last Year: Never true     Ran Out of Food in the Last Year: Never true   Transportation Needs: No Transportation Needs (11/6/2024)    TRANSPORTATION NEEDS     Transportation : No   Physical Activity: Inactive (10/4/2024)    Exercise Vital Sign     Days of Exercise per Week: 0 days     Minutes of Exercise per Session: 0 min   Stress: No Stress Concern Present (11/6/2024)    [place] [employer] - Occupational Stress Questionnaire     Feeling of Stress : Not at all   Housing Stability: Low Risk  (11/6/2024)    Housing Stability Vital Sign     Unable to Pay for Housing in the Last Year: No     Homeless in the Last Year: No     Patient's screen for food insecurity, housing instability, transportation needs, utility difficulties, and interpersonal safety. Social work consulted for discharge planning and communication with insurance during admission.     HOME MEDICATIONS     Prior to Admission medications    Medication Sig Start Date End Date Taking? Authorizing Provider    amLODIPine (NORVASC) 5 MG tablet Take 1 tablet (5 mg total) by mouth once daily. 10/8/24 10/8/25  Cyrus Orellana MD   atorvastatin (LIPITOR) 40 MG tablet Take 1 tablet (40 mg total) by mouth every evening. 11/6/24 12/6/24  Russell Louie MD   latanoprost 0.005 % ophthalmic solution Place into both eyes. 12/11/23   Provider, Historical   levETIRAcetam (KEPPRA) 500 MG Tab Take 1 tablet (500 mg total) by mouth 2 (two) times daily. 11/6/24 12/6/24  Russell Louie MD   metFORMIN (GLUCOPHAGE) 500 MG tablet Take 500 mg by mouth.    Provider, Historical   prednisoLONE acetate (PRED MILD) 0.12 % ophthalmic suspension Place 1 drop into both eyes 4 (four) times daily.    Provider, Historical     ALLERGIES     Hydroxyzine hcl and Felodipine    REVIEW OF SYSTEMS     Except as documented above, all other systems reviewed and negative    PHYSICAL EXAM     Vitals:    11/07/24 0859   BP:    Pulse: 89   Resp: 18   Temp:         General:  In no acute distress, resting comfortably, frail  Head and neck:  Atraumatic, normocephalic, moist mucous membranes, supple neck  Chest:  Clear to auscultation bilaterally  Heart:  S1, S2, no appreciable murmur  Abdomen:  Soft, nontender, BS +  MSK:  Warm, no lower extremity edema, no clubbing or cyanosis  Neuro:  Alert and oriented x3 with slow response time, moving all extremities with good strength, cranial nerves intact  Integumentary:  Resolved ulceration to left ankle due to radiation for Kaposi sarcoma, incontinence dermatitis     ASSESSMENT AND PLAN     Left parietal late subacute and right parietal early subacute cortical hemorrhage   Neurology recommends no anticoagulation or antiplatelet therapy at this time due to spontaneous ICH events    Follow up in 1 month with Yue Rodriguez NP in 1 month for MRI w/wo contrast with SWI sequence and CSF labs.  Awaiting encephalopathy autoimmune evaluation, CSF  Awaiting HSV type 2 glycoprotein IgG, CSF  Awaiting Lyme  disease serology, CSF  PT/OT/SLP   Continue Keppra 500mg PO BID for seizure prophylaxis.     Kaposi Sarcoma   Followed by [facility] - Dr. Celestine Henderson   S/p XRT   CT chest/abdomen/plevis on 10/04/2024 was negative     Incontinence dermatitis   Followed by wound care    Chronic Microcytic Anemia   Folate deficiency   Will monitor  Multivitamin     HTN / HLD  Continue amlodipine 5mg PO QD and atorvastatin 40mg PO QHS.   SBP goal < 140     Left foot wound - healed     DVT prophylaxis: Neurology recommends holding at this time.     Code status: Full  __________________________________________________________________  LABS/MICRO/MEDS/DIAGNOSTICS     LABS  Recent Labs     11/07/24  0524      K 3.9   CO2 27   BUN 17.1   CREATININE 0.82   GLUCOSE 111   CALCIUM 9.0   ALKPHOS 94   AST 19   ALT 26   ALBUMIN 2.6*     Recent Labs     11/07/24  0524   WBC 10.10   RBC 4.66   HCT 34.7*   MCV 74.5*        MICROBIOLOGY  Microbiology Results (last 7 days)       ** No results found for the last 168 hours. **          MEDICATIONS   amLODIPine  5 mg Oral Daily    atorvastatin  40 mg Oral QHS    ferrous sulfate  1 tablet Oral Daily    gentamicin   Topical (Top) Daily    latanoprost  1 drop Both Eyes Daily    levETIRAcetam  500 mg Oral BID      INFUSIONS    DIAGNOSTIC TESTS  No orders to display     Patient information was obtained from patient, patient's family, past medical records and ER records.   All diagnosis and differential diagnosis have been reviewed; assessment and plan has been documented. I have personally reviewed the labs and test results that are presently available; I have reviewed the patients medication list. I have reviewed the consulting providers response and recommendations. I have reviewed or attempted to review medical records based upon their availability.  All of the patient's questions have been addressed and answered. Patient's is agreeable to the above stated plan. I will continue to  monitor closely and make adjustments to medical management as needed.  This note was created using [employer] voice recognition software that occasionally misinterpreted phrases or words.  Please contact me if any questions may rise regarding documentation to clarify verbiage.      ORLY Alvarado  Internal Medicine  Department of Hospital Medicine [facility]

## 2024-11-07 NOTE — CONSULTS
[facility]  Wound Care    Patient Name:  Tali Beard   MRN:  [id number]  Date: 11/7/2024  Diagnosis: Intracranial hemorrhage    History:     Past Medical History:   Diagnosis Date    Subarachnoid hemorrhage        Social History     Socioeconomic History    Marital status:    Tobacco Use    Smoking status: Never     Passive exposure: Never    Smokeless tobacco: Never   Substance and Sexual Activity    Alcohol use: Not Currently    Drug use: Never    Sexual activity: Not Currently     Social Drivers of Health     Financial Resource Strain: Low Risk  (11/6/2024)    Overall Financial Resource Strain (CARDIA)     Difficulty of Paying Living Expenses: Not hard at all   Food Insecurity: No Food Insecurity (11/6/2024)    Hunger Vital Sign     Worried About Running Out of Food in the Last Year: Never true     Ran Out of Food in the Last Year: Never true   Transportation Needs: No Transportation Needs (11/6/2024)    TRANSPORTATION NEEDS     Transportation : No   Physical Activity: Inactive (10/4/2024)    Exercise Vital Sign     Days of Exercise per Week: 0 days     Minutes of Exercise per Session: 0 min   Stress: No Stress Concern Present (11/6/2024)    [place] [employer] - Occupational Stress Questionnaire     Feeling of Stress : Not at all   Housing Stability: Low Risk  (11/6/2024)    Housing Stability Vital Sign     Unable to Pay for Housing in the Last Year: No     Homeless in the Last Year: No       Precautions:     Allergies as of 11/05/2024 - Reviewed 11/05/2024   Allergen Reaction Noted    Hydroxyzine hcl  07/29/2019    Felodipine Rash 12/09/2013       WO Assessment Details/Treatment     New consult obtained for L foot ulceration, perirectal irritation.   Pt is known pt of [facility] wound care center.   Upon examination, L foot ulceration is closed/resurfaced.   Recommend application of bandaid to site every other day while newly healed skin matures.   Pt hx of radiation to area makes it vunerable to breakdown.   Incontinence associated dermatitis noted to perirectal area, buttocks/inner thighs/groin.  Denuded area present to perirectal area.   Recommend application of hydrocolloid over open areas every other day, Barrier cream mixed with antifungal ointment to remaining affected irritated area BID and prn.   Pressure prevention measures recommended with turn q 2 hr assist, float heels, wedge use and aggressive moisture management due to urinary / fecal incontinence.   Son at bedside, discussed pressure prevent measures while hospitalized.   Verbalized understanding.      11/07/24 0842   WOCN Assessment   WOCN Total Time (mins) 20   Visit Date 11/07/24   Consult Type Follow Up   WOCN Speciality Wound   Wound moisture;other   Number of Wounds 2   Continence Type Urinary;Fecal   Intervention assessed;chart review;orders   Teaching on-going   Skin Interventions   Device Skin Pressure Protection absorbent pad utilized/changed;positioning supports utilized;pressure points protected;tubing/devices free from skin contact   Pressure Reduction Devices pressure-redistributing mattress utilized;positioning supports utilized;heel offloading device utilized;chair cushion utilized   Pressure Reduction Techniques frequent weight shift encouraged;heels elevated off bed;positioned off wounds;weight shift assistance provided;pressure points protected   Skin Protection hydrocolloids used;incontinence pads utilized;skin sealant/moisture barrier applied   Pressure Injury Prevention    Check Moisture Management Pad Done   Sacral Foam Dressing Patient incontinent, barrier cream applied  (hydrocolloid on open areas, mixture of barrier paste and antifungal ointment)   Heel protection technique Heel boot  (heel protectors)   Check Medical Devices Done        Wound 05/07/24 0952 Ulceration Left medial Foot   Date First Assessed/Time First Assessed: 05/07/24 0952   Present on Original  Admission: Yes  Primary Wound Type: Ulceration  Side: Left  Orientation: medial  Location: Foot  Pulses: 2+ palpable DP and PT pulses, strong doppler signal to both   Wound Image    Dressing Appearance Intact;Dry;Clean   Drainage Amount None   Appearance Intact;Pink;Epithelialization;Closed/resurfaced   Periwound Area Dry;Hemosiderin Staining   Wound Length (cm) 0 cm   Wound Width (cm) 0 cm   Wound Depth (cm) 0 cm   Wound Volume (cm^3) 0 cm^3   Wound Surface Area (cm^2) 0 cm^2   Care Cleansed with:;Sterile normal saline   Dressing Applied;Bandaid   Dressing Change Due 11/09/24        Wound 11/06/24 1548 Incontinence associated dermatitis Perirectal   Date First Assessed/Time First Assessed: 11/06/24 1548   Present on Original Admission: Yes  Primary Wound Type: Incontinence associated dermatitis  Location: Perirectal   Wound Image    Dressing Appearance Open to air;No dressing   Drainage Amount None   Drainage Characteristics/Odor No odor   Appearance Pink;Not granulating  (denuded)   Tissue loss description Partial thickness   Periwound Area Redness;Denuded   Wound Edges Irregular   Wound Length (cm) 3.5 cm  (area of denuded skin)   Wound Width (cm) 7 cm  (area of denuded skin)   Wound Depth (cm) 0.1 cm   Wound Volume (cm^3) 2.45 cm^3   Wound Surface Area (cm^2) 24.5 cm^2   Care Cleansed with:;Soap and water;Applied:;Other (see comments)  (Z guard paste/antifungal ointment to perirectal/groin area)   Dressing Applied;Hydrocolloid        Recommendations made to primary team  . Orders placed.     11/07/2024

## 2024-11-07 NOTE — PLAN OF CARE
Problem: SLP  Goal: SLP Goal  Description: LTG: Patient will improve cognitive linguistic skills to SPV to ensure safety upon discharge home.    STG:  Patient will name x10 items in a concrete category w/I 1 minute to improve word generation Vy.  Patient will recall 3 EOD Vy.  Patient will answer LTM questions w/ 80% accuracy Vy.  Patient will provide solutions to common problems w/ 80% accuracy Vy.  Outcome: Progressing

## 2024-11-07 NOTE — PT/OT/SLP PROGRESS
Occupational Therapy  Treatment    Name: Tali Beard    : 1935 (88 y.o.)  MRN: [id number]           TREATMENT SUMMARY AND RECOMMENDATIONS:      OT Date of Treatment: 24  OT Start Time: 1331  OT Stop Time: 1402  OT Total Time (min): 31 min      Subjective Assessment:   No complaints  Lethargic   x Awake, alert, cooperative  Impulsive    Uncooperative   Flat affect    Agitated  c/o pain    Appropriate  c/o fatigue    Confused  Treated at bedside     Emotionally labile x Treated in gym/dept.    Anxious/fearful   Other:        Therapy Precautions:   x Cognitive deficits  Spinal precautions    Collar - hard  Sternal precautions    Collar - soft   Cervical precautions    x Fall risk  LSO    Hip precautions - posterior  TLSO    Hip precautions - anterior  WBAT    Impaired communication  Partial weightbearing    Oxygen  TTWB    PEG tube  NWB    Visual deficits  Knee immobilizer    Hearing deficits   Other:        Treatment Objectives:     Mobility Training:    Mobility task Assist level Comments    Bed mobility     Transfer CGA Stand/pivot t/f with HHA to wheelchair   Sit to stands transitions CGA From [place] and wheelchair level with cues for hand placement    Functional mobility CGA X80 feet with HHA    Sitting balance     Standing balance  CGA Pt stood with table anterior with no AD and performed reach to low stool to grasp objects and place onto table anterior. No LOB noted.    Wheelchair mobility          Therapeutic Activity:  - while seated in w/c, pt performed reach to floor level to grasp appropriate colored rings and then place across midline onto pole at shoulder level. Focus on facilitating increasing trunk control with anterior/posterior weight shifts. Pt able to match colors when cued with minimal difficulty.   - pt participated in matching ax to follow commands of grasping colored item and place into appropriately labeled cups on table anterior. Pt had difficulty distinguishing between  pink/orange therefore matching incorrectly with cues required.      Additional Comments:  Noted better cognition this session with less cues required for attention to task and increased distractibility.     Assessment: Patient tolerated session well. Pt had positive response to today's treatment. Pt continues to make good progress towards goals. Pt would continue to benefit from skilled OT services to improve pt's safety and independence with daily occupations, decrease caregiver burden, and reduce pt's risk of falls.     GOALS:   Multidisciplinary Problems       Occupational Therapy Goals          Problem: Occupational Therapy    Goal Priority Disciplines Outcome Interventions   Occupational Therapy Goal     OT, PT/OT Progressing    Description: Goals to be met by: 11/28/24     Patient will increase functional independence with ADLs by performing:    UE Dressing with Set-up Assistance.  LE Dressing with Set-up Assistance.  Grooming while standing at sink with Supervision.  Toileting from toilet with Contact Guard Assistance for hygiene and clothing management.   Bathing from  shower chair/bench with Contact Guard Assistance.  Toilet transfer to toilet with Stand-by Assistance.  Increased functional strength to 4/5 for BUEs.                         Recommendations:     Discharge Equipment Recommendations:   (TBD pending progress)     Plan:     Patient to be seen  (5-6x/wk; QD/BID as appropriate) to address the above listed problems via self-care/home management, therapeutic activities, therapeutic exercises, cognitive retraining  Plan of Care Expires: 11/28/24  Revisions made to plan of care: No    Skilled OT Minutes Provided: 31  Communication with Treatment Team:     At end of treatment, patient remained:   Up in chair     Up in wheelchair in room    In bed    With alarm activated    Bed rails up    Call bell in reach     Family/friends present    Restraints secured properly    In bathroom with CNA/RN notified   x  In gym with PT/PTA/tech    Nurse aware    Other:

## 2024-11-07 NOTE — PT/OT/SLP PROGRESS
Physical Therapy Treatment Note           Name: Tali Beard    : 1935 (88 y.o.)  MRN: [id number]           TREATMENT SUMMARY AND RECOMMENDATIONS:    PT Received On: 24  PT Start Time: 1400     PT Stop Time: 1428  PT Total Time (min): 28 min     Subjective Assessment:   No complaints  Lethargic   x Awake, alert, cooperative  Uncooperative    Agitated  c/o pain    Appropriate  c/o fatigue   x Confused - Mild  Treated at bedside     Emotionally labile x Treated in gym/dept.    Impulsive  Other:    Flat affect       Therapy Precautions:   x Cognitive deficits  Spinal precautions    Collar - hard  Sternal precautions    Collar - soft   TLSO   x Fall risk  LSO    Hip precautions - posterior  Knee immobilizer    Hip precautions - anterior  WBAT    Impaired communication  Partial weightbearing    Oxygen  TTWB    PEG tube  NWB    Visual deficits  Other:    Hearing deficits          Treatment Objectives:     Mobility Training:   Assist level Comments    Bed mobility     Transfer CGA/HHA Stand pivot using R HHA, no AD   Gait CGA/HHA X 75 feet, no AD, only needing R HHA. Cues needed 100% of the time to remain focused to task due to pt reaching for each door and trying to look into each room.    Sit to stand transitions B HHA/MIN  From chair x 5 reps - pt unable to follow command to push with B UE support and kept reaching for PT 's hands.    Sitting balance     Standing balance  B HHA/CGA Transfer wt shifting and B LE alternating marches, no losses in balance.    Wheelchair mobility     Car transfer     Other:          Therapeutic Exercise:   Exercise Sets Reps Comments   Seated B LE EX  15 Hip flexion, knee ext, ankle PF/DF - difficulty following commands for completion of task, verbal, tactile and visual cues needed 100% of the time.    Seated B LE Bike using UBE  5' Mixture of forward and backwards                   Additional Comments:  Pt is demonstrating good functional tolerance, just needs  more cues for cognitive deficits.     Assessment: Patient tolerated session well. Easily fatigued    PT Plan: continue per POC - Home with  family care  Revisions made to plan of care: No    GOALS:   Multidisciplinary Problems       Physical Therapy Goals          Problem: Physical Therapy    Goal Priority Disciplines Outcome Interventions   Physical Therapy Goal     PT, PT/OT Progressing    Description: Goals to be met by: Discharge      Patient will increase functional independence with mobility by performin. Supine <> sit with Supervision, remaining focused to task 100% of the time and only needing 25% cues.   2. Sit to stand transfer with Supervision, remaining focused to task 100% of the time and only needing 25% cues  3. Bed to chair transfer with Supervision using Rolling Walker vs No AD; remaining focused to task 100% of the time and only needing 25% cues  4. Gait  x 350 feet with Supervision using Rolling Walker vs no AD; remaining focused to task 100% of the time and only needing 25% cues.                          Skilled PT Minutes Provided: 25   Communication with Treatment Team:     Equipment recommendations:       At end of treatment, patient remained:  x Up in chair     Up in wheelchair in room    In bed   x With alarm activated    Bed rails up   x Call bell in reach    x Family/friends present    Restraints secured properly    In bathroom with CNA/RN notified   x Nurse aware    In gym with therapist/tech    Other:

## 2024-11-07 NOTE — PLAN OF CARE
Problem: Physical Therapy  Goal: Physical Therapy Goal  Description: Goals to be met by: Discharge      Patient will increase functional independence with mobility by performin. Supine <> sit with Supervision, remaining focused to task 100% of the time and only needing 25% cues.   2. Sit to stand transfer with Supervision, remaining focused to task 100% of the time and only needing 25% cues  3. Bed to chair transfer with Supervision using Rolling Walker vs No AD; remaining focused to task 100% of the time and only needing 25% cues  4. Gait  x 350 feet with Supervision using Rolling Walker vs no AD; remaining focused to task 100% of the time and only needing 25% cues.     Outcome: Progressing

## 2024-11-07 NOTE — PT/OT/SLP EVAL
"Occupational Therapy   Evaluation    Name: Tali Beard  MRN: [id number]  Admitting Diagnosis: Intracranial hemorrhage  Recent Surgery: * No surgery found *      Recommendations:     Discharge Recommendations: Low Intensity Therapy  Discharge Equipment Recommendations:   (TBD pending progress)  Barriers to discharge:   (impaired cognition)    Assessment:   88 year old female with PMHx of HTN, HLD, DM2, Kaposi Sarcoma s/p resection and radiation to left ankle and submental node (followed by [facility]), and prior subarachnoid hemorrhage and intraparenchymal hemorrhage presents to [facility] for PT/OT/SLP following right parietal intracranial hemorrhage. MRI 11/02/24 revealed "evolving left parietal late subacute and right parietal early subacute cortical hemorrhage. New punctate focus of suspected ischemic cortical infarct at the left occipital lobe, with no additional sites of abnormal diffusion signal appreciated." CT head without contrast 11/6/24 showed the right parietal lobe hematoma is decreasing in size. Neurology was consulted and does not recommend anticoagulation or antiplatelets at this time due to spontaneous bleeding events. Dr. Hannah suspects primary intracranial hemorrhage due to amyloid angiopathy. Follow up with Yue Rodriguez NP 12/12/24 for outpatient MRI brain w/wo contrast with SWI sequence and CSF labs review. EEG was negative for seizures, showed diffuse cerebral slowing - will continue keppra 500mg PO BID for seizure prophylaxis.     Patient lives alone and was completely independent prior to this hospitalization. Has plans for 24 hour care upon returning home between 2 of her sons. She lives in a 1 story home without stairs and a tub/shower combo.  She presents with performance deficits affecting function: weakness, impaired functional mobility, impaired cognition, decreased safety awareness, impaired endurance, impaired self care skills, impaired balance, decreased upper " extremity function, gait instability.      Rehab Prognosis: Good; patient would benefit from acute skilled OT services to address these deficits and reach maximum level of function.       Plan:     Patient to be seen  (5-6x/wk; QD/BID as appropriate) to address the above listed problems via self-care/home management, therapeutic activities, therapeutic exercises, cognitive retraining  Plan of Care Expires: 11/28/24  Plan of Care Reviewed with: patient, son    Subjective     Chief Complaint: pt did not state complaints at this time   Patient/Family Comments/goals: increase independence and strengthening to return home in order to reduce caregiver burden     Occupational Profile:  Living Environment: lives alone in  home with no steps; tub/shower combo  Previous level of function: independent in ADLs/IADLs (-) driving  Roles and Routines: cooking, cleaning, etc   Equipment Used at Home: none  Assistance upon Discharge: pt will have 24 hour care between two sons at discharge     Pain/Comfort:  Pain Rating 1: 0/10    Patients cultural, spiritual, Anabaptism conflicts given the current situation: no    Objective:     Communicated with: RN prior to session.  Patient found supine with bed alarm, peripheral IV, PureWick upon OT entry to room.    General Precautions: Standard, fall  Orthopedic Precautions: N/A  Braces: N/A  Respiratory Status: Room air    Occupational Performance:    Bed Mobility:    Patient completed Supine to Sit with moderate assistance    Functional Mobility/Transfers:  Patient completed Sit <> Stand Transfer with minimum assistance with  rolling walker   Patient completed Bed <> Chair Transfer using Stand Pivot technique with contact guard assistance with rolling walker; pt also performed with HHA to BSChair with CGA  Functional Mobility: x125 feet with RW CGA    Activities of Daily Living:  Feeding:  setup assistance    Upper Body Dressing: moderate assistance - pt losing balance to L lateral side  when attempting to delma shirt EOB  Lower Body Dressing: moderate assistance - pt able to thread LLE; required assist for RLE however able to manage over hips in standing with balance assist. Assist to delma B shoes.     Cognitive/Visual Perceptual:  Cognitive/Psychosocial Skills:     -       Oriented to: Person; pt son reports pt was able to recall location during ST evaluation however stated she was in [place] during OT evaluation   - noted difficulty with command following due to increased distractibility and fidgeting; pt hyper fixated on getting jacket and two rosary's with max cues required for attention to task  - pt required problem solving cues when dressing as pt was attempting to delma shirt prior to taking off hospital gown and had difficulty with orientation of shirt     Physical Exam: unable to assess appropriately due to cognition however used BUEs functionally with no issues noted. Will continue to assess.   Upper Extremity Range of Motion:       -       Right Upper Extremity: WFL  -       Left Upper Extremity: WFL  Upper Extremity Strength:    -       Right Upper Extremity: WFL  -       Left Upper Extremity: WFL    AMPAC 6 Click ADL:  AMPAC Total Score: 18    Treatment & Education:  Therapist provided facilitation and instruction of proper body mechanics, energy conservation, and fall prevention strategies during tasks listed above.  Patient educated on role of OT, POC and goals for therapy  Patient educated on importance of OOB activities with staff member assistance and sitting OOB majority of the day.     Patient clear to ambulate to/from bathroom with RN/PCT, assist x1 person with RW and gait belt .    Patient left up in chair with all lines intact, call button in reach, chair alarm on, and son present    GOALS:   Multidisciplinary Problems       Occupational Therapy Goals          Problem: Occupational Therapy    Goal Priority Disciplines Outcome Interventions   Occupational Therapy Goal     OT,  PT/OT Progressing    Description: Goals to be met by: 11/28/24     Patient will increase functional independence with ADLs by performing:    UE Dressing with Set-up Assistance.  LE Dressing with Set-up Assistance.  Grooming while standing at sink with Supervision.  Toileting from toilet with Contact Guard Assistance for hygiene and clothing management.   Bathing from  shower chair/bench with Contact Guard Assistance.  Toilet transfer to toilet with Stand-by Assistance.  Increased functional strength to 4/5 for BUEs.                         History:     Past Medical History:   Diagnosis Date    Subarachnoid hemorrhage        No past surgical history on file.    Time Tracking:     OT Date of Treatment: 11/07/24  OT Start Time: 1030  OT Stop Time: 1105  OT Total Time (min): 35 min    Billable Minutes:Evaluation 35 min    11/7/2024

## 2024-11-07 NOTE — PLAN OF CARE
Problem: Adult Inpatient Plan of Care  Goal: Plan of Care Review  Outcome: Progressing  Flowsheets (Taken 11/7/2024 1211)  Plan of Care Reviewed With:   patient   family  Goal: Patient-Specific Goal (Individualized)  Outcome: Progressing  Flowsheets (Taken 11/7/2024 1211)  Individualized Care Needs: encourage pt/ot/st, monitor blood glucose levels  Anxieties, Fears or Concerns: none expressed  Goal: Absence of Hospital-Acquired Illness or Injury  Outcome: Progressing  Intervention: Identify and Manage Fall Risk  Flowsheets (Taken 11/7/2024 1211)  Safety Promotion/Fall Prevention:   assistive device/personal item within reach   bed alarm set   gait belt with ambulation   nonskid shoes/socks when out of bed   instructed to call staff for mobility   side rails raised x 2  Goal: Optimal Comfort and Wellbeing  Outcome: Progressing  Goal: Readiness for Transition of Care  Outcome: Progressing     Problem: Diabetes Comorbidity  Goal: Blood Glucose Level Within Targeted Range  Outcome: Progressing     Problem: Infection  Goal: Absence of Infection Signs and Symptoms  Outcome: Progressing  Intervention: Prevent or Manage Infection  Flowsheets (Taken 11/7/2024 1211)  Infection Management: aseptic technique maintained  Isolation Precautions: protective     Problem: Wound  Goal: Optimal Coping  Outcome: Progressing  Intervention: Support Patient and Family Response  Flowsheets (Taken 11/7/2024 1211)  Supportive Measures: active listening utilized  Family/Support System Care:   self-care encouraged   caregiver stress acknowledged  Goal: Optimal Functional Ability  Outcome: Progressing  Goal: Absence of Infection Signs and Symptoms  Outcome: Progressing  Intervention: Prevent or Manage Infection  Flowsheets (Taken 11/7/2024 1211)  Infection Management: aseptic technique maintained  Isolation Precautions: protective  Goal: Improved Oral Intake  Outcome: Progressing  Goal: Optimal Pain Control and Function  Outcome:  Progressing  Goal: Skin Health and Integrity  Outcome: Progressing  Intervention: Optimize Skin Protection  Flowsheets (Taken 11/7/2024 1211)  Pressure Reduction Techniques: frequent weight shift encouraged  Skin Protection:   incontinence pads utilized   skin sealant/moisture barrier applied  Activity Management: Walk with assistive devise and /or staff member - L3  Head of Bed (HOB) Positioning: HOB at 30 degrees  Goal: Optimal Wound Healing  Outcome: Progressing     Problem: Fall Injury Risk  Goal: Absence of Fall and Fall-Related Injury  Outcome: Progressing  Intervention: Identify and Manage Contributors  Flowsheets (Taken 11/7/2024 1211)  Self-Care Promotion:   independence encouraged   adaptive equipment use encouraged   BADL personal objects within reach  Medication Review/Management: medications reviewed  Intervention: Promote Injury-Free Environment  Flowsheets (Taken 11/7/2024 1211)  Safety Promotion/Fall Prevention:   assistive device/personal item within reach   bed alarm set   gait belt with ambulation   nonskid shoes/socks when out of bed   instructed to call staff for mobility   side rails raised x 2     Problem: Skin Injury Risk Increased  Goal: Skin Health and Integrity  Outcome: Progressing  Intervention: Optimize Skin Protection  Flowsheets (Taken 11/7/2024 1211)  Pressure Reduction Techniques: frequent weight shift encouraged  Skin Protection:   incontinence pads utilized   skin sealant/moisture barrier applied  Activity Management: Walk with assistive devise and /or staff member - L3  Head of Bed (HOB) Positioning: HOB at 30 degrees

## 2024-11-07 NOTE — PLAN OF CARE
Problem: Occupational Therapy  Goal: Occupational Therapy Goal  Description: Goals to be met by: 11/28/24     Patient will increase functional independence with ADLs by performing:    UE Dressing with Set-up Assistance.  LE Dressing with Set-up Assistance.  Grooming while standing at sink with Supervision.  Toileting from toilet with Contact Guard Assistance for hygiene and clothing management.   Bathing from  shower chair/bench with Contact Guard Assistance.  Toilet transfer to toilet with Stand-by Assistance.  Increased functional strength to 4/5 for BUEs.    Outcome: Progressing

## 2024-11-08 LAB
GLUCOSE SERPL-MCNC: 99 MG/DL (ref 70–110)
POCT GLUCOSE: 103 MG/DL (ref 70–110)
POCT GLUCOSE: 104 MG/DL (ref 70–110)
POCT GLUCOSE: 149 MG/DL (ref 70–110)
POCT GLUCOSE: 99 MG/DL (ref 70–110)

## 2024-11-08 PROCEDURE — 97116 GAIT TRAINING THERAPY: CPT

## 2024-11-08 PROCEDURE — 92507 TX SP LANG VOICE COMM INDIV: CPT

## 2024-11-08 PROCEDURE — 99900035 HC TECH TIME PER 15 MIN (STAT)

## 2024-11-08 PROCEDURE — 25000003 PHARM REV CODE 250: Performed by: PHYSICIAN ASSISTANT

## 2024-11-08 PROCEDURE — 97535 SELF CARE MNGMENT TRAINING: CPT

## 2024-11-08 PROCEDURE — 97530 THERAPEUTIC ACTIVITIES: CPT

## 2024-11-08 PROCEDURE — 97110 THERAPEUTIC EXERCISES: CPT

## 2024-11-08 PROCEDURE — 51798 US URINE CAPACITY MEASURE: CPT

## 2024-11-08 PROCEDURE — 11000004 HC SNF PRIVATE

## 2024-11-08 PROCEDURE — 97129 THER IVNTJ 1ST 15 MIN: CPT

## 2024-11-08 RX ORDER — ADHESIVE BANDAGE
30 BANDAGE TOPICAL DAILY PRN
Status: DISCONTINUED | OUTPATIENT
Start: 2024-11-08 | End: 2024-11-15

## 2024-11-08 RX ADMIN — AMLODIPINE BESYLATE 5 MG: 5 TABLET ORAL at 08:11

## 2024-11-08 RX ADMIN — FERROUS SULFATE TAB 325 MG (65 MG ELEMENTAL FE) 1 EACH: 325 (65 FE) TAB at 08:11

## 2024-11-08 RX ADMIN — LATANOPROST 1 DROP: 50 SOLUTION/ DROPS OPHTHALMIC at 08:11

## 2024-11-08 RX ADMIN — LEVETIRACETAM 500 MG: 500 TABLET, FILM COATED ORAL at 08:11

## 2024-11-08 RX ADMIN — ATORVASTATIN CALCIUM 40 MG: 40 TABLET, FILM COATED ORAL at 08:11

## 2024-11-08 NOTE — PLAN OF CARE
[facility] [place] - Medical Surgical Unit  Initial Discharge Assessment       Primary Care Provider: Aurora Moe FNP    Admission Diagnosis: Intracranial hemorrhage [I62.9]    Admission Date: 11/6/2024  Expected Discharge Date:     Transition of Care Barriers: None    Payor: MEDICARE / Plan: MEDICARE PART A & B / Product Type: Government /     Extended Emergency Contact Information  Primary Emergency Contact: Jesus Beard  Mobile Phone: [phone]  Relation: Son  Preferred language: English   needed? No  Secondary Emergency Contact: Heri Beard  Mobile Phone: [phone]  Relation: Son  Preferred language: English   needed? No    Discharge Plan A: Home, Home with family         [id number] at [address], [place] - [address]  [address] [facility] [address]  Phone: [phone] Fax: [phone]      Initial Assessment (most recent)       Adult Discharge Assessment - 11/08/24 1250          Discharge Assessment    Assessment Type Discharge Planning Assessment     Confirmed/corrected address, phone number and insurance Yes     Confirmed Demographics Correct on Facesheet     Source of Information family     Reason For Admission Intracranial hemorrhage     People in Home alone     Facility Arrived From: Home     Do you expect to return to your current living situation? Yes     Do you have help at home or someone to help you manage your care at home? Yes     Who are your caregiver(s) and their phone number(s)? [phone]     Prior to hospitilization cognitive status: Alert/Oriented     Current cognitive status: Alert/Oriented     Walking or Climbing Stairs Difficulty no     Dressing/Bathing Difficulty no     Equipment Currently Used at Home none     Readmission within 30 days? No     Patient currently being followed by outpatient case management? No     Do you currently have service(s) that help you manage your care at home? Yes      How Many hours does patient receive services 2     Name and Contact number of agency [facility]     Is the pt/caregiver preference to resume services with current agency Yes     Do you take prescription medications? Yes     Do you have prescription coverage? Yes     Do you have any problems affording any of your prescribed medications? TBD     Is the patient taking medications as prescribed? yes     Who is going to help you get home at discharge? [phone]     How do you get to doctors appointments? family or friend will provide     Are you on dialysis? No     Do you take coumadin? No     Discharge Plan A Home;Home with family     DME Needed Upon Discharge  other (see comments)   TBD    Discharge Plan discussed with: Adult children     Transition of Care Barriers None        Physical Activity    On average, how many days per week do you engage in moderate to strenuous exercise (like a brisk walk)? 0 days     On average, how many minutes do you engage in exercise at this level? 0 min        Financial Resource Strain    How hard is it for you to pay for the very basics like food, housing, medical care, and heating? Not hard at all        Housing Stability    In the last 12 months, was there a time when you were not able to pay the mortgage or rent on time? No     At any time in the past 12 months, were you homeless or living in a shelter (including now)? No        Transportation Needs    Has the lack of transportation kept you from medical appointments, meetings, work or from getting things needed for daily living? No        Food Insecurity    Within the past 12 months, you worried that your food would run out before you got the money to buy more. Never true     Within the past 12 months, the food you bought just didn't last and you didn't have money to get more. Never true        Stress    Do you feel stress - tense, restless, nervous, or anxious, or unable to sleep at night because your mind is  troubled all the time - these days? Only a little        Social Isolation    How often do you feel lonely or isolated from those around you?  Rarely        Alcohol Use    Q1: How often do you have a drink containing alcohol? Never     Q2: How many drinks containing alcohol do you have on a typical day when you are drinking? Patient does not drink     Q3: How often do you have six or more drinks on one occasion? Never        Utilities    In the past 12 months has the electric, gas, oil, or water company threatened to shut off services in your home? No        Health Literacy    How often do you need to have someone help you when you read instructions, pamphlets, or other written material from your doctor or pharmacy? Sometimes

## 2024-11-08 NOTE — PT/OT/SLP PROGRESS
Physical Therapy Treatment Note           Name: Tali Beard    : 1935 (88 y.o.)  MRN: [id number]           TREATMENT SUMMARY AND RECOMMENDATIONS:    PT Received On: 24  PT Start Time: 1031     PT Stop Time: 1108  PT Total Time (min): 37 min     Subjective Assessment:   No complaints  Lethargic   x Awake, alert, cooperative  Uncooperative    Agitated  c/o pain    Appropriate  c/o fatigue   x Confused - Mild  Treated at bedside     Emotionally labile x Treated in gym/dept.    Impulsive  Other:    Flat affect       Therapy Precautions:   x Cognitive deficits  Spinal precautions    Collar - hard  Sternal precautions    Collar - soft   TLSO   x Fall risk  LSO    Hip precautions - posterior  Knee immobilizer    Hip precautions - anterior  WBAT    Impaired communication  Partial weightbearing    Oxygen  TTWB    PEG tube  NWB    Visual deficits  Other:    Hearing deficits          Treatment Objectives:     Mobility Training:   Assist level Comments    Bed mobility     Transfer CGA/HHA Stand pivot and toilet using R HHA, no AD   Gait CGA/HHA X 200 feet, no AD, only needing HHA. Cues needed 50% of the time to remain focused to task and safety with turns/obstacle negotiation     Sit to stand transitions B HHA/MIN  From chair x 5 reps - pt unable to follow command to push with B UE support and kept reaching for PT 's hands.    Sitting balance     Standing balance  B HHA/CGA Transverse wt shifting and B LE alternating marches, no losses in balance.    Wheelchair mobility     Car transfer     Other:          Therapeutic Exercise:   Exercise Sets Reps Comments   Seated B LE EX  15 Hip flexion, knee ext, ankle PF/DF - difficulty following commands for completion of task, verbal, tactile and visual cues needed 100% of the time.    Seated B LE Bike using UBE  8' Mixture of forward and backwards                   Additional Comments:  Pt is demonstrating good functional tolerance, just needs more cues for  cognitive deficits. Pt was having a lot of gas during ambulation so attempts at BM on toilet, but pt was unable. Nursing in room to check blood sugars.     Assessment: Patient tolerated session well.     PT Plan: continue per POC - Home with  family care  Revisions made to plan of care: No    GOALS:   Multidisciplinary Problems       Physical Therapy Goals          Problem: Physical Therapy    Goal Priority Disciplines Outcome Interventions   Physical Therapy Goal     PT, PT/OT Progressing    Description: Goals to be met by: Discharge      Patient will increase functional independence with mobility by performin. Supine <> sit with Supervision, remaining focused to task 100% of the time and only needing 25% cues.   2. Sit to stand transfer with Supervision, remaining focused to task 100% of the time and only needing 25% cues  3. Bed to chair transfer with Supervision using Rolling Walker vs No AD; remaining focused to task 100% of the time and only needing 25% cues  4. Gait  x 350 feet with Supervision using Rolling Walker vs no AD; remaining focused to task 100% of the time and only needing 25% cues.                          Skilled PT Minutes Provided: 35   Communication with Treatment Team:     Equipment recommendations:       At end of treatment, patient remained:  x Up in chair     Up in wheelchair in room    In bed   x With alarm activated    Bed rails up   x Call bell in reach    x Family/friends present    Restraints secured properly    In bathroom with CNA/RN notified   x Nurse aware    In gym with therapist/tech    Other:

## 2024-11-08 NOTE — PLAN OF CARE
Problem: Adult Inpatient Plan of Care  Goal: Plan of Care Review  Outcome: Progressing  Flowsheets (Taken 11/7/2024 2336)  Plan of Care Reviewed With:   patient   family  Goal: Patient-Specific Goal (Individualized)  Outcome: Progressing  Flowsheets (Taken 11/7/2024 2336)  Individualized Care Needs: Encourage PT/OT/ST, Monitor blood glucose levels, Seizure Precautions  Anxieties, Fears or Concerns: none at this time  Patient/Family-Specific Goals (Include Timeframe): Return to baseline by discharge  Goal: Absence of Hospital-Acquired Illness or Injury  Outcome: Progressing  Intervention: Prevent and Manage VTE (Venous Thromboembolism) Risk  Flowsheets (Taken 11/7/2024 2000)  VTE Prevention/Management:   bleeding precautions maintained   bleeding risk assessed   fluids promoted   dorsiflexion/plantar flexion performed   ROM (active) performed  Goal: Optimal Comfort and Wellbeing  Outcome: Progressing

## 2024-11-08 NOTE — PT/OT/SLP PROGRESS
Occupational Therapy  Treatment    Name: Tali Beard    : 1935 (88 y.o.)  MRN: [id number]           TREATMENT SUMMARY AND RECOMMENDATIONS:      OT Date of Treatment: 24  OT Start Time: 1000  OT Stop Time: 1030  OT Total Time (min): 30 min      Subjective Assessment:   No complaints  Lethargic   x Awake, alert, cooperative  Impulsive    Uncooperative   Flat affect    Agitated  c/o pain    Appropriate  c/o fatigue   x Confused x Treated at bedside     Emotionally labile x Treated in gym/dept.    Anxious/fearful   Other:        Therapy Precautions:   x Cognitive deficits  Spinal precautions    Collar - hard  Sternal precautions    Collar - soft   Cervical precautions    x Fall risk  LSO    Hip precautions - posterior  TLSO    Hip precautions - anterior  WBAT    Impaired communication  Partial weightbearing    Oxygen  TTWB    PEG tube  NWB    Visual deficits  Knee immobilizer    Hearing deficits   Other:        Treatment Objectives:     Mobility Training:    Mobility task Assist level Comments    Bed mobility SBA Supine>EOB   Transfer CGA Stand/pivot t/f with HHA to standard chair   Sit to stands transitions CGA From EOB and standard chair with cues for hand placement    Functional mobility CGA X80 feet with HHA    Sitting balance     Standing balance  SBA/CGA Pt stood with table anterior to perform reach to low stool to grasp ring and then place across midline onto pole above shoulder level. Pt then performed in opposite direction with no LOB noted.   Wheelchair mobility        ADL Training:    ADL Assist level Comments    Feeding     Grooming/hygiene     Bathing     Upper body dressing SBA Pt able to delma bra and pullover shirt while seated EOB    Lower body dressing Min A Pt able to thread RLE and then attempted to delma L shoe prior to threading LLE into pants requiring redirection. Pt then threaded LLE into R pants leg and required assistance to correct. Pt able to manage pants over hips in  standing and delma B shoes seated EOB   Toileting     Toilet transfer     Adaptive equipment training     Other:           Therapeutic Exercise:   Exercise Sets Reps Comments   Arm bike 1 5 min Level 1; forwards                         Additional Comments:  Intermittent bouts of confusion noted and pt requiring cues and redirection to correct. Pt fixated on trying to fix bed prior to leaving room for therapy session and required redirection to allow staff to complete.     Assessment: Patient tolerated session well. Pt remains mostly limited by impaired cognition. Pt had positive response to today's treatment. Pt continues to make good progress towards goals. Pt would continue to benefit from skilled OT services to improve pt's safety and independence with daily occupations, decrease caregiver burden, and reduce pt's risk of falls.     GOALS:   Multidisciplinary Problems       Occupational Therapy Goals          Problem: Occupational Therapy    Goal Priority Disciplines Outcome Interventions   Occupational Therapy Goal     OT, PT/OT Progressing    Description: Goals to be met by: 11/28/24     Patient will increase functional independence with ADLs by performing:    UE Dressing with Set-up Assistance.  LE Dressing with Set-up Assistance.  Grooming while standing at sink with Supervision.  Toileting from toilet with Contact Guard Assistance for hygiene and clothing management.   Bathing from  shower chair/bench with Contact Guard Assistance.  Toilet transfer to toilet with Stand-by Assistance.  Increased functional strength to 4/5 for BUEs.                         Recommendations:     Discharge Equipment Recommendations:   (TBD pending progress)     Plan:     Patient to be seen  (5-6x/wk; QD/BID as appropriate) to address the above listed problems via self-care/home management, therapeutic activities, therapeutic exercises, cognitive retraining  Plan of Care Expires: 11/28/24  Revisions made to plan of care:  No    Skilled OT Minutes Provided: 30  Communication with Treatment Team:     At end of treatment, patient remained:   Up in chair     Up in wheelchair in room    In bed    With alarm activated    Bed rails up    Call bell in reach     Family/friends present    Restraints secured properly    In bathroom with CNA/RN notified   x In gym with PT/PTA/tech    Nurse aware    Other:

## 2024-11-08 NOTE — MEDICAL/APP STUDENT
[facility]  HOSPITAL MEDICINE ~ PROGRESS NOTE    CHIEF COMPLAINT     Hospital follow up for swing following right parietal intracranial hemorrhage    HOSPITAL COURSE     Patient is an 88 year old female with PMHx of HTN, HLD, DM2, Kaposi Sarcoma s/p resection and radiation to left ankle and submental node (followed by [facility]), and prior subarachnoid hemorrhage and intraparenchymal hemorrhage presents to [facility] for swing following right parietal intracranial hemorrhage.  She initially presented to [facility] on 10/30/2024 due to left sided weakness and confusion.  CT head revealed acute hemorrhagic infarct of the right parietal lobe.  CTA head and neck displayed diminished caliber of M1 and M2 segments bilaterally MCA possible indicative of vasospasm.  MRI revealed evolving left parietal late subacute and right parietal early subacute cortical hemorrhage, new punctate focus of suspected ischemic cortical infarct at the left occipital lobe, with no additional sites of abnormal diffusion signal appreciated.     Neurology does not recommend anticoagulation or antiplatelets at this time due to spontaneous bleeding events. Suspecting primary intracranial hemorrhage due to amyloid angiopathy. EEG was negative for seizures, showed diffuse cerebral slowing - will continue keppra 500mg PO BID for seizure prophylaxis.      Patient lives alone and was completely independent prior to this hospitalization. Has plans for 24 hour care upon returning home between 2 of her sons. She lives in a 1 story home without stairs and a climb in bathtub. Patient's mentation was improving prior to admission to [place]. Patient is oriented x3 with slow response time - patient's son report she is worse than her baseline mentation. Patient denies headache, N/V, or vision changes.      Today:  Patient has no new complaints and states she feels good. She is CGA/HHA for therapy sessions. Encouraged  increased PO fluid intake.    OBJECTIVE/PHYSICAL EXAM     VITAL SIGNS (MOST RECENT):  Temp: 97.7 °F (36.5 °C) (11/08/24 0725)  Pulse: 88 (11/08/24 0725)  Resp: 18 (11/08/24 0307)  BP: (!) 147/70 (11/08/24 0725)  SpO2: 99 % (11/08/24 0725) VITAL SIGNS (24 HOUR RANGE):  Temp:  [97.7 °F (36.5 °C)-99.3 °F (37.4 °C)] 97.7 °F (36.5 °C)  Pulse:  [79-94] 88  Resp:  [18] 18  SpO2:  [98 %-100 %] 99 %  BP: (108-147)/(61-73) 147/70     General:  In no acute distress, resting comfortably, frail  Head and neck:  Atraumatic, normocephalic, moist mucous membranes, supple neck  Chest:  Clear to auscultation bilaterally  Heart:  S1, S2, no appreciable murmur  Abdomen:  Soft, nontender, BS +  MSK:  Warm, no lower extremity edema, no clubbing or cyanosis  Neuro:  Alert and oriented x3 with slow response time, moving all extremities with good strength, cranial nerves intact  Integumentary:  Resolved ulceration to left ankle due to radiation for Kaposi sarcoma, incontinence dermatitis     ASSESSMENT/PLAN     Left parietal late subacute and right parietal early subacute cortical hemorrhage   Neurology recommends no anticoagulation or antiplatelet therapy at this time due to spontaneous ICH events    Follow up in 1 month with Yue Rdoriguez NP in 1 month for MRI w/wo contrast with SWI sequence and CSF labs.  Awaiting encephalopathy autoimmune evaluation, CSF  Awaiting HSV type 2 glycoprotein IgG, CSF  Awaiting Lyme disease serology, CSF  PT/OT/SLP   Continue Keppra 500mg PO BID for seizure prophylaxis.      Kaposi Sarcoma   Followed by [facility] - Dr. Celestine Henderson   S/p XRT   CT chest/abdomen/plevis on 10/04/2024 was negative      Incontinence dermatitis   Followed by wound care  Purewick  Prn bowel regimen      Chronic Microcytic Anemia   Folate deficiency   Will monitor  Multivitamin      HTN / HLD  Continue amlodipine 5mg PO QD and atorvastatin 40mg PO QHS.   SBP goal < 140      Left foot wound - healed      DVT  "prophylaxis: Neurology recommends holding at this time.      Code status: Full    Anticipated discharge and disposition:  Continue PT/OT.  Patient has established 24 hour care at home doing her children.  __________________________________________________________________________    LABS/MICRO/MEDS/DIAGNOSTICS     LABS  Recent Labs     11/07/24  0524      K 3.9   CO2 27   BUN 17.1   CREATININE 0.82   GLUCOSE 111   CALCIUM 9.0   ALKPHOS 94   AST 19   ALT 26   ALBUMIN 2.6*     Recent Labs     11/07/24 0524   WBC 10.10   RBC 4.66   HCT 34.7*   MCV 74.5*        MICROBIOLOGY  Microbiology Results (last 7 days)       ** No results found for the last 168 hours. **             MEDICATIONS   amLODIPine  5 mg Oral Daily    atorvastatin  40 mg Oral QHS    ferrous sulfate  1 tablet Oral Daily    gentamicin   Topical (Top) Daily    latanoprost  1 drop Both Eyes Daily    levETIRAcetam  500 mg Oral BID         INFUSIONS       DIAGNOSTIC TESTS  No orders to display      No results found for: "EF"     NUTRITION STATUS  Patient meets ASPEN criteria for mild malnutrition of acute illness or injury per RD assessment as evidenced by:  Energy Intake (Malnutrition): less than or equal to 50% for greater than or equal to 5 days  Weight Loss (Malnutrition): other (see comments) (wt loss unsure family reports)  Subcutaneous Fat (Malnutrition): mild depletion  Muscle Mass (Malnutrition): mild depletion  Fluid Accumulation (Malnutrition): other (see comments) (unable to assess)        A minimum of two characteristics is recommended for diagnosis of either severe or non-severe malnutrition.     Case related differential diagnoses have been reviewed; assessment and plan has been documented. I have personally reviewed the labs and test results that are currently available; I have reviewed the patients medication list. I have reviewed the consulting providers recommendations. I have reviewed or attempted to review medical records " based upon their availability.  All of the patient's and/or family's questions have been addressed and answered to the best of my ability.  I will continue to monitor closely and make adjustments to medical management as needed.  This document was created using M*Modal Fluency Direct.  Transcription errors may have been made.  Please contact me if any questions may rise regarding documentation to clarify transcription.      BLANCO AlvaradoS  Internal Medicine  Department of Hospital Medicine [facility]

## 2024-11-08 NOTE — PLAN OF CARE
Problem: Adult Inpatient Plan of Care  Goal: Plan of Care Review  Outcome: Progressing  Flowsheets (Taken 11/8/2024 0755)  Plan of Care Reviewed With:   patient   child  Goal: Patient-Specific Goal (Individualized)  Outcome: Progressing  Flowsheets (Taken 11/8/2024 0755)  Individualized Care Needs: encourage therapy/mobility to chair, monitor blood glucose levels, monitor I/O's, seizure precautions, monitor hydrocolloid on buttocks/barrier creams applied  Anxieties, Fears or Concerns: keppra dosage, oral intake/urination  Patient/Family-Specific Goals (Include Timeframe): return to baseline by SENDY  Goal: Absence of Hospital-Acquired Illness or Injury  Outcome: Progressing  Intervention: Identify and Manage Fall Risk  Flowsheets (Taken 11/8/2024 0755)  Safety Promotion/Fall Prevention:   assistive device/personal item within reach   chair alarm set   gait belt with ambulation   in recliner, wheels locked   instructed to call staff for mobility   Fall Risk reviewed with patient/family   medications reviewed   nonskid shoes/socks when out of bed  Intervention: Prevent Skin Injury  Flowsheets (Taken 11/8/2024 0755)  Skin Protection:   hydrocolloids used   skin sealant/moisture barrier applied  Device Skin Pressure Protection:   adhesive use limited   positioning supports utilized   pressure points protected   tubing/devices free from skin contact  Intervention: Prevent and Manage VTE (Venous Thromboembolism) Risk  Flowsheets (Taken 11/8/2024 0755)  VTE Prevention/Management:   bleeding precautions maintained   ambulation promoted   bleeding risk assessed   dorsiflexion/plantar flexion performed  Intervention: Prevent Infection  Flowsheets (Taken 11/8/2024 0755)  Infection Prevention:   hand hygiene promoted   single patient room provided

## 2024-11-08 NOTE — PT/OT/SLP PROGRESS
Speech Language Pathology Treatment    Patient Name:  Tali Beard   MRN:  [id number]  Admitting Diagnosis: Intracranial hemorrhage    Recommendations:                 General Recommendations:  Cognitive-linguistic therapy  Diet recommendations:  Regular Diet - IDDSI Level 7, Liquid Diet Level: Thin liquids - IDDSI Level 0   Aspiration Precautions: HOB to 90 degrees   General Precautions: Standard, fall  Communication strategies:  go to room if call light pushed    Assessment:     Tali Beard is a 88 y.o. female with an SLP diagnosis of Cognitive-Linguistic Impairment.  She presents with impaired SHORT-TERM MEMORY recall, long term recall, problem solving, thought organization, and word finding. Patient will benefit from skilled SLP intervention to target above stated deficits and promote a return to PLOF.     Subjective     Patient seen at bedside w/ son present. Session delayed due to Patient finishing in the restroom w/ RN upon SLP arrival.    Patient goals: none stated     Pain/Comfort:       Respiratory Status: Room air    Objective:     Has the patient been evaluated by SLP for swallowing?      Keep patient NPO?     Current Respiratory Status:        Select category member Fo3 worksheet completed verbally w/ 80% accuracy Independently, increasing to 100% accuracy min-modA.  Occasional verbal cueing for sustained attention to task.    Overall good session.  Cont SLP PLAN OF CARE.    Goals:   Multidisciplinary Problems       SLP Goals          Problem: SLP    Goal Priority Disciplines Outcome   SLP Goal     SLP Progressing   Description: LTG: Patient will improve cognitive linguistic skills to SPV to ensure safety upon discharge home.    STG:  Patient will name x10 items in a concrete category w/I 1 minute to improve word generation Vy.  Patient will recall 3 EOD Vy.  Patient will answer LTM questions w/ 80% accuracy Vy.  Patient will provide solutions to common problems w/ 80% accuracy Vy.                        Plan:     Patient to be seen:  5 x/week   Plan of Care expires:  11/29/24  Plan of Care reviewed with:  patient, son   SLP Follow-Up:  Yes       Discharge recommendations:      Barriers to Discharge:  None    Time Tracking:     SLP Treatment Date:   11/08/24  Speech Start Time:  0915  Speech Stop Time:  0935     Speech Total Time (min):  20 min    Billable Minutes: Speech Therapy Individual 20    Sara Davila M.S., CCC-SLP   11/08/2024

## 2024-11-08 NOTE — PT/OT/SLP PROGRESS
Occupational Therapy  Treatment    Name: Tali Beard    : 1935 (88 y.o.)  MRN: [id number]           TREATMENT SUMMARY AND RECOMMENDATIONS:      OT Date of Treatment: 24  OT Start Time: 1300  OT Stop Time: 1330  OT Total Time (min): 30 min      Subjective Assessment:   No complaints  Lethargic   x Awake, alert, cooperative  Impulsive    Uncooperative   Flat affect    Agitated  c/o pain    Appropriate  c/o fatigue    Confused  Treated at bedside     Emotionally labile x Treated in gym/dept.    Anxious/fearful   Other:        Therapy Precautions:   x Cognitive deficits  Spinal precautions    Collar - hard  Sternal precautions    Collar - soft   Cervical precautions    x Fall risk  LSO    Hip precautions - posterior  TLSO    Hip precautions - anterior  WBAT    Impaired communication  Partial weightbearing    Oxygen  TTWB    PEG tube  NWB    Visual deficits  Knee immobilizer    Hearing deficits   Other:        Treatment Objectives:     Mobility Training:    Mobility task Assist level Comments    Bed mobility     Transfer CGA Stand/pivot t/f with no device to BSChair   Sit to stands transitions SBA From standard chair   Functional mobility CGA X80 feet with no device    Sitting balance     Standing balance      Wheelchair mobility          Therapeutic Exercise:   Exercise Sets Reps Comments   1# dowel 2 10 Shoulder press, chest press, straight arm raises, bicep curls, forward rows, backwards rows  Visual cues required for proper technique.                        Cognitive activity:   1) pt participated in activity to match deck of cards by 4 different suits - initially, pt required mod cues but decreased to min cues for placement of cards. Pt was able to match appropriately however would attempt to place card underneath pile rather than on top. Cues required for directions of task.  2) pt participated in ax to follow list by color and match pegs into pegboard appropriately. Pt able to perform with min  cues however pt able to problem solve and correct mistakes with and without cues.       Assessment: Patient tolerated session well. Noted better cognition this session as pt was able to perform more complex tasks. Pt had positive response to today's treatment. Pt continues to make good progress towards goals. Pt would continue to benefit from skilled OT services to improve pt's safety and independence with daily occupations, decrease caregiver burden, and reduce pt's risk of falls.     GOALS:   Multidisciplinary Problems       Occupational Therapy Goals          Problem: Occupational Therapy    Goal Priority Disciplines Outcome Interventions   Occupational Therapy Goal     OT, PT/OT Progressing    Description: Goals to be met by: 11/28/24     Patient will increase functional independence with ADLs by performing:    UE Dressing with Set-up Assistance.  LE Dressing with Set-up Assistance.  Grooming while standing at sink with Supervision.  Toileting from toilet with Contact Guard Assistance for hygiene and clothing management.   Bathing from  shower chair/bench with Contact Guard Assistance.  Toilet transfer to toilet with Stand-by Assistance.  Increased functional strength to 4/5 for BUEs.                         Recommendations:     Discharge Equipment Recommendations:   (TBD pending progress)     Plan:     Patient to be seen  (5-6x/wk; QD/BID as appropriate) to address the above listed problems via self-care/home management, therapeutic activities, therapeutic exercises, cognitive retraining  Plan of Care Expires: 11/28/24  Revisions made to plan of care: No    Skilled OT Minutes Provided: 30  Communication with Treatment Team:     At end of treatment, patient remained:  x Up in chair     Up in wheelchair in room    In bed   x With alarm activated    Bed rails up   x Call bell in reach    x Family/friends present    Restraints secured properly    In bathroom with CNA/RN notified    In gym with PT/PTA/tech    Nurse  aware    Other:

## 2024-11-08 NOTE — PT/OT/SLP PROGRESS
Physical Therapy Treatment Note           Name: Tali Beard    : 1935 (88 y.o.)  MRN: [id number]           TREATMENT SUMMARY AND RECOMMENDATIONS:    PT Received On: 24  PT Start Time: 1230     PT Stop Time: 1300  PT Total Time (min): 30 min     Subjective Assessment:   No complaints  Lethargic   x Awake, alert, cooperative  Uncooperative    Agitated  c/o pain    Appropriate  c/o fatigue   x Confused - Mild  Treated at bedside     Emotionally labile x Treated in gym/dept.    Impulsive  Other:    Flat affect       Therapy Precautions:   x Cognitive deficits  Spinal precautions    Collar - hard  Sternal precautions    Collar - soft   TLSO   x Fall risk  LSO    Hip precautions - posterior  Knee immobilizer    Hip precautions - anterior  WBAT    Impaired communication  Partial weightbearing    Oxygen  TTWB    PEG tube  NWB    Visual deficits  Other:    Hearing deficits          Treatment Objectives:     Mobility Training:   Assist level Comments    Bed mobility     Transfer     Gait CGA/HHA    CGA/SBA X 350 feet with L HHA - 75% cues needed for focus to task.     X 25 feet without AD or HHA      Sit to stand transitions CGA From recliner and WC using B UE support on arm rest.    Sitting balance     Standing balance  CGA Dynamic challenges with reaching for cones at various levels/heights. No losses in balance demonstrated.    Wheelchair mobility     Car transfer     Other:          Therapeutic Exercise:   Exercise Sets Reps Comments                               Additional Comments:  Pt is demonstrating good functional tolerance, just needs more cues for cognitive deficits.     Extra time needed for movement initiation, needing extra cues.     Pt able to follow commands well for dynamic balance tasks and demonstrated Fair+ balance.     Assessment: Patient tolerated session well. PT to progress as able.    PT Plan: continue per POC - Home with 24/7 family care - possible neuro rehab first.    Revisions made to plan of care: No    GOALS:   Multidisciplinary Problems       Physical Therapy Goals          Problem: Physical Therapy    Goal Priority Disciplines Outcome Interventions   Physical Therapy Goal     PT, PT/OT Progressing    Description: Goals to be met by: Discharge      Patient will increase functional independence with mobility by performin. Supine <> sit with Supervision, remaining focused to task 100% of the time and only needing 25% cues.   2. Sit to stand transfer with Supervision, remaining focused to task 100% of the time and only needing 25% cues  3. Bed to chair transfer with Supervision using Rolling Walker vs No AD; remaining focused to task 100% of the time and only needing 25% cues  4. Gait  x 350 feet with Supervision using Rolling Walker vs no AD; remaining focused to task 100% of the time and only needing 25% cues.                          Skilled PT Minutes Provided: 30   Communication with Treatment Team:     Equipment recommendations:       At end of treatment, patient remained:  x Up in chair     Up in wheelchair in room    In bed    With alarm activated    Bed rails up    Call bell in reach     Family/friends present    Restraints secured properly    In bathroom with CNA/RN notified    Nurse aware   x In gym with therapist/tech    Other:

## 2024-11-09 LAB
POCT GLUCOSE: 112 MG/DL (ref 70–110)
POCT GLUCOSE: 114 MG/DL (ref 70–110)
POCT GLUCOSE: 119 MG/DL (ref 70–110)
POCT GLUCOSE: 123 MG/DL (ref 70–110)

## 2024-11-09 PROCEDURE — 25000003 PHARM REV CODE 250: Performed by: PHYSICIAN ASSISTANT

## 2024-11-09 PROCEDURE — 97530 THERAPEUTIC ACTIVITIES: CPT

## 2024-11-09 PROCEDURE — 97110 THERAPEUTIC EXERCISES: CPT

## 2024-11-09 PROCEDURE — 99900035 HC TECH TIME PER 15 MIN (STAT)

## 2024-11-09 PROCEDURE — 11000004 HC SNF PRIVATE

## 2024-11-09 RX ADMIN — ATORVASTATIN CALCIUM 40 MG: 40 TABLET, FILM COATED ORAL at 08:11

## 2024-11-09 RX ADMIN — FERROUS SULFATE TAB 325 MG (65 MG ELEMENTAL FE) 1 EACH: 325 (65 FE) TAB at 08:11

## 2024-11-09 RX ADMIN — LEVETIRACETAM 500 MG: 500 TABLET, FILM COATED ORAL at 08:11

## 2024-11-09 RX ADMIN — LATANOPROST 1 DROP: 50 SOLUTION/ DROPS OPHTHALMIC at 08:11

## 2024-11-09 RX ADMIN — AMLODIPINE BESYLATE 5 MG: 5 TABLET ORAL at 08:11

## 2024-11-09 NOTE — PLAN OF CARE
Problem: Adult Inpatient Plan of Care  Goal: Plan of Care Review  Outcome: Progressing  Flowsheets (Taken 11/9/2024 0921)  Plan of Care Reviewed With:   patient   child  Goal: Patient-Specific Goal (Individualized)  Outcome: Progressing  Flowsheets (Taken 11/9/2024 0921)  Individualized Care Needs: encourage therapy/mobility, monitor blood glucose levels, monitor I/O's, seizure precautions, wound care  Anxieties, Fears or Concerns: patient's progress and how long she'll be here  Patient/Family-Specific Goals (Include Timeframe): return to baseline by SENDY to discharge home with family  Goal: Absence of Hospital-Acquired Illness or Injury  Outcome: Progressing  Intervention: Identify and Manage Fall Risk  Flowsheets (Taken 11/9/2024 0921)  Safety Promotion/Fall Prevention:   assistive device/personal item within reach   chair alarm set   gait belt with ambulation   in recliner, wheels locked   instructed to call staff for mobility   medications reviewed   nonskid shoes/socks when out of bed   muscle strengthening facilitated   Supervised toileting - stay within arms reach  Intervention: Prevent Skin Injury  Flowsheets (Taken 11/9/2024 0921)  Body Position:   weight shifting   legs elevated  Skin Protection:   protective footwear used   hydrocolloids used   skin sealant/moisture barrier applied  Device Skin Pressure Protection:   adhesive use limited   positioning supports utilized   pressure points protected  Intervention: Prevent and Manage VTE (Venous Thromboembolism) Risk  Flowsheets (Taken 11/9/2024 0921)  VTE Prevention/Management:   bleeding precautions maintained   ambulation promoted   bleeding risk assessed   dorsiflexion/plantar flexion performed  Intervention: Prevent Infection  Flowsheets (Taken 11/9/2024 0921)  Infection Prevention:   hand hygiene promoted   single patient room provided  Goal: Optimal Comfort and Wellbeing  Outcome: Progressing  Intervention: Monitor Pain and Promote  Comfort  Flowsheets (Taken 11/9/2024 0921)  Pain Management Interventions:   pillow support provided   position adjusted   quiet environment facilitated   relaxation techniques promoted  Intervention: Provide Person-Centered Care  Flowsheets (Taken 11/9/2024 0921)  Trust Relationship/Rapport:   care explained   choices provided   questions answered   questions encouraged   reassurance provided     Problem: Diabetes Comorbidity  Goal: Blood Glucose Level Within Targeted Range  Outcome: Progressing  Intervention: Monitor and Manage Glycemia  Flowsheets (Taken 11/9/2024 0921)  Glycemic Management: blood glucose monitored     Problem: Infection  Goal: Absence of Infection Signs and Symptoms  Outcome: Progressing  Intervention: Prevent or Manage Infection  Flowsheets (Taken 11/9/2024 0921)  Fever Reduction/Comfort Measures: lightweight bedding  Isolation Precautions: protective     Problem: Wound  Goal: Optimal Coping  Outcome: Progressing  Intervention: Support Patient and Family Response  Flowsheets (Taken 11/9/2024 0921)  Supportive Measures:   relaxation techniques promoted   self-care encouraged  Family/Support System Care:   presence promoted   support provided  Goal: Optimal Functional Ability  Outcome: Progressing  Goal: Absence of Infection Signs and Symptoms  Outcome: Progressing  Goal: Improved Oral Intake  Outcome: Progressing  Intervention: Promote and Optimize Oral Intake  Flowsheets (Taken 11/9/2024 0921)  Oral Nutrition Promotion: calorie-dense liquids provided  Nutrition Interventions:   food preferences provided   meal set-up provided  Goal: Optimal Pain Control and Function  Outcome: Progressing  Goal: Skin Health and Integrity  Outcome: Progressing  Intervention: Optimize Skin Protection  Flowsheets (Taken 11/9/2024 0921)  Pressure Reduction Techniques:   frequent weight shift encouraged   rest period provided between sit times   pressure points protected  Pressure Reduction Devices: positioning  supports utilized  Skin Protection:   protective footwear used   hydrocolloids used   skin sealant/moisture barrier applied  Activity Management:   Ambulated to bathroom - L4   Walk with assistive devise and /or staff member - L3   Standing - L3   Up in chair - L3  Head of Bed (HOB) Positioning: HOB at 60-90 degrees     Problem: Fall Injury Risk  Goal: Absence of Fall and Fall-Related Injury  Outcome: Progressing  Intervention: Identify and Manage Contributors  Flowsheets (Taken 11/9/2024 0921)  Self-Care Promotion:   independence encouraged   BADL personal objects within reach   meal set-up provided  Medication Review/Management: medications reviewed  Intervention: Promote Injury-Free Environment  Flowsheets (Taken 11/9/2024 0921)  Safety Promotion/Fall Prevention:   assistive device/personal item within reach   chair alarm set   gait belt with ambulation   in recliner, wheels locked   instructed to call staff for mobility   medications reviewed   nonskid shoes/socks when out of bed   muscle strengthening facilitated   Supervised toileting - stay within arms reach     Problem: Skin Injury Risk Increased  Goal: Skin Health and Integrity  Outcome: Progressing  Intervention: Optimize Skin Protection  Flowsheets (Taken 11/9/2024 0921)  Pressure Reduction Techniques:   frequent weight shift encouraged   rest period provided between sit times   pressure points protected  Pressure Reduction Devices: positioning supports utilized  Skin Protection:   protective footwear used   hydrocolloids used   skin sealant/moisture barrier applied  Activity Management:   Ambulated to bathroom - L4   Walk with assistive devise and /or staff member - L3   Standing - L3   Up in chair - L3  Head of Bed (HOB) Positioning: HOB at 60-90 degrees

## 2024-11-09 NOTE — PT/OT/SLP PROGRESS
Occupational Therapy  Treatment    Name: Tali Beard    : 1935 (88 y.o.)  MRN: [id number]           TREATMENT SUMMARY AND RECOMMENDATIONS:      OT Date of Treatment: 24  OT Start Time: 933  OT Stop Time:   OT Total Time (min): 37 min      Subjective Assessment:   No complaints  Lethargic   x Awake, alert, cooperative  Impulsive    Uncooperative   Flat affect    Agitated  c/o pain    Appropriate  c/o fatigue    Confused  Treated at bedside     Emotionally labile x Treated in gym/dept.    Anxious/fearful   Other:        Therapy Precautions:   x Cognitive deficits  Spinal precautions    Collar - hard  Sternal precautions    Collar - soft   Cervical precautions    x Fall risk  LSO    Hip precautions - posterior  TLSO    Hip precautions - anterior  WBAT    Impaired communication  Partial weightbearing    Oxygen  TTWB    PEG tube  NWB    Visual deficits  Knee immobilizer    Hearing deficits   Other:        Treatment Objectives:     Mobility Training:    Mobility task Assist level Comments    Bed mobility     Transfer CGA Stand/pivot t/f with no device to BSChair   Sit to stands transitions SBA From BSChair and standard chair   Functional mobility CGA X300 feet, x100 feet with no device    Sitting balance     Standing balance  SBA Pt stood with no UE support to perform reach to low stool to grasp resistive clothespins and then place across midline onto savanna above shoulder level. No LOB noted.    Wheelchair mobility          Therapeutic Exercise:   Exercise Sets Reps Comments   LE bike 1 5 min Level 1; forwards   Arm bike 1 5 min Level 1; forwards                   Assessment: Patient tolerated session well. Pt had positive response to today's treatment. Pt continues to make good progress towards goals. Pt would continue to benefit from skilled OT services to improve pt's safety and independence with daily occupations, decrease caregiver burden, and reduce pt's risk of falls.     GOALS:    Multidisciplinary Problems       Occupational Therapy Goals          Problem: Occupational Therapy    Goal Priority Disciplines Outcome Interventions   Occupational Therapy Goal     OT, PT/OT Progressing    Description: Goals to be met by: 11/28/24     Patient will increase functional independence with ADLs by performing:    UE Dressing with Set-up Assistance.  LE Dressing with Set-up Assistance.  Grooming while standing at sink with Supervision.  Toileting from toilet with Contact Guard Assistance for hygiene and clothing management.   Bathing from  shower chair/bench with Contact Guard Assistance.  Toilet transfer to toilet with Stand-by Assistance.  Increased functional strength to 4/5 for BUEs.                         Recommendations:     Discharge Equipment Recommendations:   (TBD pending progress)     Plan:     Patient to be seen  (5-6x/wk; QD/BID as appropriate) to address the above listed problems via self-care/home management, therapeutic activities, therapeutic exercises, cognitive retraining  Plan of Care Expires: 11/28/24  Revisions made to plan of care: No    Skilled OT Minutes Provided: 37  Communication with Treatment Team:     At end of treatment, patient remained:  x Up in chair     Up in wheelchair in room    In bed   x With alarm activated    Bed rails up   x Call bell in reach    x Family/friends present    Restraints secured properly    In bathroom with CNA/RN notified    In gym with PT/PTA/tech    Nurse aware    Other:

## 2024-11-09 NOTE — PROGRESS NOTES
[facility]  HOSPITAL MEDICINE ~ PROGRESS NOTE    CHIEF COMPLAINT   Hospital follow up for right parietal intracranial hemorrhage    HOSPITAL COURSE   88 year old female with PMHx of HTN, HLD, DM2, Kaposi Sarcoma s/p resection and radiation to left ankle and submental node (followed by [facility]), and prior subarachnoid hemorrhage and intraparenchymal hemorrhage presents to [facility] for swing following right parietal intracranial hemorrhage.  She initially presented to [facility] on 10/30/2024 due to left sided weakness and confusion.  CT head revealed acute hemorrhagic infarct of the right parietal lobe.  CTA head and neck displayed diminished caliber of M1 and M2 segments bilaterally MCA possible indicative of vasospasm.  MRI revealed evolving left parietal late subacute and right parietal early subacute cortical hemorrhage, new punctate focus of suspected ischemic cortical infarct at the left occipital lobe, with no additional sites of abnormal diffusion signal appreciated.     Neurology does not recommend anticoagulation or antiplatelets at this time due to spontaneous bleeding events. Suspecting primary intracranial hemorrhage due to amyloid angiopathy. EEG was negative for seizures, showed diffuse cerebral slowing - will continue keppra 500mg PO BID for seizure prophylaxis.      Patient lives alone and was completely independent prior to this hospitalization. Has plans for 24 hour care upon returning home between 2 of her sons. She lives in a 1 story home without stairs and a climb in bathtub. Patient's mentation was improving prior to admission to [facility]. Patient is oriented x3 with slow response time - patient's son report she is worse than her baseline mentation. Patient denies headache, N/V, or vision changes.      Today:  Son at bedside, we did discuss potential neuro rehab for the patient, maybe not be feasible for the family but we will reach out to case  manager.  I discussed with the patient's son Dr. Hannah's recommendation of 500 mg b.i.d., he expressed understanding  OBJECTIVE/PHYSICAL EXAM     VITAL SIGNS (MOST RECENT):  Temp: 98.4 °F (36.9 °C) (11/09/24 0745)  Pulse: 80 (11/09/24 0745)  Resp: 15 (11/08/24 2000)  BP: 126/65 (11/09/24 0745)  SpO2: 99 % (11/09/24 0745) VITAL SIGNS (24 HOUR RANGE):  Temp:  [97.4 °F (36.3 °C)-98.6 °F (37 °C)] 98.4 °F (36.9 °C)  Pulse:  [80-91] 80  Resp:  [15-90] 15  SpO2:  [97 %-99 %] 99 %  BP: (107-126)/(57-69) 126/65   GENERAL: In no acute distress, afebrile  HEENT:  PERRLA  CHEST: Clear to auscultation bilaterally  HEART: S1, S2, no appreciable murmur  ABDOMEN: Soft, nontender, BS +  MSK: Warm, no lower extremity edema, no clubbing or cyanosis  NEUROLOGIC: Alert and oriented x3, moving all extremities with good strength, speech is difficult to understand occasionally  ASSESSMENT/PLAN   Left parietal late subacute and right parietal early subacute cortical hemorrhage   Neurology recommends no anticoagulation or antiplatelet therapy at this time due to spontaneous ICH events    Follow up in 1 month with Yue Rodriguez NP in 1 month (approx 11/29) for MRI w/wo contrast with SWI sequence and CSF labs.  Pending: encephalopathy autoimmune evaluation, CSF, HSV type 2 glycoprotein IgG, Lyme disease serology,   Continue Keppra 500mg PO BID for seizure prophylaxis.      Kaposi Sarcoma   Followed by [facility] - Dr. Celestine Henderson   S/p XRT   CT chest/abdomen/plevis on 10/04/2024 was negative      Incontinence dermatitis   Followed by wound care  Purewick  Prn bowel regimen      Chronic Microcytic Anemia   Folate deficiency   Multivitamin      HTN / HLD  Continue amlodipine 5mg PO QD and atorvastatin 40mg PO QHS.   SBP goal < 140      Left foot wound - healed      DVT prophylaxis: Neurology recommends holding chemical prophylaxis at this time.   Code status: Full   Anticipated discharge and disposition:  Continue PT/OT.  Patient  has established 24 hour care at home doing her children  _______________________________________    NUTRITIONAL STATUS     Patient meets ASPEN criteria for mild malnutrition of acute illness or injury per RD assessment as evidenced by:  Energy Intake (Malnutrition): less than or equal to 50% for greater than or equal to 5 days  Weight Loss (Malnutrition): other (see comments) (wt loss unsure family reports)  Subcutaneous Fat (Malnutrition): mild depletion  Muscle Mass (Malnutrition): mild depletion  Fluid Accumulation (Malnutrition): other (see comments) (unable to assess)        A minimum of two characteristics is recommended for diagnosis of either severe or non-severe malnutrition.     LABS/MICRO/MEDS/DIAGNOSTICS       LABS  Recent Labs     11/07/24 0524      K 3.9   CO2 27   BUN 17.1   CREATININE 0.82   GLUCOSE 111   CALCIUM 9.0   ALKPHOS 94   AST 19   ALT 26   ALBUMIN 2.6*     Recent Labs     11/07/24 0524   WBC 10.10   RBC 4.66   HCT 34.7*   MCV 74.5*          MICROBIOLOGY  Microbiology Results (last 7 days)       ** No results found for the last 168 hours. **               MEDICATIONS   amLODIPine  5 mg Oral Daily    atorvastatin  40 mg Oral QHS    ferrous sulfate  1 tablet Oral Daily    gentamicin   Topical (Top) Daily    latanoprost  1 drop Both Eyes Daily    levETIRAcetam  500 mg Oral BID         INFUSIONS         DIAGNOSTIC TESTS  No orders to display        No echocardiogram results found for the past 14 days.         Case related differential diagnoses have been reviewed; assessment and plan has been documented. I have personally reviewed the labs and test results that are currently available; I have reviewed the patients medication list. I have reviewed the consulting providers recommendations. I have reviewed or attempted to review medical records based upon their availability.  All of the patient's and/or family's questions have been addressed and answered to the best of my ability.  I  will continue to monitor closely and make adjustments to medical management as needed.  This document was created using M*Modal Fluency Direct.  Transcription errors may have been made.  Please contact me if any questions may rise regarding documentation to clarify transcription.        Thairy G Reyes, DO   Internal Medicine  Department of Hospital Medicine [facility]

## 2024-11-10 LAB — POCT GLUCOSE: 137 MG/DL (ref 70–110)

## 2024-11-10 PROCEDURE — 94760 N-INVAS EAR/PLS OXIMETRY 1: CPT

## 2024-11-10 PROCEDURE — 25000003 PHARM REV CODE 250: Performed by: PHYSICIAN ASSISTANT

## 2024-11-10 PROCEDURE — 11000004 HC SNF PRIVATE

## 2024-11-10 PROCEDURE — 99900035 HC TECH TIME PER 15 MIN (STAT)

## 2024-11-10 RX ADMIN — AMLODIPINE BESYLATE 5 MG: 5 TABLET ORAL at 08:11

## 2024-11-10 RX ADMIN — FERROUS SULFATE TAB 325 MG (65 MG ELEMENTAL FE) 1 EACH: 325 (65 FE) TAB at 08:11

## 2024-11-10 RX ADMIN — ATORVASTATIN CALCIUM 40 MG: 40 TABLET, FILM COATED ORAL at 08:11

## 2024-11-10 RX ADMIN — LATANOPROST 1 DROP: 50 SOLUTION/ DROPS OPHTHALMIC at 09:11

## 2024-11-10 RX ADMIN — LEVETIRACETAM 500 MG: 500 TABLET, FILM COATED ORAL at 08:11

## 2024-11-10 NOTE — PROGRESS NOTES
[facility]  HOSPITAL MEDICINE ~ PROGRESS NOTE    CHIEF COMPLAINT   Hospital follow up for right parietal intracranial hemorrhage    HOSPITAL COURSE   88 year old female with PMHx of HTN, HLD, DM2, Kaposi Sarcoma s/p resection and radiation to left ankle and submental node (followed by [facility]), and prior subarachnoid hemorrhage and intraparenchymal hemorrhage presents to [facility] for swing following right parietal intracranial hemorrhage.  She initially presented to [facility] on 10/30/2024 due to left sided weakness and confusion.  CT head revealed acute hemorrhagic infarct of the right parietal lobe.  CTA head and neck displayed diminished caliber of M1 and M2 segments bilaterally MCA possible indicative of vasospasm.  MRI revealed evolving left parietal late subacute and right parietal early subacute cortical hemorrhage, new punctate focus of suspected ischemic cortical infarct at the left occipital lobe, with no additional sites of abnormal diffusion signal appreciated.     Neurology does not recommend anticoagulation or antiplatelets at this time due to spontaneous bleeding events. Suspecting primary intracranial hemorrhage due to amyloid angiopathy. EEG was negative for seizures, showed diffuse cerebral slowing - will continue keppra 500mg PO BID for seizure prophylaxis.      Patient lives alone and was completely independent prior to this hospitalization. Has plans for 24 hour care upon returning home between 2 of her sons. She lives in a 1 story home without stairs and a climb in bathtub. Patient's mentation was improving prior to admission to [place]. Patient is oriented x3 with slow response time - patient's son report she is worse than her baseline mentation. Patient denies headache, N/V, or vision changes.      Today:  Son at bedside, we did discuss potential neuro rehab for the patient, maybe not be feasible for the family but we will reach out to case  manager on Monday.     Requested to DC CBGs. Otherwise, no complaints   OBJECTIVE/PHYSICAL EXAM     VITAL SIGNS (MOST RECENT):  Temp: 97.5 °F (36.4 °C) (11/10/24 0736)  Pulse: 75 (11/10/24 0736)  Resp: 18 (11/09/24 2010)  BP: (!) 118/52 (11/10/24 0736)  SpO2: 98 % (11/10/24 0736) VITAL SIGNS (24 HOUR RANGE):  Temp:  [97.5 °F (36.4 °C)-98.2 °F (36.8 °C)] 97.5 °F (36.4 °C)  Pulse:  [75-91] 75  Resp:  [16-18] 18  SpO2:  [96 %-98 %] 98 %  BP: (118-124)/(52-72) 118/52   GENERAL: In no acute distress, afebrile  HEENT:  PERRLA  CHEST: Clear to auscultation bilaterally  HEART: S1, S2, no appreciable murmur  ABDOMEN: Soft, nontender, BS +  MSK: Warm, no lower extremity edema, no clubbing or cyanosis  NEUROLOGIC: Alert and oriented x3, moving all extremities with good strength, speech is difficult to understand occasionally  ASSESSMENT/PLAN   Left parietal late subacute and right parietal early subacute cortical hemorrhage   Neurology recommends no anticoagulation or antiplatelet therapy at this time due to spontaneous ICH events    Follow up in 1 month with Yue Rodriguez NP in 1 month (approx 11/29) for MRI w/wo contrast with SWI sequence and CSF labs.  Pending: encephalopathy autoimmune evaluation, CSF, HSV type 2 glycoprotein IgG, Lyme disease serology,   Continue Keppra 500mg PO BID for seizure prophylaxis.      Kaposi Sarcoma   Followed by [facility] - Dr. Celestine Henderson   S/p XRT   CT chest/abdomen/plevis on 10/04/2024 was negative      Incontinence dermatitis   Followed by wound care  Purewick  Prn bowel regimen      Chronic Microcytic Anemia   Folate deficiency   Multivitamin      HTN / HLD  Continue amlodipine 5mg PO QD and atorvastatin 40mg PO QHS.   SBP goal < 140      Left foot wound - healed      DVT prophylaxis: Neurology recommends holding chemical prophylaxis at this time.   Code status: Full   Anticipated discharge and disposition:  Continue PT/OT.  Patient has established 24 hour care at home  "with her children  _______________________________________    NUTRITIONAL STATUS     Patient meets ASPEN criteria for mild malnutrition of acute illness or injury per RD assessment as evidenced by:  Energy Intake (Malnutrition): less than or equal to 50% for greater than or equal to 5 days  Weight Loss (Malnutrition): other (see comments) (wt loss unsure family reports)  Subcutaneous Fat (Malnutrition): mild depletion  Muscle Mass (Malnutrition): mild depletion  Fluid Accumulation (Malnutrition): other (see comments) (unable to assess)        A minimum of two characteristics is recommended for diagnosis of either severe or non-severe malnutrition.     LABS/MICRO/MEDS/DIAGNOSTICS       LABS  No results for input(s): "NA", "K", "CHLORIDE", "CO2", "BUN", "CREATININE", "GLUCOSE", "CALCIUM", "ALKPHOS", "AST", "ALT", "ALBUMIN" in the last 72 hours.    No results for input(s): "WBC", "RBC", "HCT", "MCV", "PLT" in the last 72 hours.    Invalid input(s): "HG"      MICROBIOLOGY  Microbiology Results (last 7 days)       ** No results found for the last 168 hours. **               MEDICATIONS   amLODIPine  5 mg Oral Daily    atorvastatin  40 mg Oral QHS    ferrous sulfate  1 tablet Oral Daily    gentamicin   Topical (Top) Daily    latanoprost  1 drop Both Eyes Daily    levETIRAcetam  500 mg Oral BID         INFUSIONS         DIAGNOSTIC TESTS  No orders to display        No echocardiogram results found for the past 14 days.         Case related differential diagnoses have been reviewed; assessment and plan has been documented. I have personally reviewed the labs and test results that are currently available; I have reviewed the patients medication list. I have reviewed the consulting providers recommendations. I have reviewed or attempted to review medical records based upon their availability.  All of the patient's and/or family's questions have been addressed and answered to the best of my ability.  I will continue to monitor " closely and make adjustments to medical management as needed.  This document was created using M*Modal Fluency Direct.  Transcription errors may have been made.  Please contact me if any questions may rise regarding documentation to clarify transcription.        Thairy G Reyes, DO   Internal Medicine  Department of Hospital Medicine [facility]

## 2024-11-10 NOTE — PLAN OF CARE
Problem: Adult Inpatient Plan of Care  Goal: Plan of Care Review  Outcome: Progressing  Flowsheets (Taken 11/10/2024 1110)  Plan of Care Reviewed With:   patient   child  Goal: Patient-Specific Goal (Individualized)  Outcome: Progressing  Flowsheets (Taken 11/10/2024 1110)  Individualized Care Needs: encourage oral intake, up to toilet or bsc for toileting, monitor I/O's, monitor vs  Anxieties, Fears or Concerns: no concerns at this time  Patient/Family-Specific Goals (Include Timeframe): return to baseline by SENDY to discharge home with family  Goal: Absence of Hospital-Acquired Illness or Injury  Outcome: Progressing  Intervention: Identify and Manage Fall Risk  Flowsheets (Taken 11/10/2024 1110)  Safety Promotion/Fall Prevention:   assistive device/personal item within reach   chair alarm set   Fall Risk reviewed with patient/family   Fall Risk signage in place   family expresses understanding of fall risk and prevention   gait belt with ambulation   in recliner, wheels locked   medications reviewed   family to remain at bedside   nonskid shoes/socks when out of bed  Intervention: Prevent Skin Injury  Flowsheets (Taken 11/10/2024 1110)  Body Position:   weight shifting   legs elevated  Skin Protection:   protective footwear used   hydrocolloids used   skin sealant/moisture barrier applied  Device Skin Pressure Protection:   adhesive use limited   positioning supports utilized   pressure points protected  Intervention: Prevent and Manage VTE (Venous Thromboembolism) Risk  Flowsheets (Taken 11/10/2024 1110)  VTE Prevention/Management:   bleeding precautions maintained   ambulation promoted   bleeding risk assessed   dorsiflexion/plantar flexion performed  Intervention: Prevent Infection  Flowsheets (Taken 11/10/2024 1110)  Infection Prevention:   hand hygiene promoted   single patient room provided  Goal: Optimal Comfort and Wellbeing  Outcome: Progressing  Intervention: Monitor Pain and Promote  Comfort  Flowsheets (Taken 11/10/2024 1110)  Pain Management Interventions:   pillow support provided   position adjusted   quiet environment facilitated   relaxation techniques promoted  Intervention: Provide Person-Centered Care  Flowsheets (Taken 11/10/2024 1110)  Trust Relationship/Rapport:   care explained   choices provided   emotional support provided   empathic listening provided   questions answered     Problem: Infection  Goal: Absence of Infection Signs and Symptoms  Outcome: Progressing  Intervention: Prevent or Manage Infection  Flowsheets (Taken 11/10/2024 1110)  Fever Reduction/Comfort Measures: lightweight bedding  Infection Management: aseptic technique maintained  Isolation Precautions: protective     Problem: Wound  Goal: Optimal Functional Ability  Outcome: Progressing  Intervention: Optimize Functional Ability  Flowsheets (Taken 11/10/2024 1110)  Assistive Device Utilized:   gait belt   walker  Activity Management:   Ambulated to bathroom - L4   Ambulated in room - L4   Walk with assistive devise and /or staff member - L3   Up in chair - L3  Activity Assistance Provided: assistance, 1 person  Goal: Absence of Infection Signs and Symptoms  Outcome: Progressing  Intervention: Prevent or Manage Infection  Flowsheets (Taken 11/10/2024 1110)  Fever Reduction/Comfort Measures: lightweight bedding  Infection Management: aseptic technique maintained  Isolation Precautions: protective  Goal: Improved Oral Intake  Outcome: Progressing  Goal: Optimal Pain Control and Function  Outcome: Progressing  Intervention: Prevent or Manage Pain  Flowsheets (Taken 11/10/2024 1110)  Sleep/Rest Enhancement:   consistent schedule promoted   regular sleep/rest pattern promoted   relaxation techniques promoted  Pain Management Interventions:   pillow support provided   position adjusted   quiet environment facilitated   relaxation techniques promoted  Goal: Skin Health and Integrity  Outcome: Progressing  Goal: Optimal  Wound Healing  Outcome: Progressing     Problem: Fall Injury Risk  Goal: Absence of Fall and Fall-Related Injury  Outcome: Progressing  Intervention: Identify and Manage Contributors  Flowsheets (Taken 11/10/2024 1110)  Self-Care Promotion:   independence encouraged   BADL personal objects within reach   meal set-up provided  Medication Review/Management: medications reviewed  Intervention: Promote Injury-Free Environment  Flowsheets (Taken 11/10/2024 1110)  Safety Promotion/Fall Prevention:   assistive device/personal item within reach   chair alarm set   Fall Risk reviewed with patient/family   Fall Risk signage in place   family expresses understanding of fall risk and prevention   gait belt with ambulation   in recliner, wheels locked   medications reviewed   family to remain at bedside   nonskid shoes/socks when out of bed     Problem: Skin Injury Risk Increased  Goal: Skin Health and Integrity  Outcome: Progressing  Intervention: Optimize Skin Protection  Flowsheets (Taken 11/10/2024 1110)  Pressure Reduction Techniques:   frequent weight shift encouraged   rest period provided between sit times   pressure points protected  Pressure Reduction Devices: positioning supports utilized  Skin Protection:   protective footwear used   hydrocolloids used   skin sealant/moisture barrier applied  Activity Management:   Ambulated to bathroom - L4   Ambulated in room - L4   Walk with assistive devise and /or staff member - L3   Up in chair - L3  Head of Bed (HOB) Positioning: HOB at 60-90 degrees  Intervention: Promote and Optimize Oral Intake  Flowsheets (Taken 11/10/2024 1110)  Oral Nutrition Promotion: calorie-dense foods provided  Nutrition Interventions:   food preferences provided   meal set-up provided

## 2024-11-10 NOTE — PLAN OF CARE
Problem: Adult Inpatient Plan of Care  Goal: Plan of Care Review  Outcome: Progressing  Flowsheets (Taken 11/9/2024 1851)  Plan of Care Reviewed With:   patient   child  Goal: Patient-Specific Goal (Individualized)  Outcome: Progressing  Flowsheets (Taken 11/9/2024 1851)  Individualized Care Needs: encourage PO fluids, monitor blood glucose levels, monitor intake and output, seizure precautions, wound care  Anxieties, Fears or Concerns: patient's progress and how long she will be here  Patient/Family-Specific Goals (Include Timeframe): show improvement in condition by discharge  Goal: Absence of Hospital-Acquired Illness or Injury  Outcome: Progressing  Intervention: Identify and Manage Fall Risk  Flowsheets (Taken 11/9/2024 1851)  Safety Promotion/Fall Prevention:   assistive device/personal item within reach   bed alarm set   diversional activities provided   Fall Risk reviewed with patient/family   gait belt with ambulation   instructed to call staff for mobility   lighting adjusted   medications reviewed   nonskid shoes/socks when out of bed   patient expresses understanding of fall risk and prevention   side rails raised x 3   supervised activity   toileting scheduled  Intervention: Prevent Skin Injury  Flowsheets (Taken 11/9/2024 1851)  Body Position:   position changed independently   sitting up in bed   weight shifting  Skin Protection:   incontinence pads utilized   hydrocolloids used  Device Skin Pressure Protection:   absorbent pad utilized/changed   adhesive use limited   positioning supports utilized   pressure points protected  Intervention: Prevent and Manage VTE (Venous Thromboembolism) Risk  Flowsheets (Taken 11/9/2024 1851)  VTE Prevention/Management:   bleeding precautions maintained   bleeding risk assessed   ambulation promoted   fluids promoted  Intervention: Prevent Infection  Flowsheets (Taken 11/9/2024 1851)  Infection Prevention:   equipment surfaces disinfected   cohorting utilized    environmental surveillance performed   hand hygiene promoted   rest/sleep promoted   single patient room provided  Goal: Optimal Comfort and Wellbeing  Outcome: Progressing  Intervention: Monitor Pain and Promote Comfort  Flowsheets (Taken 11/9/2024 1851)  Pain Management Interventions:   care clustered   diversional activity provided   pain management plan reviewed with patient/caregiver   pillow support provided   position adjusted   quiet environment facilitated   relaxation techniques promoted   medication offered but refused  Intervention: Provide Person-Centered Care  Flowsheets (Taken 11/9/2024 1851)  Trust Relationship/Rapport:   care explained   emotional support provided   choices provided   empathic listening provided   questions answered   questions encouraged   reassurance provided   thoughts/feelings acknowledged     Problem: Diabetes Comorbidity  Goal: Blood Glucose Level Within Targeted Range  Outcome: Progressing  Intervention: Monitor and Manage Glycemia  Flowsheets (Taken 11/9/2024 1851)  Glycemic Management: blood glucose monitored

## 2024-11-11 PROCEDURE — 25000003 PHARM REV CODE 250: Performed by: PHYSICIAN ASSISTANT

## 2024-11-11 PROCEDURE — 99900035 HC TECH TIME PER 15 MIN (STAT)

## 2024-11-11 PROCEDURE — 92507 TX SP LANG VOICE COMM INDIV: CPT

## 2024-11-11 PROCEDURE — 97530 THERAPEUTIC ACTIVITIES: CPT

## 2024-11-11 PROCEDURE — 97535 SELF CARE MNGMENT TRAINING: CPT

## 2024-11-11 PROCEDURE — 97116 GAIT TRAINING THERAPY: CPT

## 2024-11-11 PROCEDURE — 97110 THERAPEUTIC EXERCISES: CPT

## 2024-11-11 PROCEDURE — 11000004 HC SNF PRIVATE

## 2024-11-11 PROCEDURE — 94760 N-INVAS EAR/PLS OXIMETRY 1: CPT

## 2024-11-11 RX ADMIN — GENTAMICIN SULFATE: 1 OINTMENT TOPICAL at 09:11

## 2024-11-11 RX ADMIN — AMLODIPINE BESYLATE 5 MG: 5 TABLET ORAL at 09:11

## 2024-11-11 RX ADMIN — LATANOPROST 1 DROP: 50 SOLUTION/ DROPS OPHTHALMIC at 09:11

## 2024-11-11 RX ADMIN — LEVETIRACETAM 500 MG: 500 TABLET, FILM COATED ORAL at 09:11

## 2024-11-11 RX ADMIN — ATORVASTATIN CALCIUM 40 MG: 40 TABLET, FILM COATED ORAL at 08:11

## 2024-11-11 RX ADMIN — FERROUS SULFATE TAB 325 MG (65 MG ELEMENTAL FE) 1 EACH: 325 (65 FE) TAB at 09:11

## 2024-11-11 RX ADMIN — LEVETIRACETAM 500 MG: 500 TABLET, FILM COATED ORAL at 08:11

## 2024-11-11 NOTE — PT/OT/SLP PROGRESS
Speech Language Pathology Treatment    Patient Name:  Tali Beard   MRN:  [id number]  Admitting Diagnosis: Intracranial hemorrhage    Recommendations:                 General Recommendations:  Speech/language therapy  Diet recommendations:  Regular Diet - IDDSI Level 7, Liquid Diet Level: Thin liquids - IDDSI Level 0   Aspiration Precautions: Standard aspiration precautions   General Precautions: Standard, fall  Communication strategies:  yes/no questions only and provide increased time to answer    Assessment:     Tali Beard is a 88 y.o. female with an SLP diagnosis of Aphasia and Cognitive-Linguistic Impairment.  She presents with word finding deficits, frequent perseverations, decreased sequencing and poor thought organization decreasing efficiency with communication with staff members.    Subjective     Pt seen in room with her son present.   Patient goals: return home.      Pain/Comfort:   None reported     Respiratory Status: Room air    Objective:     Has the patient been evaluated by SLP for swallowing?      Keep patient NPO?     Current Respiratory Status:        [place] category members 3 per set required max phonemic and semantic cues for completion. Pt with frequent cues required to remain on task. Pt with preseverations noted throughout task. Naming objects with 40% acc; improved to 80% with cloze procedure. Long-term memory recall with 60% acc; required cues from her son. Heavy education regarding expressive abilities with HEP to the son present. SLP left materials in the room and instructed son on HEP with verbalized understanding.     Goals:   Multidisciplinary Problems       SLP Goals          Problem: SLP    Goal Priority Disciplines Outcome   SLP Goal     SLP Progressing   Description: LTG: Patient will improve cognitive linguistic skills to SPV to ensure safety upon discharge home.    STG:  Patient will name x10 items in a concrete category w/I 1 minute to improve word generation  Vy.  Patient will recall 3 EOD Vy.  Patient will answer LTM questions w/ 80% accuracy Vy.  Patient will provide solutions to common problems w/ 80% accuracy Vy.                       Plan:     Patient to be seen:  5 x/week   Plan of Care expires:  11/29/24  Plan of Care reviewed with:  patient, son   SLP Follow-Up:  Yes       Discharge recommendations:  caregiver asst     Barriers to Discharge:   Awareness to deficits     Time Tracking:     SLP Treatment Date:   11/11/24   Speech Start Time:  14:05   Speech Stop Time:  14:50      Speech Total Time (min):  45 min      Billable Minutes: Speech Therapy Individual 45    11/11/2024

## 2024-11-11 NOTE — PT/OT/SLP PROGRESS
Occupational Therapy  Treatment    Name: Tali Beard    : 1935 (88 y.o.)  MRN: [id number]           TREATMENT SUMMARY AND RECOMMENDATIONS:      OT Date of Treatment: 24  OT Start Time: 900  OT Stop Time: 930  OT Total Time (min): 30 min      Subjective Assessment:   No complaints  Lethargic   x Awake, alert, cooperative  Impulsive    Uncooperative   Flat affect    Agitated  c/o pain    Appropriate  c/o fatigue   x Confused x Treated at bedside     Emotionally labile x Treated in gym/dept.    Anxious/fearful   Other:        Therapy Precautions:   x Cognitive deficits  Spinal precautions    Collar - hard  Sternal precautions    Collar - soft   Cervical precautions    x Fall risk  LSO    Hip precautions - posterior  TLSO    Hip precautions - anterior  WBAT    Impaired communication  Partial weightbearing    Oxygen  TTWB    PEG tube  NWB    Visual deficits  Knee immobilizer    Hearing deficits   Other:        Treatment Objectives:     Mobility Training:    Mobility task Assist level Comments    Bed mobility     Transfer SBA/CGA Stand/pivot t/f without device to standard chair   Sit to stands transitions SBA From Bschair and standard chair    Functional mobility SBA/CGA X300 feet with no device    Sitting balance     Standing balance  CGA Pt stood with no device and performed reach to floor level to grasp cones and then placed onto table above shoulder level    Wheelchair mobility        ADL Training:    ADL Assist level Comments    Feeding     Grooming/hygiene     Bathing     Upper body dressing SBA Pt donned pullover shirt and jacket while seated in BSchair   Lower body dressing SBA Pt able to delma pants and B shoes; increased time for orientation    Toileting     Toilet transfer     Adaptive equipment training     Other:           Therapeutic Exercise:   Exercise Sets Reps Comments   Arm bike 2 5 min Level 1; forwards/backwards                         Additional Comments:  Pt is mostly limited by  cognition than functional limitations. Will begin training with son for preparation home.    Assessment: Patient tolerated session well. Pt had positive response to today's treatment. Pt continues to make good progress towards goals. Pt would continue to benefit from skilled OT services to improve pt's safety and independence with daily occupations, decrease caregiver burden, and reduce pt's risk of falls.     GOALS:   Multidisciplinary Problems       Occupational Therapy Goals          Problem: Occupational Therapy    Goal Priority Disciplines Outcome Interventions   Occupational Therapy Goal     OT, PT/OT Progressing    Description: Goals to be met by: 11/28/24     Patient will increase functional independence with ADLs by performing:    UE Dressing with Set-up Assistance. - met   LE Dressing with Set-up Assistance. - met   Grooming while standing at sink with Supervision.  Toileting from toilet with Contact Guard Assistance for hygiene and clothing management.   Bathing from  shower chair/bench with Contact Guard Assistance.  Toilet transfer to toilet with Stand-by Assistance.  Increased functional strength to 4/5 for BUEs.                         Recommendations:     Discharge Equipment Recommendations:   (TBD pending progress)     Plan:     Patient to be seen  (5-6x/wk; QD/BID as appropriate) to address the above listed problems via self-care/home management, therapeutic activities, therapeutic exercises, cognitive retraining  Plan of Care Expires: 11/28/24  Revisions made to plan of care: No    Skilled OT Minutes Provided: 30  Communication with Treatment Team:     At end of treatment, patient remained:   Up in chair     Up in wheelchair in room    In bed    With alarm activated    Bed rails up    Call bell in reach     Family/friends present    Restraints secured properly    In bathroom with CNA/RN notified   x In gym with PT/PTA/tech    Nurse aware    Other:

## 2024-11-11 NOTE — MEDICAL/APP STUDENT
[facility]  HOSPITAL MEDICINE ~ PROGRESS NOTE    CHIEF COMPLAINT     Hospital follow up for right parietal intracranial hemorrhage     HOSPITAL COURSE     88 year old female with PMHx of HTN, HLD, DM2, Kaposi Sarcoma s/p resection and radiation to left ankle and submental node (followed by [facility]), and prior subarachnoid hemorrhage and intraparenchymal hemorrhage presents to [facility] for swing following right parietal intracranial hemorrhage.  She initially presented to [facility] on 10/30/2024 due to left sided weakness and confusion.  CT head revealed acute hemorrhagic infarct of the right parietal lobe.  CTA head and neck displayed diminished caliber of M1 and M2 segments bilaterally MCA possible indicative of vasospasm.  MRI revealed evolving left parietal late subacute and right parietal early subacute cortical hemorrhage, new punctate focus of suspected ischemic cortical infarct at the left occipital lobe, with no additional sites of abnormal diffusion signal appreciated.     Neurology does not recommend anticoagulation or antiplatelets at this time due to spontaneous bleeding events. Suspecting primary intracranial hemorrhage due to amyloid angiopathy. EEG was negative for seizures, showed diffuse cerebral slowing - will continue keppra 500mg PO BID for seizure prophylaxis.      Patient lives alone and was completely independent prior to this hospitalization. Has plans for 24 hour care upon returning home between 2 of her sons. She lives in a 1 story home without stairs and a climb in bathtub. Patient's mentation was improving prior to admission to [place]. Patient is oriented x3 with slow response time - patient's son report she is worse than her baseline mentation. Patient denies headache, N/V, or vision changes.     Today:  Patient has no new complaints. Son a bedside states the neuro rehab will not work due to travelling distance for himself and patient's  other son. Improving strength with therapy.     OBJECTIVE/PHYSICAL EXAM     VITAL SIGNS (MOST RECENT):  Temp: 98.1 °F (36.7 °C) (11/11/24 0730)  Pulse: 84 (11/11/24 0730)  Resp: 20 (11/11/24 0730)  BP: 132/75 (11/11/24 0730)  SpO2: 100 % (11/11/24 0730) VITAL SIGNS (24 HOUR RANGE):  Temp:  [98.1 °F (36.7 °C)-98.6 °F (37 °C)] 98.1 °F (36.7 °C)  Pulse:  [83-84] 84  Resp:  [18-20] 20  SpO2:  [98 %-100 %] 100 %  BP: (116-132)/(64-75) 132/75     GENERAL: In no acute distress, afebrile  HEENT: PERRLA   CHEST: Clear to auscultation bilaterally  HEART: S1, S2, no appreciable murmur  ABDOMEN: Soft, nontender, BS +  MSK: Warm, no lower extremity edema, no clubbing or cyanosis  NEUROLOGIC: Alert and oriented x3, moving all extremities with good strength, speech is difficult to understand occasionally, slow response time      ASSESSMENT/PLAN     Left parietal late subacute and right parietal early subacute cortical hemorrhage   Neurology recommends no anticoagulation or antiplatelet therapy at this time due to spontaneous ICH events    Follow up in 1 month with Yue Rodriguez NP in 1 month (approx 11/29) for MRI w/wo contrast with SWI sequence and CSF labs.  Pending: encephalopathy autoimmune evaluation, CSF, HSV type 2 glycoprotein IgG, Lyme disease serology.   Continue Keppra 500mg PO BID for seizure prophylaxis.      Kaposi Sarcoma   Followed by [facility] - Dr. Celestine Henderson   S/p XRT   CT chest/abdomen/pelvis on 10/04/2024 was negative      Incontinence dermatitis   Followed by wound care  Purewick  Prn bowel regimen      Chronic Microcytic Anemia   Folate deficiency   Multivitamin      HTN / HLD  Continue amlodipine 5mg PO QD and atorvastatin 40mg PO QHS.   SBP goal < 140      Left foot wound - healed      DVT prophylaxis: Neurology recommends holding chemical prophylaxis at this time.     Code status: Full     Anticipated discharge and disposition:  Continue PT/OT.  Patient has established 24 hour care at  "home with her children  __________________________________________________________________________    LABS/MICRO/MEDS/DIAGNOSTICS     LABS  No results for input(s): "NA", "K", "CHLORIDE", "CO2", "BUN", "CREATININE", "GLUCOSE", "CALCIUM", "ALKPHOS", "AST", "ALT", "ALBUMIN" in the last 72 hours.  No results for input(s): "WBC", "RBC", "HCT", "MCV", "PLT" in the last 72 hours.    Invalid input(s): "HG"    MICROBIOLOGY  Microbiology Results (last 7 days)       ** No results found for the last 168 hours. **             MEDICATIONS   amLODIPine  5 mg Oral Daily    atorvastatin  40 mg Oral QHS    ferrous sulfate  1 tablet Oral Daily    gentamicin   Topical (Top) Daily    latanoprost  1 drop Both Eyes Daily    levETIRAcetam  500 mg Oral BID         INFUSIONS       DIAGNOSTIC TESTS  No orders to display      No results found for: "EF"     NUTRITION STATUS  Patient meets ASPEN criteria for mild malnutrition of acute illness or injury per RD assessment as evidenced by:  Energy Intake (Malnutrition): less than or equal to 50% for greater than or equal to 5 days  Weight Loss (Malnutrition): other (see comments) (wt loss unsure family reports)  Subcutaneous Fat (Malnutrition): mild depletion  Muscle Mass (Malnutrition): mild depletion  Fluid Accumulation (Malnutrition): other (see comments) (unable to assess)        A minimum of two characteristics is recommended for diagnosis of either severe or non-severe malnutrition.     Case related differential diagnoses have been reviewed; assessment and plan has been documented. I have personally reviewed the labs and test results that are currently available; I have reviewed the patients medication list. I have reviewed the consulting providers recommendations. I have reviewed or attempted to review medical records based upon their availability.  All of the patient's and/or family's questions have been addressed and answered to the best of my ability.  I will continue to monitor closely " and make adjustments to medical management as needed.  This document was created using M*Modal Fluency Direct.  Transcription errors may have been made.  Please contact me if any questions may rise regarding documentation to clarify transcription.      BLANCO AlvaradoS  Internal Medicine  Department of Hospital Medicine [facility]

## 2024-11-11 NOTE — PLAN OF CARE
Problem: Occupational Therapy  Goal: Occupational Therapy Goal  Description: Goals to be met by: 11/28/24     Patient will increase functional independence with ADLs by performing:    UE Dressing with Set-up Assistance. - met   LE Dressing with Set-up Assistance. - met   Grooming while standing at sink with Supervision.  Toileting from toilet with Contact Guard Assistance for hygiene and clothing management.   Bathing from  shower chair/bench with Contact Guard Assistance.  Toilet transfer to toilet with Stand-by Assistance.  Increased functional strength to 4/5 for BUEs.    Outcome: Progressing

## 2024-11-11 NOTE — PT/OT/SLP PROGRESS
Physical Therapy Treatment Note           Name: Tali Beard    : 1935 (88 y.o.)  MRN: [id number]           TREATMENT SUMMARY AND RECOMMENDATIONS:    PT Received On: 24  PT Start Time: 1300     PT Stop Time: 1324  PT Total Time (min): 24 min     Subjective Assessment:   No complaints  Lethargic   x Awake, alert, cooperative  Uncooperative    Agitated  c/o pain    Appropriate  c/o fatigue   x Confused - moderate  Treated at bedside     Emotionally labile x Treated in gym/dept.    Impulsive  Other:    Flat affect       Therapy Precautions:   x Cognitive deficits  Spinal precautions    Collar - hard  Sternal precautions    Collar - soft   TLSO   x Fall risk  LSO    Hip precautions - posterior  Knee immobilizer    Hip precautions - anterior  WBAT    Impaired communication  Partial weightbearing    Oxygen  TTWB    PEG tube  NWB    Visual deficits  Other:    Hearing deficits          Treatment Objectives:     Mobility Training:   Assist level Comments    Bed mobility     Transfer CGA/SBA Stand pivot without AD, but increased time/cues needed for completion of task.    Gait CGA X 350 feet, no AD, PT holding gait belt. 1 loss of balance, but pt able to self correct with use of hand rail. Cues needed 50% of the time to remain focused to task and safety with turns/obstacle negotiation     Sit to stand transitions CGA/SBA From chair x 3 reps - B UE support.    Sitting balance     Standing balance      Wheelchair mobility     Car transfer     Other:          Therapeutic Exercise:   Exercise Sets Reps Comments   Seated B LE Bike using UBE  6' Forward.                          Additional Comments:  Pt is demonstrating good functional tolerance, just needs more cues for cognitive deficits.     PT to progress as able. Therapy to start training with sons.     Assessment: Patient tolerated session well.     PT Plan: continue per POC - Home with  family care  Revisions made to plan of care: No    GOALS:    Multidisciplinary Problems       Physical Therapy Goals          Problem: Physical Therapy    Goal Priority Disciplines Outcome Interventions   Physical Therapy Goal     PT, PT/OT Progressing    Description: Goals to be met by: Discharge      Patient will increase functional independence with mobility by performin. Supine <> sit with Supervision, remaining focused to task 100% of the time and only needing 25% cues.   2. Sit to stand transfer with Supervision, remaining focused to task 100% of the time and only needing 25% cues  3. Bed to chair transfer with Supervision using Rolling Walker vs No AD; remaining focused to task 100% of the time and only needing 25% cues  4. Gait  x 350 feet with Supervision using Rolling Walker vs no AD; remaining focused to task 100% of the time and only needing 25% cues.                          Skilled PT Minutes Provided: 23   Communication with Treatment Team:     Equipment recommendations:       At end of treatment, patient remained:  x Up in chair     Up in wheelchair in room    In bed    With alarm activated    Bed rails up    Call bell in reach     Family/friends present    Restraints secured properly    In bathroom with CNA/RN notified    Nurse aware   x In gym with therapist/tech    Other:

## 2024-11-11 NOTE — PLAN OF CARE
Problem: Adult Inpatient Plan of Care  Goal: Plan of Care Review  Outcome: Progressing  Flowsheets (Taken 11/11/2024 0947)  Plan of Care Reviewed With:   patient   child  Goal: Patient-Specific Goal (Individualized)  Outcome: Progressing  Flowsheets (Taken 11/11/2024 0947)  Individualized Care Needs: encourage oral intake, frequent rounding with toileting offered, up to toilet and in chair while awake, monitor I/O's, monitor vs  Anxieties, Fears or Concerns: Patient did not sleep much last night but states that this is somewhat normal as she normally catnaps throughout the day and wakes early  Patient/Family-Specific Goals (Include Timeframe): return to baseline by SENDY to discharge home with family  Goal: Absence of Hospital-Acquired Illness or Injury  Outcome: Progressing  Intervention: Identify and Manage Fall Risk  Flowsheets (Taken 11/11/2024 0947)  Safety Promotion/Fall Prevention:   assistive device/personal item within reach   chair alarm set   family to remain at bedside   gait belt with ambulation   in recliner, wheels locked   instructed to call staff for mobility   medications reviewed   muscle strengthening facilitated   nonskid shoes/socks when out of bed   Supervised toileting - stay within arms reach  Intervention: Prevent Skin Injury  Flowsheets (Taken 11/11/2024 0947)  Body Position:   weight shifting   legs elevated  Skin Protection:   protective footwear used   hydrocolloids used   skin sealant/moisture barrier applied  Device Skin Pressure Protection:   adhesive use limited   positioning supports utilized   pressure points protected  Intervention: Prevent and Manage VTE (Venous Thromboembolism) Risk  Flowsheets (Taken 11/11/2024 0947)  VTE Prevention/Management:   bleeding precautions maintained   bleeding risk assessed   ambulation promoted   dorsiflexion/plantar flexion performed  Intervention: Prevent Infection  Flowsheets (Taken 11/11/2024 0947)  Infection Prevention:   hand hygiene  promoted   single patient room provided  Goal: Optimal Comfort and Wellbeing  Outcome: Progressing  Intervention: Monitor Pain and Promote Comfort  Flowsheets (Taken 11/11/2024 0947)  Pain Management Interventions:   pillow support provided   position adjusted   quiet environment facilitated   relaxation techniques promoted  Intervention: Provide Person-Centered Care  Flowsheets (Taken 11/11/2024 0947)  Trust Relationship/Rapport:   care explained   choices provided   questions answered   questions encouraged   reassurance provided     Problem: Infection  Goal: Absence of Infection Signs and Symptoms  Outcome: Progressing  Intervention: Prevent or Manage Infection  Flowsheets (Taken 11/11/2024 0947)  Fever Reduction/Comfort Measures: lightweight bedding  Isolation Precautions: protective     Problem: Wound  Goal: Optimal Coping  Outcome: Progressing  Intervention: Support Patient and Family Response  Flowsheets (Taken 11/11/2024 0947)  Supportive Measures: relaxation techniques promoted  Family/Support System Care:   support provided   presence promoted  Goal: Optimal Functional Ability  Outcome: Progressing  Intervention: Optimize Functional Ability  Flowsheets (Taken 11/11/2024 0947)  Assistive Device Utilized:   gait belt   walker  Activity Management:   Ambulated to bathroom - L4   Up in chair - L3   Ambulated in room - L4   Standing - L3   Walk with assistive devise and /or staff member - L3  Activity Assistance Provided: assistance, 1 person  Goal: Absence of Infection Signs and Symptoms  Outcome: Progressing  Intervention: Prevent or Manage Infection  Flowsheets (Taken 11/11/2024 0947)  Fever Reduction/Comfort Measures: lightweight bedding  Isolation Precautions: protective  Goal: Improved Oral Intake  Outcome: Progressing  Intervention: Promote and Optimize Oral Intake  Flowsheets (Taken 11/11/2024 0947)  Oral Nutrition Promotion: calorie-dense foods provided  Nutrition Interventions:   meal set-up  provided   food preferences provided  Goal: Optimal Pain Control and Function  Outcome: Progressing  Goal: Skin Health and Integrity  Outcome: Progressing  Goal: Optimal Wound Healing  Outcome: Progressing     Problem: Fall Injury Risk  Goal: Absence of Fall and Fall-Related Injury  Outcome: Progressing  Intervention: Identify and Manage Contributors  Flowsheets (Taken 11/11/2024 0947)  Self-Care Promotion:   BADL personal objects within reach   independence encouraged   meal set-up provided  Medication Review/Management: medications reviewed     Problem: Skin Injury Risk Increased  Goal: Skin Health and Integrity  Outcome: Progressing  Intervention: Optimize Skin Protection  Flowsheets (Taken 11/11/2024 0947)  Pressure Reduction Techniques:   frequent weight shift encouraged   rest period provided between sit times   pressure points protected  Pressure Reduction Devices: positioning supports utilized  Skin Protection:   protective footwear used   hydrocolloids used   skin sealant/moisture barrier applied  Activity Management:   Ambulated to bathroom - L4   Up in chair - L3   Ambulated in room - L4   Standing - L3   Walk with assistive devise and /or staff member - L3  Head of Bed (HOB) Positioning: HOB at 60-90 degrees

## 2024-11-11 NOTE — PT/OT/SLP PROGRESS
Occupational Therapy  Treatment    Name: Tali Beard    : 1935 (88 y.o.)  MRN: [id number]           TREATMENT SUMMARY AND RECOMMENDATIONS:      OT Date of Treatment: 24  OT Start Time: 1323  OT Stop Time: 1357  OT Total Time (min): 34 min      Subjective Assessment:   No complaints  Lethargic   x Awake, alert, cooperative  Impulsive    Uncooperative   Flat affect    Agitated  c/o pain    Appropriate x c/o fatigue   x Confused  Treated at bedside     Emotionally labile x Treated in gym/dept.    Anxious/fearful   Other:        Therapy Precautions:   x Cognitive deficits  Spinal precautions    Collar - hard  Sternal precautions    Collar - soft   Cervical precautions    x Fall risk  LSO    Hip precautions - posterior  TLSO    Hip precautions - anterior  WBAT    Impaired communication  Partial weightbearing    Oxygen  TTWB    PEG tube  NWB    Visual deficits  Knee immobilizer    Hearing deficits   Other:        Treatment Objectives:     Mobility Training:    Mobility task Assist level Comments    Bed mobility     Transfer SBA/CGA Stand/pivot t/f with no device to BSChair   Sit to stands transitions SBA/CGA Multiple reps from standard chair during session    Functional mobility CGA X80 feet with no device    Sitting balance     Standing balance  SBA            CGA Pt stood over 15 minutes to participate in IADL task of folding clothes. Pt perseverating on making sure corners are perfectly lined up therefore increased time for task of folding x6 towels.      Pt stood on airex foam pad with no UE support to reach for bean bags and then toss into bucket anterior. Minimal LOB with OT assisting to correct posteriorly.    Wheelchair mobility          Additional Comments:  Discussed training with one son who reports he has been caring for his mother at home prior to this and feels as though he does not need additional training.     Assessment: Patient tolerated session well. Pt had positive response to today's  treatment. Pt continues to make good progress towards goals. Pt would continue to benefit from skilled OT services to improve pt's safety and independence with daily occupations, decrease caregiver burden, and reduce pt's risk of falls.     GOALS:   Multidisciplinary Problems       Occupational Therapy Goals          Problem: Occupational Therapy    Goal Priority Disciplines Outcome Interventions   Occupational Therapy Goal     OT, PT/OT Progressing    Description: Goals to be met by: 11/28/24     Patient will increase functional independence with ADLs by performing:    UE Dressing with Set-up Assistance. - met   LE Dressing with Set-up Assistance. - met   Grooming while standing at sink with Supervision.  Toileting from toilet with Contact Guard Assistance for hygiene and clothing management.   Bathing from  shower chair/bench with Contact Guard Assistance.  Toilet transfer to toilet with Stand-by Assistance.  Increased functional strength to 4/5 for BUEs.                         Recommendations:     Discharge Equipment Recommendations:   (TBD pending progress)     Plan:     Patient to be seen  (5-6x/wk; QD/BID as appropriate) to address the above listed problems via self-care/home management, therapeutic activities, therapeutic exercises, cognitive retraining  Plan of Care Expires: 11/28/24  Revisions made to plan of care: No    Skilled OT Minutes Provided: 34  Communication with Treatment Team:     At end of treatment, patient remained:  x Up in chair     Up in wheelchair in room    In bed   x With alarm activated    Bed rails up   x Call bell in reach    x Family/friends present    Restraints secured properly    In bathroom with CNA/RN notified    In gym with PT/PTA/tech    Nurse aware    Other:

## 2024-11-11 NOTE — PLAN OF CARE
Problem: Adult Inpatient Plan of Care  Goal: Plan of Care Review  Outcome: Progressing  Flowsheets (Taken 11/10/2024 2356)  Plan of Care Reviewed With:   patient   child  Goal: Patient-Specific Goal (Individualized)  Outcome: Progressing  Flowsheets (Taken 11/10/2024 2356)  Individualized Care Needs: Encourage Oral intake, Up to toilet or BSC with gait belt and walker for toileting, Monitor I&O's, Monitor VS  Anxieties, Fears or Concerns: none at this time  Patient/Family-Specific Goals (Include Timeframe): Return to baseline by SENDY to discharge home with family  Goal: Absence of Hospital-Acquired Illness or Injury  Outcome: Progressing  Intervention: Prevent and Manage VTE (Venous Thromboembolism) Risk  Flowsheets (Taken 11/10/2024 2000)  VTE Prevention/Management:   ambulation promoted   bleeding precautions maintained   bleeding risk assessed   dorsiflexion/plantar flexion performed   fluids promoted   ROM (active) performed  Goal: Optimal Comfort and Wellbeing  Outcome: Progressing

## 2024-11-11 NOTE — PROGRESS NOTES
Inpatient Nutrition Assessment    Admit Date: 11/6/2024   Total duration of encounter: 5 days   Patient Age: 88 y.o.    Nutrition Recommendation/Prescription     Continue regular diet. 2. Monitor intake,wt, labs,medications    Communication of Recommendations: reviewed with patient, reviewed with family, and new [facility] bed    Nutrition Assessment     Malnutrition Assessment/Nutrition-Focused Physical Exam    Malnutrition Context: acute illness or injury (11/07/24 1354)  Malnutrition Level: mild (11/07/24 1354)  Energy Intake (Malnutrition): less than or equal to 50% for greater than or equal to 5 days (11/07/24 1353)  Weight Loss (Malnutrition): other (see comments) (wt loss unsure family reports) (11/07/24 1353)  Subcutaneous Fat (Malnutrition): mild depletion (11/07/24 1354)  Orbital Region (Subcutaneous Fat Loss): other (see comments) (doesn't meet criteria)  Upper Arm Region (Subcutaneous Fat Loss): other (see comments) (doesn't meet criteria)     Muscle Mass (Malnutrition): mild depletion (11/07/24 1354)  [place] Region (Muscle Loss): other (see comments) (doesn't meet criteria)  Clavicle Bone Region (Muscle Loss): other (see comments) (doesn't meet criteria)                    Fluid Accumulation (Malnutrition): other (see comments) (unable to assess) (11/07/24 1354)        A minimum of two characteristics is recommended for diagnosis of either severe or non-severe malnutrition.    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 (followed by [facility]), and prior subarachnoid hemorrhage and intraparenchymal hemorrhage     Scheduled Medications:  amLODIPine, 5 mg, Daily  atorvastatin, 40 mg, QHS  ferrous sulfate, 1 tablet, Daily  gentamicin, , Daily  latanoprost, 1 drop, Daily  levETIRAcetam, 500 mg, BID    Continuous Infusions:   PRN Medications:  acetaminophen, 650 mg, Q4H PRN  albuterol-ipratropium, 3 mL, Q6H PRN  aluminum-magnesium hydroxide-simethicone, 30 mL, QID PRN  bisacodyL, 10 mg, Daily PRN  dextrose 10%, 12.5 g, PRN  dextrose 10%, 25 g, PRN  glucagon (human recombinant), 1 mg, PRN  glucose, 16 g, PRN  glucose, 24 g, PRN  HYDROcodone-acetaminophen, 1 tablet, Q6H PRN  insulin aspart U-100, 0-5 Units, QID (AC + HS) PRN  magnesium hydroxide 400 mg/5 ml, 30 mL, Daily PRN  melatonin, 9 mg, Nightly PRN  morphine, 2 mg, Q6H PRN  naloxone, 0.02 mg, PRN  ondansetron, 8 mg, Q8H PRN  ondansetron, 4 mg, Q8H PRN  polyethylene glycol, 17 g, TID PRN  simethicone, 1 tablet, QID PRN  sodium chloride 0.9%, 10 mL, PRN    Calorie Containing IV Medications: no significant kcals from medications at this time    Recent Labs   Lab 11/05/24  0353 11/07/24  0524    139   K 3.7 3.9   CALCIUM 8.9 9.0   * 106   CO2 24 27   BUN 17.1 17.1   CREATININE 0.74 0.82   EGFRNORACEVR >60 >60   GLUCOSE 105 111   BILITOT 0.5 0.6   ALKPHOS 60 94   ALT 26 26   AST 27 19   ALBUMIN 2.6* 2.6*   WBC 4.92 10.10   HGB 10.6* 11.0*   HCT 34.2* 34.7*     Nutrition Orders:  Diet Adult Regular      Appetite/Oral Intake: good/50-75% of meals  Factors Affecting Nutritional Intake: none identified  Social Needs Impacting Access to Food: none identified  Food/Faith/Cultural Preferences: none reported  Food Allergies: none reported  Last Bowel Movement: 11/11/24  Wound(s):     Wound 05/07/24 0952 Ulceration Left medial Foot-Tissue loss description: Partial thickness       Wound 11/06/24 1548 Incontinence  "associated dermatitis Perirectal-Tissue loss description: Partial thickness     Comments    11/07/24: Pt has short term recall congitivie issues, SLP. SLP complete swallow jose, upgraded diet to regular consistency. Family and patient reports intake improved, as well as, intake has been good PTA. Family unsure of wt loss. #. Complete NFPE, mild muscle waisting noted. PO intake 50-75% of meals today. Pt has Hx of DM. Blood glucose WNL. Adjust diet as need, due to advance age and to promote good PO intake. Will monitor intake and wt. Labs/meds review. +BM per EMR.     11/11/24: Pt visiting with family during rounds. Pt states intake is good. Tolerating regular diet. Discussed food preferences. Adjust menus. Wt increase, see below. Labs/meds review. +BM per patient. Will monitor.     Anthropometrics    Height: 5' 3" (160 cm), Height Method: Stated  Last Weight: 61 kg (134 lb 7.7 oz) (11/11/24 1329), Weight Method: Bed Scale  BMI (Calculated): 23.8  BMI Classification: normal (BMI 18.5-24.9)     Ideal Body Weight (IBW), Female: 115 lb     % Ideal Body Weight, Female (lb): 116.94 %                             Usual Weight Provided By:  wt loss fermín.     Wt Readings from Last 5 Encounters:   11/11/24 61 kg (134 lb 7.7 oz)   10/29/24 65.6 kg (144 lb 10 oz)   10/23/24 63.2 kg (139 lb 5.3 oz)   10/03/24 63.2 kg (139 lb 5.3 oz)   10/03/24 66.7 kg (147 lb)     Weight Change(s) Since Admission: +3.7 kg wt gain.   Wt Readings from Last 1 Encounters:   11/11/24 1329 61 kg (134 lb 7.7 oz)   11/11/24 0449 61 kg (134 lb 7.7 oz)   11/07/24 1348 57.3 kg (126 lb 5.2 oz)   11/06/24 1530 57.3 kg (126 lb 5.2 oz)   Admit Weight: 57.3 kg (126 lb 5.2 oz) (11/06/24 1530), Weight Method: Bed Scale    Estimated Needs    Weight Used For Calorie Calculations: 61 kg (134 lb 7.7 oz)  Energy Calorie Requirements (kcal): 1830 kcal (30 kcal/kg/CBW)  Energy Need Method: Kcal/kg  Weight Used For Protein Calculations: 61 kg (134 lb 7.7 " oz)  Protein Requirements: 73.35 gm (1.2 gmg/kg/CBW)  Fluid Requirements (mL): 1830 mL (1 mL/kcal)        Enteral Nutrition     Patient not receiving enteral nutrition at this time.    Parenteral Nutrition     Patient not receiving parenteral nutrition support at this time.    Evaluation of Received Nutrient Intake    Calories: meeting estimated needs  Protein: meeting estimated needs    Patient Education     Not applicable.    Nutrition Diagnosis       PES:  Mild malnutrition related to acute illness as evidenced by less than or equal to 50% needs met for greater than or equal to 5 days, mild muscle depletion, and wt loss unsure . (progressing)    Nutrition Interventions     Intervention(s): general/healthful diet    Goal: Consume % of meals/snacks by follow-up. (progressing)  Goal: Maintain weight throughout hospitalization. (progressing)    Nutrition Goals & Monitoring     Dietitian will monitor: food and beverage intake, weight, weight change, electrolyte/renal panel, glucose/endocrine profile, and gastrointestinal profile  Discharge planning: continue regular diet  Nutrition Risk/Follow-Up: moderate (follow-up in 3-5 days)   Please consult if re-assessment needed sooner.

## 2024-11-11 NOTE — PLAN OF CARE
[facility]  Discharge Reassessment    Primary Care Provider: Aurora Moe FNP    Expected Discharge Date:     Reassessment (most recent)       Discharge Reassessment - 11/11/24 1424          Discharge Reassessment    Assessment Type Discharge Planning Reassessment     Did the patient's condition or plan change since previous assessment? No     Discharge Plan discussed with: Patient;Adult children     Discharge Plan A Home;Home Health;Home with family     DME Needed Upon Discharge  other (see comments)   TBD    Transition of Care Barriers None     Why the patient remains in the hospital Requires continued medical care        Post-Acute Status    Discharge Delays None known at this time

## 2024-11-11 NOTE — PT/OT/SLP PROGRESS
Physical Therapy Treatment Note           Name: Tali Beard    : 1935 (88 y.o.)  MRN: [id number]           TREATMENT SUMMARY AND RECOMMENDATIONS:    PT Received On: 24  PT Start Time: 932     PT Stop Time: 959  PT Total Time (min): 27 min     Subjective Assessment:   No complaints  Lethargic   x Awake, alert, cooperative  Uncooperative    Agitated  c/o pain    Appropriate  c/o fatigue   x Confused - moderate  Treated at bedside     Emotionally labile x Treated in gym/dept.    Impulsive  Other:    Flat affect       Therapy Precautions:   x Cognitive deficits  Spinal precautions    Collar - hard  Sternal precautions    Collar - soft   TLSO   x Fall risk  LSO    Hip precautions - posterior  Knee immobilizer    Hip precautions - anterior  WBAT    Impaired communication  Partial weightbearing    Oxygen  TTWB    PEG tube  NWB    Visual deficits  Other:    Hearing deficits          Treatment Objectives:     Mobility Training:   Assist level Comments    Bed mobility     Transfer CGA/SBA Stand pivot without AD, but increased time/cues needed for completion of task. Pt fixated on gait belt and trying to get if off.    Gait CGA X 200 feet, no AD, PT holding gait belt. 2 losses of balance, but pt able to self correct. Cues needed 50% of the time to remain focused to task and safety with turns/obstacle negotiation     Sit to stand transitions CGA From chair x 5 reps - B UE support.    Sitting balance     Standing balance  CGA/MIN A Reaching to cones at various heights including floor and eye levels. One loss in balance with reaching to floor levels.    Wheelchair mobility     Car transfer     Other:          Therapeutic Exercise:   Exercise Sets Reps Comments   Seated B LE EX  15 Hip flexion, knee ext, ankle PF/DF - difficulty following commands for completion of task, verbal, tactile and visual cues needed 100% of the time.                          Additional Comments:  Pt is demonstrating good  functional tolerance, just needs more cues for cognitive deficits. PT provided verbal, tactile and visual cues for knee ext, but once PT stopped demonstrating pt started clapping and counting to 10 instead of kicking. Son was educated on cognitive deficits and needed for constant cues.     Assessment: Patient tolerated session well.     PT Plan: continue per POC - Home with  family care  Revisions made to plan of care: No    GOALS:   Multidisciplinary Problems       Physical Therapy Goals          Problem: Physical Therapy    Goal Priority Disciplines Outcome Interventions   Physical Therapy Goal     PT, PT/OT Progressing    Description: Goals to be met by: Discharge      Patient will increase functional independence with mobility by performin. Supine <> sit with Supervision, remaining focused to task 100% of the time and only needing 25% cues.   2. Sit to stand transfer with Supervision, remaining focused to task 100% of the time and only needing 25% cues  3. Bed to chair transfer with Supervision using Rolling Walker vs No AD; remaining focused to task 100% of the time and only needing 25% cues  4. Gait  x 350 feet with Supervision using Rolling Walker vs no AD; remaining focused to task 100% of the time and only needing 25% cues.                          Skilled PT Minutes Provided: 25   Communication with Treatment Team:     Equipment recommendations:       At end of treatment, patient remained:  x Up in chair     Up in wheelchair in room    In bed   x With alarm activated    Bed rails up   x Call bell in reach    x Family/friends present    Restraints secured properly    In bathroom with CNA/RN notified   x Nurse aware    In gym with therapist/tech    Other:

## 2024-11-12 LAB
ANION GAP SERPL CALC-SCNC: 6 MEQ/L
BACTERIA #/AREA URNS AUTO: ABNORMAL /HPF
BASOPHILS # BLD AUTO: 0.03 X10(3)/MCL
BASOPHILS NFR BLD AUTO: 0.5 %
BILIRUB UR QL STRIP.AUTO: NEGATIVE
BUN SERPL-MCNC: 21 MG/DL (ref 9.8–20.1)
CALCIUM SERPL-MCNC: 9.1 MG/DL (ref 8.4–10.2)
CHLORIDE SERPL-SCNC: 110 MMOL/L (ref 98–107)
CLARITY UR: ABNORMAL
CO2 SERPL-SCNC: 28 MMOL/L (ref 23–31)
COLOR UR AUTO: ABNORMAL
CREAT SERPL-MCNC: 0.75 MG/DL (ref 0.55–1.02)
CREAT/UREA NIT SERPL: 28
EOSINOPHIL # BLD AUTO: 0.17 X10(3)/MCL (ref 0–0.9)
EOSINOPHIL NFR BLD AUTO: 3.1 %
ERYTHROCYTE [DISTWIDTH] IN BLOOD BY AUTOMATED COUNT: 15.2 % (ref 11.5–17)
GFR SERPLBLD CREATININE-BSD FMLA CKD-EPI: >60 ML/MIN/1.73/M2
GLUCOSE SERPL-MCNC: 104 MG/DL (ref 82–115)
GLUCOSE UR QL STRIP: NEGATIVE
HCT VFR BLD AUTO: 33.5 % (ref 37–47)
HGB BLD-MCNC: 10.3 G/DL (ref 12–16)
HGB UR QL STRIP: ABNORMAL
IMM GRANULOCYTES # BLD AUTO: 0.05 X10(3)/MCL (ref 0–0.04)
IMM GRANULOCYTES NFR BLD AUTO: 0.9 %
KETONES UR QL STRIP: NEGATIVE
LEUKOCYTE ESTERASE UR QL STRIP: ABNORMAL
LYMPHOCYTES # BLD AUTO: 1.28 X10(3)/MCL (ref 0.6–4.6)
LYMPHOCYTES NFR BLD AUTO: 23.1 %
MCH RBC QN AUTO: 23.1 PG (ref 27–31)
MCHC RBC AUTO-ENTMCNC: 30.7 G/DL (ref 33–36)
MCV RBC AUTO: 75.1 FL (ref 80–94)
MONOCYTES # BLD AUTO: 0.56 X10(3)/MCL (ref 0.1–1.3)
MONOCYTES NFR BLD AUTO: 10.1 %
NEUTROPHILS # BLD AUTO: 3.46 X10(3)/MCL (ref 2.1–9.2)
NEUTROPHILS NFR BLD AUTO: 62.3 %
NITRITE UR QL STRIP: POSITIVE
PH UR STRIP: 8.5 [PH]
PLATELET # BLD AUTO: 204 X10(3)/MCL (ref 130–400)
PMV BLD AUTO: 10.3 FL (ref 7.4–10.4)
POTASSIUM SERPL-SCNC: 4.4 MMOL/L (ref 3.5–5.1)
PROT UR QL STRIP: 100
RBC # BLD AUTO: 4.46 X10(6)/MCL (ref 4.2–5.4)
RBC #/AREA URNS AUTO: ABNORMAL /HPF
SODIUM SERPL-SCNC: 144 MMOL/L (ref 136–145)
SP GR UR STRIP.AUTO: 1.01 (ref 1–1.03)
SQUAMOUS #/AREA URNS AUTO: ABNORMAL /HPF
UROBILINOGEN UR STRIP-ACNC: 1
WBC # BLD AUTO: 5.55 X10(3)/MCL (ref 4.5–11.5)
WBC #/AREA URNS AUTO: ABNORMAL /HPF

## 2024-11-12 PROCEDURE — 80048 BASIC METABOLIC PNL TOTAL CA: CPT | Performed by: PHYSICIAN ASSISTANT

## 2024-11-12 PROCEDURE — 36415 COLL VENOUS BLD VENIPUNCTURE: CPT | Performed by: PHYSICIAN ASSISTANT

## 2024-11-12 PROCEDURE — 99900035 HC TECH TIME PER 15 MIN (STAT)

## 2024-11-12 PROCEDURE — 97535 SELF CARE MNGMENT TRAINING: CPT

## 2024-11-12 PROCEDURE — 11000004 HC SNF PRIVATE

## 2024-11-12 PROCEDURE — 25000003 PHARM REV CODE 250: Performed by: PHYSICIAN ASSISTANT

## 2024-11-12 PROCEDURE — 94760 N-INVAS EAR/PLS OXIMETRY 1: CPT

## 2024-11-12 PROCEDURE — 81003 URINALYSIS AUTO W/O SCOPE: CPT | Performed by: PHYSICIAN ASSISTANT

## 2024-11-12 PROCEDURE — 97530 THERAPEUTIC ACTIVITIES: CPT

## 2024-11-12 PROCEDURE — 87086 URINE CULTURE/COLONY COUNT: CPT | Performed by: PHYSICIAN ASSISTANT

## 2024-11-12 PROCEDURE — 92507 TX SP LANG VOICE COMM INDIV: CPT

## 2024-11-12 PROCEDURE — 97110 THERAPEUTIC EXERCISES: CPT

## 2024-11-12 PROCEDURE — 97116 GAIT TRAINING THERAPY: CPT

## 2024-11-12 PROCEDURE — 85025 COMPLETE CBC W/AUTO DIFF WBC: CPT | Performed by: PHYSICIAN ASSISTANT

## 2024-11-12 RX ADMIN — LEVETIRACETAM 500 MG: 500 TABLET, FILM COATED ORAL at 09:11

## 2024-11-12 RX ADMIN — ATORVASTATIN CALCIUM 40 MG: 40 TABLET, FILM COATED ORAL at 08:11

## 2024-11-12 RX ADMIN — AMLODIPINE BESYLATE 5 MG: 5 TABLET ORAL at 09:11

## 2024-11-12 RX ADMIN — FERROUS SULFATE TAB 325 MG (65 MG ELEMENTAL FE) 1 EACH: 325 (65 FE) TAB at 09:11

## 2024-11-12 RX ADMIN — LATANOPROST 1 DROP: 50 SOLUTION/ DROPS OPHTHALMIC at 09:11

## 2024-11-12 RX ADMIN — LEVETIRACETAM 500 MG: 500 TABLET, FILM COATED ORAL at 08:11

## 2024-11-12 NOTE — MEDICAL/APP STUDENT
[facility]  HOSPITAL MEDICINE ~ PROGRESS NOTE    CHIEF COMPLAINT     Hospital follow up for right parietal intracranial hemorrhage     HOSPITAL COURSE     88 year old female with PMHx of HTN, HLD, DM2, Kaposi Sarcoma s/p resection and radiation to left ankle and submental node (followed by [facility]), and prior subarachnoid hemorrhage and intraparenchymal hemorrhage presents to [facility] for swing following right parietal intracranial hemorrhage.  She initially presented to [facility] on 10/30/2024 due to left sided weakness and confusion.  CT head revealed acute hemorrhagic infarct of the right parietal lobe.  CTA head and neck displayed diminished caliber of M1 and M2 segments bilaterally MCA possible indicative of vasospasm.  MRI revealed evolving left parietal late subacute and right parietal early subacute cortical hemorrhage, new punctate focus of suspected ischemic cortical infarct at the left occipital lobe, with no additional sites of abnormal diffusion signal appreciated.     Neurology does not recommend anticoagulation or antiplatelets at this time due to spontaneous bleeding events. Suspecting primary intracranial hemorrhage due to amyloid angiopathy. EEG was negative for seizures, showed diffuse cerebral slowing - will continue keppra 500mg PO BID for seizure prophylaxis.      Patient lives alone and was completely independent prior to this hospitalization. Has plans for 24 hour care upon returning home between 2 of her sons. She lives in a 1 story home without stairs and a climb in bathtub. Patient's mentation was improving prior to admission to [facility]. Patient is oriented x3 with slow response time - patient's son report she is worse than her baseline mentation. Patient denies headache, N/V, or vision changes.     Today:  Patient has no new complaints. Son at bedside says he is letting her feed herself and practice further independence instead of  assisting her. Patient verbally responding more.  Nursing reports foul-smelling cloudy urine overnight.  Obtaining UA.    OBJECTIVE/PHYSICAL EXAM     VITAL SIGNS (MOST RECENT):  Temp: 98.2 °F (36.8 °C) (11/12/24 0724)  Pulse: 78 (11/12/24 0835)  Resp: 18 (11/12/24 0835)  BP: 128/68 (11/12/24 0724)  SpO2: 96 % (11/12/24 0835) VITAL SIGNS (24 HOUR RANGE):  Temp:  [98.2 °F (36.8 °C)] 98.2 °F (36.8 °C)  Pulse:  [78-86] 78  Resp:  [18] 18  SpO2:  [96 %-100 %] 96 %  BP: (112-128)/(64-68) 128/68     GENERAL: In no acute distress, afebrile  HEENT: PERRLA   CHEST: Clear to auscultation bilaterally  HEART: S1, S2, no appreciable murmur  ABDOMEN: Soft, nontender, BS +  MSK: Warm, no lower extremity edema, no clubbing or cyanosis  NEUROLOGIC: Alert and oriented x3, moving all extremities with good strength, speech is difficult to understand occasionally, slow response time    ASSESSMENT/PLAN     Left parietal late subacute and right parietal early subacute cortical hemorrhage   Neurology recommends no anticoagulation or antiplatelet therapy at this time due to spontaneous ICH events    Follow up with Yue Rodriguez NP in 1 month (approx 11/29) for MRI w/wo contrast with SWI sequence and CSF labs.  Negative for Lyme disease serology, encephalopathy autoimmune evaluation  Pending: CSF, HSV type 2 glycoprotein IgG  Continue Keppra 500mg PO BID for seizure prophylaxis.      Kaposi Sarcoma   Followed by [facility] - Dr. Celestine Henderson   S/p XRT   CT chest/abdomen/pelvis on 10/04/2024 was negative      Incontinence dermatitis   Followed by wound care  Purewick  Prn bowel regimen     Malodorous urine   UA      Chronic Microcytic Anemia   Folate deficiency   Multivitamin      HTN / HLD  Continue amlodipine 5mg PO QD and atorvastatin 40mg PO QHS.   SBP goal < 140      Left foot wound - healed      DVT prophylaxis: Neurology recommends holding chemical prophylaxis at this time.      Code status: Full      Anticipated discharge  "and disposition:  Continue PT/OT.  Patient has established 24 hour care at home with her children  __________________________________________________________________________    LABS/MICRO/MEDS/DIAGNOSTICS     LABS  Recent Labs     11/12/24  0605      K 4.4   CO2 28   BUN 21.0*   CREATININE 0.75   GLUCOSE 104   CALCIUM 9.1     Recent Labs     11/12/24  0605   WBC 5.55   RBC 4.46   HCT 33.5*   MCV 75.1*        MICROBIOLOGY  Microbiology Results (last 7 days)       ** No results found for the last 168 hours. **             MEDICATIONS   amLODIPine  5 mg Oral Daily    atorvastatin  40 mg Oral QHS    ferrous sulfate  1 tablet Oral Daily    gentamicin   Topical (Top) Daily    latanoprost  1 drop Both Eyes Daily    levETIRAcetam  500 mg Oral BID         INFUSIONS       DIAGNOSTIC TESTS  No orders to display      No results found for: "EF"     NUTRITION STATUS  Patient meets ASPEN criteria for mild malnutrition of acute illness or injury per RD assessment as evidenced by:  Energy Intake (Malnutrition): less than or equal to 50% for greater than or equal to 5 days  Weight Loss (Malnutrition): other (see comments) (wt loss unsure family reports)  Subcutaneous Fat (Malnutrition): mild depletion  Muscle Mass (Malnutrition): mild depletion  Fluid Accumulation (Malnutrition): other (see comments) (unable to assess)        A minimum of two characteristics is recommended for diagnosis of either severe or non-severe malnutrition.     Case related differential diagnoses have been reviewed; assessment and plan has been documented. I have personally reviewed the labs and test results that are currently available; I have reviewed the patients medication list. I have reviewed the consulting providers recommendations. I have reviewed or attempted to review medical records based upon their availability.  All of the patient's and/or family's questions have been addressed and answered to the best of my ability.  I will continue " to monitor closely and make adjustments to medical management as needed.  This document was created using M*Modal Fluency Direct.  Transcription errors may have been made.  Please contact me if any questions may rise regarding documentation to clarify transcription.      ORLY Alvarado  Internal Medicine  Department of Hospital Medicine [facility]

## 2024-11-12 NOTE — PT/OT/SLP PROGRESS
Speech Language Pathology Treatment    Patient Name:  Tali Beard   MRN:  [id number]  Admitting Diagnosis: Intracranial hemorrhage    Recommendations:                 General Recommendations:  Cognitive-linguistic therapy  Diet recommendations:  Regular Diet - IDDSI Level 7, Liquid Diet Level: Thin liquids - IDDSI Level 0   Aspiration Precautions: HOB to 90 degrees   General Precautions: Standard, fall  Communication strategies:  go to room if call light pushed    Assessment:     Tali Beard is a 88 y.o. female with an SLP diagnosis of Cognitive-Linguistic Impairment.  She presents with impaired SHORT-TERM MEMORY recall, long term recall, problem solving, thought organization, and word finding. Patient will benefit from skilled SLP intervention to target above stated deficits and promote a return to PLOF.     Subjective     Patient seen in lobby w/ co-treat w/ PT and then in room w/ son present.    Patient goals: none stated     Pain/Comfort:       Respiratory Status: Room air    Objective:     Has the patient been evaluated by SLP for swallowing?      Keep patient NPO?     Current Respiratory Status:        Sequencing cards by suite while standing working on dynamic standing completed given mod-max verbal and visual cueing due to impaired sustained attention and sequencing.  [place] category naming w/I 1 minute: Animals x4 modA; colors x3 Vy.    Overall good session.  Cont SLP PLAN OF CARE.    Goals:   Multidisciplinary Problems       SLP Goals          Problem: SLP    Goal Priority Disciplines Outcome   SLP Goal     SLP Progressing   Description: LTG: Patient will improve cognitive linguistic skills to SPV to ensure safety upon discharge home.    STG:  Patient will name x10 items in a concrete category w/I 1 minute to improve word generation Vy.  Patient will recall 3 EOD Vy.  Patient will answer LTM questions w/ 80% accuracy Vy.  Patient will provide solutions to common problems w/ 80% accuracy  Vy.                       Plan:     Patient to be seen:  5 x/week   Plan of Care expires:  11/29/24  Plan of Care reviewed with:  patient, son   SLP Follow-Up:  Yes       Discharge recommendations:      Barriers to Discharge:  None    Time Tracking:     SLP Treatment Date:   11/12/24  Speech Start Time:  0940  Speech Stop Time:  1020     Speech Total Time (min):  40 min    Billable Minutes: Speech Therapy Individual 40    Sara Davila M.S., CCC-SLP   11/12/2024

## 2024-11-12 NOTE — PT/OT/SLP PROGRESS
Physical Therapy Treatment Note           Name: Tali Beard    : 1935 (88 y.o.)  MRN: [id number]           TREATMENT SUMMARY AND RECOMMENDATIONS:    PT Received On: 24  PT Start Time: 937     PT Stop Time: 100  PT Total Time (min): 29 min     Subjective Assessment:   No complaints  Lethargic   x Awake, alert, cooperative  Uncooperative    Agitated  c/o pain    Appropriate  c/o fatigue   x Confused - minimal  Treated at bedside     Emotionally labile  Treated in gym/dept.    Impulsive  Other:    Flat affect       Therapy Precautions:   x Cognitive deficits  Spinal precautions    Collar - hard  Sternal precautions    Collar - soft   TLSO   x Fall risk  LSO    Hip precautions - posterior  Knee immobilizer    Hip precautions - anterior  WBAT    Impaired communication  Partial weightbearing    Oxygen  TTWB    PEG tube  NWB    Visual deficits  Other:    Hearing deficits          Treatment Objectives:     Mobility Training:   Assist level Comments    Bed mobility     Transfer CGA/SBA From standing - >recliner, stand pivot. No use of AD   Gait CGA 2 X 90 feet without AD. Pt seemed to walk slower when she held onto the hand rails. Extra time needed to begin ambulation to get out of room. WC following behind for safety.    Sit to stand transitions CGA/SBA Pushed up from arm rails on WC, multiple reps completed for therapy task.    Sitting balance     Standing balance  CGA/SBA Dynamic standing while completing therapy task. Pt stood while arms resting on table in front about 10 mins and then sat for a break. Wt shifting and UE reaching encouraged.    Wheelchair mobility     Car transfer     Other:          Therapeutic Exercise:   Exercise Sets Reps Comments                               Additional Comments:  Pt easily distracted by WC during ambulation.    PT and ST co-treated today for therapy session to encourage cognitive sequencing and processing during functional tasks.      Assessment:  Patient tolerated session well. Pt did need extra cueing to complete task.     PT Plan: continue per POC - Home with / family care  Revisions made to plan of care: No    GOALS:   Multidisciplinary Problems       Physical Therapy Goals          Problem: Physical Therapy    Goal Priority Disciplines Outcome Interventions   Physical Therapy Goal     PT, PT/OT Progressing    Description: Goals to be met by: Discharge      Patient will increase functional independence with mobility by performin. Supine <> sit with Supervision, remaining focused to task 100% of the time and only needing 25% cues.   2. Sit to stand transfer with Supervision, remaining focused to task 100% of the time and only needing 25% cues  3. Bed to chair transfer with Supervision using Rolling Walker vs No AD; remaining focused to task 100% of the time and only needing 25% cues  4. Gait  x 350 feet with Supervision using Rolling Walker vs no AD; remaining focused to task 100% of the time and only needing 25% cues.                          Skilled PT Minutes Provided: 23   Communication with Treatment Team:     Equipment recommendations:       At end of treatment, patient remained:  x Up in chair     Up in wheelchair in room    In bed   x With alarm activated    Bed rails up   x Call bell in reach    x Family/friends present    Restraints secured properly    In bathroom with CNA/RN notified    Nurse aware    In gym with therapist/tech    Other:

## 2024-11-12 NOTE — PT/OT/SLP PROGRESS
Physical Therapy Treatment Note           Name: Tali Beard    : 1935 (88 y.o.)  MRN: [id number]           TREATMENT SUMMARY AND RECOMMENDATIONS:    PT Received On: 24  PT Start Time: 1325     PT Stop Time: 1339  PT Total Time (min): 14 min     Subjective Assessment:   No complaints  Lethargic   x Awake, alert, cooperative  Uncooperative    Agitated  c/o pain    Appropriate  c/o fatigue   x Confused - minimal  Treated at bedside     Emotionally labile  Treated in gym/dept.    Impulsive  Other:    Flat affect       Therapy Precautions:   x Cognitive deficits  Spinal precautions    Collar - hard  Sternal precautions    Collar - soft   TLSO   x Fall risk  LSO    Hip precautions - posterior  Knee immobilizer    Hip precautions - anterior  WBAT    Impaired communication  Partial weightbearing    Oxygen  TTWB    PEG tube  NWB    Visual deficits  Other:    Hearing deficits          Treatment Objectives:     Mobility Training:   Assist level Comments    Bed mobility     Transfer CGA/SBA From standing - >recliner, stand pivot without the use of AD   Gait CGA/SBA X 300 feet without AD. Ask pt questions about naming objects and colors during ambulation. WC following behind for safety.   Sit to stand transitions SBA Pushing up from chair to standing prior to ambulation.    Sitting balance     Standing balance      Wheelchair mobility     Car transfer     Other:          Therapeutic Exercise:   Exercise Sets Reps Comments   LLE bike   5' Forwards                          Additional Comments:  Pt was eager to go back to room due to family being there to visit.     Pt cognition seemed to be better this afternoon with recalling objects and needed less help with cueing for task.    Assessment: Patient tolerated session well.    PT Plan: continue per POC - Home with  family care  Revisions made to plan of care: No    GOALS:   Multidisciplinary Problems       Physical Therapy Goals          Problem:  Physical Therapy    Goal Priority Disciplines Outcome Interventions   Physical Therapy Goal     PT, PT/OT Progressing    Description: Goals to be met by: Discharge      Patient will increase functional independence with mobility by performin. Supine <> sit with Supervision, remaining focused to task 100% of the time and only needing 25% cues.   2. Sit to stand transfer with Supervision, remaining focused to task 100% of the time and only needing 25% cues  3. Bed to chair transfer with Supervision using Rolling Walker vs No AD; remaining focused to task 100% of the time and only needing 25% cues  4. Gait  x 350 feet with Supervision using Rolling Walker vs no AD; remaining focused to task 100% of the time and only needing 25% cues.                          Skilled PT Minutes Provided: 14  Communication with Treatment Team:     Equipment recommendations:       At end of treatment, patient remained:  x Up in chair     Up in wheelchair in room    In bed   x With alarm activated    Bed rails up   x Call bell in reach    x Family/friends present    Restraints secured properly    In bathroom with CNA/RN notified    Nurse aware    In gym with therapist/tech    Other:

## 2024-11-12 NOTE — PT/OT/SLP PROGRESS
Occupational Therapy  Treatment    Name: Tali Beard    : 1935 (88 y.o.)  MRN: [id number]           TREATMENT SUMMARY AND RECOMMENDATIONS:      OT Date of Treatment: 24  OT Start Time: 1300  OT Stop Time: 1324  OT Total Time (min): 24 min      Subjective Assessment:   No complaints  Lethargic   x Awake, alert, cooperative  Impulsive    Uncooperative   Flat affect    Agitated  c/o pain    Appropriate  c/o fatigue    Confused  Treated at bedside     Emotionally labile x Treated in gym/dept.    Anxious/fearful   Other:        Therapy Precautions:   x Cognitive deficits  Spinal precautions    Collar - hard  Sternal precautions    Collar - soft   Cervical precautions    x Fall risk  LSO    Hip precautions - posterior  TLSO    Hip precautions - anterior  WBAT    Impaired communication  Partial weightbearing    Oxygen  TTWB    PEG tube  NWB    Visual deficits  Knee immobilizer    Hearing deficits   Other:        Treatment Objectives:     Mobility Training:    Mobility task Assist level Comments    Bed mobility     Transfer SBA/CGA Stand/pivot t/f with no device to standard chair   Sit to stands transitions     Functional mobility SBA/CGA X150 feet with no device    Sitting balance     Standing balance      Wheelchair mobility          Therapeutic Exercise:   Exercise Sets Reps Comments   Arm bike 2 5 min Level 1; forwards/backwards   Sit to stands 1 10 reps CGA from standard chair                   Assessment: Patient tolerated session well. Pt had positive response to today's treatment. Pt continues to make good progress towards goals. Pt would continue to benefit from skilled OT services to improve pt's safety and independence with daily occupations, decrease caregiver burden, and reduce pt's risk of falls.     GOALS:   Multidisciplinary Problems       Occupational Therapy Goals          Problem: Occupational Therapy    Goal Priority Disciplines Outcome Interventions   Occupational Therapy Goal     OT,  PT/OT Progressing    Description: Goals to be met by: 11/28/24     Patient will increase functional independence with ADLs by performing:    UE Dressing with Set-up Assistance. - met   LE Dressing with Set-up Assistance. - met   Grooming while standing at sink with Supervision.  Toileting from toilet with Contact Guard Assistance for hygiene and clothing management.   Bathing from  shower chair/bench with Contact Guard Assistance.  Toilet transfer to toilet with Stand-by Assistance.  Increased functional strength to 4/5 for BUEs.                         Recommendations:     Discharge Equipment Recommendations:   (TBD pending progress)     Plan:     Patient to be seen  (5-6x/wk; QD/BID as appropriate) to address the above listed problems via self-care/home management, therapeutic activities, therapeutic exercises, cognitive retraining  Plan of Care Expires: 11/28/24  Revisions made to plan of care: No    Skilled OT Minutes Provided: 24  Communication with Treatment Team:     At end of treatment, patient remained:   Up in chair     Up in wheelchair in room    In bed    With alarm activated    Bed rails up    Call bell in reach     Family/friends present    Restraints secured properly    In bathroom with CNA/RN notified   x In gym with PT/PTA/tech    Nurse aware    Other:

## 2024-11-12 NOTE — PLAN OF CARE
Problem: Adult Inpatient Plan of Care  Goal: Plan of Care Review  Outcome: Progressing  Flowsheets (Taken 11/12/2024 0002)  Plan of Care Reviewed With:   patient   child  Goal: Patient-Specific Goal (Individualized)  Outcome: Progressing  Flowsheets (Taken 11/12/2024 0002)  Individualized Care Needs: Encourage oral intake, Up to toilet, Monitor I&O's, Monitor VS, Wound care  Anxieties, Fears or Concerns: none at this time  Patient/Family-Specific Goals (Include Timeframe): Return to baseline by SENDY to dischare home with family  Goal: Absence of Hospital-Acquired Illness or Injury  Outcome: Progressing  Intervention: Prevent and Manage VTE (Venous Thromboembolism) Risk  Flowsheets (Taken 11/11/2024 2000)  VTE Prevention/Management:   bleeding precautions maintained   bleeding risk assessed   ambulation promoted   dorsiflexion/plantar flexion performed   fluids promoted   ROM (active) performed  Goal: Optimal Comfort and Wellbeing  Outcome: Progressing

## 2024-11-12 NOTE — PLAN OF CARE
Problem: Adult Inpatient Plan of Care  Goal: Plan of Care Review  Outcome: Progressing  Flowsheets (Taken 11/12/2024 1019)  Plan of Care Reviewed With:   patient   child  Goal: Patient-Specific Goal (Individualized)  Outcome: Progressing  Flowsheets (Taken 11/12/2024 1019)  Individualized Care Needs: UA, encourage oral intake, up to toilet for toileting, monitor I&O's, wound care  Anxieties, Fears or Concerns: none expressed  Patient/Family-Specific Goals (Include Timeframe): return to baseline by SENDY to discharge home with family  Goal: Absence of Hospital-Acquired Illness or Injury  Outcome: Progressing  Intervention: Identify and Manage Fall Risk  Flowsheets (Taken 11/12/2024 1019)  Safety Promotion/Fall Prevention:   assistive device/personal item within reach   chair alarm set   gait belt with ambulation   in recliner, wheels locked   medications reviewed   nonskid shoes/socks when out of bed  Intervention: Prevent Skin Injury  Flowsheets (Taken 11/12/2024 1019)  Body Position:   weight shifting   legs elevated  Skin Protection:   protective footwear used   skin sealant/moisture barrier applied   hydrocolloids used  Device Skin Pressure Protection:   positioning supports utilized   adhesive use limited   pressure points protected  Intervention: Prevent and Manage VTE (Venous Thromboembolism) Risk  Flowsheets (Taken 11/12/2024 1019)  VTE Prevention/Management:   ambulation promoted   bleeding precautions maintained   bleeding risk assessed   dorsiflexion/plantar flexion performed  Intervention: Prevent Infection  Flowsheets (Taken 11/12/2024 1019)  Infection Prevention:   hand hygiene promoted   single patient room provided  Goal: Optimal Comfort and Wellbeing  Outcome: Progressing  Intervention: Monitor Pain and Promote Comfort  Flowsheets (Taken 11/12/2024 1019)  Pain Management Interventions:   pillow support provided   position adjusted   quiet environment facilitated   relaxation techniques  promoted  Intervention: Provide Person-Centered Care  Flowsheets (Taken 11/12/2024 1019)  Trust Relationship/Rapport:   care explained   choices provided   questions answered   questions encouraged   reassurance provided

## 2024-11-12 NOTE — PT/OT/SLP PROGRESS
Occupational Therapy  Treatment    Name: Tali Beard    : 1935 (88 y.o.)  MRN: [id number]           TREATMENT SUMMARY AND RECOMMENDATIONS:      OT Date of Treatment: 24  OT Start Time: 900  OT Stop Time: 936  OT Total Time (min): 36 min      Subjective Assessment:   No complaints  Lethargic   x Awake, alert, cooperative  Impulsive    Uncooperative   Flat affect    Agitated  c/o pain    Appropriate  c/o fatigue    Confused x Treated at bedside     Emotionally labile  Treated in gym/dept.    Anxious/fearful   Other:        Therapy Precautions:   x Cognitive deficits  Spinal precautions    Collar - hard  Sternal precautions    Collar - soft   Cervical precautions    x Fall risk  LSO    Hip precautions - posterior  TLSO    Hip precautions - anterior  WBAT    Impaired communication  Partial weightbearing    Oxygen  TTWB    PEG tube  NWB    Visual deficits  Knee immobilizer    Hearing deficits   Other:        Treatment Objectives:     Mobility Training:    Mobility task Assist level Comments    Bed mobility SBA Supine>EOB   Transfer SBA/CGA Stand/pivot t/f with no device to wheelchair   Sit to stands transitions SBA/CGA Multiple reps throughout session    Functional mobility CGA Short bouts in room with no device for ADL session    Sitting balance     Standing balance  Good No LOB noted during standing ADL tasks   Wheelchair mobility        ADL Training:    ADL Assist level Comments    Feeding     Grooming/hygiene     Bathing CGA Pt able to bath 10/10 body parts CGA for safety in standing   Upper body dressing Min A Pt able to delma bra and pullover shirt with min A to fix twisted bra   Lower body dressing Min A Pt able to delma pants with min A for threading LE into wrong hole. Pt able to complete remainder. Able to delma B socks and B shoes   Toileting     Toilet transfer CGA Stand/pivot t/f with no device to toilet    Adaptive equipment training     Other:         Additional Comments:  Pt is able to  complete ADL functional tasks with less assistance. Son present in room reports he feels comfortable with taking mother home and does not feel as though he needs additional training.     Assessment: Patient tolerated session well. Pt had positive response to today's treatment. Pt continues to make good progress towards goals. Pt would continue to benefit from skilled OT services to improve pt's safety and independence with daily occupations, decrease caregiver burden, and reduce pt's risk of falls.     GOALS:   Multidisciplinary Problems       Occupational Therapy Goals          Problem: Occupational Therapy    Goal Priority Disciplines Outcome Interventions   Occupational Therapy Goal     OT, PT/OT Progressing    Description: Goals to be met by: 11/28/24     Patient will increase functional independence with ADLs by performing:    UE Dressing with Set-up Assistance. - met   LE Dressing with Set-up Assistance. - met   Grooming while standing at sink with Supervision.  Toileting from toilet with Contact Guard Assistance for hygiene and clothing management.   Bathing from  shower chair/bench with Contact Guard Assistance.  Toilet transfer to toilet with Stand-by Assistance.  Increased functional strength to 4/5 for BUEs.                         Recommendations:     Discharge Equipment Recommendations:   (TBD pending progress)     Plan:     Patient to be seen  (5-6x/wk; QD/BID as appropriate) to address the above listed problems via self-care/home management, therapeutic activities, therapeutic exercises, cognitive retraining  Plan of Care Expires: 11/28/24  Revisions made to plan of care: No    Skilled OT Minutes Provided: 36  Communication with Treatment Team:     At end of treatment, patient remained:   Up in chair    x Up in wheelchair in room    In bed    With alarm activated    Bed rails up    Call bell in reach     Family/friends present    Restraints secured properly    In bathroom with CNA/RN notified    In  gym with PT/PTA/tech    Nurse aware   x Other: PT present

## 2024-11-13 PROCEDURE — 97530 THERAPEUTIC ACTIVITIES: CPT

## 2024-11-13 PROCEDURE — 94760 N-INVAS EAR/PLS OXIMETRY 1: CPT

## 2024-11-13 PROCEDURE — 63600175 PHARM REV CODE 636 W HCPCS: Performed by: STUDENT IN AN ORGANIZED HEALTH CARE EDUCATION/TRAINING PROGRAM

## 2024-11-13 PROCEDURE — 92507 TX SP LANG VOICE COMM INDIV: CPT

## 2024-11-13 PROCEDURE — 97110 THERAPEUTIC EXERCISES: CPT

## 2024-11-13 PROCEDURE — 11000004 HC SNF PRIVATE

## 2024-11-13 PROCEDURE — 99900035 HC TECH TIME PER 15 MIN (STAT)

## 2024-11-13 PROCEDURE — 97129 THER IVNTJ 1ST 15 MIN: CPT

## 2024-11-13 PROCEDURE — 25000003 PHARM REV CODE 250: Performed by: PHYSICIAN ASSISTANT

## 2024-11-13 PROCEDURE — 97535 SELF CARE MNGMENT TRAINING: CPT | Mod: CQ

## 2024-11-13 PROCEDURE — 97535 SELF CARE MNGMENT TRAINING: CPT

## 2024-11-13 PROCEDURE — 97116 GAIT TRAINING THERAPY: CPT

## 2024-11-13 RX ORDER — CEFTRIAXONE 1 G/1
1 INJECTION, POWDER, FOR SOLUTION INTRAMUSCULAR; INTRAVENOUS
Status: COMPLETED | OUTPATIENT
Start: 2024-11-13 | End: 2024-11-17

## 2024-11-13 RX ADMIN — FERROUS SULFATE TAB 325 MG (65 MG ELEMENTAL FE) 1 EACH: 325 (65 FE) TAB at 09:11

## 2024-11-13 RX ADMIN — LATANOPROST 1 DROP: 50 SOLUTION/ DROPS OPHTHALMIC at 09:11

## 2024-11-13 RX ADMIN — CEFTRIAXONE SODIUM 1 G: 1 INJECTION, POWDER, FOR SOLUTION INTRAMUSCULAR; INTRAVENOUS at 09:11

## 2024-11-13 RX ADMIN — LEVETIRACETAM 500 MG: 500 TABLET, FILM COATED ORAL at 07:11

## 2024-11-13 RX ADMIN — LEVETIRACETAM 500 MG: 500 TABLET, FILM COATED ORAL at 09:11

## 2024-11-13 RX ADMIN — ATORVASTATIN CALCIUM 40 MG: 40 TABLET, FILM COATED ORAL at 07:11

## 2024-11-13 RX ADMIN — AMLODIPINE BESYLATE 5 MG: 5 TABLET ORAL at 09:11

## 2024-11-13 NOTE — PT/OT/SLP PROGRESS
Occupational Therapy  Treatment    Name: Tali Beard    : 1935 (88 y.o.)  MRN: [id number]           TREATMENT SUMMARY AND RECOMMENDATIONS:      OT Date of Treatment: 24  OT Start Time: 1300  OT Stop Time: 1328  OT Total Time (min): 28 min      Subjective Assessment:   No complaints  Lethargic   x Awake, alert, cooperative  Impulsive    Uncooperative   Flat affect    Agitated  c/o pain    Appropriate  c/o fatigue    Confused  Treated at bedside     Emotionally labile x Treated in gym/dept.    Anxious/fearful   Other:        Therapy Precautions:   x Cognitive deficits  Spinal precautions    Collar - hard  Sternal precautions    Collar - soft   Cervical precautions    x Fall risk  LSO    Hip precautions - posterior  TLSO    Hip precautions - anterior  WBAT    Impaired communication  Partial weightbearing    Oxygen  TTWB    PEG tube  NWB    Visual deficits  Knee immobilizer    Hearing deficits   Other:        Treatment Objectives:     Mobility Training:    Mobility task Assist level Comments    Bed mobility     Transfer SBA/CGA Stand/pivot t/f with no device to standard chair   Sit to stands transitions SBA Multiple reps throughout session    Functional mobility SBA/CGA X300 feet with no device; 1 LOB when turning    Sitting balance     Standing balance  SBA Pt stood to complete functional reaching ax    Wheelchair mobility          Cognitive activity:   Exercise Comments   Pt performed matching ax to match cards to appropriate number Difficulty noted with multitasking of being in gym environment with increased distractions and ability to follow directions. Max cues required for task and pt unable to continue due to distractions.                    Assessment: Patient tolerated session well. Pt had positive response to today's treatment. Pt continues to make good progress towards goals. Pt would continue to benefit from skilled OT services to improve pt's safety and independence with daily occupations,  decrease caregiver burden, and reduce pt's risk of falls.     GOALS:   Multidisciplinary Problems       Occupational Therapy Goals          Problem: Occupational Therapy    Goal Priority Disciplines Outcome Interventions   Occupational Therapy Goal     OT, PT/OT Progressing    Description: Goals to be met by: 11/28/24     Patient will increase functional independence with ADLs by performing:    UE Dressing with Set-up Assistance. - met   LE Dressing with Set-up Assistance. - met   Grooming while standing at sink with Supervision.  Toileting from toilet with Contact Guard Assistance for hygiene and clothing management.   Bathing from  shower chair/bench with Contact Guard Assistance.  Toilet transfer to toilet with Stand-by Assistance.  Increased functional strength to 4/5 for BUEs.                         Recommendations:     Discharge Equipment Recommendations:   (TBD pending progress)     Plan:     Patient to be seen  (5-6x/wk; QD/BID as appropriate) to address the above listed problems via self-care/home management, therapeutic activities, therapeutic exercises, cognitive retraining  Plan of Care Expires: 11/28/24  Revisions made to plan of care: No    Skilled OT Minutes Provided: 28  Communication with Treatment Team:     At end of treatment, patient remained:   Up in chair     Up in wheelchair in room    In bed    With alarm activated    Bed rails up    Call bell in reach     Family/friends present    Restraints secured properly    In bathroom with CNA/RN notified   x In gym with PT/PTA/tech    Nurse aware    Other:

## 2024-11-13 NOTE — PT/OT/SLP PROGRESS
Physical Therapy Treatment Note           Name: Tali Beard    : 1935 (88 y.o.)  MRN: [id number]           TREATMENT SUMMARY AND RECOMMENDATIONS:    PT Received On: 24  PT Start Time: 1003     PT Stop Time: 1028  PT Total Time (min): 25 min     Subjective Assessment:   No complaints  Lethargic   x Awake, alert, cooperative  Uncooperative    Agitated  c/o pain    Appropriate  c/o fatigue   x Confused - minimal  Treated at bedside     Emotionally labile  Treated in gym/dept.    Impulsive  Other:    Flat affect       Therapy Precautions:   x Cognitive deficits  Spinal precautions    Collar - hard  Sternal precautions    Collar - soft   TLSO   x Fall risk  LSO    Hip precautions - posterior  Knee immobilizer    Hip precautions - anterior  WBAT    Impaired communication  Partial weightbearing    Oxygen  TTWB    PEG tube  NWB    Visual deficits  Other:    Hearing deficits          Treatment Objectives:     Mobility Training:   Assist level Comments    Bed mobility     Transfer CGA/SBA    Gait CGA X 175 feet without AD, 3 losses in balance towards the R due to scissoring.    Sit to stand transitions CGA/SBA From recliner and standard chair x 5 reps   Sitting balance     Standing balance  CGA/SBA Dynamic standing for dressing tasks and reaching to floor for cones.    Wheelchair mobility     Car transfer     Other:          Therapeutic Exercise:   Exercise Sets Reps Comments   Standing marches, mini squats  15    Seated knee ext and marching   15                    Additional Comments:  Pt with good tolerance, Pt able to follow commands 75% of the time. Improved object naming during ambulation challenges.     Assessment: Patient tolerated session well. Pt did need extra cueing to complete task.     PT Plan: continue per POC - Home with / family care  Revisions made to plan of care: No    GOALS:   Multidisciplinary Problems       Physical Therapy Goals          Problem: Physical Therapy    Goal  Priority Disciplines Outcome Interventions   Physical Therapy Goal     PT, PT/OT Progressing    Description: Goals to be met by: Discharge      Patient will increase functional independence with mobility by performin. Supine <> sit with Supervision, remaining focused to task 100% of the time and only needing 25% cues.   2. Sit to stand transfer with Supervision, remaining focused to task 100% of the time and only needing 25% cues  3. Bed to chair transfer with Supervision using Rolling Walker vs No AD; remaining focused to task 100% of the time and only needing 25% cues  4. Gait  x 350 feet with Supervision using Rolling Walker vs no AD; remaining focused to task 100% of the time and only needing 25% cues.                          Skilled PT Minutes Provided: 25   Communication with Treatment Team:     Equipment recommendations:       At end of treatment, patient remained:  x Up in chair     Up in wheelchair in room    In bed    With alarm activated    Bed rails up    Call bell in reach     Family/friends present    Restraints secured properly    In bathroom with CNA/RN notified    Nurse aware   x In gym with therapist/tech    Other:

## 2024-11-13 NOTE — PLAN OF CARE
Weekly Staffing Report      Date Admitted: 11/6/2024 :   Staffing Date:     Patient Active Problem List   Diagnosis    Type 2 diabetes mellitus without complication, without long-term current use of insulin    Primary hypertension    Other hyperlipidemia    Foot ulcer with fat layer exposed, left    Nontraumatic hemorrhage of right cerebral hemisphere    Intracranial atherosclerosis    Acute focal neurological deficit    Counseling regarding advance care planning and goals of care    Weakness    Intracranial hemorrhage          Team Members Present:       Nursing Present     Yes [x]    No []    Physical Therapy Present    Yes [x]    No []    Occupational Therapy Present     Yes [x]    No []    Speech Therapy Present    Yes []    No []    NA [x]    Dietary Present    Yes [x]    No []        Physician Present     [x] Thairy Reyes    [] Lili Booker    [x] NIKUNJ Ac    []       Family Present    [x] Adult Children son present    [] Spouse    [] POA    [] Friend/ Caregiver    [] Other       Interdisciplinary Meeting Notes:  PT- using no assistive devices to walk 250ft at a time. Someone will need to be with her at all times. CGA. Bed mobility SBA and transfers Cga. Needs time and queuing. OT- CGA for most ADLs. Min A for toileting/bathing. Appetite- intake better. Medically- urinalysis was abnormal Started on Abx for 5 days. Here until next week for further therapy. When discharged will have 24hr care. All questions answered at this [email or web address]                 Please see Documented PT/OT/ST, Dietary, Nursing, and Physician notes for detailed treatment information.

## 2024-11-13 NOTE — PLAN OF CARE
Problem: Adult Inpatient Plan of Care  Goal: Plan of Care Review  Outcome: Progressing  Flowsheets (Taken 11/13/2024 0800)  Plan of Care Reviewed With: patient  Goal: Patient-Specific Goal (Individualized)  Outcome: Progressing  Flowsheets (Taken 11/13/2024 0800)  Individualized Care Needs: start IV abx for uti, encourage fluids, monitor I&O  Anxieties, Fears or Concerns: none voiced at this time  Goal: Absence of Hospital-Acquired Illness or Injury  Outcome: Progressing  Intervention: Prevent Skin Injury  Flowsheets (Taken 11/13/2024 0800)  Body Position:   position changed independently   weight shifting  Skin Protection: drying agents applied  Device Skin Pressure Protection: absorbent pad utilized/changed  Intervention: Prevent and Manage VTE (Venous Thromboembolism) Risk  Flowsheets (Taken 11/13/2024 0800)  VTE Prevention/Management:   ambulation promoted   bleeding risk assessed   bleeding risk factor(s) identified, provider notified  Intervention: Prevent Infection  Flowsheets (Taken 11/13/2024 0800)  Infection Prevention:   cohorting utilized   personal protective equipment utilized   environmental surveillance performed   rest/sleep promoted   equipment surfaces disinfected   single patient room provided   hand hygiene promoted     Problem: Infection  Goal: Absence of Infection Signs and Symptoms  Outcome: Progressing     Problem: Fall Injury Risk  Goal: Absence of Fall and Fall-Related Injury  Outcome: Progressing  Intervention: Identify and Manage Contributors  Flowsheets (Taken 11/13/2024 0800)  Self-Care Promotion:   adaptive equipment use encouraged   independence encouraged   BADL personal objects within reach   BADL personal routines maintained   meal set-up provided

## 2024-11-13 NOTE — PT/OT/SLP PROGRESS
Speech Language Pathology Treatment    Patient Name:  Tali Beard   MRN:  [id number]  Admitting Diagnosis: Intracranial hemorrhage    Recommendations:                 General Recommendations:  Cognitive-linguistic therapy  Diet recommendations:  Regular Diet - IDDSI Level 7, Liquid Diet Level: Thin liquids - IDDSI Level 0   Aspiration Precautions: HOB to 90 degrees   General Precautions: Standard, fall  Communication strategies:  go to room if call light pushed    Assessment:     Tali Beard is a 88 y.o. female with an SLP diagnosis of Cognitive-Linguistic Impairment.  She presents with impaired SHORT-TERM MEMORY recall, long term recall, problem solving, thought organization, and word finding. Patient will benefit from skilled SLP intervention to target above stated deficits and promote a return to PLOF.     Subjective     Patient seen in room sitting in chair w/ 2 sons present.     Patient goals: none stated     Pain/Comfort:       Respiratory Status: Room air    Objective:     Has the patient been evaluated by SLP for swallowing?      Keep patient NPO?     Current Respiratory Status:        Picture inferences completed w/ 40% accuracy Alayna, increasing to 80% accuracy modA.  Patient oriented to month Independently, year Independently, place Independently, city Independently, and situation Independently.  Discussion w/ Patient's son regarding CLOF goals and discharge recs (24 hour supervision and home health therapy)    Overall good session.  Cont SLP PLAN OF CARE.    Goals:   Multidisciplinary Problems       SLP Goals          Problem: SLP    Goal Priority Disciplines Outcome   SLP Goal     SLP Progressing   Description: LTG: Patient will improve cognitive linguistic skills to SPV to ensure safety upon discharge home.    STG:  Patient will name x10 items in a concrete category w/I 1 minute to improve word generation Vy.  Patient will recall 3 EOD Vy.  Patient will answer LTM questions w/ 80% accuracy  Vy.  Patient will provide solutions to common problems w/ 80% accuracy Vy.                       Plan:     Patient to be seen:  5 x/week   Plan of Care expires:  11/29/24  Plan of Care reviewed with:  patient, son   SLP Follow-Up:  Yes       Discharge recommendations:      Barriers to Discharge:  None    Time Tracking:     SLP Treatment Date:   11/13/24  Speech Start Time:  0900  Speech Stop Time:  0935     Speech Total Time (min):  35 min    Billable Minutes: Speech Therapy Individual 35    Sara Davila M.S., CCC-SLP   11/13/2024

## 2024-11-13 NOTE — PT/OT/SLP PROGRESS
Occupational Therapy  Treatment    Name: Tali Beard    : 1935 (88 y.o.)  MRN: [id number]           TREATMENT SUMMARY AND RECOMMENDATIONS:      OT Date of Treatment: 24  OT Start Time: 1030  OT Stop Time: 1058  OT Total Time (min): 28 min      Subjective Assessment:   No complaints  Lethargic   x Awake, alert, cooperative  Impulsive    Uncooperative   Flat affect    Agitated  c/o pain    Appropriate  c/o fatigue   x Confused  Treated at bedside     Emotionally labile x Treated in gym/dept.    Anxious/fearful   Other:        Therapy Precautions:   x Cognitive deficits  Spinal precautions    Collar - hard  Sternal precautions    Collar - soft   Cervical precautions    x Fall risk  LSO    Hip precautions - posterior  TLSO    Hip precautions - anterior  WBAT    Impaired communication  Partial weightbearing    Oxygen  TTWB    PEG tube  NWB    Visual deficits  Knee immobilizer    Hearing deficits   Other:        Treatment Objectives:     Mobility Training:    Mobility task Assist level Comments    Bed mobility     Transfer SBA Stand/pivot t/f without device to BSchair   Sit to stands transitions SBA From standard chair   Functional mobility SBA/CGA X80 feet without device    Sitting balance     Standing balance      Wheelchair mobility          Therapeutic Exercise:   Exercise Sets Reps Comments   Arm bike 2 5 min Level 1; forwards/backwards   UE/core strengthening  2 12 reps With 1# wrist weights, pt stood to perform functional reach to low stool to grasp rings and then place onto savanna above shoulder level.    While seated, pt performed removal of rings from above shoulder level and placed anteriorly onto floor level                    Assessment: Patient tolerated session well. Pt had positive response to today's treatment. Pt continues to make good progress towards goals. Pt would continue to benefit from skilled OT services to improve pt's safety and independence with daily occupations, decrease  caregiver burden, and reduce pt's risk of falls.     GOALS:   Multidisciplinary Problems       Occupational Therapy Goals          Problem: Occupational Therapy    Goal Priority Disciplines Outcome Interventions   Occupational Therapy Goal     OT, PT/OT Progressing    Description: Goals to be met by: 11/28/24     Patient will increase functional independence with ADLs by performing:    UE Dressing with Set-up Assistance. - met   LE Dressing with Set-up Assistance. - met   Grooming while standing at sink with Supervision.  Toileting from toilet with Contact Guard Assistance for hygiene and clothing management.   Bathing from  shower chair/bench with Contact Guard Assistance.  Toilet transfer to toilet with Stand-by Assistance.  Increased functional strength to 4/5 for BUEs.                         Recommendations:     Discharge Equipment Recommendations:   (TBD pending progress)     Plan:     Patient to be seen  (5-6x/wk; QD/BID as appropriate) to address the above listed problems via self-care/home management, therapeutic activities, therapeutic exercises, cognitive retraining  Plan of Care Expires: 11/28/24  Revisions made to plan of care: No    Skilled OT Minutes Provided: 28  Communication with Treatment Team:     At end of treatment, patient remained:  x Up in chair     Up in wheelchair in room    In bed   x With alarm activated    Bed rails up   x Call bell in reach    x Family/friends present    Restraints secured properly    In bathroom with CNA/RN notified    In gym with PT/PTA/tech    Nurse aware    Other:

## 2024-11-13 NOTE — PT/OT/SLP PROGRESS
Physical Therapy Treatment Note           Name: Tali Beard    : 1935 (88 y.o.)  MRN: [id number]           TREATMENT SUMMARY AND RECOMMENDATIONS:    PT Received On: 24  PT Start Time: 1329     PT Stop Time: 1402  PT Total Time (min): 33 min     Subjective Assessment:   No complaints  Lethargic   x Awake, alert, cooperative  Uncooperative    Agitated  c/o pain    Appropriate  c/o fatigue   x Confused - minimal  Treated at bedside     Emotionally labile x Treated in gym/dept.    Impulsive  Other:    Flat affect       Therapy Precautions:   x Cognitive deficits  Spinal precautions    Collar - hard  Sternal precautions    Collar - soft   TLSO   x Fall risk  LSO    Hip precautions - posterior  Knee immobilizer    Hip precautions - anterior  WBAT    Impaired communication  Partial weightbearing    Oxygen  TTWB    PEG tube  NWB    Visual deficits  Other:    Hearing deficits          Treatment Objectives:     Mobility Training:   Assist level Comments    Bed mobility     Transfer CGA/SBA Stand pivot from standing-> recliner   Gait CGA X 100 feet from gym to pt room. Extra time needed to begin ambulation but was bale to ambulate at a constant speed this session.    Sit to stand transitions SBA Pushing off from chair arms prior to ambulation   Sitting balance     Standing balance      Wheelchair mobility     Car transfer     Other:          Therapeutic Exercise:   Exercise Sets Reps Comments   Seated LLE with 1# ankle wts 2 10 Hip flex, knee ext, hip abd/add                           Additional Comments:  Pt was able to follow commands 75% of the time, pt needed tactile and visual cues to facilitate movement.     Assessment: Patient tolerated session better and was able to follow cues better this session.     PT Plan: continue per POC - Home with  family care  Revisions made to plan of care: No    GOALS:   Multidisciplinary Problems       Physical Therapy Goals          Problem: Physical Therapy     Goal Priority Disciplines Outcome Interventions   Physical Therapy Goal     PT, PT/OT Progressing    Description: Goals to be met by: Discharge      Patient will increase functional independence with mobility by performin. Supine <> sit with Supervision, remaining focused to task 100% of the time and only needing 25% cues.   2. Sit to stand transfer with Supervision, remaining focused to task 100% of the time and only needing 25% cues  3. Bed to chair transfer with Supervision using Rolling Walker vs No AD; remaining focused to task 100% of the time and only needing 25% cues  4. Gait  x 350 feet with Supervision using Rolling Walker vs no AD; remaining focused to task 100% of the time and only needing 25% cues.                          Skilled PT Minutes Provided: 33  Communication with Treatment Team:     Equipment recommendations:       At end of treatment, patient remained:  x Up in chair     Up in wheelchair in room    In bed   x With alarm activated    Bed rails up   x Call bell in reach    x Family/friends present    Restraints secured properly    In bathroom with CNA/RN notified    Nurse aware    In gym with therapist/tech    Other:

## 2024-11-13 NOTE — PATIENT CARE CONFERENCE
Name: Tali Beard    : 1935 (88 y.o.)  MRN: [id number]            Interdisciplinary Team Conference     Case conference held with patient/family and care team to discuss progress, plan of care, barriers to be addressed for safe return home, equipment recommendations, and discharge planning. Communicated therapy progress with MD, RN, therapy clinicians and case management. All questions/concerns answered.

## 2024-11-13 NOTE — PT/OT/SLP PROGRESS
Name: Tali Beard    : 1935 (88 y.o.)  MRN: [id number]            Interdisciplinary Team Conference     Case conference held with patient/family and care team to discuss progress, plan of care, barriers to be addressed for safe return home, equipment recommendations, and discharge planning. Communicated therapy progress with MD, RN, therapy clinicians and case management. All questions/concerns answered.

## 2024-11-13 NOTE — PLAN OF CARE
Problem: Adult Inpatient Plan of Care  Goal: Plan of Care Review  Outcome: Progressing  Flowsheets (Taken 11/12/2024 2340)  Plan of Care Reviewed With:   patient   child  Goal: Patient-Specific Goal (Individualized)  Outcome: Progressing  Flowsheets (Taken 11/12/2024 2340)  Individualized Care Needs: Encourage oral intake, Up to toilet, monitor I&O's Wound care  Anxieties, Fears or Concerns: none at this time  Patient/Family-Specific Goals (Include Timeframe): return to baseline by SENDY to discharge home with family  Goal: Absence of Hospital-Acquired Illness or Injury  Outcome: Progressing  Intervention: Prevent Skin Injury  Flowsheets (Taken 11/12/2024 2000)  Skin Protection: hydrocolloids used  Intervention: Prevent and Manage VTE (Venous Thromboembolism) Risk  Flowsheets (Taken 11/12/2024 2000)  VTE Prevention/Management:   ambulation promoted   bleeding precautions maintained   bleeding risk assessed   fluids promoted   dorsiflexion/plantar flexion performed   ROM (active) performed  Goal: Optimal Comfort and Wellbeing  Outcome: Progressing

## 2024-11-13 NOTE — MEDICAL/APP STUDENT
[facility] - [facility]  HOSPITAL MEDICINE ~ PROGRESS NOTE    CHIEF COMPLAINT     Hospital follow up for right parietal intracranial hemorrhage     HOSPITAL COURSE     88 year old female with PMHx of HTN, HLD, DM2, Kaposi Sarcoma s/p resection and radiation to left ankle and submental node (followed by [facility]), and prior subarachnoid hemorrhage and intraparenchymal hemorrhage presents to [facility] for swing following right parietal intracranial hemorrhage.  She initially presented to [facility] on 10/30/2024 due to left sided weakness and confusion.  CT head revealed acute hemorrhagic infarct of the right parietal lobe.  CTA head and neck displayed diminished caliber of M1 and M2 segments bilaterally MCA possible indicative of vasospasm.  MRI revealed evolving left parietal late subacute and right parietal early subacute cortical hemorrhage, new punctate focus of suspected ischemic cortical infarct at the left occipital lobe, with no additional sites of abnormal diffusion signal appreciated.     Neurology does not recommend anticoagulation or antiplatelets at this time due to spontaneous bleeding events. Suspecting primary intracranial hemorrhage due to amyloid angiopathy. EEG was negative for seizures, showed diffuse cerebral slowing - will continue keppra 500mg PO BID for seizure prophylaxis.      Patient lives alone and was completely independent prior to this hospitalization. Has plans for 24 hour care upon returning home between 2 of her sons. She lives in a 1 story home without stairs and a climb in bathtub. Patient's mentation was improving prior to admission to [facility]. Patient is oriented x3 with slow response time - patient's son report she is worse than her baseline mentation. Patient denies headache, N/V, or vision changes.     Today:  Patient has no new complaints. Son at bedside. UA with UTI, reflexed to culture which is pending. Empirically treating with rocephin 1g  IV QD. Patient will continue PT/OT as well as speech therapy.     OBJECTIVE/PHYSICAL EXAM     VITAL SIGNS (MOST RECENT):  Temp: 97.9 °F (36.6 °C) (11/13/24 0715)  Pulse: 84 (11/13/24 0715)  Resp: 18 (11/13/24 0715)  BP: (!) 162/96 (11/13/24 0715)  SpO2: 98 % (11/13/24 0715) VITAL SIGNS (24 HOUR RANGE):  Temp:  [97.6 °F (36.4 °C)-98.6 °F (37 °C)] 97.9 °F (36.6 °C)  Pulse:  [78-89] 84  Resp:  [18] 18  SpO2:  [98 %-100 %] 98 %  BP: (130-162)/(72-96) 162/96     GENERAL: In no acute distress, afebrile  HEENT: PERRLA   CHEST: Clear to auscultation bilaterally  HEART: S1, S2, no appreciable murmur  ABDOMEN: Soft, nontender, BS +  MSK: Warm, no lower extremity edema, no clubbing or cyanosis  NEUROLOGIC: Alert and oriented x3, moving all extremities with good strength, speech is difficult to understand occasionally, slow response time    ASSESSMENT/PLAN     Left parietal late subacute and right parietal early subacute cortical hemorrhage   Neurology recommends no anticoagulation or antiplatelet therapy at this time due to spontaneous ICH events    Follow up with Yue Rodriguez NP in 1 month (approx 11/29) for MRI w/wo contrast with SWI sequence and CSF labs.  Negative for Lyme disease serology, encephalopathy autoimmune evaluation  Pending: CSF, HSV type 2 glycoprotein IgG  Continue Keppra 500mg PO BID for seizure prophylaxis.   Continue speech therapy for cognition      Incontinence dermatitis   Followed by wound care  Purewick  Prn bowel regimen      UTI  Urine culture pending  Empiric treatment with rocephin 1g IV for 5 day regimen starting 11/13/2024     Chronic Microcytic Anemia   Folate deficiency   Multivitamin      HTN / HLD  Continue amlodipine 5mg PO QD and atorvastatin 40mg PO QHS.   SBP goal < 140      Left foot wound - healed     Kaposi Sarcoma   Followed by [facility] - Dr. Celestine Henderson   S/p XRT   CT chest/abdomen/pelvis on 10/04/2024 was negative      DVT prophylaxis: Neurology recommends holding  "chemical prophylaxis at this time.      Code status: Full      Anticipated discharge and disposition:  Continue PT/OT.  Patient has established 24 hour care at home with her children  __________________________________________________________________________    LABS/MICRO/MEDS/DIAGNOSTICS     LABS  Recent Labs     11/12/24  0605      K 4.4   CO2 28   BUN 21.0*   CREATININE 0.75   GLUCOSE 104   CALCIUM 9.1     Recent Labs     11/12/24  0605   WBC 5.55   RBC 4.46   HCT 33.5*   MCV 75.1*        MICROBIOLOGY  Microbiology Results (last 7 days)       Procedure Component Value Units Date/Time    Urine culture [[id number]] Collected: 11/12/24 1910    Order Status: Sent Specimen: Urine Updated: 11/1935             MEDICATIONS   amLODIPine  5 mg Oral Daily    atorvastatin  40 mg Oral QHS    cefTRIAXone (Rocephin) IV (PEDS and ADULTS)  1 g Intravenous Q24H    ferrous sulfate  1 tablet Oral Daily    gentamicin   Topical (Top) Daily    latanoprost  1 drop Both Eyes Daily    levETIRAcetam  500 mg Oral BID         INFUSIONS       DIAGNOSTIC TESTS  No orders to display      No results found for: "EF"     NUTRITION STATUS  Patient meets ASPEN criteria for mild malnutrition of acute illness or injury per RD assessment as evidenced by:  Energy Intake (Malnutrition): less than or equal to 50% for greater than or equal to 5 days  Weight Loss (Malnutrition): other (see comments) (wt loss unsure family reports)  Subcutaneous Fat (Malnutrition): mild depletion  Muscle Mass (Malnutrition): mild depletion  Fluid Accumulation (Malnutrition): other (see comments) (unable to assess)        A minimum of two characteristics is recommended for diagnosis of either severe or non-severe malnutrition.     Case related differential diagnoses have been reviewed; assessment and plan has been documented. I have personally reviewed the labs and test results that are currently available; I have reviewed the patients medication list. I " have reviewed the consulting providers recommendations. I have reviewed or attempted to review medical records based upon their availability.  All of the patient's and/or family's questions have been addressed and answered to the best of my ability.  I will continue to monitor closely and make adjustments to medical management as needed.  This document was created using [employer]*Outcome Referrals Fluency Direct.  Transcription errors may have been made.  Please contact me if any questions may rise regarding documentation to clarify transcription.      BLANCO AlvaradoS  Internal Medicine  Department of Hospital Medicine [facility] - [facility]

## 2024-11-14 PROCEDURE — 94760 N-INVAS EAR/PLS OXIMETRY 1: CPT

## 2024-11-14 PROCEDURE — 11000004 HC SNF PRIVATE

## 2024-11-14 PROCEDURE — 92507 TX SP LANG VOICE COMM INDIV: CPT

## 2024-11-14 PROCEDURE — 25000003 PHARM REV CODE 250: Performed by: PHYSICIAN ASSISTANT

## 2024-11-14 PROCEDURE — 63600175 PHARM REV CODE 636 W HCPCS: Performed by: STUDENT IN AN ORGANIZED HEALTH CARE EDUCATION/TRAINING PROGRAM

## 2024-11-14 PROCEDURE — 97530 THERAPEUTIC ACTIVITIES: CPT

## 2024-11-14 PROCEDURE — 97116 GAIT TRAINING THERAPY: CPT

## 2024-11-14 PROCEDURE — 99900035 HC TECH TIME PER 15 MIN (STAT)

## 2024-11-14 PROCEDURE — 97110 THERAPEUTIC EXERCISES: CPT

## 2024-11-14 PROCEDURE — 97535 SELF CARE MNGMENT TRAINING: CPT

## 2024-11-14 RX ADMIN — LEVETIRACETAM 500 MG: 500 TABLET, FILM COATED ORAL at 08:11

## 2024-11-14 RX ADMIN — ATORVASTATIN CALCIUM 40 MG: 40 TABLET, FILM COATED ORAL at 08:11

## 2024-11-14 RX ADMIN — LATANOPROST 1 DROP: 50 SOLUTION/ DROPS OPHTHALMIC at 08:11

## 2024-11-14 RX ADMIN — FERROUS SULFATE TAB 325 MG (65 MG ELEMENTAL FE) 1 EACH: 325 (65 FE) TAB at 08:11

## 2024-11-14 RX ADMIN — AMLODIPINE BESYLATE 5 MG: 5 TABLET ORAL at 08:11

## 2024-11-14 RX ADMIN — CEFTRIAXONE SODIUM 1 G: 1 INJECTION, POWDER, FOR SOLUTION INTRAMUSCULAR; INTRAVENOUS at 09:11

## 2024-11-14 NOTE — PLAN OF CARE
Problem: Occupational Therapy  Goal: Occupational Therapy Goal  Description: Goals to be met by: 11/28/24     Patient will increase functional independence with ADLs by performing:    UE Dressing with Set-up Assistance. - met   LE Dressing with Set-up Assistance. - met   Grooming while standing at sink with Supervision. - met   Toileting from toilet with Contact Guard Assistance for hygiene and clothing management. - met   Bathing from  shower chair/bench with Contact Guard Assistance. - met   Toilet transfer to toilet with Stand-by Assistance. - met   Increased functional strength to 4/5 for BUEs.    Outcome: Progressing, pt will be seen QD due to progressing with ADLs

## 2024-11-14 NOTE — PLAN OF CARE
Problem: Adult Inpatient Plan of Care  Goal: Plan of Care Review  Outcome: Progressing  Flowsheets (Taken 11/14/2024 0800)  Plan of Care Reviewed With: patient  Goal: Patient-Specific Goal (Individualized)  Outcome: Progressing  Flowsheets (Taken 11/14/2024 0800)  Individualized Care Needs: IV abx for uti, encourage fluids  Anxieties, Fears or Concerns: none voiced  Goal: Absence of Hospital-Acquired Illness or Injury  Outcome: Progressing  Intervention: Prevent Skin Injury  Flowsheets (Taken 11/14/2024 0800)  Device Skin Pressure Protection: absorbent pad utilized/changed     Problem: Fall Injury Risk  Goal: Absence of Fall and Fall-Related Injury  Outcome: Progressing  Intervention: Identify and Manage Contributors  Flowsheets (Taken 11/14/2024 0800)  Self-Care Promotion:   adaptive equipment use encouraged   independence encouraged   BADL personal objects within reach   BADL personal routines maintained   meal set-up provided

## 2024-11-14 NOTE — PT/OT/SLP PROGRESS
Speech Language Pathology Treatment    Patient Name:  Tali Beard   MRN:  [id number]  Admitting Diagnosis: Intracranial hemorrhage    Recommendations:                 General Recommendations:  Cognitive-linguistic therapy  Diet recommendations:  Regular Diet - IDDSI Level 7, Liquid Diet Level: Thin liquids - IDDSI Level 0   Aspiration Precautions: HOB to 90 degrees   General Precautions: Standard, fall  Communication strategies:  go to room if call light pushed    Assessment:     Tali Beard is a 88 y.o. female with an SLP diagnosis of Cognitive-Linguistic Impairment.  She presents with impaired SHORT-TERM MEMORY recall, long term recall, problem solving, thought organization, and word finding. Patient will benefit from skilled SLP intervention to target above stated deficits and promote a return to PLOF.     Subjective     Patient seen at bedside w/ son present. Patient pleasant and in good spirits.    Patient goals: none stated     Pain/Comfort:       Respiratory Status: Room air    Objective:     Has the patient been evaluated by SLP for swallowing?      Keep patient NPO?     Current Respiratory Status:        Object naming completed w/ 100% accuracy SPV.  Patient unable to recall name of hospital despite maxA.  Describe picture task completed given min-modA. Patient providing vague details and SLP provided verbal cueing to utilize more complex sentence formulation.  1-step command followin% accuracy Independently  2-step command followin% accuracy Independently, increasing to 100% accuracy modA.    Overall good session.  Cont SLP PLAN OF CARE.    Goals:   Multidisciplinary Problems       SLP Goals          Problem: SLP    Goal Priority Disciplines Outcome   SLP Goal     SLP Progressing   Description: LTG: Patient will improve cognitive linguistic skills to SPV to ensure safety upon discharge home.    STG:  Patient will name x10 items in a concrete category w/I 1 minute to improve word  generation Vy.  Patient will recall 3 EOD Vy.  Patient will answer LTM questions w/ 80% accuracy Vy.  Patient will provide solutions to common problems w/ 80% accuracy Vy.                       Plan:     Patient to be seen:  5 x/week   Plan of Care expires:  11/29/24  Plan of Care reviewed with:  patient, son   SLP Follow-Up:  Yes       Discharge recommendations:      Barriers to Discharge:  None    Time Tracking:     SLP Treatment Date:   11/14/24  Speech Start Time:  0900  Speech Stop Time:  0935     Speech Total Time (min):  35 min    Billable Minutes: Speech Therapy Individual 35      Sara Davila M.S., CCC-SLP   11/14/2024

## 2024-11-14 NOTE — PT/OT/SLP PROGRESS
Occupational Therapy  Treatment    Name: Tali Beard    : 1935 (88 y.o.)  MRN: [id number]           TREATMENT SUMMARY AND RECOMMENDATIONS:      OT Date of Treatment: 24  OT Start Time: 930  OT Stop Time:   OT Total Time (min): 30 min      Subjective Assessment:   No complaints  Lethargic   x Awake, alert, cooperative  Impulsive    Uncooperative   Flat affect    Agitated  c/o pain    Appropriate  c/o fatigue   x Confused x Treated at bedside     Emotionally labile x Treated in gym/dept.    Anxious/fearful   Other:        Therapy Precautions:   x Cognitive deficits  Spinal precautions    Collar - hard  Sternal precautions    Collar - soft   Cervical precautions    x Fall risk  LSO    Hip precautions - posterior  TLSO    Hip precautions - anterior  WBAT    Impaired communication  Partial weightbearing    Oxygen  TTWB    PEG tube  NWB    Visual deficits  Knee immobilizer    Hearing deficits   Other:        Treatment Objectives:     Mobility Training:    Mobility task Assist level Comments    Bed mobility SBA Supine>EOB   Transfer SBA Stand/pivot t/f with no device to standard chair   Sit to stands transitions SBA Multiple reps throughout session    Functional mobility SBA Close SBA x80 feet with no device; cues for attention to task    Sitting balance     Standing balance      Wheelchair mobility        ADL Training:    ADL Assist level Comments    Feeding     Grooming/hygiene SBA Pt stood at sink to perform hand hygiene    Bathing     Upper body dressing Min A Pt attempting to delma pullover shirt prior to donning bra requiring cues and assist to delma bra. Pt able to complete remainder and delma shirt without assistance   Lower body dressing Min A Pt able to delma pants and B socks; pt son donned B shoes despite cues to allow pt to attempt    Toileting SBA/CGA (+) void   Toilet transfer SBA Stand/pivot t/f with no device to toilet    Adaptive equipment training     Other:           Therapeutic  "Exercise:   Exercise Sets Reps Comments   Arm bike 1 3 min Level 1; forwards                         Additional Comments:  Increased time for dressing and toileting due to increased distractions in room (nurse, son, dietitian) requiring cues to attend to task. Pt is very rigid with sequencing and process of ADLs even if performed incorrectly requiring increased cues to correct. Pt continues to respond "I know" but attempting to delma shirt before bra. Son reports this happens at home during ADLs and needs cues to assist.     Assessment: Patient tolerated session well. Pt has met all ADL goals therefore will be seen QD with focus on strengthening and functional cognition. Pt had positive response to today's treatment. Pt continues to make good progress towards goals. Pt would continue to benefit from skilled OT services to improve pt's safety and independence with daily occupations, decrease caregiver burden, and reduce pt's risk of falls.     GOALS:   Multidisciplinary Problems       Occupational Therapy Goals          Problem: Occupational Therapy    Goal Priority Disciplines Outcome Interventions   Occupational Therapy Goal     OT, PT/OT Progressing    Description: Goals to be met by: 11/28/24     Patient will increase functional independence with ADLs by performing:    UE Dressing with Set-up Assistance. - met   LE Dressing with Set-up Assistance. - met   Grooming while standing at sink with Supervision. - met   Toileting from toilet with Contact Guard Assistance for hygiene and clothing management. - met   Bathing from  shower chair/bench with Contact Guard Assistance. - met   Toilet transfer to toilet with Stand-by Assistance. - met   Increased functional strength to 4/5 for BUEs.                         Recommendations:     Discharge Equipment Recommendations:   (TBD pending progress)     Plan:     Patient to be seen  (5-6x/wk; QD/BID as appropriate) to address the above listed problems via self-care/home " management, therapeutic activities, therapeutic exercises, cognitive retraining  Plan of Care Expires: 11/28/24  Revisions made to plan of care: Yes, decreased frequency from BID to QD    Skilled OT Minutes Provided: 30  Communication with Treatment Team:     At end of treatment, patient remained:   Up in chair     Up in wheelchair in room    In bed    With alarm activated    Bed rails up    Call bell in reach     Family/friends present    Restraints secured properly    In bathroom with CNA/RN notified   x In gym with PT/PTA/tech    Nurse aware    Other:

## 2024-11-14 NOTE — PT/OT/SLP PROGRESS
Physical Therapy Treatment Note           Name: Tali Beard    : 1935 (88 y.o.)  MRN: [id number]           TREATMENT SUMMARY AND RECOMMENDATIONS:    PT Received On: 24  PT Start Time: 1331     PT Stop Time: 1357  PT Total Time (min): 26 min     Subjective Assessment:   No complaints  Lethargic   x Awake, alert, cooperative  Uncooperative    Agitated  c/o pain    Appropriate  c/o fatigue   x Confused - minimal  Treated at bedside     Emotionally labile  Treated in gym/dept.    Impulsive  Other:    Flat affect       Therapy Precautions:   x Cognitive deficits  Spinal precautions    Collar - hard  Sternal precautions    Collar - soft   TLSO   x Fall risk  LSO    Hip precautions - posterior  Knee immobilizer    Hip precautions - anterior  WBAT    Impaired communication  Partial weightbearing    Oxygen  TTWB    PEG tube  NWB    Visual deficits  Other:    Hearing deficits          Treatment Objectives:     Mobility Training:   Assist level Comments    Bed mobility     Transfer CGA/SBA Stand pivot to recliner   Gait CGA/SBA X 225 feet without AD, pt walking slower due to wanting to grab onto hand rails in hallway. Needed extra timing to begin ambulation.   X 100 feet without using AD   Sit to stand transitions SBA Pushing off from chair   Sitting balance     Standing balance      Wheelchair mobility     Car transfer     Other: Curb  CGA/HHA Ascending/descending curb x 1, pt seemed to be more comfortable completing task if she was holding a hand.        Therapeutic Exercise:   Exercise Sets Reps Comments                               Additional Comments:  Pt was bale to recall much better this afternoon and also able to hold a conversation.    Pt had visitors in her room when we picked her from therapy and she was eager to get back to visit.     Treated by Fatoumata MEREDITH, Supervised by Aurora Robbins PT     Assessment: Patient tolerated session better.    PT Plan: continue per POC - Home  with  family care  Revisions made to plan of care: No    GOALS:   Multidisciplinary Problems       Physical Therapy Goals          Problem: Physical Therapy    Goal Priority Disciplines Outcome Interventions   Physical Therapy Goal     PT, PT/OT Progressing    Description: Goals to be met by: Discharge      Patient will increase functional independence with mobility by performin. Supine <> sit with Supervision, remaining focused to task 100% of the time and only needing 25% cues.   2. Sit to stand transfer with Supervision, remaining focused to task 100% of the time and only needing 25% cues  3. Bed to chair transfer with Supervision using Rolling Walker vs No AD; remaining focused to task 100% of the time and only needing 25% cues  4. Gait  x 350 feet with Supervision using Rolling Walker vs no AD; remaining focused to task 100% of the time and only needing 25% cues.                          Skilled PT Minutes Provided: 26  Communication with Treatment Team:     Equipment recommendations:       At end of treatment, patient remained:  x Up in chair     Up in wheelchair in room    In bed   x With alarm activated    Bed rails up   x Call bell in reach    x Family/friends present    Restraints secured properly    In bathroom with CNA/RN notified    Nurse aware    In gym with therapist/tech    Other:

## 2024-11-14 NOTE — PROGRESS NOTES
Inpatient Nutrition Assessment    Admit Date: 11/6/2024   Total duration of encounter: 8 days   Patient Age: 88 y.o.    Nutrition Recommendation/Prescription     Continue regular diet. 2. Monitor intake,wt, labs,medications    Communication of Recommendations: reviewed with patient, reviewed with family, and new [facility] bed    Nutrition Assessment     Malnutrition Assessment/Nutrition-Focused Physical Exam    Malnutrition Context: acute illness or injury (11/07/24 1354)  Malnutrition Level: mild (11/07/24 1354)  Energy Intake (Malnutrition): less than or equal to 50% for greater than or equal to 5 days (11/07/24 1353)  Weight Loss (Malnutrition): other (see comments) (wt loss unsure family reports) (11/07/24 1353)  Subcutaneous Fat (Malnutrition): mild depletion (11/07/24 1354)  Orbital Region (Subcutaneous Fat Loss): other (see comments) (doesn't meet criteria)  Upper Arm Region (Subcutaneous Fat Loss): other (see comments) (doesn't meet criteria)     Muscle Mass (Malnutrition): mild depletion (11/07/24 1354)  [place] Region (Muscle Loss): other (see comments) (doesn't meet criteria)  Clavicle Bone Region (Muscle Loss): other (see comments) (doesn't meet criteria)                    Fluid Accumulation (Malnutrition): other (see comments) (unable to assess) (11/07/24 1354)        A minimum of two characteristics is recommended for diagnosis of either severe or non-severe malnutrition.    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 (followed by [facility]), and prior subarachnoid hemorrhage and intraparenchymal hemorrhage     Scheduled Medications:  amLODIPine, 5 mg, Daily  atorvastatin, 40 mg, QHS  cefTRIAXone (Rocephin) IV (PEDS and ADULTS), 1 g, Q24H  ferrous sulfate, 1 tablet, Daily  latanoprost, 1 drop, Daily  levETIRAcetam, 500 mg, BID    Continuous Infusions:   PRN Medications:  acetaminophen, 650 mg, Q4H PRN  albuterol-ipratropium, 3 mL, Q6H PRN  aluminum-magnesium hydroxide-simethicone, 30 mL, QID PRN  bisacodyL, 10 mg, Daily PRN  dextrose 10%, 12.5 g, PRN  dextrose 10%, 25 g, PRN  glucagon (human recombinant), 1 mg, PRN  glucose, 16 g, PRN  glucose, 24 g, PRN  HYDROcodone-acetaminophen, 1 tablet, Q6H PRN  insulin aspart U-100, 0-5 Units, QID (AC + HS) PRN  magnesium hydroxide 400 mg/5 ml, 30 mL, Daily PRN  melatonin, 9 mg, Nightly PRN  morphine, 2 mg, Q6H PRN  naloxone, 0.02 mg, PRN  ondansetron, 8 mg, Q8H PRN  ondansetron, 4 mg, Q8H PRN  polyethylene glycol, 17 g, TID PRN  simethicone, 1 tablet, QID PRN  sodium chloride 0.9%, 10 mL, PRN    Calorie Containing IV Medications: no significant kcals from medications at this time    Recent Labs   Lab 11/12/24  0605      K 4.4   CALCIUM 9.1   *   CO2 28   BUN 21.0*   CREATININE 0.75   EGFRNORACEVR >60   GLUCOSE 104   WBC 5.55   HGB 10.3*   HCT 33.5*     Nutrition Orders:  Diet Adult Regular      Appetite/Oral Intake: good/50-75% of meals  Factors Affecting Nutritional Intake: none identified  Social Needs Impacting Access to Food: none identified  Food/Holiness/Cultural Preferences: none reported  Food Allergies: none reported  Last Bowel Movement: 11/13/24  Wound(s):     Wound 05/07/24 0952 Ulceration Left medial Foot-Tissue loss description: Partial thickness       Wound 11/06/24 1548 Incontinence associated dermatitis Perirectal-Tissue loss description: Partial thickness     Comments    11/07/24: Pt has short  "term recall congitivie issues, SLP. SLP complete swallow jose, upgraded diet to regular consistency. Family and patient reports intake improved, as well as, intake has been good PTA. Family unsure of wt loss. #. Complete NFPE, mild muscle waisting noted. PO intake 50-75% of meals today. Pt has Hx of DM. Blood glucose WNL. Adjust diet as need, due to advance age and to promote good PO intake. Will monitor intake and wt. Labs/meds review. +BM per EMR.     11/11/24: Pt visiting with family during rounds. Pt states intake is good. Tolerating regular diet. Discussed food preferences. Adjust menus. Wt increase, see below. Labs/meds review. +BM per patient. Will monitor.     11/14/24: Pt and family present. Observed tray at breakfast. 100%. Pt and family reports intake is good. +BM per patient. Labs/meds review. Will monitor.   Anthropometrics    Height: 5' 3" (160 cm), Height Method: Stated  Last Weight: 61 kg (134 lb 7.7 oz) (11/11/24 1329), Weight Method: Bed Scale  BMI (Calculated): 23.8  BMI Classification: normal (BMI 18.5-24.9)     Ideal Body Weight (IBW), Female: 115 lb     % Ideal Body Weight, Female (lb): 116.94 %                             Usual Weight Provided By:  wt loss fermín.     Wt Readings from Last 5 Encounters:   11/11/24 61 kg (134 lb 7.7 oz)   10/29/24 65.6 kg (144 lb 10 oz)   10/23/24 63.2 kg (139 lb 5.3 oz)   10/03/24 63.2 kg (139 lb 5.3 oz)   10/03/24 66.7 kg (147 lb)     Weight Change(s) Since Admission: +3.7 kg wt gain.   Wt Readings from Last 1 Encounters:   11/11/24 1329 61 kg (134 lb 7.7 oz)   11/11/24 0449 61 kg (134 lb 7.7 oz)   11/07/24 1348 57.3 kg (126 lb 5.2 oz)   11/06/24 1530 57.3 kg (126 lb 5.2 oz)   Admit Weight: 57.3 kg (126 lb 5.2 oz) (11/06/24 1530), Weight Method: Bed Scale    Estimated Needs    Weight Used For Calorie Calculations: 61 kg (134 lb 7.7 oz)  Energy Calorie Requirements (kcal): 1830 kcal (30 kcal/kg/CBW)  Energy Need Method: Kcal/kg  Weight Used For Protein " Calculations: 61 kg (134 lb 7.7 oz)  Protein Requirements: 73.35 gm (1.2 gmg/kg/CBW)  Fluid Requirements (mL): 1830 mL (1 mL/kcal)        Enteral Nutrition     Patient not receiving enteral nutrition at this time.    Parenteral Nutrition     Patient not receiving parenteral nutrition support at this time.    Evaluation of Received Nutrient Intake    Calories: meeting estimated needs  Protein: meeting estimated needs    Patient Education     Not applicable.    Nutrition Diagnosis       PES:  Mild malnutrition related to acute illness as evidenced by less than or equal to 50% needs met for greater than or equal to 5 days, mild muscle depletion, and wt loss unsure . (progressing)    Nutrition Interventions     Intervention(s): general/healthful diet    Goal: Consume % of meals/snacks by follow-up. (progressing)  Goal: Maintain weight throughout hospitalization. (progressing)    Nutrition Goals & Monitoring     Dietitian will monitor: food and beverage intake, weight, weight change, electrolyte/renal panel, glucose/endocrine profile, and gastrointestinal profile  Discharge planning: continue regular diet  Nutrition Risk/Follow-Up: moderate (follow-up in 3-5 days)   Please consult if re-assessment needed sooner.

## 2024-11-14 NOTE — PT/OT/SLP PROGRESS
Physical Therapy Treatment Note           Name: Tali Beard    : 1935 (88 y.o.)  MRN: [id number]           TREATMENT SUMMARY AND RECOMMENDATIONS:    PT Received On: 24  PT Start Time: 1001     PT Stop Time: 1026  PT Total Time (min): 25 min     Subjective Assessment:   No complaints  Lethargic   x Awake, alert, cooperative  Uncooperative    Agitated  c/o pain    Appropriate  c/o fatigue   x Confused - minimal  Treated at bedside     Emotionally labile x Treated in gym/dept.    Impulsive  Other:    Flat affect       Therapy Precautions:   x Cognitive deficits  Spinal precautions    Collar - hard  Sternal precautions    Collar - soft   TLSO   x Fall risk  LSO    Hip precautions - posterior  Knee immobilizer    Hip precautions - anterior  WBAT    Impaired communication  Partial weightbearing    Oxygen  TTWB    PEG tube  NWB    Visual deficits  Other:    Hearing deficits          Treatment Objectives:     Mobility Training:   Assist level Comments    Bed mobility     Transfer CGA/SBA Stand pivot to recliner   Gait CGA/SBA X 100 feet without AD, pt easily distracted when seeing people or looking in rooms. Pt started to walk slower when grabbing onto hand rails in hallway.    Sit to stand transitions SBA Pushing off from chair arms, multiple reps for exercises    Sitting balance     Standing balance      Wheelchair mobility     Car transfer     Other:          Therapeutic Exercise:   Exercise Sets Reps Comments   LLE bike   10' 5' forward    5' backwards, pt needed tactile cues 75% of the time to complete the task.   Standing LLE  10 Mini squats, SBA  LEON Vázquez                   Additional Comments:  Pt was able to follow commands 75% of time time. Pt needed verbal and tactile cues in order to complete task.    No family present in the room when bringing pt back, made sure pt was aware what button to press if she needed help.     Treated by Fatoumata MEREDITH, Supervised by Aurora  Rosendo PT     Assessment: Patient tolerated session well.    PT Plan: continue per POC - Home with / family care  Revisions made to plan of care: No    GOALS:   Multidisciplinary Problems       Physical Therapy Goals          Problem: Physical Therapy    Goal Priority Disciplines Outcome Interventions   Physical Therapy Goal     PT, PT/OT Progressing    Description: Goals to be met by: Discharge      Patient will increase functional independence with mobility by performin. Supine <> sit with Supervision, remaining focused to task 100% of the time and only needing 25% cues.   2. Sit to stand transfer with Supervision, remaining focused to task 100% of the time and only needing 25% cues  3. Bed to chair transfer with Supervision using Rolling Walker vs No AD; remaining focused to task 100% of the time and only needing 25% cues  4. Gait  x 350 feet with Supervision using Rolling Walker vs no AD; remaining focused to task 100% of the time and only needing 25% cues.                          Skilled PT Minutes Provided: 25  Communication with Treatment Team:     Equipment recommendations:       At end of treatment, patient remained:  x Up in chair     Up in wheelchair in room    In bed   x With alarm activated    Bed rails up   x Call bell in reach     Family/friends present    Restraints secured properly    In bathroom with CNA/RN notified    Nurse aware    In gym with therapist/tech    Other:

## 2024-11-14 NOTE — PLAN OF CARE
[facility]  Discharge Reassessment    Primary Care Provider: Aurora Moe FNP    Expected Discharge Date: 11/20/2024    Reassessment (most recent)       Discharge Reassessment - 11/14/24 1303          Discharge Reassessment    Assessment Type Discharge Planning Reassessment     Did the patient's condition or plan change since previous assessment? No     Discharge Plan discussed with: Patient;Adult children     Discharge Plan A Home;Home Health     DME Needed Upon Discharge  other (see comments)   TBD    Transition of Care Barriers None     Why the patient remains in the hospital Requires continued medical care        Post-Acute Status    Discharge Delays None known at this time

## 2024-11-14 NOTE — PROGRESS NOTES
[facility]  HOSPITAL MEDICINE ~ PROGRESS NOTE    CHIEF COMPLAINT     Hospital follow up for right parietal intracranial hemorrhage     HOSPITAL COURSE     88 year old female with PMHx of HTN, HLD, DM2, Kaposi Sarcoma s/p resection and radiation to left ankle and submental node (followed by [facility]), and prior subarachnoid hemorrhage and intraparenchymal hemorrhage presents to [facility] for swing following right parietal intracranial hemorrhage.  She initially presented to [facility] on 10/30/2024 due to left sided weakness and confusion.  CT head revealed acute hemorrhagic infarct of the right parietal lobe.  CTA head and neck displayed diminished caliber of M1 and M2 segments bilaterally MCA possible indicative of vasospasm.  MRI revealed evolving left parietal late subacute and right parietal early subacute cortical hemorrhage, new punctate focus of suspected ischemic cortical infarct at the left occipital lobe, with no additional sites of abnormal diffusion signal appreciated.     Neurology does not recommend anticoagulation or antiplatelets at this time due to spontaneous bleeding events. Suspecting primary intracranial hemorrhage due to amyloid angiopathy. EEG was negative for seizures, showed diffuse cerebral slowing - will continue keppra 500mg PO BID for seizure prophylaxis.      Patient lives alone and was completely independent prior to this hospitalization. Has plans for 24 hour care upon returning home between 2 of her sons. She lives in a 1 story home without stairs and a climb in bathtub. Patient's mentation was improving prior to admission to [facility]. Patient is oriented x3 with slow response time - patient's son report she is worse than her baseline mentation. Patient denies headache, N/V, or vision changes.      Today:  Urine culture showing Gram-negative rods.  Continue with empiric Rocephin for now.    OBJECTIVE/PHYSICAL EXAM     VITAL SIGNS (MOST  RECENT):  Temp: 98.2 °F (36.8 °C) (11/14/24 0730)  Pulse: 75 (11/14/24 0730)  Resp: 18 (11/13/24 0715)  BP: 109/76 (11/14/24 0730)  SpO2: 98 % (11/14/24 0730) VITAL SIGNS (24 HOUR RANGE):  Temp:  [98.2 °F (36.8 °C)-100.4 °F (38 °C)] 98.2 °F (36.8 °C)  Pulse:  [75-96] 75  SpO2:  [98 %-99 %] 98 %  BP: (109-131)/(72-76) 109/76     GENERAL: In no acute distress, afebrile  HEENT: PERRLA   CHEST: Clear to auscultation bilaterally  HEART: S1, S2, no appreciable murmur  ABDOMEN: Soft, nontender, BS +  MSK: Warm, no lower extremity edema, no clubbing or cyanosis  NEUROLOGIC: Alert and oriented x3, moving all extremities with good strength, speech is difficult to understand occasionally, slow response time    ASSESSMENT/PLAN     Left parietal late subacute and right parietal early subacute cortical hemorrhage   Neurology recommends no anticoagulation or antiplatelet therapy at this time due to spontaneous ICH events    Follow up with Yue Rodriguez NP in 1 month (approx 11/29) for MRI w/wo contrast with SWI sequence and CSF labs.  Negative for Lyme disease serology, encephalopathy autoimmune evaluation  Pending: CSF, HSV type 2 glycoprotein IgG  Continue Keppra 500mg PO BID for seizure prophylaxis.   Continue speech therapy for cognition      Incontinence dermatitis   Followed by wound care  Purewick  Prn bowel regimen      Gram-negative rods UTI  Urine culture pending  Empiric treatment with rocephin 1g IV for 5 day regimen starting 11/13/2024     Chronic Microcytic Anemia   Folate deficiency   Multivitamin      HTN / HLD  Continue amlodipine 5mg PO QD and atorvastatin 40mg PO QHS.   SBP goal < 140      Left foot wound - healed      Kaposi Sarcoma   Followed by [facility] - Dr. Celestine Henderson   S/p XRT   CT chest/abdomen/pelvis on 10/04/2024 was negative      DVT prophylaxis: Neurology recommends holding chemical prophylaxis at this time.      Code status: Full      Anticipated discharge and disposition:  Continue  "PT/OT.  Patient has established 24 hour care at home with her children  __________________________________________________________________________    LABS/MICRO/MEDS/DIAGNOSTICS     LABS  Recent Labs     11/12/24  0605      K 4.4   CO2 28   BUN 21.0*   CREATININE 0.75   GLUCOSE 104   CALCIUM 9.1     Recent Labs     11/12/24  0605   WBC 5.55   RBC 4.46   HCT 33.5*   MCV 75.1*        MICROBIOLOGY  Microbiology Results (last 7 days)       Procedure Component Value Units Date/Time    Urine culture [[id number]]  (Abnormal) Collected: 11/12/24 1910    Order Status: Completed Specimen: Urine Updated: 11/14/24 0714     Urine Culture >/= 100,000 colonies/ml Gram-negative Rods             MEDICATIONS   amLODIPine  5 mg Oral Daily    atorvastatin  40 mg Oral QHS    cefTRIAXone (Rocephin) IV (PEDS and ADULTS)  1 g Intravenous Q24H    ferrous sulfate  1 tablet Oral Daily    latanoprost  1 drop Both Eyes Daily    levETIRAcetam  500 mg Oral BID         INFUSIONS       DIAGNOSTIC TESTS  No orders to display        No results found for: "EF"     NUTRITION STATUS  Patient meets ASPEN criteria for mild malnutrition of acute illness or injury per RD assessment as evidenced by:  Energy Intake (Malnutrition): less than or equal to 50% for greater than or equal to 5 days  Weight Loss (Malnutrition): other (see comments) (wt loss unsure family reports)  Subcutaneous Fat (Malnutrition): mild depletion  Muscle Mass (Malnutrition): mild depletion  Fluid Accumulation (Malnutrition): other (see comments) (unable to assess)        A minimum of two characteristics is recommended for diagnosis of either severe or non-severe malnutrition.     Case related differential diagnoses have been reviewed; assessment and plan has been documented. I have personally reviewed the labs and test results that are currently available; I have reviewed the patients medication list. I have reviewed the consulting providers recommendations. I have " reviewed or attempted to review medical records based upon their availability.  All of the patient's and/or family's questions have been addressed and answered to the best of my ability.  I will continue to monitor closely and make adjustments to medical management as needed.  This document was created using M*Modal Fluency Direct.  Transcription errors may have been made.  Please contact me if any questions may rise regarding documentation to clarify transcription.      NIKUNJ Coronado   Internal Medicine  Department of Hospital Medicine [facility]

## 2024-11-15 LAB — BACTERIA UR CULT: ABNORMAL

## 2024-11-15 PROCEDURE — 97116 GAIT TRAINING THERAPY: CPT

## 2024-11-15 PROCEDURE — 63600175 PHARM REV CODE 636 W HCPCS: Performed by: STUDENT IN AN ORGANIZED HEALTH CARE EDUCATION/TRAINING PROGRAM

## 2024-11-15 PROCEDURE — 11000004 HC SNF PRIVATE

## 2024-11-15 PROCEDURE — A4216 STERILE WATER/SALINE, 10 ML: HCPCS | Performed by: PHYSICIAN ASSISTANT

## 2024-11-15 PROCEDURE — 25000003 PHARM REV CODE 250: Performed by: PHYSICIAN ASSISTANT

## 2024-11-15 PROCEDURE — 97530 THERAPEUTIC ACTIVITIES: CPT

## 2024-11-15 PROCEDURE — 99900035 HC TECH TIME PER 15 MIN (STAT)

## 2024-11-15 PROCEDURE — 92507 TX SP LANG VOICE COMM INDIV: CPT

## 2024-11-15 PROCEDURE — 97110 THERAPEUTIC EXERCISES: CPT

## 2024-11-15 PROCEDURE — 94760 N-INVAS EAR/PLS OXIMETRY 1: CPT

## 2024-11-15 RX ADMIN — FERROUS SULFATE TAB 325 MG (65 MG ELEMENTAL FE) 1 EACH: 325 (65 FE) TAB at 08:11

## 2024-11-15 RX ADMIN — Medication 10 ML: at 09:11

## 2024-11-15 RX ADMIN — LEVETIRACETAM 500 MG: 500 TABLET, FILM COATED ORAL at 08:11

## 2024-11-15 RX ADMIN — ATORVASTATIN CALCIUM 40 MG: 40 TABLET, FILM COATED ORAL at 09:11

## 2024-11-15 RX ADMIN — CEFTRIAXONE SODIUM 1 G: 1 INJECTION, POWDER, FOR SOLUTION INTRAMUSCULAR; INTRAVENOUS at 08:11

## 2024-11-15 RX ADMIN — AMLODIPINE BESYLATE 5 MG: 5 TABLET ORAL at 08:11

## 2024-11-15 RX ADMIN — LEVETIRACETAM 500 MG: 500 TABLET, FILM COATED ORAL at 09:11

## 2024-11-15 RX ADMIN — LATANOPROST 1 DROP: 50 SOLUTION/ DROPS OPHTHALMIC at 08:11

## 2024-11-15 NOTE — PROGRESS NOTES
Follow up assessment performed.   Significant improvement noted to perirectal IAD.    Only small denuded area remains on R aspect.  Changed hydrocolloid, re-applied Zguard paste/antifungal ointment to remainder of area for protection.   Pt's mobility has significantly improved, ambulating with therapy.   Reinforced importance of pressure prevention/ moisture management measures with son at bedside.   Verbalized understanding.      11/15/24 1116   WOCN Assessment   WOCN Total Time (mins) 10   Visit Date 11/15/24   Visit Time 1116   Consult Type New   WOCN Speciality Wound   Wound moisture   Number of Wounds 1   Continence Type Urinary;Fecal   Intervention assessed;chart review;orders   Teaching on-going   Skin Interventions   Device Skin Pressure Protection absorbent pad utilized/changed;positioning supports utilized;tubing/devices free from skin contact   Pressure Reduction Devices pressure-redistributing mattress utilized   Pressure Reduction Techniques frequent weight shift encouraged   Skin Protection hydrocolloids used;incontinence pads utilized;skin sealant/moisture barrier applied   Pressure Injury Prevention    Check Moisture Management Pad Done   Sacral Foam Dressing Replace  (hydrocolloid)   Heel protection technique Pillow   Check Medical Devices Done        Wound 11/06/24 1548 Incontinence associated dermatitis Perirectal   Date First Assessed/Time First Assessed: 11/06/24 1548   Present on Original Admission: Yes  Primary Wound Type: Incontinence associated dermatitis  Location: Perirectal   Wound Image    Dressing Appearance Dry   Drainage Amount None   Appearance Pink;Red;Not granulating   Tissue loss description Partial thickness   Red (%), Wound Tissue Color 100 %   Periwound Area Intact;Dry   Wound Edges Defined   Wound Length (cm) 1.5 cm   Wound Width (cm) 1.5 cm   Wound Depth (cm) 0.1 cm   Wound Volume (cm^3) 0.225 cm^3   Wound Surface Area (cm^2) 2.25 cm^2   Care Cleansed with:;Antimicrobial  agent;Wound cleanser   Dressing Changed;Hydrocolloid   Periwound Care Moisture barrier applied   Dressing Change Due 11/17/24

## 2024-11-15 NOTE — PLAN OF CARE
oblem: Adult Inpatient Plan of Care  Goal: Plan of Care Review  Outcome: Progressing  Flowsheets (Taken 11/14/2024 2300)  Plan of Care Reviewed With:   patient   child  Goal: Patient-Specific Goal (Individualized)  Outcome: Progressing  Flowsheets (Taken 11/14/2024 2300)  Individualized Care Needs: Encourage oral intake, Up to toilet, monitor I&O's Wound care  Anxieties, Fears or Concerns: none at this time  Patient/Family-Specific Goals (Include Timeframe): return to baseline by SENDY to discharge home with family  Goal: Absence of Hospital-Acquired Illness or Injury  Outcome: Progressing  Intervention: Prevent Skin Injury  Flowsheets (Taken 11/14/2024 2000)  Skin Protection: hydrocolloids used  Intervention: Prevent and Manage VTE (Venous Thromboembolism) Risk  Flowsheets (Taken 11/14/2024 2000)  VTE Prevention/Management:   ambulation promoted   bleeding precautions maintained   bleeding risk assessed   fluids promoted   dorsiflexion/plantar flexion performed   ROM (active) performed  Goal: Optimal Comfort and Wellbeing  Outcome: Progressing

## 2024-11-15 NOTE — PLAN OF CARE
oblem: Adult Inpatient Plan of Care  Goal: Plan of Care Review  Outcome: Progressing  Flowsheets (Taken 11/13/2024 2300)  Plan of Care Reviewed With:   patient   child  Goal: Patient-Specific Goal (Individualized)  Outcome: Progressing  Flowsheets (Taken 11/13/2024 2300)  Individualized Care Needs: Encourage oral intake, Up to toilet, monitor I&O's Wound care  Anxieties, Fears or Concerns: none at this time  Patient/Family-Specific Goals (Include Timeframe): return to baseline by SENDY to discharge home with family  Goal: Absence of Hospital-Acquired Illness or Injury  Outcome: Progressing  Intervention: Prevent Skin Injury  Flowsheets (Taken 11/13/2024 2000)  Skin Protection: hydrocolloids used  Intervention: Prevent and Manage VTE (Venous Thromboembolism) Risk  Flowsheets (Taken 11/13/2024 2000)  VTE Prevention/Management:   ambulation promoted   bleeding precautions maintained   bleeding risk assessed   fluids promoted   dorsiflexion/plantar flexion performed   ROM (active) performed  Goal: Optimal Comfort and Wellbeing  Outcome: Progressing

## 2024-11-15 NOTE — PROGRESS NOTES
[facility]  HOSPITAL MEDICINE ~ PROGRESS NOTE    CHIEF COMPLAINT     Hospital follow up for right parietal intracranial hemorrhage     HOSPITAL COURSE     88 year old female with PMHx of HTN, HLD, DM2, Kaposi Sarcoma s/p resection and radiation to left ankle and submental node (followed by [facility]), and prior subarachnoid hemorrhage and intraparenchymal hemorrhage presents to [facility] for swing following right parietal intracranial hemorrhage.  She initially presented to [facility] on 10/30/2024 due to left sided weakness and confusion.  CT head revealed acute hemorrhagic infarct of the right parietal lobe.  CTA head and neck displayed diminished caliber of M1 and M2 segments bilaterally MCA possible indicative of vasospasm.  MRI revealed evolving left parietal late subacute and right parietal early subacute cortical hemorrhage, new punctate focus of suspected ischemic cortical infarct at the left occipital lobe, with no additional sites of abnormal diffusion signal appreciated.     Neurology does not recommend anticoagulation or antiplatelets at this time due to spontaneous bleeding events. Suspecting primary intracranial hemorrhage due to amyloid angiopathy. EEG was negative for seizures, showed diffuse cerebral slowing - will continue keppra 500mg PO BID for seizure prophylaxis.      Patient lives alone and was completely independent prior to this hospitalization. Has plans for 24 hour care upon returning home between 2 of her sons. She lives in a 1 story home without stairs and a climb in bathtub. Patient's mentation was improving prior to admission to [facility]. Patient is oriented x3 with slow response time - patient's son report she is worse than her baseline mentation. Patient denies headache, N/V, or vision changes.      Today:  Urine culture showing Proteus mirabilis sensitive to Rocephin.  Continue for 5 day course.    OBJECTIVE/PHYSICAL EXAM     VITAL SIGNS  (MOST RECENT):  Temp: 98.2 °F (36.8 °C) (11/15/24 0732)  Pulse: 72 (11/15/24 0732)  Resp: 18 (11/13/24 0715)  BP: (!) 146/74 (11/15/24 0732)  SpO2: 98 % (11/15/24 0732) VITAL SIGNS (24 HOUR RANGE):  Temp:  [98.2 °F (36.8 °C)-99 °F (37.2 °C)] 98.2 °F (36.8 °C)  Pulse:  [72-92] 72  SpO2:  [95 %-98 %] 98 %  BP: (116-146)/(74-77) 146/74     GENERAL: In no acute distress, afebrile  HEENT: PERRLA   CHEST: Clear to auscultation bilaterally  HEART: S1, S2, no appreciable murmur  ABDOMEN: Soft, nontender, BS +  MSK: Warm, no lower extremity edema, no clubbing or cyanosis  NEUROLOGIC: Alert and oriented x3, moving all extremities with good strength, speech is difficult to understand occasionally, slow response time    ASSESSMENT/PLAN     Left parietal late subacute and right parietal early subacute cortical hemorrhage   Neurology recommends no anticoagulation or antiplatelet therapy at this time due to spontaneous ICH events    Follow up with Yue Rodriguez NP in 1 month (approx 11/29) for MRI w/wo contrast with SWI sequence and CSF labs.  Negative for Lyme disease serology, encephalopathy autoimmune evaluation  Pending: CSF, HSV type 2 glycoprotein IgG  Continue Keppra 500mg PO BID for seizure prophylaxis.   Continue speech therapy for cognition      Incontinence dermatitis   Followed by wound care  Purewick  Prn bowel regimen      Proteus mirabilis UTI  Rocephin 1g IV for 5 day regimen starting 11/13/2024     Chronic Microcytic Anemia   Folate deficiency   Multivitamin      HTN / HLD  Continue amlodipine 5mg PO QD and atorvastatin 40mg PO QHS.   SBP goal < 140      Left foot wound - healed      Kaposi Sarcoma   Followed by [facility] - Dr. Celestine Henderson   S/p XRT   CT chest/abdomen/pelvis on 10/04/2024 was negative      DVT prophylaxis: Neurology recommends holding chemical prophylaxis at this time.      Code status: Full      Anticipated discharge and disposition:  Continue PT/OT.  Patient has established 24  "hour care at home with her children  __________________________________________________________________________    LABS/MICRO/MEDS/DIAGNOSTICS     LABS  No results for input(s): "NA", "K", "CHLORIDE", "CO2", "BUN", "CREATININE", "GLUCOSE", "CALCIUM", "ALKPHOS", "AST", "ALT", "ALBUMIN" in the last 72 hours.    No results for input(s): "WBC", "RBC", "HCT", "MCV", "PLT" in the last 72 hours.    Invalid input(s): "HG"    MICROBIOLOGY  Microbiology Results (last 7 days)       Procedure Component Value Units Date/Time    Urine culture [[id number]]  (Abnormal)  (Susceptibility) Collected: 11/12/24 1910    Order Status: Completed Specimen: Urine Updated: 11/15/24 0704     Urine Culture >/= 100,000 colonies/ml Proteus mirabilis             MEDICATIONS   amLODIPine  5 mg Oral Daily    atorvastatin  40 mg Oral QHS    cefTRIAXone (Rocephin) IV (PEDS and ADULTS)  1 g Intravenous Q24H    ferrous sulfate  1 tablet Oral Daily    latanoprost  1 drop Both Eyes Daily    levETIRAcetam  500 mg Oral BID         INFUSIONS       DIAGNOSTIC TESTS  No orders to display      No results found for: "EF"     NUTRITION STATUS  Patient meets ASPEN criteria for mild malnutrition of acute illness or injury per RD assessment as evidenced by:  Energy Intake (Malnutrition): less than or equal to 50% for greater than or equal to 5 days  Weight Loss (Malnutrition): other (see comments) (wt loss unsure family reports)  Subcutaneous Fat (Malnutrition): mild depletion  Muscle Mass (Malnutrition): mild depletion  Fluid Accumulation (Malnutrition): other (see comments) (unable to assess)        A minimum of two characteristics is recommended for diagnosis of either severe or non-severe malnutrition.     Case related differential diagnoses have been reviewed; assessment and plan has been documented. I have personally reviewed the labs and test results that are currently available; I have reviewed the patients medication list. I have reviewed the consulting " providers recommendations. I have reviewed or attempted to review medical records based upon their availability.  All of the patient's and/or family's questions have been addressed and answered to the best of my ability.  I will continue to monitor closely and make adjustments to medical management as needed.  This document was created using M*Modal Fluency Direct.  Transcription errors may have been made.  Please contact me if any questions may rise regarding documentation to clarify transcription.      NIKUNJ Coronado   Internal Medicine  Department of Hospital Medicine [facility]

## 2024-11-15 NOTE — PLAN OF CARE
Problem: Adult Inpatient Plan of Care  Goal: Plan of Care Review  Outcome: Progressing  Flowsheets (Taken 11/15/2024 0800)  Plan of Care Reviewed With: patient  Goal: Patient-Specific Goal (Individualized)  Outcome: Progressing  Flowsheets (Taken 11/15/2024 0800)  Individualized Care Needs: iv abx for uti, encourage fluids  Anxieties, Fears or Concerns: none voiced  Goal: Absence of Hospital-Acquired Illness or Injury  Outcome: Progressing  Intervention: Prevent Skin Injury  Flowsheets (Taken 11/15/2024 0800)  Device Skin Pressure Protection:   absorbent pad utilized/changed   adhesive use limited

## 2024-11-15 NOTE — PT/OT/SLP PROGRESS
Speech Language Pathology Treatment    Patient Name:  Tali Beard   MRN:  [id number]  Admitting Diagnosis: Intracranial hemorrhage    Recommendations:                 General Recommendations:  Cognitive-linguistic therapy  Diet recommendations:  Regular Diet - IDDSI Level 7, Liquid Diet Level: Thin liquids - IDDSI Level 0   Aspiration Precautions: HOB to 90 degrees   General Precautions: Standard, fall  Communication strategies:  go to room if call light pushed    Assessment:     Tali Beard is a 88 y.o. female with an SLP diagnosis of Cognitive-Linguistic Impairment.  She presents with impaired SHORT-TERM MEMORY recall, long term recall, problem solving, thought organization, and word finding. Patient will benefit from skilled SLP intervention to target above stated deficits and promote a return to PLOF.     Subjective     Patient seen at bedside w/ son present. Patient pleasant and in good spirits.    Patient goals: none stated     Pain/Comfort:       Respiratory Status: Room air    Objective:     Has the patient been evaluated by SLP for swallowing?      Keep patient NPO?     Current Respiratory Status:        Provide category member given first letter worksheet completed verbally w/ 20% accuracy Independently, increasing to 70% accuracy modA.  Patient able to write first name legibly and quickly, increased time and cueing to write last name.    Overall good session.  Cont SLP PLAN OF CARE.    Goals:   Multidisciplinary Problems       SLP Goals          Problem: SLP    Goal Priority Disciplines Outcome   SLP Goal     SLP Progressing   Description: LTG: Patient will improve cognitive linguistic skills to SPV to ensure safety upon discharge home.    STG:  Patient will name x10 items in a concrete category w/I 1 minute to improve word generation Vy.  Patient will recall 3 EOD Vy.  Patient will answer LTM questions w/ 80% accuracy Vy.  Patient will provide solutions to common problems w/ 80% accuracy  Vy.                       Plan:     Patient to be seen:  5 x/week   Plan of Care expires:  11/29/24  Plan of Care reviewed with:  patient, son   SLP Follow-Up:  Yes       Discharge recommendations:      Barriers to Discharge:  None    Time Tracking:     SLP Treatment Date:   11/15/24  Speech Start Time:  0900  Speech Stop Time:  0930     Speech Total Time (min):  30 min    Billable Minutes: Speech Therapy Individual 30      Sara Davila M.S., CCC-SLP   11/15/2024

## 2024-11-15 NOTE — PT/OT/SLP PROGRESS
Physical Therapy Treatment Note           Name: Tali Beard    : 1935 (88 y.o.)  MRN: [id number]           TREATMENT SUMMARY AND RECOMMENDATIONS:    PT Received On: 11/15/24  PT Start Time: 1357     PT Stop Time: 1426  PT Total Time (min): 29 min     Subjective Assessment:   No complaints  Lethargic   x Awake, alert, cooperative  Uncooperative    Agitated  c/o pain    Appropriate  c/o fatigue   x Confused - minimal  Treated at bedside     Emotionally labile  Treated in gym/dept.    Impulsive  Other:    Flat affect       Therapy Precautions:   x Cognitive deficits  Spinal precautions    Collar - hard  Sternal precautions    Collar - soft   TLSO   x Fall risk  LSO    Hip precautions - posterior  Knee immobilizer    Hip precautions - anterior  WBAT    Impaired communication  Partial weightbearing    Oxygen  TTWB    PEG tube  NWB    Visual deficits  Other:    Hearing deficits          Treatment Objectives:     Mobility Training:   Assist level Comments    Bed mobility     Transfer     Gait CGA/SBA X 240 feet from room to gym, x 1 break to do standing exercising.   X 100 feet from gym to room. Pt needed extra time for gait this session, walked at a slower pace. Without AD for both trials. WC following behind for safety.   Sit to stand transitions SBA Multiple reps, pushing off from arm of chair.   Sitting balance     Standing balance      Wheelchair mobility     Car transfer     Other:          Therapeutic Exercise:   Exercise Sets Reps Comments   BLE standing  2 10    20 Hip abd/add    Marches    BLE seated 2 10 Hip flex and knee ext with 2lb ankle weights                   Additional Comments:  Pt was eager to go back to room, tried to redirect to continue therapy session.    Treated by Fatoumata MEREDITH, Supervised by Aurora Robbins PT     Assessment: Patient tolerated session well. Less cues needed for proper exercise completion     PT Plan: continue per POC - Home with / family  care  Revisions made to plan of care: No    GOALS:   Multidisciplinary Problems       Physical Therapy Goals          Problem: Physical Therapy    Goal Priority Disciplines Outcome Interventions   Physical Therapy Goal     PT, PT/OT Progressing    Description: Goals to be met by: Discharge      Patient will increase functional independence with mobility by performin. Supine <> sit with Supervision, remaining focused to task 100% of the time and only needing 25% cues.   2. Sit to stand transfer with Supervision, remaining focused to task 100% of the time and only needing 25% cues  3. Bed to chair transfer with Supervision using Rolling Walker vs No AD; remaining focused to task 100% of the time and only needing 25% cues  4. Gait  x 350 feet with Supervision using Rolling Walker vs no AD; remaining focused to task 100% of the time and only needing 25% cues.                          Skilled PT Minutes Provided: 29  Communication with Treatment Team:     Equipment recommendations:       At end of treatment, patient remained:  x Up in chair     Up in wheelchair in room    In bed   x With alarm activated    Bed rails up   x Call bell in reach    x Family/friends present    Restraints secured properly    In bathroom with CNA/RN notified    Nurse aware    In gym with therapist/tech    Other:

## 2024-11-15 NOTE — PT/OT/SLP PROGRESS
Occupational Therapy  Treatment    Name: Tali Beard    : 1935 (88 y.o.)  MRN: [id number]           TREATMENT SUMMARY AND RECOMMENDATIONS:      OT Date of Treatment: 11/15/24  OT Start Time: 930  OT Stop Time: 957  OT Total Time (min): 27 min      Subjective Assessment:   No complaints  Lethargic   x Awake, alert, cooperative  Impulsive    Uncooperative   Flat affect    Agitated  c/o pain    Appropriate  c/o fatigue   x Confused  Treated at bedside     Emotionally labile x Treated in gym/dept.    Anxious/fearful   Other:        Therapy Precautions:   x Cognitive deficits  Spinal precautions    Collar - hard  Sternal precautions    Collar - soft   Cervical precautions    x Fall risk  LSO    Hip precautions - posterior  TLSO    Hip precautions - anterior  WBAT    Impaired communication  Partial weightbearing    Oxygen  TTWB    PEG tube  NWB    Visual deficits  Knee immobilizer    Hearing deficits   Other:        Treatment Objectives:     Mobility Training:    Mobility task Assist level Comments    Bed mobility     Transfer SBA/CGA Stand/pivot t/f without device to standard chair   Sit to stands transitions SBA 2x5 reps from standard chair   Functional mobility SBA/CGA X100 feet without device    Sitting balance     Standing balance  SBA/CGA Pt stood with no UE support to perform reach to floor level to grasp rings and then place onto savanna across midline and over shoulder level. No LOB noted.    Wheelchair mobility        ADL Training:    ADL Assist level Comments    Feeding     Grooming/hygiene     Bathing     Upper body dressing SBA Pt stood and donned zip up jacket    Lower body dressing     Toileting     Toilet transfer     Adaptive equipment training     Other:           Therapeutic Exercise:   Exercise Sets Reps Comments   Arm bike 2 5 min Level 1; forwards/backwards                         Assessment: Patient tolerated session well. Pt had positive response to today's treatment. Pt continues to make  good progress towards goals. Pt would continue to benefit from skilled OT services to improve pt's safety and independence with daily occupations, decrease caregiver burden, and reduce pt's risk of falls.     GOALS:   Multidisciplinary Problems       Occupational Therapy Goals          Problem: Occupational Therapy    Goal Priority Disciplines Outcome Interventions   Occupational Therapy Goal     OT, PT/OT Progressing    Description: Goals to be met by: 11/28/24     Patient will increase functional independence with ADLs by performing:    UE Dressing with Set-up Assistance. - met   LE Dressing with Set-up Assistance. - met   Grooming while standing at sink with Supervision. - met   Toileting from toilet with Contact Guard Assistance for hygiene and clothing management. - met   Bathing from  shower chair/bench with Contact Guard Assistance. - met   Toilet transfer to toilet with Stand-by Assistance. - met   Increased functional strength to 4/5 for BUEs.                         Recommendations:     Discharge Equipment Recommendations:   (TBD pending progress)     Plan:     Patient to be seen  (5-6x/wk; QD/BID as appropriate) to address the above listed problems via self-care/home management, therapeutic activities, therapeutic exercises, cognitive retraining  Plan of Care Expires: 11/28/24  Revisions made to plan of care: No    Skilled OT Minutes Provided: 27  Communication with Treatment Team:     At end of treatment, patient remained:   Up in chair     Up in wheelchair in room    In bed    With alarm activated    Bed rails up    Call bell in reach     Family/friends present    Restraints secured properly    In bathroom with CNA/RN notified   x In gym with PT/PTA/tech    Nurse aware    Other:

## 2024-11-15 NOTE — PT/OT/SLP PROGRESS
Physical Therapy Treatment Note           Name: Tali Beard    : 1935 (88 y.o.)  MRN: [id number]           TREATMENT SUMMARY AND RECOMMENDATIONS:    PT Received On: 11/15/24  PT Start Time: 1003     PT Stop Time: 1034  PT Total Time (min): 31 min     Subjective Assessment:   No complaints  Lethargic   x Awake, alert, cooperative  Uncooperative    Agitated  c/o pain    Appropriate  c/o fatigue   x Confused - minimal  Treated at bedside     Emotionally labile  Treated in gym/dept.    Impulsive  Other:    Flat affect       Therapy Precautions:   x Cognitive deficits  Spinal precautions    Collar - hard  Sternal precautions    Collar - soft   TLSO   x Fall risk  LSO    Hip precautions - posterior  Knee immobilizer    Hip precautions - anterior  WBAT    Impaired communication  Partial weightbearing    Oxygen  TTWB    PEG tube  NWB    Visual deficits  Other:    Hearing deficits          Treatment Objectives:     Mobility Training:   Assist level Comments    Bed mobility     Transfer     Gait CGA/SBA X 200 feet without AD, Pt easily distracted when someone is talking 25% of the time. Pt walks at a slower pace when holding onto railings in hallway. WC following behind for safety.    Sit to stand transitions SBA Multiple reps for exercise, pushing off from chair.   Sitting balance     Standing balance  CGA/SBA Static standing while wound care was changing bandages on backside.   Wheelchair mobility     Car transfer     Other:          Therapeutic Exercise:   Exercise Sets Reps Comments   BLE bike   10' 5' Forwards  5' Backwards, pt needed verbal cues 25% of the time to remind pt to continue backwards    Standing  2 5 Sit to stand                   Additional Comments:    No new issues. PT to progress as able. Assisted wound care at tx end    Treated by Fatoumata MEREDITH, Supervised by Aurora Robbins PT     Assessment: Patient tolerated session well.    PT Plan: continue per POC - Home with   family care  Revisions made to plan of care: No    GOALS:   Multidisciplinary Problems       Physical Therapy Goals          Problem: Physical Therapy    Goal Priority Disciplines Outcome Interventions   Physical Therapy Goal     PT, PT/OT Progressing    Description: Goals to be met by: Discharge      Patient will increase functional independence with mobility by performin. Supine <> sit with Supervision, remaining focused to task 100% of the time and only needing 25% cues.   2. Sit to stand transfer with Supervision, remaining focused to task 100% of the time and only needing 25% cues  3. Bed to chair transfer with Supervision using Rolling Walker vs No AD; remaining focused to task 100% of the time and only needing 25% cues  4. Gait  x 350 feet with Supervision using Rolling Walker vs no AD; remaining focused to task 100% of the time and only needing 25% cues.                          Skilled PT Minutes Provided: 31  Communication with Treatment Team:     Equipment recommendations:       At end of treatment, patient remained:  x Up in chair     Up in wheelchair in room    In bed   x With alarm activated    Bed rails up   x Call bell in reach    x Family/friends present    Restraints secured properly    In bathroom with CNA/RN notified    Nurse aware    In gym with therapist/tech    Other:

## 2024-11-16 PROCEDURE — 99900035 HC TECH TIME PER 15 MIN (STAT)

## 2024-11-16 PROCEDURE — 63600175 PHARM REV CODE 636 W HCPCS: Performed by: STUDENT IN AN ORGANIZED HEALTH CARE EDUCATION/TRAINING PROGRAM

## 2024-11-16 PROCEDURE — 25000003 PHARM REV CODE 250: Performed by: PHYSICIAN ASSISTANT

## 2024-11-16 PROCEDURE — 97110 THERAPEUTIC EXERCISES: CPT

## 2024-11-16 PROCEDURE — 97116 GAIT TRAINING THERAPY: CPT

## 2024-11-16 PROCEDURE — 94760 N-INVAS EAR/PLS OXIMETRY 1: CPT

## 2024-11-16 PROCEDURE — A4216 STERILE WATER/SALINE, 10 ML: HCPCS | Performed by: PHYSICIAN ASSISTANT

## 2024-11-16 PROCEDURE — 11000004 HC SNF PRIVATE

## 2024-11-16 RX ADMIN — LEVETIRACETAM 500 MG: 500 TABLET, FILM COATED ORAL at 09:11

## 2024-11-16 RX ADMIN — LATANOPROST 1 DROP: 50 SOLUTION/ DROPS OPHTHALMIC at 09:11

## 2024-11-16 RX ADMIN — AMLODIPINE BESYLATE 5 MG: 5 TABLET ORAL at 09:11

## 2024-11-16 RX ADMIN — LEVETIRACETAM 500 MG: 500 TABLET, FILM COATED ORAL at 08:11

## 2024-11-16 RX ADMIN — ATORVASTATIN CALCIUM 40 MG: 40 TABLET, FILM COATED ORAL at 08:11

## 2024-11-16 RX ADMIN — POLYETHYLENE GLYCOL 3350 17 G: 17 POWDER, FOR SOLUTION ORAL at 11:11

## 2024-11-16 RX ADMIN — Medication 10 ML: at 08:11

## 2024-11-16 RX ADMIN — FERROUS SULFATE TAB 325 MG (65 MG ELEMENTAL FE) 1 EACH: 325 (65 FE) TAB at 09:11

## 2024-11-16 RX ADMIN — CEFTRIAXONE SODIUM 1 G: 1 INJECTION, POWDER, FOR SOLUTION INTRAMUSCULAR; INTRAVENOUS at 09:11

## 2024-11-16 NOTE — PROGRESS NOTES
[facility]  HOSPITAL MEDICINE ~ PROGRESS NOTE    CHIEF COMPLAINT     Hospital follow up for right parietal intracranial hemorrhage     HOSPITAL COURSE     88 year old female with PMHx of HTN, HLD, DM2, Kaposi Sarcoma s/p resection and radiation to left ankle and submental node (followed by [facility]), and prior subarachnoid hemorrhage and intraparenchymal hemorrhage presents to [facility] for swing following right parietal intracranial hemorrhage.  She initially presented to [facility] on 10/30/2024 due to left sided weakness and confusion.  CT head revealed acute hemorrhagic infarct of the right parietal lobe.  CTA head and neck displayed diminished caliber of M1 and M2 segments bilaterally MCA possible indicative of vasospasm.  MRI revealed evolving left parietal late subacute and right parietal early subacute cortical hemorrhage, new punctate focus of suspected ischemic cortical infarct at the left occipital lobe, with no additional sites of abnormal diffusion signal appreciated.     Neurology does not recommend anticoagulation or antiplatelets at this time due to spontaneous bleeding events. Suspecting primary intracranial hemorrhage due to amyloid angiopathy. EEG was negative for seizures, showed diffuse cerebral slowing - will continue keppra 500mg PO BID for seizure prophylaxis.      Patient lives alone and was completely independent prior to this hospitalization. Has plans for 24 hour care upon returning home between 2 of her sons. She lives in a 1 story home without stairs and a climb in bathtub. Patient's mentation was improving prior to admission to [place]. Patient is oriented x3 with slow response time - patient's son report she is worse than her baseline mentation. Patient denies headache, N/V, or vision changes.      Today:  Urine culture showing Proteus mirabilis sensitive to Rocephin.  Continue for 5 day course.    OBJECTIVE/PHYSICAL EXAM     VITAL SIGNS  (MOST RECENT):  Temp: 98.1 °F (36.7 °C) (11/16/24 0830)  Pulse: 72 (11/16/24 0838)  Resp: 18 (11/16/24 0838)  BP: 128/71 (11/16/24 0830)  SpO2: 100 % (11/16/24 0838) VITAL SIGNS (24 HOUR RANGE):  Temp:  [97.7 °F (36.5 °C)-98.1 °F (36.7 °C)] 98.1 °F (36.7 °C)  Pulse:  [71-85] 72  Resp:  [18] 18  SpO2:  [99 %-100 %] 100 %  BP: (128-135)/(71-75) 128/71     GENERAL: In no acute distress, afebrile  HEENT: PERRLA   CHEST: Clear to auscultation bilaterally  HEART: S1, S2, no appreciable murmur  ABDOMEN: Soft, nontender, BS +  MSK: Warm, no lower extremity edema, no clubbing or cyanosis  NEUROLOGIC: Alert and oriented x3, moving all extremities with good strength, speech is difficult to understand occasionally, slow response time    ASSESSMENT/PLAN     Left parietal late subacute and right parietal early subacute cortical hemorrhage   Neurology recommends no anticoagulation or antiplatelet therapy at this time due to spontaneous ICH events    Follow up with Yue Rodriguez NP in 1 month (approx 11/29) for MRI w/wo contrast with SWI sequence and CSF labs.  Negative for Lyme disease serology, encephalopathy autoimmune evaluation  Pending: CSF, HSV type 2 glycoprotein IgG  Continue Keppra 500mg PO BID for seizure prophylaxis.   Continue speech therapy for cognition      Incontinence dermatitis   Followed by wound care  Purewick  Prn bowel regimen      Proteus mirabilis UTI  Rocephin 1g IV for 5 day regimen starting 11/13/2024     Chronic Microcytic Anemia   Folate deficiency   Multivitamin      HTN / HLD  Continue amlodipine 5mg PO QD and atorvastatin 40mg PO QHS.   SBP goal < 140      Left foot wound - healed      Kaposi Sarcoma   Followed by [facility] - Dr. Celestine Henderson   S/p XRT   CT chest/abdomen/pelvis on 10/04/2024 was negative      DVT prophylaxis: Neurology recommends holding chemical prophylaxis at this time.      Code status: Full      Anticipated discharge and disposition:  Continue PT/OT.  Patient has  "established 24 hour care at home with her children  __________________________________________________________________________    LABS/MICRO/MEDS/DIAGNOSTICS     LABS  No results for input(s): "NA", "K", "CHLORIDE", "CO2", "BUN", "CREATININE", "GLUCOSE", "CALCIUM", "ALKPHOS", "AST", "ALT", "ALBUMIN" in the last 72 hours.    No results for input(s): "WBC", "RBC", "HCT", "MCV", "PLT" in the last 72 hours.    Invalid input(s): "HG"    MICROBIOLOGY  Microbiology Results (last 7 days)       Procedure Component Value Units Date/Time    Urine culture [[id number]]  (Abnormal)  (Susceptibility) Collected: 11/12/24 1910    Order Status: Completed Specimen: Urine Updated: 11/15/24 0704     Urine Culture >/= 100,000 colonies/ml Proteus mirabilis             MEDICATIONS   amLODIPine  5 mg Oral Daily    atorvastatin  40 mg Oral QHS    cefTRIAXone (Rocephin) IV (PEDS and ADULTS)  1 g Intravenous Q24H    ferrous sulfate  1 tablet Oral Daily    latanoprost  1 drop Both Eyes Daily    levETIRAcetam  500 mg Oral BID         INFUSIONS       DIAGNOSTIC TESTS  No orders to display      No results found for: "EF"     NUTRITION STATUS  Patient meets ASPEN criteria for mild malnutrition of acute illness or injury per RD assessment as evidenced by:  Energy Intake (Malnutrition): less than or equal to 50% for greater than or equal to 5 days  Weight Loss (Malnutrition): other (see comments) (wt loss unsure family reports)  Subcutaneous Fat (Malnutrition): mild depletion  Muscle Mass (Malnutrition): mild depletion  Fluid Accumulation (Malnutrition): other (see comments) (unable to assess)        A minimum of two characteristics is recommended for diagnosis of either severe or non-severe malnutrition.     Case related differential diagnoses have been reviewed; assessment and plan has been documented. I have personally reviewed the labs and test results that are currently available; I have reviewed the patients medication list. I have reviewed the " consulting providers recommendations. I have reviewed or attempted to review medical records based upon their availability.  All of the patient's and/or family's questions have been addressed and answered to the best of my ability.  I will continue to monitor closely and make adjustments to medical management as needed.  This document was created using M*Modal Fluency Direct.  Transcription errors may have been made.  Please contact me if any questions may rise regarding documentation to clarify transcription.      Lili Booker MD   Internal Medicine  Department of Hospital Medicine [facility]

## 2024-11-16 NOTE — PLAN OF CARE
Problem: Adult Inpatient Plan of Care  Goal: Plan of Care Review  Outcome: Progressing  Goal: Patient-Specific Goal (Individualized)  Outcome: Progressing  Flowsheets (Taken 11/15/2024 2257)  Individualized Care Needs: Family at bedside to assist with frequent reorientation as needed, encourage oral fluids, continue IV abx for Urinary tract infection and continue to monitor for safety.  Anxieties, Fears or Concerns: none noted at this time  Goal: Absence of Hospital-Acquired Illness or Injury  Outcome: Progressing  Goal: Optimal Comfort and Wellbeing  Outcome: Progressing  Goal: Readiness for Transition of Care  Outcome: Progressing     Problem: Infection  Goal: Absence of Infection Signs and Symptoms  Outcome: Progressing  Intervention: Prevent or Manage Infection  Flowsheets (Taken 11/15/2024 2257)  Fever Reduction/Comfort Measures:   lightweight clothing   lightweight bedding  Infection Management: aseptic technique maintained  Isolation Precautions:   protective   precautions maintained     Problem: Fall Injury Risk  Goal: Absence of Fall and Fall-Related Injury  Outcome: Progressing  Intervention: Promote Injury-Free Environment  Flowsheets (Taken 11/15/2024 2257)  Safety Promotion/Fall Prevention:   assistive device/personal item within reach   chair alarm set   Fall Risk reviewed with patient/family   Fall Risk signage in place   family expresses understanding of fall risk and prevention   gait belt with ambulation   instructed to call staff for mobility   lighting adjusted   medications reviewed   side rails raised x 3

## 2024-11-16 NOTE — PT/OT/SLP PROGRESS
Physical Therapy Treatment Note           Name: Tali Beard    : 1935 (88 y.o.)  MRN: [id number]           TREATMENT SUMMARY AND RECOMMENDATIONS:    PT Received On: 24  PT Start Time: 0900     PT Stop Time: 930  PT Total Time (min): 30 min     Subjective Assessment:   No complaints  Lethargic   x Awake, alert, cooperative  Uncooperative    Agitated  c/o pain    Appropriate  c/o fatigue   x Confused  x Treated at bedside     Emotionally labile  Treated in gym/dept.    Impulsive  Other:    Flat affect       Therapy Precautions:   x Cognitive deficits  Spinal precautions    Collar - hard  Sternal precautions    Collar - soft   TLSO   x Fall risk  LSO    Hip precautions - posterior  Knee immobilizer    Hip precautions - anterior  WBAT    Impaired communication  Partial weightbearing    Oxygen  TTWB    PEG tube  NWB    Visual deficits  Other:    Hearing deficits          Treatment Objectives:     Mobility Training:   Assist level Comments    Bed mobility CGA Supine > sit   Transfer CGA HHA   Gait  ft with HHA for safety with no LOB and minimal to no abnormalities, unsafe to amb unassisted due to cognitive deficits   Sit to stand transitions CGA X 10 reps from normal height EOB with verbal, visual, and tactile cues required for proper hand placement prior to sitting and standing however pt unable to follow   Sitting balance     Standing balance      Wheelchair mobility     Car transfer     Other:          Therapeutic Exercise:   Exercise Sets Reps Comments   B LE supported standing exercises   20 Attempted heel raises, hip flexion, hip abd                         Additional Comments:.    Assessment: Patient tolerated session well with son present throughout session to assist with communicating in Romansh to try and improve pt's understanding of activities. Pt able to amb increased distance with HHA for safety on level surface with no LOB however unsafe to amb unassisted due to cognitive  deficits. Verbal (english and Bangladeshi), visual, and tactile cues required throughout session to facilitate proper techniques with exercises and activities however difficulty following instructions    PT Plan: continue per POC - Home with / family care  Revisions made to plan of care: No    GOALS:   Multidisciplinary Problems       Physical Therapy Goals          Problem: Physical Therapy    Goal Priority Disciplines Outcome Interventions   Physical Therapy Goal     PT, PT/OT Progressing    Description: Goals to be met by: Discharge      Patient will increase functional independence with mobility by performin. Supine <> sit with Supervision, remaining focused to task 100% of the time and only needing 25% cues.   2. Sit to stand transfer with Supervision, remaining focused to task 100% of the time and only needing 25% cues  3. Bed to chair transfer with Supervision using Rolling Walker vs No AD; remaining focused to task 100% of the time and only needing 25% cues  4. Gait  x 350 feet with Supervision using Rolling Walker vs no AD; remaining focused to task 100% of the time and only needing 25% cues.                          Skilled PT Minutes Provided: 30  Communication with Treatment Team:     Equipment recommendations:       At end of treatment, patient remained:  x Up in chair     Up in wheelchair in room    In bed   x With alarm activated    Bed rails up   x Call bell in reach    x Family/friends present    Restraints secured properly    In bathroom with CNA/RN notified    Nurse aware    In gym with therapist/tech    Other:

## 2024-11-17 PROCEDURE — 99900035 HC TECH TIME PER 15 MIN (STAT)

## 2024-11-17 PROCEDURE — 11000004 HC SNF PRIVATE

## 2024-11-17 PROCEDURE — 25000003 PHARM REV CODE 250: Performed by: PHYSICIAN ASSISTANT

## 2024-11-17 PROCEDURE — 94760 N-INVAS EAR/PLS OXIMETRY 1: CPT

## 2024-11-17 PROCEDURE — 63600175 PHARM REV CODE 636 W HCPCS: Performed by: STUDENT IN AN ORGANIZED HEALTH CARE EDUCATION/TRAINING PROGRAM

## 2024-11-17 RX ADMIN — ATORVASTATIN CALCIUM 40 MG: 40 TABLET, FILM COATED ORAL at 08:11

## 2024-11-17 RX ADMIN — LEVETIRACETAM 500 MG: 500 TABLET, FILM COATED ORAL at 08:11

## 2024-11-17 RX ADMIN — FERROUS SULFATE TAB 325 MG (65 MG ELEMENTAL FE) 1 EACH: 325 (65 FE) TAB at 09:11

## 2024-11-17 RX ADMIN — LATANOPROST 1 DROP: 50 SOLUTION/ DROPS OPHTHALMIC at 09:11

## 2024-11-17 RX ADMIN — CEFTRIAXONE SODIUM 1 G: 1 INJECTION, POWDER, FOR SOLUTION INTRAMUSCULAR; INTRAVENOUS at 09:11

## 2024-11-17 RX ADMIN — AMLODIPINE BESYLATE 5 MG: 5 TABLET ORAL at 09:11

## 2024-11-17 RX ADMIN — LEVETIRACETAM 500 MG: 500 TABLET, FILM COATED ORAL at 09:11

## 2024-11-17 NOTE — PLAN OF CARE
Problem: Adult Inpatient Plan of Care  Goal: Patient-Specific Goal (Individualized)  Flowsheets (Taken 11/16/2024 2050)  Individualized Care Needs: Continue to encourage oral fluids, continue treatment of UTI with IV abx, reorient pt as needed and monitor for safety.  Anxieties, Fears or Concerns: none noted at this time     Problem: Infection  Goal: Absence of Infection Signs and Symptoms  Intervention: Prevent or Manage Infection  Flowsheets (Taken 11/16/2024 2050)  Fever Reduction/Comfort Measures:   lightweight bedding   lightweight clothing  Infection Management: aseptic technique maintained  Isolation Precautions:   protective   precautions maintained     Problem: Wound  Goal: Skin Health and Integrity  Intervention: Optimize Skin Protection  Flowsheets (Taken 11/16/2024 2050)  Pressure Reduction Techniques:   frequent weight shift encouraged   weight shift assistance provided  Activity Management: Walk with assistive devise and /or staff member - L3  Head of Bed (HOB) Positioning: HOB elevated

## 2024-11-17 NOTE — PROGRESS NOTES
[facility]  HOSPITAL MEDICINE ~ PROGRESS NOTE    CHIEF COMPLAINT     Hospital follow up for right parietal intracranial hemorrhage     HOSPITAL COURSE     88 year old female with PMHx of HTN, HLD, DM2, Kaposi Sarcoma s/p resection and radiation to left ankle and submental node (followed by [facility]), and prior subarachnoid hemorrhage and intraparenchymal hemorrhage presents to [facility] for swing following right parietal intracranial hemorrhage.  She initially presented to [facility] on 10/30/2024 due to left sided weakness and confusion.  CT head revealed acute hemorrhagic infarct of the right parietal lobe.  CTA head and neck displayed diminished caliber of M1 and M2 segments bilaterally MCA possible indicative of vasospasm.  MRI revealed evolving left parietal late subacute and right parietal early subacute cortical hemorrhage, new punctate focus of suspected ischemic cortical infarct at the left occipital lobe, with no additional sites of abnormal diffusion signal appreciated.     Neurology does not recommend anticoagulation or antiplatelets at this time due to spontaneous bleeding events. Suspecting primary intracranial hemorrhage due to amyloid angiopathy. EEG was negative for seizures, showed diffuse cerebral slowing - will continue keppra 500mg PO BID for seizure prophylaxis.      Patient lives alone and was completely independent prior to this hospitalization. Has plans for 24 hour care upon returning home between 2 of her sons. She lives in a 1 story home without stairs and a climb in bathtub. Patient's mentation was improving prior to admission to [place]. Patient is oriented x3 with slow response time - patient's son report she is worse than her baseline mentation. Patient denies headache, N/V, or vision changes.      Today:  Urine culture showing Proteus mirabilis sensitive to Rocephin.  Continue for 5 day course.    OBJECTIVE/PHYSICAL EXAM     VITAL SIGNS  (MOST RECENT):  Temp: 97.5 °F (36.4 °C) (11/17/24 0608)  Pulse: 84 (11/17/24 0855)  Resp: 18 (11/17/24 0855)  BP: 124/76 (11/17/24 0608)  SpO2: 98 % (11/17/24 0855) VITAL SIGNS (24 HOUR RANGE):  Temp:  [97.5 °F (36.4 °C)-98.5 °F (36.9 °C)] 97.5 °F (36.4 °C)  Pulse:  [80-84] 84  Resp:  [18] 18  SpO2:  [97 %-99 %] 98 %  BP: (124-139)/(75-76) 124/76     GENERAL: In no acute distress, afebrile  HEENT: PERRLA   CHEST: Clear to auscultation bilaterally  HEART: S1, S2, no appreciable murmur  ABDOMEN: Soft, nontender, BS +  MSK: Warm, no lower extremity edema, no clubbing or cyanosis  NEUROLOGIC: Alert and oriented x3, moving all extremities with good strength, speech is difficult to understand occasionally, slow response time    ASSESSMENT/PLAN     Left parietal late subacute and right parietal early subacute cortical hemorrhage   Neurology recommends no anticoagulation or antiplatelet therapy at this time due to spontaneous ICH events    Follow up with Yue Rodriguez NP in 1 month (approx 11/29) for MRI w/wo contrast with SWI sequence and CSF labs.  Negative for Lyme disease serology, encephalopathy autoimmune evaluation  Pending: CSF, HSV type 2 glycoprotein IgG  Continue Keppra 500mg PO BID for seizure prophylaxis.   Continue speech therapy for cognition      Incontinence dermatitis   Followed by wound care  Purewick  Prn bowel regimen      Proteus mirabilis UTI  Rocephin 1g IV for 5 day regimen starting 11/13/2024     Chronic Microcytic Anemia   Folate deficiency   Multivitamin      HTN / HLD  Continue amlodipine 5mg PO QD and atorvastatin 40mg PO QHS.   SBP goal < 140      Left foot wound - healed      Kaposi Sarcoma   Followed by [facility] - Dr. Celestine Henderson   S/p XRT   CT chest/abdomen/pelvis on 10/04/2024 was negative      DVT prophylaxis: Neurology recommends holding chemical prophylaxis at this time.      Code status: Full      Anticipated discharge and disposition:  Continue PT/OT.  Patient has  "established 24 hour care at home with her children  __________________________________________________________________________    LABS/MICRO/MEDS/DIAGNOSTICS     LABS  No results for input(s): "NA", "K", "CHLORIDE", "CO2", "BUN", "CREATININE", "GLUCOSE", "CALCIUM", "ALKPHOS", "AST", "ALT", "ALBUMIN" in the last 72 hours.    No results for input(s): "WBC", "RBC", "HCT", "MCV", "PLT" in the last 72 hours.    Invalid input(s): "HG"    MICROBIOLOGY  Microbiology Results (last 7 days)       Procedure Component Value Units Date/Time    Urine culture [[id number]]  (Abnormal)  (Susceptibility) Collected: 11/12/24 1910    Order Status: Completed Specimen: Urine Updated: 11/15/24 0704     Urine Culture >/= 100,000 colonies/ml Proteus mirabilis             MEDICATIONS   amLODIPine  5 mg Oral Daily    atorvastatin  40 mg Oral QHS    cefTRIAXone (Rocephin) IV (PEDS and ADULTS)  1 g Intravenous Q24H    ferrous sulfate  1 tablet Oral Daily    latanoprost  1 drop Both Eyes Daily    levETIRAcetam  500 mg Oral BID         INFUSIONS       DIAGNOSTIC TESTS  No orders to display      No results found for: "EF"     NUTRITION STATUS  Patient meets ASPEN criteria for mild malnutrition of acute illness or injury per RD assessment as evidenced by:  Energy Intake (Malnutrition): less than or equal to 50% for greater than or equal to 5 days  Weight Loss (Malnutrition): other (see comments) (wt loss unsure family reports)  Subcutaneous Fat (Malnutrition): mild depletion  Muscle Mass (Malnutrition): mild depletion  Fluid Accumulation (Malnutrition): other (see comments) (unable to assess)        A minimum of two characteristics is recommended for diagnosis of either severe or non-severe malnutrition.     Case related differential diagnoses have been reviewed; assessment and plan has been documented. I have personally reviewed the labs and test results that are currently available; I have reviewed the patients medication list. I have reviewed the " consulting providers recommendations. I have reviewed or attempted to review medical records based upon their availability.  All of the patient's and/or family's questions have been addressed and answered to the best of my ability.  I will continue to monitor closely and make adjustments to medical management as needed.  This document was created using M*Modal Fluency Direct.  Transcription errors may have been made.  Please contact me if any questions may rise regarding documentation to clarify transcription.      Lili Booker MD   Internal Medicine  Department of Hospital Medicine [facility]

## 2024-11-17 NOTE — PLAN OF CARE
Problem: Adult Inpatient Plan of Care  Goal: Plan of Care Review  Outcome: Progressing  Flowsheets (Taken 11/17/2024 0800)  Plan of Care Reviewed With:   patient   child  Goal: Patient-Specific Goal (Individualized)  Outcome: Progressing  Flowsheets (Taken 11/17/2024 0800)  Individualized Care Needs: Continue to encourage oral fluids, continue treatment of UTI with IV abx, reorient pt as needed and monitor for safety  Anxieties, Fears or Concerns: None noted at this time  Patient/Family-Specific Goals (Include Timeframe): Return to baseline by SENDY to discharge home with family  Goal: Absence of Hospital-Acquired Illness or Injury  Outcome: Progressing  Goal: Optimal Comfort and Wellbeing  Outcome: Progressing  Goal: Readiness for Transition of Care  Outcome: Progressing

## 2024-11-18 ENCOUNTER — DOCUMENT SCAN (OUTPATIENT)
Dept: HOME HEALTH SERVICES | Facility: HOSPITAL | Age: 89
End: 2024-11-18
Payer: MEDICARE

## 2024-11-18 LAB
ANION GAP SERPL CALC-SCNC: 5 MEQ/L
BASOPHILS # BLD AUTO: 0.03 X10(3)/MCL
BASOPHILS NFR BLD AUTO: 0.6 %
BUN SERPL-MCNC: 15.6 MG/DL (ref 9.8–20.1)
CALCIUM SERPL-MCNC: 8.8 MG/DL (ref 8.4–10.2)
CHLORIDE SERPL-SCNC: 107 MMOL/L (ref 98–107)
CO2 SERPL-SCNC: 27 MMOL/L (ref 23–31)
CREAT SERPL-MCNC: 0.74 MG/DL (ref 0.55–1.02)
CREAT/UREA NIT SERPL: 21
EOSINOPHIL # BLD AUTO: 0.22 X10(3)/MCL (ref 0–0.9)
EOSINOPHIL NFR BLD AUTO: 4.5 %
ERYTHROCYTE [DISTWIDTH] IN BLOOD BY AUTOMATED COUNT: 15.3 % (ref 11.5–17)
GFR SERPLBLD CREATININE-BSD FMLA CKD-EPI: >60 ML/MIN/1.73/M2
GLUCOSE SERPL-MCNC: 98 MG/DL (ref 82–115)
HCT VFR BLD AUTO: 33.5 % (ref 37–47)
HGB BLD-MCNC: 10.4 G/DL (ref 12–16)
IMM GRANULOCYTES # BLD AUTO: 0.01 X10(3)/MCL (ref 0–0.04)
IMM GRANULOCYTES NFR BLD AUTO: 0.2 %
LYMPHOCYTES # BLD AUTO: 1.12 X10(3)/MCL (ref 0.6–4.6)
LYMPHOCYTES NFR BLD AUTO: 23 %
MCH RBC QN AUTO: 23.5 PG (ref 27–31)
MCHC RBC AUTO-ENTMCNC: 31 G/DL (ref 33–36)
MCV RBC AUTO: 75.8 FL (ref 80–94)
MONOCYTES # BLD AUTO: 0.49 X10(3)/MCL (ref 0.1–1.3)
MONOCYTES NFR BLD AUTO: 10.1 %
NEUTROPHILS # BLD AUTO: 3 X10(3)/MCL (ref 2.1–9.2)
NEUTROPHILS NFR BLD AUTO: 61.6 %
PLATELET # BLD AUTO: 182 X10(3)/MCL (ref 130–400)
PMV BLD AUTO: 11.3 FL (ref 7.4–10.4)
POTASSIUM SERPL-SCNC: 4.1 MMOL/L (ref 3.5–5.1)
RBC # BLD AUTO: 4.42 X10(6)/MCL (ref 4.2–5.4)
SODIUM SERPL-SCNC: 139 MMOL/L (ref 136–145)
WBC # BLD AUTO: 4.87 X10(3)/MCL (ref 4.5–11.5)

## 2024-11-18 PROCEDURE — 36415 COLL VENOUS BLD VENIPUNCTURE: CPT | Performed by: PHYSICIAN ASSISTANT

## 2024-11-18 PROCEDURE — 97110 THERAPEUTIC EXERCISES: CPT

## 2024-11-18 PROCEDURE — 97116 GAIT TRAINING THERAPY: CPT

## 2024-11-18 PROCEDURE — 97530 THERAPEUTIC ACTIVITIES: CPT

## 2024-11-18 PROCEDURE — 25000003 PHARM REV CODE 250: Performed by: PHYSICIAN ASSISTANT

## 2024-11-18 PROCEDURE — 85025 COMPLETE CBC W/AUTO DIFF WBC: CPT | Performed by: PHYSICIAN ASSISTANT

## 2024-11-18 PROCEDURE — 11000004 HC SNF PRIVATE

## 2024-11-18 PROCEDURE — 80048 BASIC METABOLIC PNL TOTAL CA: CPT | Performed by: PHYSICIAN ASSISTANT

## 2024-11-18 PROCEDURE — 99900035 HC TECH TIME PER 15 MIN (STAT)

## 2024-11-18 PROCEDURE — 92507 TX SP LANG VOICE COMM INDIV: CPT

## 2024-11-18 PROCEDURE — 94760 N-INVAS EAR/PLS OXIMETRY 1: CPT

## 2024-11-18 RX ADMIN — ATORVASTATIN CALCIUM 40 MG: 40 TABLET, FILM COATED ORAL at 08:11

## 2024-11-18 RX ADMIN — LEVETIRACETAM 500 MG: 500 TABLET, FILM COATED ORAL at 08:11

## 2024-11-18 RX ADMIN — LEVETIRACETAM 500 MG: 500 TABLET, FILM COATED ORAL at 09:11

## 2024-11-18 RX ADMIN — AMLODIPINE BESYLATE 5 MG: 5 TABLET ORAL at 09:11

## 2024-11-18 RX ADMIN — LATANOPROST 1 DROP: 50 SOLUTION/ DROPS OPHTHALMIC at 09:11

## 2024-11-18 RX ADMIN — FERROUS SULFATE TAB 325 MG (65 MG ELEMENTAL FE) 1 EACH: 325 (65 FE) TAB at 09:11

## 2024-11-18 NOTE — PT/OT/SLP PROGRESS
Speech Language Pathology Treatment    Patient Name:  Tali Beard   MRN:  [id number]  Admitting Diagnosis: Intracranial hemorrhage    Recommendations:                 General Recommendations:  Cognitive-linguistic therapy  Diet recommendations:  Regular Diet - IDDSI Level 7, Liquid Diet Level: Thin liquids - IDDSI Level 0   Aspiration Precautions: HOB to 90 degrees   General Precautions: Standard, fall  Communication strategies:  go to room if call light pushed    Assessment:     Tali Beard is a 88 y.o. female with an SLP diagnosis of Cognitive-Linguistic Impairment.  She presents with impaired SHORT-TERM MEMORY recall, long term recall, problem solving, thought organization, and word finding. Patient will benefit from skilled SLP intervention to target above stated deficits and promote a return to PLOF.     Subjective     Patient seen in chair in room w/ son present. Patient pleasant and in good spirits.    Patient goals: none stated     Pain/Comfort:       Respiratory Status: Room air    Objective:     Has the patient been evaluated by SLP for swallowing?      Keep patient NPO?     Current Respiratory Status:        SLP discussed and provided information on home health vs OP therapy upon discharge. Patient's son wishes to proceed w/ OP therapy services. CM and PA notified.  SLP presented safety scenarios and Patient provided logical solution given maxA. Nonsensical responses at times and limited grammatical structure to responses present.    Overall good session.  Cont SLP PLAN OF CARE.    Goals:   Multidisciplinary Problems       SLP Goals          Problem: SLP    Goal Priority Disciplines Outcome   SLP Goal     SLP Progressing   Description: LTG: Patient will improve cognitive linguistic skills to SPV to ensure safety upon discharge home.    STG:  Patient will name x10 items in a concrete category w/I 1 minute to improve word generation Vy.  Patient will recall 3 EOD Vy.  Patient will answer LTM  questions w/ 80% accuracy Vy.  Patient will provide solutions to common problems w/ 80% accuracy Vy.                       Plan:     Patient to be seen:  5 x/week   Plan of Care expires:  11/29/24  Plan of Care reviewed with:  patient, son   SLP Follow-Up:  Yes       Discharge recommendations:      Barriers to Discharge:  None    Time Tracking:     SLP Treatment Date:   11/18/24  Speech Start Time:  0955  Speech Stop Time:  1030     Speech Total Time (min):  35 min    Billable Minutes: Speech Therapy Individual 35      Sara Davila M.S., CCC-SLP   11/18/2024

## 2024-11-18 NOTE — PLAN OF CARE
Referral sent to [facility] for possible assistance with walk in shower installation. Freedom of choice on file

## 2024-11-18 NOTE — PLAN OF CARE
[facility]  Discharge Reassessment    Primary Care Provider: Aurora Moe FNP    Expected Discharge Date: 11/20/2024    Reassessment (most recent)       Discharge Reassessment - 11/18/24 1404          Discharge Reassessment    Assessment Type Discharge Planning Reassessment     Did the patient's condition or plan change since previous assessment? No     Discharge Plan discussed with: Adult children;Patient     Discharge Plan A Home     DME Needed Upon Discharge  none     Transition of Care Barriers None     Why the patient remains in the hospital Requires continued medical care        Post-Acute Status    Hospital Resources/Appts/Education Provided Provided patient/caregiver with written discharge plan information     Discharge Delays None known at this time

## 2024-11-18 NOTE — PT/OT/SLP PROGRESS
Physical Therapy Treatment Note           Name: Tali Beard    : 1935 (88 y.o.)  MRN: [id number]           TREATMENT SUMMARY AND RECOMMENDATIONS:    PT Received On: 24  PT Start Time: 930     PT Stop Time: 955  PT Total Time (min): 25 min     Subjective Assessment:   No complaints  Lethargic   x Awake, alert, cooperative  Uncooperative    Agitated  c/o pain    Appropriate  c/o fatigue   x Confused - minimal  Treated at bedside     Emotionally labile  Treated in gym/dept.    Impulsive  Other:    Flat affect       Therapy Precautions:   x Cognitive deficits  Spinal precautions    Collar - hard  Sternal precautions    Collar - soft   TLSO   x Fall risk  LSO    Hip precautions - posterior  Knee immobilizer    Hip precautions - anterior  WBAT    Impaired communication  Partial weightbearing    Oxygen  TTWB    PEG tube  NWB    Visual deficits  Other:    Hearing deficits          Treatment Objectives:     Mobility Training:   Assist level Comments    Bed mobility     Transfer     Gait CGA/SBA X 275 feet from gym to room. Pt seemed to be confused due to her thinking we were on the other side of the hallway. Pt hold onto railing while ambulating. Verbal cueing was needed 25% of the time, pt easily distracted when people are talking around her while ambulating.    Sit to stand transitions SBA Pushing off from chair arms, multiple reps for exercises    Sitting balance     Standing balance      Wheelchair mobility     Car transfer     Other:          Therapeutic Exercise:   Exercise Sets Reps Comments   BLE Seated  2 10 Knee ext, hip flex, hip abd/add with 2lb ankle weights.   Visual cues needed 50% of the time to complete task.    BLE standing   10 Sit->stand, pt needed verbal and tactile cues 50% of the time to complete task.                    Additional Comments:  Pt needed more cueing for exercises this therapy session, but able to complete task after cues.     Treated by Fatoumata Jin  MASOUD, Supervised by Aurora Robbins PT     Assessment: Patient tolerated session well.     PT Plan: continue per POC - Home with  family care  Revisions made to plan of care: No    GOALS:   Multidisciplinary Problems       Physical Therapy Goals          Problem: Physical Therapy    Goal Priority Disciplines Outcome Interventions   Physical Therapy Goal     PT, PT/OT Progressing    Description: Goals to be met by: Discharge      Patient will increase functional independence with mobility by performin. Supine <> sit with Supervision, remaining focused to task 100% of the time and only needing 25% cues.   2. Sit to stand transfer with Supervision, remaining focused to task 100% of the time and only needing 25% cues  3. Bed to chair transfer with Supervision using Rolling Walker vs No AD; remaining focused to task 100% of the time and only needing 25% cues  4. Gait  x 350 feet with Supervision using Rolling Walker vs no AD; remaining focused to task 100% of the time and only needing 25% cues.                          Skilled PT Minutes Provided: 25  Communication with Treatment Team:     Equipment recommendations:       At end of treatment, patient remained:  x Up in chair     Up in wheelchair in room    In bed   x With alarm activated    Bed rails up   x Call bell in reach    x Family/friends present    Restraints secured properly    In bathroom with CNA/RN notified    Nurse aware    In gym with therapist/tech    Other:

## 2024-11-18 NOTE — PLAN OF CARE
Problem: Adult Inpatient Plan of Care  Goal: Plan of Care Review  Outcome: Progressing  Flowsheets (Taken 11/17/2024 2058)  Plan of Care Reviewed With:   patient   child  Goal: Patient-Specific Goal (Individualized)  Outcome: Progressing  Flowsheets (Taken 11/17/2024 2058)  Individualized Care Needs: continue to encourage oral fluids, UTI managment-abx complete, reorient as needed, monitor for safety  Anxieties, Fears or Concerns: none noted at this time  Patient/Family-Specific Goals (Include Timeframe): Return to baseline by SENDY to d/c home with family  Goal: Absence of Hospital-Acquired Illness or Injury  Outcome: Progressing  Intervention: Identify and Manage Fall Risk  Flowsheets (Taken 11/17/2024 2058)  Safety Promotion/Fall Prevention:   assistive device/personal item within reach   bed alarm set   side rails raised x 3   room near unit station   nonskid shoes/socks when out of bed   instructed to call staff for mobility   lighting adjusted   Fall Risk reviewed with patient/family   diversional activities provided   high risk medications identified   medications reviewed   patient expresses understanding of fall risk and prevention   family expresses understanding of fall risk and prevention   family to remain at bedside  Intervention: Prevent Skin Injury  Flowsheets (Taken 11/17/2024 2058)  Body Position: sitting up in bed  Skin Protection:   hydrocolloids used   skin sealant/moisture barrier applied  Device Skin Pressure Protection:   absorbent pad utilized/changed   adhesive use limited   pressure points protected   tubing/devices free from skin contact  Intervention: Prevent and Manage VTE (Venous Thromboembolism) Risk  Flowsheets (Taken 11/17/2024 2058)  VTE Prevention/Management:   fluids promoted   ambulation promoted  Intervention: Prevent Infection  Flowsheets (Taken 11/17/2024 2058)  Infection Prevention:   environmental surveillance performed   equipment surfaces disinfected   rest/sleep  promoted   personal protective equipment utilized   hand hygiene promoted   single patient room provided  Goal: Optimal Comfort and Wellbeing  Outcome: Progressing  Intervention: Monitor Pain and Promote Comfort  Flowsheets (Taken 11/17/2024 2058)  Pain Management Interventions:   care clustered   quiet environment facilitated   relaxation techniques promoted   position adjusted   pain management plan reviewed with patient/caregiver  Intervention: Provide Person-Centered Care  Flowsheets (Taken 11/17/2024 2058)  Trust Relationship/Rapport:   care explained   questions answered   choices provided   questions encouraged   reassurance provided   emotional support provided   empathic listening provided   thoughts/feelings acknowledged  Goal: Readiness for Transition of Care  Outcome: Progressing  Intervention: Mutually Develop Transition Plan  Flowsheets (Taken 11/17/2024 2058)  Equipment Currently Used at Home: none  Who are your caregiver(s) and their phone number(s)?: edmundo- [phone]  Communicated SENDY with patient/caregiver: Date not available/Unable to determine  Do you expect to return to your current living situation?: Yes  Do you have help at home or someone to help you manage your care at home?: Yes  Readmission within 30 days?: No  Do you currently have service(s) that help you manage your care at home?: Yes  Name and Contact number of agency: [facility]  Is the pt/caregiver preference to resume services with current agency: Yes     Problem: Infection  Goal: Absence of Infection Signs and Symptoms  Outcome: Progressing  Intervention: Prevent or Manage Infection  Flowsheets (Taken 11/17/2024 2058)  Fever Reduction/Comfort Measures:   lightweight clothing   lightweight bedding  Infection Management: aseptic technique maintained  Isolation Precautions:   precautions maintained   protective     Problem: Wound  Goal: Optimal Coping  Outcome: Progressing  Intervention: Support Patient and Family  Response  Flowsheets (Taken 11/17/2024 2058)  Supportive Measures: active listening utilized  Family/Support System Care: support provided  Goal: Optimal Functional Ability  Outcome: Progressing  Intervention: Optimize Functional Ability  Flowsheets (Taken 11/17/2024 2058)  Assistive Device Utilized:   gait belt   walker  Activity Management: Up in chair - L3  Activity Assistance Provided: assistance, 1 person  Goal: Absence of Infection Signs and Symptoms  Outcome: Progressing  Intervention: Prevent or Manage Infection  Flowsheets (Taken 11/17/2024 2058)  Fever Reduction/Comfort Measures:   lightweight clothing   lightweight bedding  Infection Management: aseptic technique maintained  Isolation Precautions:   precautions maintained   protective  Goal: Improved Oral Intake  Outcome: Progressing  Intervention: Promote and Optimize Oral Intake  Flowsheets (Taken 11/17/2024 2058)  Oral Nutrition Promotion: calorie-dense foods provided  Nutrition Interventions:   food preferences provided   meal set-up provided  Goal: Optimal Pain Control and Function  Outcome: Progressing  Intervention: Prevent or Manage Pain  Flowsheets (Taken 11/17/2024 2058)  Sleep/Rest Enhancement:   awakenings minimized   room darkened   noise level reduced  Pain Management Interventions:   care clustered   quiet environment facilitated   relaxation techniques promoted   position adjusted   pain management plan reviewed with patient/caregiver  Goal: Skin Health and Integrity  Outcome: Progressing  Intervention: Optimize Skin Protection  Flowsheets (Taken 11/17/2024 2058)  Pressure Reduction Techniques:   frequent weight shift encouraged   heels elevated off bed   positioned off wounds   weight shift assistance provided  Pressure Reduction Devices: pressure-redistributing mattress utilized  Skin Protection:   hydrocolloids used   skin sealant/moisture barrier applied  Activity Management: Up in chair - L3  Head of Bed (HOB) Positioning: HOB at  20-30 degrees  Goal: Optimal Wound Healing  Outcome: Progressing  Intervention: Promote Wound Healing  Flowsheets (Taken 11/17/2024 2058)  Sleep/Rest Enhancement:   awakenings minimized   room darkened   noise level reduced     Problem: Fall Injury Risk  Goal: Absence of Fall and Fall-Related Injury  Outcome: Progressing  Intervention: Identify and Manage Contributors  Flowsheets (Taken 11/17/2024 2058)  Self-Care Promotion:   BADL personal objects within reach   independence encouraged  Medication Review/Management: medications reviewed  Intervention: Promote Injury-Free Environment  Flowsheets (Taken 11/17/2024 2058)  Safety Promotion/Fall Prevention:   assistive device/personal item within reach   bed alarm set   side rails raised x 3   room near unit station   nonskid shoes/socks when out of bed   instructed to call staff for mobility   lighting adjusted   Fall Risk reviewed with patient/family   diversional activities provided   high risk medications identified   medications reviewed   patient expresses understanding of fall risk and prevention   family expresses understanding of fall risk and prevention   family to remain at bedside     Problem: Skin Injury Risk Increased  Goal: Skin Health and Integrity  Outcome: Progressing  Intervention: Optimize Skin Protection  Flowsheets (Taken 11/17/2024 2058)  Pressure Reduction Techniques:   frequent weight shift encouraged   heels elevated off bed   positioned off wounds   weight shift assistance provided  Pressure Reduction Devices: pressure-redistributing mattress utilized  Skin Protection:   hydrocolloids used   skin sealant/moisture barrier applied  Activity Management: Up in chair - L3  Head of Bed (HOB) Positioning: HOB at 20-30 degrees  Intervention: Promote and Optimize Oral Intake  Flowsheets (Taken 11/17/2024 2058)  Oral Nutrition Promotion: calorie-dense foods provided  Nutrition Interventions:   food preferences provided   meal set-up provided

## 2024-11-18 NOTE — PROGRESS NOTES
Inpatient Nutrition Assessment    Admit Date: 11/6/2024   Total duration of encounter: 12 days   Patient Age: 88 y.o.    Nutrition Recommendation/Prescription     Continue regular diet. 2. Monitor intake,wt, labs,medications    Communication of Recommendations: reviewed with patient, reviewed with family, and new [facility] bed    Nutrition Assessment     Malnutrition Assessment/Nutrition-Focused Physical Exam    Malnutrition Context: acute illness or injury (11/07/24 1354)  Malnutrition Level: mild (11/07/24 1354)  Energy Intake (Malnutrition): less than or equal to 50% for greater than or equal to 5 days (11/07/24 1353)  Weight Loss (Malnutrition): other (see comments) (wt loss unsure family reports) (11/07/24 1353)  Subcutaneous Fat (Malnutrition): mild depletion (11/07/24 1354)  Orbital Region (Subcutaneous Fat Loss): other (see comments) (doesn't meet criteria)  Upper Arm Region (Subcutaneous Fat Loss): other (see comments) (doesn't meet criteria)     Muscle Mass (Malnutrition): mild depletion (11/07/24 1354)  [place] Region (Muscle Loss): other (see comments) (doesn't meet criteria)  Clavicle Bone Region (Muscle Loss): other (see comments) (doesn't meet criteria)                    Fluid Accumulation (Malnutrition): other (see comments) (unable to assess) (11/07/24 1354)        A minimum of two characteristics is recommended for diagnosis of either severe or non-severe malnutrition.    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 (followed by [facility]), and prior subarachnoid hemorrhage and intraparenchymal hemorrhage     Scheduled Medications:  amLODIPine, 5 mg, Daily  atorvastatin, 40 mg, QHS  ferrous sulfate, 1 tablet, Daily  latanoprost, 1 drop, Daily  levETIRAcetam, 500 mg, BID    Continuous Infusions:   PRN Medications:  acetaminophen, 650 mg, Q4H PRN  albuterol-ipratropium, 3 mL, Q6H PRN  aluminum-magnesium hydroxide-simethicone, 30 mL, QID PRN  bisacodyL, 10 mg, Daily PRN  dextrose 10%, 12.5 g, PRN  dextrose 10%, 25 g, PRN  glucagon (human recombinant), 1 mg, PRN  glucose, 16 g, PRN  glucose, 24 g, PRN  HYDROcodone-acetaminophen, 1 tablet, Q6H PRN  insulin aspart U-100, 0-5 Units, QID (AC + HS) PRN  melatonin, 9 mg, Nightly PRN  morphine, 2 mg, Q6H PRN  naloxone, 0.02 mg, PRN  ondansetron, 8 mg, Q8H PRN  ondansetron, 4 mg, Q8H PRN  polyethylene glycol, 17 g, TID PRN  simethicone, 1 tablet, QID PRN  sodium chloride 0.9%, 10 mL, PRN    Calorie Containing IV Medications: no significant kcals from medications at this time    Recent Labs   Lab 11/12/24  0605 11/18/24  0412    139   K 4.4 4.1   CALCIUM 9.1 8.8   * 107   CO2 28 27   BUN 21.0* 15.6   CREATININE 0.75 0.74   EGFRNORACEVR >60 >60   GLUCOSE 104 98   WBC 5.55 4.87   HGB 10.3* 10.4*   HCT 33.5* 33.5*     Nutrition Orders:  Diet Adult Regular      Appetite/Oral Intake: good/% of meals  Factors Affecting Nutritional Intake: none identified  Social Needs Impacting Access to Food: none identified  Food/Hoahaoism/Cultural Preferences: none reported  Food Allergies: none reported  Last Bowel Movement: 11/17/24  Wound(s):[REMOVED]      Wound 05/07/24 0952 Ulceration Left medial Foot-Tissue loss description: Partial thickness       Wound 11/06/24 1377 Incontinence associated dermatitis Perirectal-Tissue loss description: Partial thickness     Comments    11/07/24: Pt has short term recall congitivie issues,  "SLP. SLP complete swallow jose, upgraded diet to regular consistency. Family and patient reports intake improved, as well as, intake has been good PTA. Family unsure of wt loss. #. Complete NFPE, mild muscle waisting noted. PO intake 50-75% of meals today. Pt has Hx of DM. Blood glucose WNL. Adjust diet as need, due to advance age and to promote good PO intake. Will monitor intake and wt. Labs/meds review. +BM per EMR.     11/11/24: Pt visiting with family during rounds. Pt states intake is good. Tolerating regular diet. Discussed food preferences. Adjust menus. Wt increase, see below. Labs/meds review. +BM per patient. Will monitor.     11/14/24: Pt and family present. Observed tray at breakfast. 100%. Pt and family reports intake is good. +BM per patient. Labs/meds review. Will monitor.     11/18/24: Pt was taking shower during rounds. Family present. Family reports intake has been good. Discussed food preferences with family. Labs/meds review. Wt increase. Will monitor.   Anthropometrics    Height: 5' 3" (160 cm), Height Method: Stated  Last Weight: 63.3 kg (139 lb 8.8 oz) (11/18/24 1404), Weight Method: Bed Scale  BMI (Calculated): 24.7  BMI Classification: normal (BMI 18.5-24.9)     Ideal Body Weight (IBW), Female: 115 lb     % Ideal Body Weight, Female (lb): 121.35 %                             Usual Weight Provided By:  wt loss usnure.     Wt Readings from Last 5 Encounters:   11/18/24 63.3 kg (139 lb 8.8 oz)   10/29/24 65.6 kg (144 lb 10 oz)   10/23/24 63.2 kg (139 lb 5.3 oz)   10/03/24 63.2 kg (139 lb 5.3 oz)   10/03/24 66.7 kg (147 lb)     Weight Change(s) Since Admission: +6kg wt gain.   Wt Readings from Last 1 Encounters:   11/18/24 1404 63.3 kg (139 lb 8.8 oz)   11/18/24 0615 63.3 kg (139 lb 8.8 oz)   11/11/24 1329 61 kg (134 lb 7.7 oz)   11/11/24 0449 61 kg (134 lb 7.7 oz)   11/07/24 1348 57.3 kg (126 lb 5.2 oz)   11/06/24 1530 57.3 kg (126 lb 5.2 oz)   Admit Weight: 57.3 kg (126 lb 5.2 oz) " (11/06/24 1530), Weight Method: Bed Scale    Estimated Needs    Weight Used For Calorie Calculations: 63.3 kg (139 lb 8.8 oz)  Energy Calorie Requirements (kcal): 1899 kcal (30 kcal/kg/CBW)  Energy Need Method: Kcal/kg  Weight Used For Protein Calculations: 63.3 kg (139 lb 8.8 oz)  Protein Requirements: 76.12 gm (1.2 gm/kg/CBW)  Fluid Requirements (mL): 1899 mL (1 mL/kcal)        Enteral Nutrition     Patient not receiving enteral nutrition at this time.    Parenteral Nutrition     Patient not receiving parenteral nutrition support at this time.    Evaluation of Received Nutrient Intake    Calories: meeting estimated needs  Protein: meeting estimated needs    Patient Education     Not applicable.    Nutrition Diagnosis       PES:  Mild malnutrition related to acute illness as evidenced by less than or equal to 50% needs met for greater than or equal to 5 days, mild muscle depletion, and wt loss unsure . (progressing)    Nutrition Interventions     Intervention(s): general/healthful diet    Goal: Consume % of meals/snacks by follow-up. (progressing)  Goal: Maintain weight throughout hospitalization. (progressing)    Nutrition Goals & Monitoring     Dietitian will monitor: food and beverage intake, weight, weight change, electrolyte/renal panel, glucose/endocrine profile, and gastrointestinal profile  Discharge planning: continue regular diet  Nutrition Risk/Follow-Up: moderate (follow-up in 3-5 days)   Please consult if re-assessment needed sooner.

## 2024-11-18 NOTE — PT/OT/SLP PROGRESS
Physical Therapy Treatment Note           Name: Tali Beard    : 1935 (88 y.o.)  MRN: [id number]           TREATMENT SUMMARY AND RECOMMENDATIONS:    PT Received On: 24  PT Start Time: 1312     PT Stop Time: 1337  PT Total Time (min): 25 min     Subjective Assessment:   No complaints  Lethargic   x Awake, alert, cooperative  Uncooperative    Agitated  c/o pain    Appropriate  c/o fatigue   x Confused - minimal  Treated at bedside     Emotionally labile  Treated in gym/dept.    Impulsive  Other:    Flat affect       Therapy Precautions:   x Cognitive deficits  Spinal precautions    Collar - hard  Sternal precautions    Collar - soft   TLSO   x Fall risk  LSO    Hip precautions - posterior  Knee immobilizer    Hip precautions - anterior  WBAT    Impaired communication  Partial weightbearing    Oxygen  TTWB    PEG tube  NWB    Visual deficits  Other:    Hearing deficits          Treatment Objectives:     Mobility Training:   Assist level Comments    Bed mobility     Transfer     Gait CGA X 380 feet from room to gym. Tactile cues 50% of the time to keep patient at a steady pace.   X 78 feet from gym back to room, pt was able to accomplish dual task by say a prayer from her rosery while ambulating. WC following behind for safety.   Sit to stand transitions SBA Pushing off from chair arm   Sitting balance     Standing balance  SBA Pt bend down to floor level to  her rosery.    Wheelchair mobility     Car transfer     Other:          Therapeutic Exercise:   Exercise Sets Reps Comments   BLE bike   10' 5' forwards  5' backwards, pt needed tactile cues 50% of the time to complete task                         Additional Comments:  Pt ambulation was better this afternoon session, pt able to walk faster and at a normal pace than previously. Pt able to recall prayer and recite it while ambulating.     Treated by Fatoumata MEREDITH, Supervised by Aurora Robbins PT     Assessment: Patient  tolerated session better.     PT Plan: continue per POC - Home with  family care  Revisions made to plan of care: No    GOALS:   Multidisciplinary Problems       Physical Therapy Goals          Problem: Physical Therapy    Goal Priority Disciplines Outcome Interventions   Physical Therapy Goal     PT, PT/OT Progressing    Description: Goals to be met by: Discharge      Patient will increase functional independence with mobility by performin. Supine <> sit with Supervision, remaining focused to task 100% of the time and only needing 25% cues.   2. Sit to stand transfer with Supervision, remaining focused to task 100% of the time and only needing 25% cues  3. Bed to chair transfer with Supervision using Rolling Walker vs No AD; remaining focused to task 100% of the time and only needing 25% cues  4. Gait  x 350 feet with Supervision using Rolling Walker vs no AD; remaining focused to task 100% of the time and only needing 25% cues.                          Skilled PT Minutes Provided: 25  Communication with Treatment Team:     Equipment recommendations:       At end of treatment, patient remained:  x Up in chair     Up in wheelchair in room    In bed   x With alarm activated    Bed rails up   x Call bell in reach    x Family/friends present    Restraints secured properly    In bathroom with CNA/RN notified    Nurse aware    In gym with therapist/tech    Other:

## 2024-11-18 NOTE — PT/OT/SLP PROGRESS
Occupational Therapy  Treatment    Name: Tali Beard    : 1935 (88 y.o.)  MRN: [id number]           TREATMENT SUMMARY AND RECOMMENDATIONS:      OT Date of Treatment: 24  OT Start Time: 900  OT Stop Time: 929  OT Total Time (min): 29 min      Subjective Assessment:   No complaints  Lethargic   x Awake, alert, cooperative  Impulsive    Uncooperative   Flat affect    Agitated  c/o pain    Appropriate  c/o fatigue   x Confused  Treated at bedside     Emotionally labile x Treated in gym/dept.    Anxious/fearful   Other:        Therapy Precautions:   x Cognitive deficits  Spinal precautions    Collar - hard  Sternal precautions    Collar - soft   Cervical precautions     Fall risk  LSO    Hip precautions - posterior  TLSO    Hip precautions - anterior  WBAT    Impaired communication  Partial weightbearing    Oxygen  TTWB    PEG tube  NWB    Visual deficits  Knee immobilizer    Hearing deficits   Other:        Treatment Objectives:     Mobility Training:    Mobility task Assist level Comments    Bed mobility     Transfer SBA/CGA Stand/pivot t/f with no device to standard chair   Sit to stands transitions SBA From BSChair and standard chair   Functional mobility SBA/CGA X80 feet with no device; cues to attempt without using wall for support    Sitting balance     Standing balance  SBA Pt stood with no device to perform reach to low stool to grasp bean bags and then toss into bucket anterior. Pt performed with RUE f/b LUE with seated rest break between sets. No LOB noted.    Wheelchair mobility          Therapeutic Exercise:   Exercise Sets Reps Comments   Arm bike 2 5 min Level 1; forwards/backwards                          Assessment: Patient tolerated session well. Pt had positive response to today's treatment. Pt continues to make good progress towards goals. Pt would continue to benefit from skilled OT services to improve pt's safety and independence with daily occupations, decrease caregiver burden,  and reduce pt's risk of falls.     GOALS:   Multidisciplinary Problems       Occupational Therapy Goals          Problem: Occupational Therapy    Goal Priority Disciplines Outcome Interventions   Occupational Therapy Goal     OT, PT/OT Progressing    Description: Goals to be met by: 11/28/24     Patient will increase functional independence with ADLs by performing:    UE Dressing with Set-up Assistance. - met   LE Dressing with Set-up Assistance. - met   Grooming while standing at sink with Supervision. - met   Toileting from toilet with Contact Guard Assistance for hygiene and clothing management. - met   Bathing from  shower chair/bench with Contact Guard Assistance. - met   Toilet transfer to toilet with Stand-by Assistance. - met   Increased functional strength to 4/5 for BUEs.                         Recommendations:     Discharge Equipment Recommendations:   (TBD pending progress)     Plan:     Patient to be seen  (5-6x/wk; QD/BID as appropriate) to address the above listed problems via self-care/home management, therapeutic activities, therapeutic exercises, cognitive retraining  Plan of Care Expires: 11/28/24  Revisions made to plan of care: No    Skilled OT Minutes Provided: 29  Communication with Treatment Team:     At end of treatment, patient remained:   Up in chair     Up in wheelchair in room    In bed    With alarm activated    Bed rails up    Call bell in reach     Family/friends present    Restraints secured properly    In bathroom with CNA/RN notified   x In gym with PT/PTA/tech    Nurse aware    Other:

## 2024-11-18 NOTE — PLAN OF CARE
Problem: Adult Inpatient Plan of Care  Goal: Plan of Care Review  Outcome: Progressing  Flowsheets (Taken 11/18/2024 0800)  Plan of Care Reviewed With:   patient   child  Goal: Patient-Specific Goal (Individualized)  Outcome: Progressing  Flowsheets (Taken 11/18/2024 0800)  Individualized Care Needs: Continue to encourage oral fluids, reorient as needed, monitor for safety  Anxieties, Fears or Concerns: None noted at this time  Patient/Family-Specific Goals (Include Timeframe): Return to baseline by SENDY to d/c home with family  Goal: Absence of Hospital-Acquired Illness or Injury  Outcome: Progressing  Goal: Optimal Comfort and Wellbeing  Outcome: Progressing  Intervention: Provide Person-Centered Care  Flowsheets (Taken 11/18/2024 0800)  Trust Relationship/Rapport:   care explained   choices provided   emotional support provided   empathic listening provided   questions answered   questions encouraged   reassurance provided   thoughts/feelings acknowledged  Goal: Readiness for Transition of Care  Outcome: Progressing

## 2024-11-18 NOTE — PROGRESS NOTES
[facility]  HOSPITAL MEDICINE ~ PROGRESS NOTE    CHIEF COMPLAINT     Hospital follow up for right parietal intracranial hemorrhage     HOSPITAL COURSE     88 year old female with PMHx of HTN, HLD, DM2, Kaposi Sarcoma s/p resection and radiation to left ankle and submental node (followed by [facility]), and prior subarachnoid hemorrhage and intraparenchymal hemorrhage presents to [facility] for swing following right parietal intracranial hemorrhage.  She initially presented to [facility] on 10/30/2024 due to left sided weakness and confusion.  CT head revealed acute hemorrhagic infarct of the right parietal lobe.  CTA head and neck displayed diminished caliber of M1 and M2 segments bilaterally MCA possible indicative of vasospasm.  MRI revealed evolving left parietal late subacute and right parietal early subacute cortical hemorrhage, new punctate focus of suspected ischemic cortical infarct at the left occipital lobe, with no additional sites of abnormal diffusion signal appreciated.     Neurology does not recommend anticoagulation or antiplatelets at this time due to spontaneous bleeding events. Suspecting primary intracranial hemorrhage due to amyloid angiopathy. EEG was negative for seizures, showed diffuse cerebral slowing - will continue keppra 500mg PO BID for seizure prophylaxis.      Patient lives alone and was completely independent prior to this hospitalization. Has plans for 24 hour care upon returning home between 2 of her sons. She lives in a 1 story home without stairs and a climb in bathtub. Patient's mentation was improving prior to admission to [place]. Patient is oriented x3 with slow response time - patient's son report she is worse than her baseline mentation. Patient denies headache, N/V, or vision changes.     Patient treated for Proteus mirabilis UTI with 5 day course of Rocephin during admission.      Today:  No reported complaints today.   Discontinue IV.  Possible discharge home on Wednesday.    OBJECTIVE/PHYSICAL EXAM     VITAL SIGNS (MOST RECENT):  Temp: 97.8 °F (36.6 °C) (11/18/24 0721)  Pulse: 74 (11/18/24 0721)  Resp: 18 (11/17/24 2022)  BP: 105/64 (11/18/24 0721)  SpO2: 100 % (11/18/24 0721) VITAL SIGNS (24 HOUR RANGE):  Temp:  [97.8 °F (36.6 °C)-98.8 °F (37.1 °C)] 97.8 °F (36.6 °C)  Pulse:  [74-83] 74  Resp:  [18] 18  SpO2:  [99 %-100 %] 100 %  BP: (105-110)/(64) 105/64     GENERAL: In no acute distress, afebrile  HEENT: PERRLA   CHEST: Clear to auscultation bilaterally  HEART: S1, S2, no appreciable murmur  ABDOMEN: Soft, nontender, BS +  MSK: Warm, no lower extremity edema, no clubbing or cyanosis  NEUROLOGIC: Alert and oriented x3, moving all extremities with good strength, speech is difficult to understand occasionally, slow response time    ASSESSMENT/PLAN     Left parietal late subacute and right parietal early subacute cortical hemorrhage   Neurology recommends no anticoagulation or antiplatelet therapy at this time due to spontaneous ICH events    Follow up with Yue Rodriguez NP in 1 month (approx 11/29) for MRI w/wo contrast with SWI sequence and CSF labs.  Negative for Lyme disease serology, encephalopathy autoimmune evaluation  Pending: CSF, HSV type 2 glycoprotein IgG  Continue Keppra 500mg PO BID for seizure prophylaxis.   Continue speech therapy for cognition      Incontinence dermatitis   Followed by wound care  Purewick  Prn bowel regimen      Proteus mirabilis UTI  Completed Rocephin 1g IV for 5 day starting 11/13/2024     Chronic Microcytic Anemia   Folate deficiency   Multivitamin      HTN / HLD  Continue amlodipine 5mg PO QD and atorvastatin 40mg PO QHS.   SBP goal < 140      Left foot wound - healed      Kaposi Sarcoma   Followed by [facility] - Dr. Celestine Henderson   S/p XRT   CT chest/abdomen/pelvis on 10/04/2024 was negative      DVT prophylaxis: Neurology recommends holding chemical prophylaxis at this time.  "     Code status: Full      Anticipated discharge and disposition:  Continue PT/OT.  Patient has established 24 hour care at home with her children. Possible discharge home Wednesday.   __________________________________________________________________________    LABS/MICRO/MEDS/DIAGNOSTICS     LABS  Recent Labs     11/18/24  0412      K 4.1   CO2 27   BUN 15.6   CREATININE 0.74   GLUCOSE 98   CALCIUM 8.8     Recent Labs     11/18/24  0412   WBC 4.87   RBC 4.42   HCT 33.5*   MCV 75.8*        MICROBIOLOGY  Microbiology Results (last 7 days)       Procedure Component Value Units Date/Time    Urine culture [[id number]]  (Abnormal)  (Susceptibility) Collected: 11/12/24 1910    Order Status: Completed Specimen: Urine Updated: 11/15/24 0704     Urine Culture >/= 100,000 colonies/ml Proteus mirabilis             MEDICATIONS   amLODIPine  5 mg Oral Daily    atorvastatin  40 mg Oral QHS    ferrous sulfate  1 tablet Oral Daily    latanoprost  1 drop Both Eyes Daily    levETIRAcetam  500 mg Oral BID         INFUSIONS       DIAGNOSTIC TESTS  No orders to display      No results found for: "EF"     NUTRITION STATUS  Patient meets ASPEN criteria for mild malnutrition of acute illness or injury per RD assessment as evidenced by:  Energy Intake (Malnutrition): less than or equal to 50% for greater than or equal to 5 days  Weight Loss (Malnutrition): other (see comments) (wt loss unsure family reports)  Subcutaneous Fat (Malnutrition): mild depletion  Muscle Mass (Malnutrition): mild depletion  Fluid Accumulation (Malnutrition): other (see comments) (unable to assess)        A minimum of two characteristics is recommended for diagnosis of either severe or non-severe malnutrition.     Case related differential diagnoses have been reviewed; assessment and plan has been documented. I have personally reviewed the labs and test results that are currently available; I have reviewed the patients medication list. I have " reviewed the consulting providers recommendations. I have reviewed or attempted to review medical records based upon their availability.  All of the patient's and/or family's questions have been addressed and answered to the best of my ability.  I will continue to monitor closely and make adjustments to medical management as needed.  This document was created using stickapps*Modal Fluency Direct.  Transcription errors may have been made.  Please contact me if any questions may rise regarding documentation to clarify transcription.      NIKUNJ Coronado   Internal Medicine  Department of Hospital Medicine [facility] - [facility]

## 2024-11-19 PROCEDURE — 97110 THERAPEUTIC EXERCISES: CPT

## 2024-11-19 PROCEDURE — 25000003 PHARM REV CODE 250: Performed by: PHYSICIAN ASSISTANT

## 2024-11-19 PROCEDURE — 11000004 HC SNF PRIVATE

## 2024-11-19 PROCEDURE — 97116 GAIT TRAINING THERAPY: CPT

## 2024-11-19 PROCEDURE — 97530 THERAPEUTIC ACTIVITIES: CPT

## 2024-11-19 PROCEDURE — 94760 N-INVAS EAR/PLS OXIMETRY 1: CPT

## 2024-11-19 PROCEDURE — 99900035 HC TECH TIME PER 15 MIN (STAT)

## 2024-11-19 RX ADMIN — ATORVASTATIN CALCIUM 40 MG: 40 TABLET, FILM COATED ORAL at 08:11

## 2024-11-19 RX ADMIN — LATANOPROST 1 DROP: 50 SOLUTION/ DROPS OPHTHALMIC at 09:11

## 2024-11-19 RX ADMIN — LEVETIRACETAM 500 MG: 500 TABLET, FILM COATED ORAL at 08:11

## 2024-11-19 RX ADMIN — AMLODIPINE BESYLATE 5 MG: 5 TABLET ORAL at 09:11

## 2024-11-19 RX ADMIN — LEVETIRACETAM 500 MG: 500 TABLET, FILM COATED ORAL at 09:11

## 2024-11-19 RX ADMIN — FERROUS SULFATE TAB 325 MG (65 MG ELEMENTAL FE) 1 EACH: 325 (65 FE) TAB at 09:11

## 2024-11-19 NOTE — PROGRESS NOTES
[facility]  HOSPITAL MEDICINE ~ PROGRESS NOTE    CHIEF COMPLAINT     Hospital follow up for right parietal intracranial hemorrhage     HOSPITAL COURSE     88 year old female with PMHx of HTN, HLD, DM2, Kaposi Sarcoma s/p resection and radiation to left ankle and submental node (followed by [facility]), and prior subarachnoid hemorrhage and intraparenchymal hemorrhage presents to [facility] for swing following right parietal intracranial hemorrhage.  She initially presented to [facility] on 10/30/2024 due to left sided weakness and confusion.  CT head revealed acute hemorrhagic infarct of the right parietal lobe.  CTA head and neck displayed diminished caliber of M1 and M2 segments bilaterally MCA possible indicative of vasospasm.  MRI revealed evolving left parietal late subacute and right parietal early subacute cortical hemorrhage, new punctate focus of suspected ischemic cortical infarct at the left occipital lobe, with no additional sites of abnormal diffusion signal appreciated.     Neurology does not recommend anticoagulation or antiplatelets at this time due to spontaneous bleeding events. Suspecting primary intracranial hemorrhage due to amyloid angiopathy. EEG was negative for seizures, showed diffuse cerebral slowing - will continue keppra 500mg PO BID for seizure prophylaxis.      Patient lives alone and was completely independent prior to this hospitalization. Has plans for 24 hour care upon returning home between 2 of her sons. She lives in a 1 story home without stairs and a climb in bathtub. Patient's mentation was improving prior to admission to [place]. Patient is oriented x3 with slow response time - patient's son report she is worse than her baseline mentation. Patient denies headache, N/V, or vision changes.     Patient treated for Proteus mirabilis UTI with 5 day course of Rocephin during admission.      Today:  Patient and family agreeable to  discharge home tomorrow with outpatient therapy.     OBJECTIVE/PHYSICAL EXAM     VITAL SIGNS (MOST RECENT):  Temp: 98.8 °F (37.1 °C) (11/19/24 0715)  Pulse: 79 (11/19/24 0715)  Resp: 18 (11/19/24 0715)  BP: 117/60 (11/19/24 0715)  SpO2: 97 % (11/19/24 0715) VITAL SIGNS (24 HOUR RANGE):  Temp:  [98.5 °F (36.9 °C)-98.8 °F (37.1 °C)] 98.8 °F (37.1 °C)  Pulse:  [78-87] 79  Resp:  [18] 18  SpO2:  [97 %-99 %] 97 %  BP: (107-121)/(49-65) 117/60     GENERAL: In no acute distress, afebrile  HEENT: PERRLA   CHEST: Clear to auscultation bilaterally  HEART: S1, S2, no appreciable murmur  ABDOMEN: Soft, nontender, BS +  MSK: Warm, no lower extremity edema, no clubbing or cyanosis  NEUROLOGIC: Alert and oriented x3, moving all extremities with good strength, speech is difficult to understand occasionally, slow response time    ASSESSMENT/PLAN     Left parietal late subacute and right parietal early subacute cortical hemorrhage   Neurology recommends no anticoagulation or antiplatelet therapy at this time due to spontaneous ICH events    Follow up with Yue Rodriguez NP in 1 month (approx 11/29) for MRI w/wo contrast with SWI sequence and CSF labs.  Negative for Lyme disease serology, encephalopathy autoimmune evaluation and CSF HSV type 2 glycoprotein IgG  Continue Keppra 500mg PO BID for seizure prophylaxis.   Continue speech therapy for cognition      Incontinence dermatitis   Followed by wound care  Purewick  Prn bowel regimen      Proteus mirabilis UTI  Completed Rocephin 1g IV for 5 day starting 11/13/2024     Chronic Microcytic Anemia   Folate deficiency   Multivitamin      HTN / HLD  Continue amlodipine 5mg PO QD and atorvastatin 40mg PO QHS.   SBP goal < 140      Left foot wound - healed      Kaposi Sarcoma   Followed by [facility] - Dr. Celestine Henderson   S/p XRT   CT chest/abdomen/pelvis on 10/04/2024 was negative      DVT prophylaxis: Neurology recommends holding chemical prophylaxis at this time.      Code  "status: Full      Anticipated discharge and disposition:  Discharge home tomorrow with 24 hour care.   __________________________________________________________________________    LABS/MICRO/MEDS/DIAGNOSTICS     LABS  Recent Labs     11/18/24  0412      K 4.1   CO2 27   BUN 15.6   CREATININE 0.74   GLUCOSE 98   CALCIUM 8.8     Recent Labs     11/18/24 0412   WBC 4.87   RBC 4.42   HCT 33.5*   MCV 75.8*        MICROBIOLOGY  Microbiology Results (last 7 days)       Procedure Component Value Units Date/Time    Urine culture [[id number]]  (Abnormal)  (Susceptibility) Collected: 11/12/24 1910    Order Status: Completed Specimen: Urine Updated: 11/15/24 0704     Urine Culture >/= 100,000 colonies/ml Proteus mirabilis             MEDICATIONS   amLODIPine  5 mg Oral Daily    atorvastatin  40 mg Oral QHS    ferrous sulfate  1 tablet Oral Daily    latanoprost  1 drop Both Eyes Daily    levETIRAcetam  500 mg Oral BID         INFUSIONS       DIAGNOSTIC TESTS  No orders to display      No results found for: "EF"     NUTRITION STATUS  Patient meets ASPEN criteria for mild malnutrition of acute illness or injury per RD assessment as evidenced by:  Energy Intake (Malnutrition): less than or equal to 50% for greater than or equal to 5 days  Weight Loss (Malnutrition): other (see comments) (wt loss unsure family reports)  Subcutaneous Fat (Malnutrition): mild depletion  Muscle Mass (Malnutrition): mild depletion  Fluid Accumulation (Malnutrition): other (see comments) (unable to assess)        A minimum of two characteristics is recommended for diagnosis of either severe or non-severe malnutrition.     Case related differential diagnoses have been reviewed; assessment and plan has been documented. I have personally reviewed the labs and test results that are currently available; I have reviewed the patients medication list. I have reviewed the consulting providers recommendations. I have reviewed or attempted to review " medical records based upon their availability.  All of the patient's and/or family's questions have been addressed and answered to the best of my ability.  I will continue to monitor closely and make adjustments to medical management as needed.  This document was created using M*Modal Fluency Direct.  Transcription errors may have been made.  Please contact me if any questions may rise regarding documentation to clarify transcription.      NIKUNJ Coronado   Internal Medicine  Department of Hospital Medicine [facility] - [facility]

## 2024-11-19 NOTE — PLAN OF CARE
Problem: Adult Inpatient Plan of Care  Goal: Plan of Care Review  Outcome: Progressing  Flowsheets (Taken 11/19/2024 0800)  Plan of Care Reviewed With:   patient   child  Goal: Patient-Specific Goal (Individualized)  Outcome: Progressing  Flowsheets (Taken 11/19/2024 0800)  Individualized Care Needs: Encourage PO fluids, reorient as needed, maintain safety precautions, PT/OT/ST  Anxieties, Fears or Concerns: None verbalized at this time  Patient/Family-Specific Goals (Include Timeframe): Continue therapy and discharge possibly Wednesday, 11/20/24  Goal: Absence of Hospital-Acquired Illness or Injury  Outcome: Progressing  Goal: Optimal Comfort and Wellbeing  Outcome: Progressing  Goal: Readiness for Transition of Care  Outcome: Progressing

## 2024-11-19 NOTE — PT/OT/SLP PROGRESS
Name: Tali Beard    : 1935 (88 y.o.)  MRN: [id number]           Patient Unavailable      Patient unable to be seen at this time secondary to: SLP attempted to see Patient following PT session this AM. Patient appeared agitated upon arrival and refusing to take morning medication w/ RN. Patient not appropriate to continue to attempt session at this time. Plan to discharge tomorrow home.

## 2024-11-19 NOTE — PT/OT/SLP PROGRESS
"         Physical Therapy Treatment Note           Name: Tali Beard    : 1935 (88 y.o.)  MRN: [id number]           TREATMENT SUMMARY AND RECOMMENDATIONS:    PT Received On: 24  PT Start Time: 934     PT Stop Time: 952  PT Total Time (min): 18 min     Subjective Assessment:   No complaints  Lethargic   x Awake, alert, cooperative  Uncooperative   x Agitated  c/o pain    Appropriate  c/o fatigue   x Confused - minimal  Treated at bedside     Emotionally labile  Treated in gym/dept.    Impulsive  Other:    Flat affect       Therapy Precautions:   x Cognitive deficits  Spinal precautions    Collar - hard  Sternal precautions    Collar - soft   TLSO   x Fall risk  LSO    Hip precautions - posterior  Knee immobilizer    Hip precautions - anterior  WBAT    Impaired communication  Partial weightbearing    Oxygen  TTWB    PEG tube  NWB    Visual deficits  Other:    Hearing deficits          Treatment Objectives:     Mobility Training:   Assist level Comments    Bed mobility     Transfer     Gait SBA/HHA X 230 feet without AD, ask pt to walk at a faster pace but she did not seem responsive to it.    Sit to stand transitions SBA Pushing off from chair, multiple reps completed for exercises    Sitting balance     Standing balance  CGA/SBA          MIN A Dynamic standing, weight shifting to catch/ hit ball from different directions. Pt needed verbal and visual cues to show how to "catch" the ball, pt was not responsive and continued to "hit " ball.    Standing on foam, pt felt more secure holding onto 2 hands, pt would lean backwards and was unable to identify and correct it.    Wheelchair mobility     Car transfer     Other:          Therapeutic Exercise:   Exercise Sets Reps Comments                               Additional Comments:  Pt seemed agitated towards end of therapy and wanted to go back to room and not continue therapy in gym.     Pt talk about her leaving tomorrow and seemed like she was " "excited to go and said her birthday was soon, ""    Treated by Fatoumata MEREDITH, Supervised by Aurora Robbins PT     Assessment: Patient tolerated session fair.     PT Plan: continue per POC - Home with  family care  Revisions made to plan of care: No    GOALS:   Multidisciplinary Problems       Physical Therapy Goals          Problem: Physical Therapy    Goal Priority Disciplines Outcome Interventions   Physical Therapy Goal     PT, PT/OT Progressing    Description: Goals to be met by: Discharge      Patient will increase functional independence with mobility by performin. Supine <> sit with Supervision, remaining focused to task 100% of the time and only needing 25% cues.   2. Sit to stand transfer with Supervision, remaining focused to task 100% of the time and only needing 25% cues  3. Bed to chair transfer with Supervision using Rolling Walker vs No AD; remaining focused to task 100% of the time and only needing 25% cues  4. Gait  x 350 feet with Supervision using Rolling Walker vs no AD; remaining focused to task 100% of the time and only needing 25% cues.                          Skilled PT Minutes Provided: 18  Communication with Treatment Team:     Equipment recommendations:       At end of treatment, patient remained:  x Up in chair     Up in wheelchair in room    In bed   x With alarm activated    Bed rails up   x Call bell in reach    x Family/friends present    Restraints secured properly    In bathroom with CNA/RN notified   x Nurse aware    In gym with therapist/tech    Other:      "

## 2024-11-19 NOTE — PT/OT/SLP PROGRESS
Occupational Therapy  Treatment    Name: Tali Beard    : 1935 (88 y.o.)  MRN: [id number]           TREATMENT SUMMARY AND RECOMMENDATIONS:      OT Date of Treatment: 24  OT Start Time: 900  OT Stop Time: 923  OT Total Time (min): 23 min      Subjective Assessment:   No complaints  Lethargic   x Awake, alert, cooperative  Impulsive    Uncooperative   Flat affect    Agitated  c/o pain    Appropriate  c/o fatigue   x Confused  Treated at bedside     Emotionally labile x Treated in gym/dept.    Anxious/fearful   Other:        Therapy Precautions:   x Cognitive deficits  Spinal precautions    Collar - hard  Sternal precautions    Collar - soft   Cervical precautions    x Fall risk  LSO    Hip precautions - posterior  TLSO    Hip precautions - anterior  WBAT    Impaired communication  Partial weightbearing    Oxygen  TTWB    PEG tube  NWB    Visual deficits  Knee immobilizer    Hearing deficits   Other:        Treatment Objectives:     Mobility Training:    Mobility task Assist level Comments    Bed mobility     Transfer SBA Stand/pivot t/f with no device to standard chair    Sit to stands transitions     Functional mobility CGA X80 feet with HHA    Sitting balance     Standing balance      Wheelchair mobility        ADL Training:    ADL Assist level Comments    Feeding     Grooming/hygiene     Bathing     Upper body dressing SBA Pt donned jacket while standing    Lower body dressing     Toileting     Toilet transfer     Adaptive equipment training     Other:           Therapeutic Exercise:   Exercise Sets Reps Comments   2# dowel 3 15  Chest press, bicep curls   Yellow flexbar 1 15  Wrist flex/ext    Sit to stands 2 5  SBA from standard chair              Additional Comments:  Pt will be discharged home tomorrow with OP therapy services.     Assessment: Patient tolerated session well. Pt had positive response to today's treatment. Pt continues to make good progress towards goals. Pt would continue to  benefit from skilled OT services to improve pt's safety and independence with daily occupations, decrease caregiver burden, and reduce pt's risk of falls.     GOALS:   Multidisciplinary Problems       Occupational Therapy Goals          Problem: Occupational Therapy    Goal Priority Disciplines Outcome Interventions   Occupational Therapy Goal     OT, PT/OT Progressing    Description: Goals to be met by: 11/28/24     Patient will increase functional independence with ADLs by performing:    UE Dressing with Set-up Assistance. - met   LE Dressing with Set-up Assistance. - met   Grooming while standing at sink with Supervision. - met   Toileting from toilet with Contact Guard Assistance for hygiene and clothing management. - met   Bathing from  shower chair/bench with Contact Guard Assistance. - met   Toilet transfer to toilet with Stand-by Assistance. - met   Increased functional strength to 4/5 for BUEs.                         Recommendations:     Discharge Equipment Recommendations:   (TBD pending progress)     Plan:     Patient to be seen  (5-6x/wk; QD/BID as appropriate) to address the above listed problems via self-care/home management, therapeutic activities, therapeutic exercises, cognitive retraining  Plan of Care Expires: 11/28/24  Revisions made to plan of care: No    Skilled OT Minutes Provided: 23  Communication with Treatment Team:     At end of treatment, patient remained:   Up in chair     Up in wheelchair in room    In bed    With alarm activated    Bed rails up    Call bell in reach     Family/friends present    Restraints secured properly    In bathroom with CNA/RN notified   x In gym with PT/PTA/tech    Nurse aware    Other:

## 2024-11-19 NOTE — PLAN OF CARE
Problem: Adult Inpatient Plan of Care  Goal: Plan of Care Review  Outcome: Progressing  Flowsheets (Taken 11/18/2024 2210)  Plan of Care Reviewed With:   patient   child  Goal: Patient-Specific Goal (Individualized)  Outcome: Progressing  Flowsheets (Taken 11/18/2024 2210)  Individualized Care Needs: encourage PO fluids, reorient as needed, maintain safety precautions, PT/OT/ST  Anxieties, Fears or Concerns: none verbalized at this time  Patient/Family-Specific Goals (Include Timeframe): continue therapy and discharge possibly Wednesday, 11/20/24  Goal: Absence of Hospital-Acquired Illness or Injury  Outcome: Progressing  Intervention: Identify and Manage Fall Risk  Flowsheets (Taken 11/18/2024 2210)  Safety Promotion/Fall Prevention:   assistive device/personal item within reach   bed alarm set   diversional activities provided   Fall Risk reviewed with patient/family   lighting adjusted   instructed to call staff for mobility   gait belt with ambulation   side rails raised x 3   supervised activity   toileting scheduled   patient expresses understanding of fall risk and prevention   nonskid shoes/socks when out of bed   medications reviewed  Intervention: Prevent Skin Injury  Flowsheets (Taken 11/18/2024 2210)  Body Position:   turned   right   side-lying  Skin Protection:   incontinence pads utilized   hydrocolloids used   skin sealant/moisture barrier applied   protective footwear used  Device Skin Pressure Protection:   absorbent pad utilized/changed   adhesive use limited   pressure points protected   positioning supports utilized  Intervention: Prevent and Manage VTE (Venous Thromboembolism) Risk  Flowsheets (Taken 11/18/2024 2210)  VTE Prevention/Management:   ambulation promoted   bleeding risk assessed   fluids promoted  Intervention: Prevent Infection  Flowsheets (Taken 11/18/2024 2210)  Infection Prevention:   cohorting utilized   hand hygiene promoted   equipment surfaces disinfected   rest/sleep  promoted   single patient room provided   environmental surveillance performed  Goal: Optimal Comfort and Wellbeing  Outcome: Progressing  Intervention: Monitor Pain and Promote Comfort  Flowsheets (Taken 11/18/2024 2218)  Pain Management Interventions:   care clustered   pain management plan reviewed with patient/caregiver  Intervention: Provide Person-Centered Care  Flowsheets (Taken 11/18/2024 2210)  Trust Relationship/Rapport:   care explained   choices provided   emotional support provided   empathic listening provided   questions answered   questions encouraged   reassurance provided   thoughts/feelings acknowledged     Problem: Fall Injury Risk  Goal: Absence of Fall and Fall-Related Injury  Outcome: Progressing  Intervention: Identify and Manage Contributors  Flowsheets (Taken 11/18/2024 2210)  Self-Care Promotion:   independence encouraged   BADL personal objects within reach   BADL personal routines maintained   adaptive equipment use encouraged   meal set-up provided  Medication Review/Management: medications reviewed  Intervention: Promote Injury-Free Environment  Flowsheets (Taken 11/18/2024 2210)  Safety Promotion/Fall Prevention:   assistive device/personal item within reach   bed alarm set   diversional activities provided   Fall Risk reviewed with patient/family   lighting adjusted   instructed to call staff for mobility   gait belt with ambulation   side rails raised x 3   supervised activity   toileting scheduled   patient expresses understanding of fall risk and prevention   nonskid shoes/socks when out of bed   medications reviewed

## 2024-11-19 NOTE — PT/OT/SLP PROGRESS
Physical Therapy Treatment Note           Name: Tali Beard    : 1935 (88 y.o.)  MRN: [id number]           TREATMENT SUMMARY AND RECOMMENDATIONS:    PT Received On: 24  PT Start Time: 1328     PT Stop Time: 1340  PT Total Time (min): 12 min     Subjective Assessment:   No complaints  Lethargic   x Awake, alert, cooperative  Uncooperative    Agitated  c/o pain    Appropriate  c/o fatigue   x Confused - minimal  Treated at bedside     Emotionally labile  Treated in gym/dept.    Impulsive  Other:    Flat affect       Therapy Precautions:   x Cognitive deficits  Spinal precautions    Collar - hard  Sternal precautions    Collar - soft   TLSO   x Fall risk  LSO    Hip precautions - posterior  Knee immobilizer    Hip precautions - anterior  WBAT    Impaired communication  Partial weightbearing    Oxygen  TTWB    PEG tube  NWB    Visual deficits  Other:    Hearing deficits          Treatment Objectives:     Mobility Training:   Assist level Comments    Bed mobility     Transfer     Gait CGA/SBA X 460 feet without AD, pt lost balance 2x's but was able to recover.    Sit to stand transitions SBA Pushing off of chair arm rest, prior to ambulation    Sitting balance     Standing balance      Wheelchair mobility     Car transfer     Other:          Therapeutic Exercise:   Exercise Sets Reps Comments                               Additional Comments:  Pt told us she was taking a nap before we went and got her for PT. Asked pt if she was excited to go home and she said yes.     Treated by Fatoumata MEREDITH, Supervised by Aurora Robbins PT     Assessment: Patient tolerated session better and tolerated therapy better this PM session.    PT Plan: continue per POC - Home with  family care  Revisions made to plan of care: No    GOALS:   Multidisciplinary Problems       Physical Therapy Goals          Problem: Physical Therapy    Goal Priority Disciplines Outcome Interventions   Physical Therapy  Goal     PT, PT/OT Progressing    Description: Goals to be met by: Discharge      Patient will increase functional independence with mobility by performin. Supine <> sit with Supervision, remaining focused to task 100% of the time and only needing 25% cues.   2. Sit to stand transfer with Supervision, remaining focused to task 100% of the time and only needing 25% cues  3. Bed to chair transfer with Supervision using Rolling Walker vs No AD; remaining focused to task 100% of the time and only needing 25% cues  4. Gait  x 350 feet with Supervision using Rolling Walker vs no AD; remaining focused to task 100% of the time and only needing 25% cues.                          Skilled PT Minutes Provided: 12  Communication with Treatment Team:     Equipment recommendations:       At end of treatment, patient remained:  x Up in chair     Up in wheelchair in room    In bed   x With alarm activated    Bed rails up   x Call bell in reach    x Family/friends present    Restraints secured properly    In bathroom with CNA/RN notified    Nurse aware    In gym with therapist/tech    Other:

## 2024-11-20 VITALS
OXYGEN SATURATION: 97 % | RESPIRATION RATE: 18 BRPM | TEMPERATURE: 99 F | BODY MASS INDEX: 24.73 KG/M2 | HEART RATE: 76 BPM | SYSTOLIC BLOOD PRESSURE: 117 MMHG | DIASTOLIC BLOOD PRESSURE: 68 MMHG | WEIGHT: 139.56 LBS | HEIGHT: 63 IN

## 2024-11-20 PROCEDURE — 97535 SELF CARE MNGMENT TRAINING: CPT

## 2024-11-20 PROCEDURE — 25000003 PHARM REV CODE 250: Performed by: PHYSICIAN ASSISTANT

## 2024-11-20 RX ORDER — FERROUS SULFATE 325(65) MG
325 TABLET, DELAYED RELEASE (ENTERIC COATED) ORAL DAILY
Qty: 30 TABLET | Refills: 0 | Status: SHIPPED | OUTPATIENT
Start: 2024-11-20 | End: 2024-12-20

## 2024-11-20 RX ORDER — LEVETIRACETAM 500 MG/1
500 TABLET ORAL 2 TIMES DAILY
Qty: 60 TABLET | Refills: 0 | Status: SHIPPED | OUTPATIENT
Start: 2024-11-20 | End: 2024-12-20

## 2024-11-20 RX ORDER — AMLODIPINE BESYLATE 5 MG/1
5 TABLET ORAL DAILY
Qty: 30 TABLET | Refills: 0 | Status: SHIPPED | OUTPATIENT
Start: 2024-11-20 | End: 2024-12-20

## 2024-11-20 RX ORDER — ATORVASTATIN CALCIUM 40 MG/1
40 TABLET, FILM COATED ORAL NIGHTLY
Qty: 30 TABLET | Refills: 0 | Status: SHIPPED | OUTPATIENT
Start: 2024-11-20 | End: 2024-12-20

## 2024-11-20 RX ADMIN — FERROUS SULFATE TAB 325 MG (65 MG ELEMENTAL FE) 1 EACH: 325 (65 FE) TAB at 08:11

## 2024-11-20 RX ADMIN — LEVETIRACETAM 500 MG: 500 TABLET, FILM COATED ORAL at 08:11

## 2024-11-20 RX ADMIN — AMLODIPINE BESYLATE 5 MG: 5 TABLET ORAL at 08:11

## 2024-11-20 RX ADMIN — LATANOPROST 1 DROP: 50 SOLUTION/ DROPS OPHTHALMIC at 08:11

## 2024-11-20 NOTE — PLAN OF CARE
[facility]  Discharge Final Note    Primary Care Provider: Aurora Moe FNP    Expected Discharge Date: 11/20/2024    Final Discharge Note (most recent)       Final Note - 11/20/24 1000          Final Note    Assessment Type Final Discharge Note     Anticipated Discharge Disposition Home or Self Care     What phone number can be called within the next 1-3 days to see how you are doing after discharge? [phone]     Hospital Resources/Appts/Education Provided Provided patient/caregiver with written discharge plan information;Appointments scheduled and added to AVS        Post-Acute Status    Post-Acute Authorization Other     Other Status See Comments   [facility]    Patient choice form signed by patient/caregiver List with quality metrics by geographic area provided     Discharge Delays None known at this time                     Important Message from Medicare             Contact Info       Yue Rodriguez FNP   Specialty: Neurology    [address] [place] [address]   Phone: [phone]       Next Steps: Follow up    Instructions: Please follow up in stroke clinic in 1 month with Yue Rodriguez or Dr. Hannah to review MRI brain with SWI sequence 12/12/2024 at 11 am    Aurora Moe FNP   Specialty: Family Medicine   Relationship: PCP - General    [address] [place] [id number]   Phone: [phone]       Next Steps: Go on 11/27/2024    Instructions: Go to appoinment on Novemeber 27th at 2:40 PM.    [facility]    [address] [facility] [address]   Phone: [phone]       Next Steps: Follow up    Instructions: The specialty clinic will be reaching out to the patient with appointment date and time. Spoke with Sanjeev

## 2024-11-20 NOTE — NURSING
Went over discharge instructions with the patient and her son, Heri.  Both verbalized understanding.

## 2024-11-20 NOTE — PLAN OF CARE
Problem: Adult Inpatient Plan of Care  Goal: Plan of Care Review  Outcome: Progressing  Flowsheets (Taken 11/19/2024 1900)  Plan of Care Reviewed With:   patient   child  Goal: Patient-Specific Goal (Individualized)  Outcome: Progressing  Flowsheets (Taken 11/19/2024 1900)  Individualized Care Needs: Encourage PO fluids, reorient as needed, maintain safety precautions, PT/OT/ST  Anxieties, Fears or Concerns: None verbalized at this time  Patient/Family-Specific Goals (Include Timeframe): Continue therapy and discharge possibly Wednesday, 11/20/24  Goal: Absence of Hospital-Acquired Illness or Injury  Outcome: Progressing  Goal: Optimal Comfort and Wellbeing  Outcome: Progressing  Goal: Readiness for Transition of Care  Outcome: Progressing

## 2024-11-20 NOTE — PATIENT CARE CONFERENCE
Name: Tali Beard    : 1935 (88 y.o.)  MRN: [id number]            Interdisciplinary Team Conference     Case conference held with patient/family and care team to discuss progress, plan of care, barriers to be addressed for safe return home, equipment recommendations, and discharge planning. Communicated therapy progress with MD, RN, therapy clinicians and case management. All questions/concerns answered.

## 2024-11-20 NOTE — DISCHARGE INSTRUCTIONS
Please follow all discharge instructions as given. KEEP ALL FOLLOW UP APPOINTMENTS!        If you experience any worsening or NEW signs or symptoms please call your primary care provider or head to your nearest emergency department.           THANK YOU FOR CHOOSING [facility].  If you have any questions please call [phone].

## 2024-11-20 NOTE — PATIENT CARE CONFERENCE
Name: Tali Beard    : 1935 (88 y.o.)  MRN: [id number]            Interdisciplinary Team Conference     Case conference held with patient/family and care team to discuss progress, plan of care, barriers to be addressed for safe return home, equipment recommendations, and discharge planning. Communicated therapy progress with MD, RN, therapy clinicians and case management. All questions/concerns answered.

## 2024-11-20 NOTE — PATIENT CARE CONFERENCE
Name: Tali Beard    : 1935 (88 y.o.)  MRN: [id number]            Interdisciplinary Team Conference     Case conference held with patient/family and care team to discuss progress, plan of care, barriers to be addressed for safe return home, equipment recommendations, and discharge planning. Communicated therapy progress with MD, RN, therapy clinicians and case management. All questions/concerns answered.

## 2024-11-20 NOTE — PT/OT/SLP DISCHARGE
Physical Therapy Discharge Summary    Name: Tali Beard  MRN: [id number]   Principal Problem: Intracranial hemorrhage     Patient Discharged from acute Physical Therapy on .  Please refer to prior PT noted date on  for functional status.     Assessment:     Patient appropriate for care in another setting.    Objective:     GOALS:   Multidisciplinary Problems       Physical Therapy Goals          Problem: Physical Therapy    Goal Priority Disciplines Outcome Interventions   Physical Therapy Goal     PT, PT/OT Adequate for Care Transition    Description: Goals to be met by: Discharge      Patient will increase functional independence with mobility by performin. Supine <> sit with Supervision, remaining focused to task 100% of the time and only needing 25% cues.   2. Sit to stand transfer with Supervision, remaining focused to task 100% of the time and only needing 25% cues  3. Bed to chair transfer with Supervision using Rolling Walker vs No AD; remaining focused to task 100% of the time and only needing 25% cues  4. Gait  x 350 feet with Supervision using Rolling Walker vs no AD; remaining focused to task 100% of the time and only needing 25% cues.                          Reasons for Discontinuation of Therapy Services  Transfer to alternate level of care. and Satisfactory goal achievement.      Plan:     Patient Discharged to: Outpatient Therapy Services.      2024

## 2024-11-20 NOTE — PLAN OF CARE
Problem: Adult Inpatient Plan of Care  Goal: Plan of Care Review  Outcome: Progressing  Goal: Patient-Specific Goal (Individualized)  Outcome: Progressing  Goal: Absence of Hospital-Acquired Illness or Injury  Outcome: Progressing  Intervention: Identify and Manage Fall Risk  Flowsheets (Taken 11/20/2024 0732)  Safety Promotion/Fall Prevention:   assistive device/personal item within reach   bed alarm set   Fall Risk signage in place   family expresses understanding of fall risk and prevention   family to remain at bedside   gait belt with ambulation   instructed to call staff for mobility   patient expresses understanding of fall risk and prevention   room near unit station   side rails raised x 3  Intervention: Prevent Skin Injury  Flowsheets (Taken 11/20/2024 0732)  Body Position:   position changed independently   upper extremity elevated   weight shifting  Skin Protection:   incontinence pads utilized   skin sealant/moisture barrier applied  Device Skin Pressure Protection:   positioning supports utilized   tubing/devices free from skin contact  Intervention: Prevent and Manage VTE (Venous Thromboembolism) Risk  Flowsheets (Taken 11/20/2024 0732)  VTE Prevention/Management:   ambulation promoted   fluids promoted   ROM (active) performed  Intervention: Prevent Infection  Flowsheets (Taken 11/20/2024 0732)  Infection Prevention:   environmental surveillance performed   hand hygiene promoted   rest/sleep promoted   single patient room provided  Goal: Optimal Comfort and Wellbeing  Outcome: Progressing  Intervention: Monitor Pain and Promote Comfort  Flowsheets (Taken 11/20/2024 0732)  Pain Management Interventions:   care clustered   pain management plan reviewed with patient/caregiver   pillow support provided   relaxation techniques promoted  Intervention: Provide Person-Centered Care  Flowsheets (Taken 11/20/2024 0732)  Trust Relationship/Rapport:   care explained   choices provided   emotional support  provided   questions encouraged   reassurance provided   thoughts/feelings acknowledged   empathic listening provided   questions answered  Goal: Readiness for Transition of Care  Outcome: Progressing  Intervention: Mutually Develop Transition Plan  Flowsheets (Taken 11/20/2024 0732)  Equipment Currently Used at Home: none  Transportation Anticipated: family or friend will provide  Who are your caregiver(s) and their phone number(s)?: stuart Lee, [phone]  Communicated SENDY with patient/caregiver: Date not available/Unable to determine  Do you expect to return to your current living situation?: Yes  Do you have help at home or someone to help you manage your care at home?: Yes  Readmission within 30 days?: No  Do you currently have service(s) that help you manage your care at home?: Yes  Is the pt/caregiver preference to resume services with current agency: Yes     Problem: Infection  Goal: Absence of Infection Signs and Symptoms  Outcome: Progressing  Intervention: Prevent or Manage Infection  Flowsheets (Taken 11/20/2024 0732)  Fever Reduction/Comfort Measures:   lightweight bedding   lightweight clothing  Infection Management: aseptic technique maintained  Isolation Precautions:   precautions maintained   protective     Problem: Wound  Goal: Optimal Coping  Outcome: Progressing  Intervention: Support Patient and Family Response  Flowsheets (Taken 11/20/2024 0732)  Supportive Measures:   active listening utilized   self-responsibility promoted   verbalization of feelings encouraged   relaxation techniques promoted  Family/Support System Care:   self-care encouraged   involvement promoted   presence promoted  Goal: Optimal Functional Ability  Outcome: Progressing  Intervention: Optimize Functional Ability  Flowsheets (Taken 11/20/2024 0732)  Assistive Device Utilized: gait belt  Activity Management:   Rolling - L1   Walk with assistive devise and /or staff member - L3  Activity Assistance Provided: assistance,  1 person  Goal: Absence of Infection Signs and Symptoms  Outcome: Progressing  Intervention: Prevent or Manage Infection  Flowsheets (Taken 11/20/2024 0732)  Fever Reduction/Comfort Measures:   lightweight bedding   lightweight clothing  Infection Management: aseptic technique maintained  Isolation Precautions:   precautions maintained   protective  Goal: Improved Oral Intake  Outcome: Progressing  Intervention: Promote and Optimize Oral Intake  Flowsheets (Taken 11/20/2024 0732)  Oral Nutrition Promotion:   calorie-dense liquids provided   nutrition counseling provided  Nutrition Interventions: food preferences provided  Goal: Optimal Pain Control and Function  Outcome: Progressing  Intervention: Prevent or Manage Pain  Flowsheets (Taken 11/20/2024 0732)  Sleep/Rest Enhancement:   relaxation techniques promoted   regular sleep/rest pattern promoted   natural light exposure provided  Pain Management Interventions:   care clustered   pain management plan reviewed with patient/caregiver   pillow support provided   relaxation techniques promoted  Goal: Skin Health and Integrity  Outcome: Progressing  Intervention: Optimize Skin Protection  Flowsheets (Taken 11/20/2024 0732)  Pressure Reduction Techniques: frequent weight shift encouraged  Pressure Reduction Devices: positioning supports utilized  Skin Protection:   incontinence pads utilized   skin sealant/moisture barrier applied  Activity Management:   Rolling - L1   Walk with assistive devise and /or staff member - L3  Head of Bed (HOB) Positioning: HOB at 30 degrees  Goal: Optimal Wound Healing  Outcome: Progressing     Problem: Fall Injury Risk  Goal: Absence of Fall and Fall-Related Injury  Outcome: Progressing  Intervention: Identify and Manage Contributors  Flowsheets (Taken 11/20/2024 0732)  Self-Care Promotion:   independence encouraged   BADL personal objects within reach   BADL personal routines maintained   meal set-up provided   adaptive equipment use  encouraged  Medication Review/Management: medications reviewed  Intervention: Promote Injury-Free Environment  Flowsheets (Taken 11/20/2024 0732)  Safety Promotion/Fall Prevention:   assistive device/personal item within reach   bed alarm set   Fall Risk signage in place   family expresses understanding of fall risk and prevention   family to remain at bedside   gait belt with ambulation   instructed to call staff for mobility   patient expresses understanding of fall risk and prevention   room near unit station   side rails raised x 3     Problem: Skin Injury Risk Increased  Goal: Skin Health and Integrity  Outcome: Progressing  Intervention: Optimize Skin Protection  Flowsheets (Taken 11/20/2024 0732)  Pressure Reduction Techniques: frequent weight shift encouraged  Pressure Reduction Devices: positioning supports utilized  Skin Protection:   incontinence pads utilized   skin sealant/moisture barrier applied  Activity Management:   Rolling - L1   Walk with assistive devise and /or staff member - L3  Head of Bed (HOB) Positioning: HOB at 30 degrees  Intervention: Promote and Optimize Oral Intake  Flowsheets (Taken 11/20/2024 0732)  Oral Nutrition Promotion:   calorie-dense liquids provided   nutrition counseling provided  Nutrition Interventions: food preferences provided

## 2024-11-20 NOTE — PLAN OF CARE
Problem: Occupational Therapy  Goal: Occupational Therapy Goal  Description: Goals to be met by: 11/28/24     Patient will increase functional independence with ADLs by performing:    UE Dressing with Set-up Assistance. - met   LE Dressing with Set-up Assistance. - met   Grooming while standing at sink with Supervision. - met   Toileting from toilet with Contact Guard Assistance for hygiene and clothing management. - met   Bathing from  shower chair/bench with Contact Guard Assistance. - met   Toilet transfer to toilet with Stand-by Assistance. - met   Increased functional strength to 4/5 for BUEs.    Outcome: Met

## 2024-11-20 NOTE — PLAN OF CARE
Problem: Physical Therapy  Goal: Physical Therapy Goal  Description: Goals to be met by: Discharge      Patient will increase functional independence with mobility by performin. Supine <> sit with Supervision, remaining focused to task 100% of the time and only needing 25% cues.   2. Sit to stand transfer with Supervision, remaining focused to task 100% of the time and only needing 25% cues  3. Bed to chair transfer with Supervision using Rolling Walker vs No AD; remaining focused to task 100% of the time and only needing 25% cues  4. Gait  x 350 feet with Supervision using Rolling Walker vs no AD; remaining focused to task 100% of the time and only needing 25% cues.     Outcome: Adequate for Care Transition

## 2024-11-20 NOTE — DISCHARGE SUMMARY
[facility]  HOSPITAL MEDICINE - DISCHARGE SUMMARY    Patient Name: Tali Beard  MRN: [id number]  Admission Date: 11/6/2024  Discharge Date: 11/20/2024  Hospital Length of Stay: 14 days  Discharge Provider: NIKUNJ Coronado  Primary Care Provider: Aurora Moe [facility]    HOSPITAL COURSE     88 year old female with PMHx of HTN, HLD, DM2, Kaposi Sarcoma s/p resection and radiation to left ankle and submental node (followed by [facility]), and prior subarachnoid hemorrhage and intraparenchymal hemorrhage presents to [facility] for swing following right parietal intracranial hemorrhage.  She initially presented to [facility] on 10/30/2024 due to left sided weakness and confusion.  CT head revealed acute hemorrhagic infarct of the right parietal lobe.  CTA head and neck displayed diminished caliber of M1 and M2 segments bilaterally MCA possible indicative of vasospasm.  MRI revealed evolving left parietal late subacute and right parietal early subacute cortical hemorrhage, new punctate focus of suspected ischemic cortical infarct at the left occipital lobe, with no additional sites of abnormal diffusion signal appreciated.     Neurology does not recommend anticoagulation or antiplatelets at this time due to spontaneous bleeding events. Suspecting primary intracranial hemorrhage due to amyloid angiopathy. EEG was negative for seizures, showed diffuse cerebral slowing - will continue keppra 500mg PO BID for seizure prophylaxis.      Patient lives alone and was completely independent prior to this hospitalization. Has plans for 24 hour care upon returning home between 2 of her sons. She lives in a 1 story home without stairs and a climb in bathtub. Patient's mentation was improving prior to admission to [place]. Patient is oriented x3 with slow response time - patient's son report she is worse than her baseline mentation. Patient denies headache, N/V, or vision changes.       Patient treated for Proteus mirabilis UTI with 5 day course of Rocephin during admission.      Patient did well with PT/OT during admission.  Not requiring outpatient therapy for PT/OT.  Son at bedside has been involved in therapy sessions in his able to care for her at home.  Patient will continue SLP outpatient for both cognition and speech.  Patient will be discharged home today with 24 hour care provided by her family.    PHYSICAL EXAM     Most Recent Vital Signs:  Temp: 98.6 °F (37 °C) (11/20/24 0730)  Pulse: 76 (11/20/24 0730)  Resp: 18 (11/19/24 1959)  BP: 117/68 (11/20/24 0730)  SpO2: 97 % (11/20/24 0730)     GENERAL: In no acute distress, afebrile  HEENT: PERRLA   CHEST: Clear to auscultation bilaterally  HEART: S1, S2, no appreciable murmur  ABDOMEN: Soft, nontender, BS +  MSK: Warm, no lower extremity edema, no clubbing or cyanosis  NEUROLOGIC: Moving all extremities with good strength, speech is difficult to understand occasionally, slow response time    DISCHARGE DIAGNOSIS     Left parietal late subacute and right parietal early subacute cortical hemorrhage   Neurology recommends no anticoagulation or antiplatelet therapy at this time due to spontaneous ICH events    Follow up with Yue Rodriguez NP 12/12/2024 at 11:00am   Continue Keppra 500mg PO BID for seizure prophylaxis  Continue speech therapy for cognition/speech outpatient   24 hour care upon discharge home       Incontinence dermatitis   Followed by wound care  [place]  Prn bowel regimen      Proteus mirabilis UTI  Completed Rocephin 1g IV for 5 day starting 11/13/2024     Chronic Microcytic Anemia   Folate deficiency   Multivitamin      HTN / HLD  Continue amlodipine 5mg PO QD and atorvastatin 40mg PO QHS.   SBP goal < 140      Left foot wound - healed      Kaposi Sarcoma   Followed by [facility] - Dr. Celestine Henderson   S/p XRT   CT chest/abdomen/pelvis on 10/04/2024 was negative     Social History     Socioeconomic History     Marital status:    Tobacco Use    Smoking status: Never     Passive exposure: Never    Smokeless tobacco: Never   Substance and Sexual Activity    Alcohol use: Not Currently    Drug use: Never    Sexual activity: Not Currently     Social Drivers of Health     Financial Resource Strain: Low Risk  (11/8/2024)    Overall Financial Resource Strain (CARDIA)     Difficulty of Paying Living Expenses: Not hard at all   Food Insecurity: No Food Insecurity (11/8/2024)    Hunger Vital Sign     Worried About Running Out of Food in the Last Year: Never true     Ran Out of Food in the Last Year: Never true   Transportation Needs: No Transportation Needs (11/8/2024)    TRANSPORTATION NEEDS     Transportation : No   Physical Activity: Inactive (11/8/2024)    Exercise Vital Sign     Days of Exercise per Week: 0 days     Minutes of Exercise per Session: 0 min   Stress: No Stress Concern Present (11/8/2024)    [employer] [place] [employer] - Occupational Stress Questionnaire     Feeling of Stress : Only a little   Housing Stability: Low Risk  (11/8/2024)    Housing Stability Vital Sign     Unable to Pay for Housing in the Last Year: No     Homeless in the Last Year: No     Patient's screen for food insecurity, housing instability, transportation needs, utility difficulties, and interpersonal safety. Social work consulted for discharge planning, communicating with the patient's insurance during admission, setting up any DME needed prior to discharge, setting up outpatient therapy.    _____________________________________________________________________________    DISCHARGE MED REC     Current Discharge Medication List        START taking these medications    Details   ferrous sulfate 325 (65 FE) MG EC tablet Take 1 tablet (325 mg total) by mouth once daily.  Qty: 30 tablet, Refills: 0           CONTINUE these medications which have CHANGED    Details   amLODIPine (NORVASC) 5 MG tablet Take 1 tablet (5 mg total) by  mouth once daily.  Qty: 30 tablet, Refills: 0    Comments: .      atorvastatin (LIPITOR) 40 MG tablet Take 1 tablet (40 mg total) by mouth every evening.  Qty: 30 tablet, Refills: 0      levETIRAcetam (KEPPRA) 500 MG Tab Take 1 tablet (500 mg total) by mouth 2 (two) times daily.  Qty: 60 tablet, Refills: 0           CONTINUE these medications which have NOT CHANGED    Details   latanoprost 0.005 % ophthalmic solution Place into both eyes.           STOP taking these medications       metFORMIN (GLUCOPHAGE) 500 MG tablet Comments:   Reason for Stopping:         prednisoLONE acetate (PRED MILD) 0.12 % ophthalmic suspension Comments:   Reason for Stopping:              CONSULTS     FOLLOW UP      Follow-up Information       Yue Rodriguez FNP Follow up.    Specialty: Neurology  Why: Please follow up in stroke clinic in 1 month with Yue Rodriguez or Dr. Hannah to review MRI brain with SWI sequence 12/12/2024 at 11 am  Contact information:  [address] [place] [address] Dr Beau SANCHEZ [id number]  [phone]               Aurora Moe, ABHIJEET Follow up.    Specialty: Family Medicine  Why: 11-13-24 2:20pm  appointment canceled on 11/07 until release from hospital.  Contact information:  [address] [facility] [address]  [phone]               [facility] Follow up.    Contact information:  [address] Mahmood [facility] [address]  [phone]                           DISCHARGE INSTRUCTIONS     Explained in detail to the patient about the discharge plan, medications, and follow-up visits. Pt understands and agrees with the treatment plan.  Discharged Condition: stable  Diet as tolerated  Activities as tolerated  Discharge to: Home or Self Care    TIME SPENT ON DISCHARGE     35 minutes    NIKUNJ Coronado  Internal Medicine  Department of Hospital Medicine [facility]    This document was created using electronic dictation services.  Please excuse any errors that may have  been made.  Contact me if any questions regarding documentation to clarify verbiage.

## 2024-11-20 NOTE — PT/OT/SLP DISCHARGE
Occupational Therapy Discharge Summary    Tali Beard  MRN: [id number]   Principal Problem: Intracranial hemorrhage      Patient Discharged from acute Occupational Therapy on 11/20/24.    Assessment:      Patient has met all goals and is not appropriate for therapy. Patient appropriate for care in another setting.    Objective:     GOALS:   Multidisciplinary Problems       Occupational Therapy Goals       Not on file              Multidisciplinary Problems (Resolved)          Problem: Occupational Therapy    Goal Priority Disciplines Outcome Interventions   Occupational Therapy Goal   (Resolved)     OT, PT/OT Met    Description: Goals to be met by: 11/28/24     Patient will increase functional independence with ADLs by performing:    UE Dressing with Set-up Assistance. - met   LE Dressing with Set-up Assistance. - met   Grooming while standing at sink with Supervision. - met   Toileting from toilet with Contact Guard Assistance for hygiene and clothing management. - met   Bathing from  shower chair/bench with Contact Guard Assistance. - met   Toilet transfer to toilet with Stand-by Assistance. - met   Increased functional strength to 4/5 for BUEs.                         Reasons for Discontinuation of Therapy Services  Satisfactory goal achievement.      Plan:     Patient Discharged to: Home no OT services needed    11/20/2024

## 2024-11-20 NOTE — PLAN OF CARE
Weekly Staffing Report      Date Admitted: 11/6/2024 :   Staffing Date:     Patient Active Problem List   Diagnosis    Type 2 diabetes mellitus without complication, without long-term current use of insulin    Primary hypertension    Other hyperlipidemia    Foot ulcer with fat layer exposed, left    Nontraumatic hemorrhage of right cerebral hemisphere    Intracranial atherosclerosis    Acute focal neurological deficit    Counseling regarding advance care planning and goals of care    Weakness    Intracranial hemorrhage          Team Members Present:       Nursing Present     Yes [x]    No []    Physical Therapy Present    Yes [x]    No []    Occupational Therapy Present     Yes [x]    No []    Speech Therapy Present    Yes [x]    No []    NA []    Dietary Present    Yes [x]    No []        Physician Present     [] Thairy Reyes    [x] Lili Booker    [x] NIKUNJ Ac    []       Family Present    [x] Adult Children Son present     [] Spouse    [] POA    [] Friend/ Caregiver    [] Other       Interdisciplinary Meeting Notes:  PT- doing well walking 300ft at a time with no assistive device. SBA for all activities. OT- SBA for all ADLs. ST- working on expressive aphasia and memory. Appetite- good. Medically- stable. Planned discharge today with outpatient speech therapy. All questions answered at this time.                Please see Documented PT/OT/ST, Dietary, Nursing, and Physician notes for detailed treatment information.

## 2024-11-20 NOTE — PT/OT/SLP DISCHARGE
Speech Language Pathology Discharge Summary    Tali Beard  MRN: [id number]   Intracranial hemorrhage     Date of Last Treatment Session: 11/20/24    Past Medical History:   Diagnosis Date    Subarachnoid hemorrhage        Status at initiation of therapy: expressive aphasia, impaired recall and orientation    Treatment Area(s):  Cognition    Goals:   Multidisciplinary Problems       SLP Goals          Problem: SLP    Goal Priority Disciplines Outcome   SLP Goal     SLP Progressing   Description: LTG: Patient will improve cognitive linguistic skills to SPV to ensure safety upon discharge home.    STG:  Patient will name x10 items in a concrete category w/I 1 minute to improve word generation Vy.  Patient will recall 3 EOD Vy.  Patient will answer LTM questions w/ 80% accuracy Vy.  Patient will provide solutions to common problems w/ 80% accuracy yV.                       Participation in Treatment (at discharge):  Cooperative    Functional Status at time of Discharge:    Cognition: Patient demonstrates moderate cognitive deficits.    Communication: Patient demonstrates moderate communication deficits.    Language: Patient demonstrates moderate language deficits.    Motor Speech: Patient demonstrates no motor speech deficits.    Swallow: Patient demonstrates no dysphagia.                     Clinical Bedside assessment was not completed                       Instrumental assessment was not completed                      Education: SLP reviewed PLAN OF CARE and strategies to implement at home for continued language and cognition support. Patient's son verbalized understanding w/ no further questions.    Patient is discharged to Home Patient will receive OP SLP services.      Sara Davila M.S., CCC-SLP

## 2024-11-21 ENCOUNTER — PATIENT OUTREACH (OUTPATIENT)
Dept: ADMINISTRATIVE | Facility: CLINIC | Age: 89
End: 2024-11-21
Payer: MEDICARE

## 2024-11-21 ENCOUNTER — PATIENT MESSAGE (OUTPATIENT)
Dept: ADMINISTRATIVE | Facility: CLINIC | Age: 89
End: 2024-11-21
Payer: MEDICARE

## 2024-11-21 NOTE — PROGRESS NOTES
C3 nurse attempted to contact Tali Beard's son, Jesus, for a TCC post hospital discharge follow up call. Someone answered, but the call was ended abruptly. The patient has a scheduled [facility] appointment with Aurora Moe NP (PCP) with [facility] on 11/27/24 @ 2:40pm.

## 2024-11-22 ENCOUNTER — CLINICAL SUPPORT (OUTPATIENT)
Dept: REHABILITATION | Facility: HOSPITAL | Age: 89
End: 2024-11-22
Attending: PEDIATRICS
Payer: MEDICARE

## 2024-11-22 DIAGNOSIS — I61.2 NONTRAUMATIC HEMORRHAGE OF RIGHT CEREBRAL HEMISPHERE: ICD-10-CM

## 2024-11-22 DIAGNOSIS — R47.01 APHASIA: Primary | ICD-10-CM

## 2024-11-22 DIAGNOSIS — I61.2 NONTRAUMATIC HEMORRHAGE OF RIGHT CEREBRAL HEMISPHERE: Primary | ICD-10-CM

## 2024-11-22 PROCEDURE — 92523 SPEECH SOUND LANG COMPREHEN: CPT

## 2024-11-22 NOTE — PROGRESS NOTES
C3 nurse spoke with Tali Beard's son, Jesus, for a TCC post hospital discharge follow up call. The patient has a scheduled HOSFU appointment with Aurora Moe NP (PCP) with [facility] on 11/27/24 @ 2:40pm, and ABHIJEET Murguia (neurology) on 12/12/24 @ 11:00am.    Per the patient's son, Jesus, she initiated outpatient ST, but is trying to reestablish care with HH; son aware that OP therapy and HH may not be possible and ST may have to transition to at home with HH, if HH is accepted.

## 2024-11-22 NOTE — PLAN OF CARE
"[facility]  Speech Therapy Evaluation -Neurological Rehabilitation     Name: Tali Beard   MRN: [id number]    Therapy Diagnosis:   Encounter Diagnoses   Name Primary?    Nontraumatic hemorrhage of right cerebral hemisphere     Aphasia Yes      Physician: Lili Booker MD  Physician Orders: Ambulatory Referral to Speech Therapy eval and treat  Medical Diagnosis: Nontraumatic hemorrhage of right cerebral hemisphere I61.2    Visit # / Visits Authorized:  1 / 1   Date of Evaluation:  11/22/2024   Insurance Authorization Period: TBD to TBD  Plan of Care Certification:  11/22/2024 to TBD     Time In:10:00   Time Out: 10:40   Total time: 40 minutes    Procedure   Speech Language Evaluation                                 40     Precautions: Standard    Subjective      Date of Onset: Patient seen in the ED on 10/3/24 w/ sons reporting she has been progressively more confused. CT head completed w/ the following result: "An area of parenchymal hemorrhage in the left temporoparietal region measures up to 35 mm in diameter. Additional small volume left-sided subarachnoid hemorrhage. No significant midline shift. No hydrocephalus. Normal positioning of the cerebellar tonsils." Patient was transferred to [facility] ICU. Patient then admitted to [facility] for rehabilitation and was discharged on 11/20/24.  History of Current Condition:  Tali Beard is a 88 y.o. female who presents to [facility] for evaluation secondary to CVA. Patient was referred to therapy by Lili Booker MD , which is the patient's hospitalist. Patient's PCP is ABHIJEET Keene. Patient reports expressive aphasia.   Patient was accompanied to the evaluation by her son.   Past Medical History: Tali Beard  has a past medical history of Subarachnoid hemorrhage.  Tali Beard  has no past surgical history on file.  Medical Hx and Allergies: Tali has a current medication " list which includes the following prescription(s): amlodipine, atorvastatin, ferrous sulfate, latanoprost, and levetiracetam.   Review of patient's allergies indicates:   Allergen Reactions    Hydroxyzine hcl      Other Reaction(s): BUMPS ALL OVER BODY    Other reaction(s): BUMPS ALL OVER BODY    Felodipine Rash     Edema in the legs     Prior Therapy: SLP services while in the hospital  Social History:  Patient lives in her home in which her sons take turns staying with her at night and during the day, Patient is not currently driving  Occupation:  retired  Prior Level of Function: independent   Current Level of Function: no AD. Patient presents w/ expressive aphasia.  Pain Scale: no pain indicated throughout session  Patient's Therapy Goals:  none stated    Objective      Formal Assessment:  Quick Aphasia Battery (QAB) was administered which consists of 8 subtests. Scores are as follows:  Level of consciousness: 4  Connected speech: 2  Word comprehension: 4  Sentence comprehension: 2  Picture namin  Repetition: 2  Reading aloud: 1  Motor speech: 3    Patient exhibits moderate aphasia characterized by impaired word generation, sentence formulation, repetition of increasing length, and reading at the sentence level. Patient w/ occasional imprecise articulation across more complex responses w/ mild motor speech deficits noted.  Cognitive deficits noted w/ recall and orientation which is difficult to determine language vs cognitive deficit at this time due to expressive aphasia. Both areas will be targeted via language therapy w/ environmental aids and visual stimuli to aid as appropriate.     Treatment         Education provided:   -role of Speech Therapy, goals/plan of care, scheduling/cancellations, insurance limitations with patient  -Additional Education provided:   Word finding strategies, PLAN OF CARE, insurance    Patient and/or family members expressed understanding.     Home Program: to be provided as  "applicable    Assessment      Tali presents to [facility] status post medical diagnosis of CVA.     Interpretation of objective assessment:   She presents with expressive aphasia characterized by nonsensical responses, word finding errors and limited grammatical structure across responses.    Demonstrates impairments including limitations as described in the problem list.     Positive prognostic factors: family support, CLOF  Negative prognostic factors: age  Barriers to therapy: No barriers to therapy identified.     Patient's spiritual, cultural, and educational needs considered and patient agreeable to plan of care and goals.    Patient will benefit from skilled therapy.    Rehab Potential: good    Short Term Goals: (6 weeks) Current Progress:   Patient will expressively produce 3+ word phrase to state object function with 80% accuracy Vy given initial phoneme cue as needed across 2 sessions.     Progressing/ Not Met 11/22/2024   Established this date   Patient will respond accurately to simple "wh" questions for one word response with 90% accuracy Vy.    Progressing/ Not Met 11/22/2024   Established this date   Patient will complete word finding task (i.e. Creating subject, verb, object pairs in VNEST protocol) with 90% acc min A to improve word fluency.    Progressing/ Not Met 11/22/2024   Established this date    Patient will recall 3 basic time and personal information facts with environmental cues and minimal assistance for use of strategies.    Progressing/ Not Met 11/22/2024   Established this date        Long Term Goals: (8 weeks) Current Progress:   Pt will develop functional expressive language skills to communicate wants,needs, and emotions effectively to variety of communication partners in medical and home environments   Established this date           Plan    Recommended Treatment Plan:  Patient will participate in the [facility] program for speech therapy 2 times " per week for 8 weeks to address her Communication and Cognition deficits, to educate patient and their family, and to participate in a home exercise program.    Other Recommendations:   Ambulatory Referral to Physical Therapy  Ambulatory Referral to Occupational Therapy     Therapist's Name:   Sara Davila CCC-SLP   11/22/2024           Physician's Signature: _________________________________________ Date: ________________

## 2024-11-22 NOTE — PROGRESS NOTES
2nd attempt-C3 nurse attempted to contact Tali Beard's son, Jesus, for a TCC post hospital discharge follow up call. No answer, and voicemail full. The patient has a scheduled HOS appointment with Aurora Moe NP (PCP) with [facility] on 11/27/24 @ 2:40pm.

## 2024-11-23 ENCOUNTER — NURSE TRIAGE (OUTPATIENT)
Dept: ADMINISTRATIVE | Facility: CLINIC | Age: 89
End: 2024-11-23
Payer: MEDICARE

## 2024-11-24 NOTE — TELEPHONE ENCOUNTER
Son calling on behalf of patient who is present. Reports patient was having dark stools in the hospital. He reports she is on iron medication and it was recommended she begin taking a stool softener. Son reports he has not seen his moms BM since. She is not having any symptoms currently. I have informed him I would route a message for follow up in this regard. He was also advised to begin her on a stool softer. I have recommended discussing stool softer options with pharmacy. I have also advised he call back with any questions/concerns or symptoms.    Reason for Disposition   MILD constipation    Protocols used: Constipation-A-AH

## 2024-11-25 ENCOUNTER — CLINICAL SUPPORT (OUTPATIENT)
Dept: REHABILITATION | Facility: HOSPITAL | Age: 89
End: 2024-11-25
Attending: PEDIATRICS
Payer: MEDICARE

## 2024-11-25 DIAGNOSIS — R47.01 APHASIA: Primary | ICD-10-CM

## 2024-11-25 PROCEDURE — 92507 TX SP LANG VOICE COMM INDIV: CPT

## 2024-11-25 NOTE — PROGRESS NOTES
"    Speech and Language Therapy Outpatient Progress Note  Date:  11/25/2024     Name: Tali Beard   MRN: [id number]   Therapy Diagnosis:   Encounter Diagnosis   Name Primary?    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 SLP discussed sending a referal for OP Physical Therapy, Patient's son agreeable.      She  was compliant to home exercise program.     Response to previous treatment: good     Pain Scale:  0/10 on VAS currently.   Pain Location: no pain indicated or expressed  Objective:     TIMED  Procedure Min.   Cognitive Communication Therapy    0         UNTIMED  Procedure Min.   {Speech- Language- Voice Therapy  35   Total Timed Units: 0  Total Untimed Units: 1  Charges Billed/# of units: 1    Short Term Goals: (6 weeks) Current Progress:   Patient will expressively produce 3+ word phrase to state object function with 80% accuracy Vy given initial phoneme cue as needed across 2 sessions.     Patient will respond accurately to simple "wh" questions for one word response with 90% accuracy Vy.     Patient will complete word finding task (i.e. Creating subject, verb, object pairs in VNEST protocol) with 90% acc min A to improve word fluency.     Patient will recall 3 basic time and personal information facts with environmental cues and minimal assistance for use of strategies.       Long Term Goal: (8 weeks)  Patient will develop functional expressive language skills to communicate wants,needs, and emotions effectively to variety of communication partners in medical and home " environments     Patient Education/Response:     Written Home Exercises Provided: Patient instructed to cont prior HEP.  Exercises were reviewed and Tali was able to demonstrate them prior to the end of the session.  Tali demonstrated good  understanding of the education provided.     Assessment:   Patient answered WH questions w/ 90% accuracy SPV.  [place] category naming w/I 1 minute to target verbal fluency. Animals x1 modA, Fruit x2 Independently, Colors x3 modA. Increased difficulty across this task as Patient was frequently reverting to previous category and answers stated.  SLP presented picture scenes and instructed Patient w/ proper sentence structure (subject+verb+object). Task completed w/ 75% accuracy mod-max verbal cueing.    Tali is progressing well towards her goals. Current goals remain appropriate. Goals to be updated as necessary.     Pt prognosis is Good. Pt will continue to benefit from skilled outpatient speech and language therapy to address the deficits listed in the problem list on initial evaluation, provide pt/family education and to maximize pt's level of independence in the home and community environment.     Barriers to Therapy: n/a  Pt's spiritual, cultural and educational needs considered and pt agreeable to plan of care and goals.    Plan:   Continue POC with focus on expressive language deficits.    Sara Davila M.S., CCC-SLP   11/25/2024

## 2024-12-02 ENCOUNTER — CLINICAL SUPPORT (OUTPATIENT)
Dept: REHABILITATION | Facility: HOSPITAL | Age: 89
End: 2024-12-02
Attending: PEDIATRICS
Payer: MEDICARE

## 2024-12-02 DIAGNOSIS — R47.01 APHASIA: Primary | ICD-10-CM

## 2024-12-02 PROCEDURE — 92507 TX SP LANG VOICE COMM INDIV: CPT

## 2024-12-02 NOTE — PROGRESS NOTES
"    Speech and Language Therapy Outpatient Progress Note  Date:  12/2/2024     Name: Tali Beard   MRN: [id number]   Therapy Diagnosis:   Encounter Diagnosis   Name Primary?    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 Patient pleasant and in good spirits.    She  was compliant to home exercise program.     Response to previous treatment: good     Pain Scale:  0/10 on VAS currently.   Pain Location: no pain indicated or expressed  Objective:     TIMED  Procedure Min.   Cognitive Communication Therapy    0         UNTIMED  Procedure Min.   {Speech- Language- Voice Therapy  30   Total Timed Units: 0  Total Untimed Units: 1  Charges Billed/# of units: 1    Short Term Goals: (6 weeks) Current Progress:   Patient will expressively produce 3+ word phrase to state object function with 80% accuracy Vy given initial phoneme cue as needed across 2 sessions.     Patient will respond accurately to simple "wh" questions for one word response with 90% accuracy Vy.     Patient will complete word finding task (i.e. Creating subject, verb, object pairs in VNEST protocol) with 90% acc min A to improve word fluency.     Patient will recall 3 basic time and personal information facts with environmental cues and minimal assistance for use of strategies.       Long Term Goal: (8 weeks)  Patient will develop functional expressive language skills to communicate wants,needs, and emotions effectively to variety of communication partners in medical and home environments     Patient Education/Response:     Written Home Exercises " Provided: Patient instructed to cont prior HEP.  Exercises were reviewed and Tali was able to demonstrate them prior to the end of the session.  Tali demonstrated good  understanding of the education provided.     Assessment:   SLP presented picture scenes and instructed Patient w/ proper sentence structure (subject+verb+object). Task completed w/ 80% accuracy mod-max verbal cueing.  Matching ryhmying words puzzle Fo5 completed given maxA and redirection to sustained attention to task.    Tali is progressing well towards her goals. Current goals remain appropriate. Goals to be updated as necessary.     Pt prognosis is Good. Pt will continue to benefit from skilled outpatient speech and language therapy to address the deficits listed in the problem list on initial evaluation, provide pt/family education and to maximize pt's level of independence in the home and community environment.     Barriers to Therapy: n/a  Pt's spiritual, cultural and educational needs considered and pt agreeable to plan of care and goals.    Plan:   Continue POC with focus on expressive language deficits.    Sara Davila M.S., CCC-SLP   12/2/2024

## 2024-12-06 ENCOUNTER — CLINICAL SUPPORT (OUTPATIENT)
Dept: REHABILITATION | Facility: HOSPITAL | Age: 89
End: 2024-12-06
Attending: PEDIATRICS
Payer: MEDICARE

## 2024-12-06 DIAGNOSIS — R47.01 APHASIA: Primary | ICD-10-CM

## 2024-12-06 PROCEDURE — 92507 TX SP LANG VOICE COMM INDIV: CPT

## 2024-12-06 NOTE — PROGRESS NOTES
"    Speech and Language Therapy Outpatient Progress Note  Date:  12/6/2024     Name: Tali Beard   MRN: [id number]   Therapy Diagnosis:   Encounter Diagnosis   Name Primary?    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 Patient pleasant and in good spirits.    She  was compliant to home exercise program.     Response to previous treatment: good     Pain Scale:  0/10 on VAS currently.   Pain Location: no pain indicated or expressed  Objective:     TIMED  Procedure Min.   Cognitive Communication Therapy    0         UNTIMED  Procedure Min.   {Speech- Language- Voice Therapy  30   Total Timed Units: 0  Total Untimed Units: 1  Charges Billed/# of units: 1    Short Term Goals: (6 weeks) Current Progress:   Patient will expressively produce 3+ word phrase to state object function with 80% accuracy Vy given initial phoneme cue as needed across 2 sessions.     Patient will respond accurately to simple "wh" questions for one word response with 90% accuracy Vy.     Patient will complete word finding task (i.e. Creating subject, verb, object pairs in VNEST protocol) with 90% acc min A to improve word fluency.     Patient will recall 3 basic time and personal information facts with environmental cues and minimal assistance for use of strategies.       Long Term Goal: (8 weeks)  Patient will develop functional expressive language skills to communicate wants,needs, and emotions effectively to variety of communication partners in medical and home environments     Patient Education/Response:     Written Home Exercises " Provided: Patient instructed to cont prior HEP.  Exercises were reviewed and Tali was able to demonstrate them prior to the end of the session.  Tali demonstrated good  understanding of the education provided.     Assessment:   Patient answered Wh questions w/ 50% accuracy Independently, increasing to 90% accuracy Vy.  Complete the list concrete category completed w/ 50% accuracy Independently. Difficulty to write words on paper despite written stimuli provided to copy.    Tali is progressing well towards her goals. Current goals remain appropriate. Goals to be updated as necessary.     Pt prognosis is Good. Pt will continue to benefit from skilled outpatient speech and language therapy to address the deficits listed in the problem list on initial evaluation, provide pt/family education and to maximize pt's level of independence in the home and community environment.     Barriers to Therapy: n/a  Pt's spiritual, cultural and educational needs considered and pt agreeable to plan of care and goals.    Plan:   Continue POC with focus on expressive language deficits.    Sara Davila M.S., CCC-SLP   12/6/2024

## 2024-12-09 ENCOUNTER — CLINICAL SUPPORT (OUTPATIENT)
Dept: REHABILITATION | Facility: HOSPITAL | Age: 89
End: 2024-12-09
Attending: PEDIATRICS
Payer: MEDICARE

## 2024-12-09 DIAGNOSIS — R47.01 APHASIA: Primary | ICD-10-CM

## 2024-12-09 PROCEDURE — 92507 TX SP LANG VOICE COMM INDIV: CPT

## 2024-12-09 NOTE — PROGRESS NOTES
"    Speech and Language Therapy Outpatient Progress Note  Date:  12/9/2024     Name: Tali Berad   MRN: [id number]   Therapy Diagnosis:   Encounter Diagnosis   Name Primary?    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 Patient pleasant and in good spirits.    She  was compliant to home exercise program.     Response to previous treatment: good     Pain Scale:  0/10 on VAS currently.   Pain Location: no pain indicated or expressed  Objective:     TIMED  Procedure Min.   Cognitive Communication Therapy    0         UNTIMED  Procedure Min.   {Speech- Language- Voice Therapy  30   Total Timed Units: 0  Total Untimed Units: 1  Charges Billed/# of units: 1    Short Term Goals: (6 weeks) Current Progress:   Patient will expressively produce 3+ word phrase to state object function with 80% accuracy Vy given initial phoneme cue as needed across 2 sessions.     Patient will respond accurately to simple "wh" questions for one word response with 90% accuracy Vy.     Patient will complete word finding task (i.e. Creating subject, verb, object pairs in VNEST protocol) with 90% acc min A to improve word fluency.     Patient will recall 3 basic time and personal information facts with environmental cues and minimal assistance for use of strategies.       Long Term Goal: (8 weeks)  Patient will develop functional expressive language skills to communicate wants,needs, and emotions effectively to variety of communication partners in medical and home environments     Patient Education/Response:     Written Home Exercises " Provided: Patient instructed to cont prior HEP.  Exercises were reviewed and Tali was able to demonstrate them prior to the end of the session.  Tali demonstrated good  understanding of the education provided.     Assessment:   SP presented everyday picture scenes and instructed Patient to utilize Subject+verb+object sentence structure to formulate sentences to appropriately describe actions. Task completed w/ 20% accuracy Independently, increasing to 70% accuracy Vy. Patient stated she feels her speech is getting better and words are coming out easier. Patient able to participate in simple conversation regarding family and accurately reported that her son arrived in town on Saturday and is leaving today.    Tali is progressing well towards her goals. Current goals remain appropriate. Goals to be updated as necessary.     Pt prognosis is Good. Pt will continue to benefit from skilled outpatient speech and language therapy to address the deficits listed in the problem list on initial evaluation, provide pt/family education and to maximize pt's level of independence in the home and community environment.     Barriers to Therapy: n/a  Pt's spiritual, cultural and educational needs considered and pt agreeable to plan of care and goals.    Plan:   Continue POC with focus on expressive language deficits.    Sara Davila M.S., CCC-SLP   12/9/2024

## 2024-12-12 ENCOUNTER — OFFICE VISIT (OUTPATIENT)
Dept: NEUROLOGY | Facility: CLINIC | Age: 89
End: 2024-12-12
Payer: MEDICARE

## 2024-12-12 VITALS
DIASTOLIC BLOOD PRESSURE: 69 MMHG | HEART RATE: 72 BPM | HEIGHT: 63 IN | BODY MASS INDEX: 24.73 KG/M2 | WEIGHT: 139.56 LBS | SYSTOLIC BLOOD PRESSURE: 136 MMHG

## 2024-12-12 DIAGNOSIS — I63.89 OTHER CEREBRAL INFARCTION: ICD-10-CM

## 2024-12-12 DIAGNOSIS — I63.9 CVA (CEREBRAL VASCULAR ACCIDENT): ICD-10-CM

## 2024-12-12 DIAGNOSIS — R53.81 PHYSICAL DECONDITIONING: Primary | ICD-10-CM

## 2024-12-12 DIAGNOSIS — R47.01 APHASIA: ICD-10-CM

## 2024-12-12 DIAGNOSIS — I67.2 INTRACRANIAL ATHEROSCLEROSIS: ICD-10-CM

## 2024-12-12 DIAGNOSIS — I60.9 SAH (SUBARACHNOID HEMORRHAGE): Primary | ICD-10-CM

## 2024-12-12 PROCEDURE — 99213 OFFICE O/P EST LOW 20 MIN: CPT | Mod: PBBFAC | Performed by: NURSE PRACTITIONER

## 2024-12-12 PROCEDURE — 99999 PR PBB SHADOW E&M-EST. PATIENT-LVL III: CPT | Mod: PBBFAC,,, | Performed by: NURSE PRACTITIONER

## 2024-12-12 RX ORDER — TIMOLOL MALEATE 5 MG/ML
SOLUTION/ DROPS OPHTHALMIC
COMMUNITY
Start: 2024-12-06

## 2024-12-12 NOTE — PROGRESS NOTES
Subjective:      Patient ID: Tali Beard is a 89 y.o. female.    Chief Complaint:  subarachnoid hemorrhage (1 mos f/u Order MRI and CSF outpatient. )      History of Present Illness  Patient with history of SAH (11/26/23, 9/3/24 and 10/3/24) presents for follow up. Patient was recently admitted to [facility] [place] with another SAH. Denies any trauma. LP was performed that was essentially unremarkable.  MRI brain showed interval signal changes consistent with evolving left parietal late subacute and right parietal early subacute cortical hemorrhage.  No additional areas of new or worsened intracranial hemorrhage identified and new punctate focus of suspected ischemic cortical infarct at the left occipital lobe, with no additional sites of abnormal diffusion signal appreciated.Patient was advised against all antiplatelets and anticoagulation. Dr. Hannah discussed with family that DSA could be performed to eval for cerebral angiopathy but that it would not change course of treatment. Today, patient is weaker since LOV. Her speech has become slower and she is not oriented to the month. She is now having to have her son help with ambulation. Patient's son reports that he has a slight cold and is inquiring what the patient could take if she develops symptoms.            Past Medical History:   Diagnosis Date    Subarachnoid hemorrhage        History reviewed. No pertinent surgical history.    No family history on file.    Social History     Socioeconomic History    Marital status:    Tobacco Use    Smoking status: Never     Passive exposure: Never    Smokeless tobacco: Never   Substance and Sexual Activity    Alcohol use: Not Currently    Drug use: Never    Sexual activity: Not Currently     Social Drivers of Health     Financial Resource Strain: Low Risk  (11/8/2024)    Overall Financial Resource Strain (CARDIA)     Difficulty of Paying Living Expenses: Not hard at all   Food Insecurity: No Food Insecurity (11/8/2024)     "Hunger Vital Sign     Worried About Running Out of Food in the Last Year: Never true     Ran Out of Food in the Last Year: Never true   Transportation Needs: No Transportation Needs (11/8/2024)    TRANSPORTATION NEEDS     Transportation : No   Physical Activity: Inactive (11/8/2024)    Exercise Vital Sign     Days of Exercise per Week: 0 days     Minutes of Exercise per Session: 0 min   Stress: No Stress Concern Present (11/8/2024)    [employer] - Occupational Stress Questionnaire     Feeling of Stress : Only a little   Housing Stability: Low Risk  (11/8/2024)    Housing Stability Vital Sign     Unable to Pay for Housing in the Last Year: No     Homeless in the Last Year: No       Current Outpatient Medications   Medication Sig Dispense Refill    amLODIPine (NORVASC) 5 MG tablet Take 1 tablet (5 mg total) by mouth once daily. 30 tablet 0    atorvastatin (LIPITOR) 40 MG tablet Take 1 tablet (40 mg total) by mouth every evening. 30 tablet 0    ferrous sulfate 325 (65 FE) MG EC tablet Take 1 tablet (325 mg total) by mouth once daily. 30 tablet 0    latanoprost 0.005 % ophthalmic solution Place into both eyes.      levETIRAcetam (KEPPRA) 500 MG Tab Take 1 tablet (500 mg total) by mouth 2 (two) times daily. 60 tablet 0    timolol maleate 0.5% (TIMOPTIC) 0.5 % Drop Place into both eyes.       No current facility-administered medications for this visit.       Review of patient's allergies indicates:   Allergen Reactions    Hydroxyzine hcl      Other Reaction(s): BUMPS ALL OVER BODY    Other reaction(s): BUMPS ALL OVER BODY    Felodipine Rash     Edema in the legs        Vitals:    12/12/24 1103   BP: 136/69   BP Location: Left arm   Patient Position: Sitting   Pulse: 72   Weight: 63.3 kg (139 lb 8.8 oz)   Height: 5' 3" (1.6 m)        Review of Systems  12 point ROS performed and negative unless otherwise documented in HPI  Objective:        Neurologic Exam      Mental Status   Oriented to " person, year  Speech: speech is normal   Level of consciousness: alert     Cranial Nerves   Cranial nerves II through XII intact.      Motor Exam   Muscle bulk: normal     Strength   Strength 5/5 throughout.      Sensory Exam   Light touch normal.      Gait, Coordination, and Reflexes      Gait  Gait: slow, careful with standby assist        Physical Exam  Vitals reviewed.   Pulmonary:      Effort: Pulmonary effort is normal.   Neurological:      Mental Status: She is oriented to person, place, and time.      Cranial Nerves: Cranial nerves 2-12 are intact.      Motor: Motor strength is normal.     Gait: Gait is intact.   Psychiatric:         Speech: Speech normal.        Assessment:     1. SAH (subarachnoid hemorrhage)    2. Other cerebral infarction    3. Aphasia    4. Intracranial atherosclerosis        Plan:     Continue keppra for seizure prevention  LP results reviewed; protein mildly elevated but can occur in the setting of a brain hemorrhage  MRI brain w/ w/out with SWI protocol for eval of any further bleed  Recommend against ASA/NSAIDS for prevention of bleed  Discussed amyloid angiopathy with patient's son. Advised blood pressure monitoring and avoidance of blood thinners is the only treatment at the present time.  Discussed stroke symptoms with patient's son who stated an understanding  Advised ok to take zyrtec/mucinex if cold symptoms occur    MDM moderate

## 2024-12-13 ENCOUNTER — CLINICAL SUPPORT (OUTPATIENT)
Dept: REHABILITATION | Facility: HOSPITAL | Age: 89
End: 2024-12-13
Attending: PEDIATRICS
Payer: MEDICARE

## 2024-12-13 DIAGNOSIS — R47.01 APHASIA: Primary | ICD-10-CM

## 2024-12-13 PROCEDURE — 92507 TX SP LANG VOICE COMM INDIV: CPT

## 2024-12-13 NOTE — PROGRESS NOTES
"    Speech and Language Therapy Outpatient Progress Note  Date:  12/13/2024     Name: Tali Beard   MRN: [id number]   Therapy Diagnosis:   Encounter Diagnosis   Name Primary?    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 Patient pleasant and in good spirits.    She  was compliant to home exercise program.     Response to previous treatment: good     Pain Scale:  0/10 on VAS currently.   Pain Location: no pain indicated or expressed  Objective:     TIMED  Procedure Min.   Cognitive Communication Therapy    0         UNTIMED  Procedure Min.   {Speech- Language- Voice Therapy  30   Total Timed Units: 0  Total Untimed Units: 1  Charges Billed/# of units: 1    Short Term Goals: (6 weeks) Current Progress:   Patient will expressively produce 3+ word phrase to state object function with 80% accuracy Vy given initial phoneme cue as needed across 2 sessions.     Patient will respond accurately to simple "wh" questions for one word response with 90% accuracy Vy.     Patient will complete word finding task (i.e. Creating subject, verb, object pairs in VNEST protocol) with 90% acc min A to improve word fluency.     Patient will recall 3 basic time and personal information facts with environmental cues and minimal assistance for use of strategies.       Long Term Goal: (8 weeks)  Patient will develop functional expressive language skills to communicate wants,needs, and emotions effectively to variety of communication partners in medical and home environments     Patient Education/Response:     Written Home Exercises " Provided: Patient instructed to cont prior HEP.  Exercises were reviewed and Tali was able to demonstrate them prior to the end of the session.  Tali demonstrated good  understanding of the education provided.     Assessment:   SP presented everyday picture scenes and instructed Patient to utilize Subject+verb+object sentence structure to formulate sentences to appropriately describe actions. Task completed w/ 10% accuracy Independently, increasing to 60% accuracy modA.     Tali is progressing well towards her goals. Current goals remain appropriate. Goals to be updated as necessary.     Pt prognosis is Good. Pt will continue to benefit from skilled outpatient speech and language therapy to address the deficits listed in the problem list on initial evaluation, provide pt/family education and to maximize pt's level of independence in the home and community environment.     Barriers to Therapy: n/a  Pt's spiritual, cultural and educational needs considered and pt agreeable to plan of care and goals.    Plan:   Continue POC with focus on expressive language deficits.    Sara Davila M.S., CCC-SLP   12/13/2024

## 2024-12-16 ENCOUNTER — NURSE TRIAGE (OUTPATIENT)
Dept: ADMINISTRATIVE | Facility: CLINIC | Age: 89
End: 2024-12-16
Payer: MEDICARE

## 2024-12-16 ENCOUNTER — CLINICAL SUPPORT (OUTPATIENT)
Dept: REHABILITATION | Facility: HOSPITAL | Age: 89
End: 2024-12-16
Attending: PEDIATRICS
Payer: MEDICARE

## 2024-12-16 DIAGNOSIS — R47.01 APHASIA: Primary | ICD-10-CM

## 2024-12-16 PROCEDURE — 92507 TX SP LANG VOICE COMM INDIV: CPT

## 2024-12-16 NOTE — PROGRESS NOTES
"    Speech and Language Therapy Outpatient Progress Note  Date:  12/16/2024     Name: Tali Beard   MRN: [id number]   Therapy Diagnosis:   Encounter Diagnosis   Name Primary?    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 Patient pleasant and in good spirits.    She  was compliant to home exercise program.     Response to previous treatment: good     Pain Scale:  0/10 on VAS currently.   Pain Location: no pain indicated or expressed  Objective:     TIMED  Procedure Min.   Cognitive Communication Therapy    0         UNTIMED  Procedure Min.   {Speech- Language- Voice Therapy  30   Total Timed Units: 0  Total Untimed Units: 1  Charges Billed/# of units: 1    Short Term Goals: (6 weeks) Current Progress:   Patient will expressively produce 3+ word phrase to state object function with 80% accuracy Vy given initial phoneme cue as needed across 2 sessions.     Patient will respond accurately to simple "wh" questions for one word response with 90% accuracy Vy.     Patient will complete word finding task (i.e. Creating subject, verb, object pairs in VNEST protocol) with 90% acc min A to improve word fluency.     Patient will recall 3 basic time and personal information facts with environmental cues and minimal assistance for use of strategies.       Long Term Goal: (8 weeks)  Patient will develop functional expressive language skills to communicate wants,needs, and emotions effectively to variety of communication partners in medical and home environments     Patient Education/Response:     Written Home Exercises " Provided: Patient instructed to cont prior HEP.  Exercises were reviewed and Tali was able to demonstrate them prior to the end of the session.  Tali demonstrated good  understanding of the education provided.     Assessment:   Patient oriented to month, year, place and city Independently. Phonemic cue to orient to situation.  Picture inferences completed targeting problem solving and word generation w/ 40% accuracy Independently, increasing to 60% accuracy modA.  Holiday crossword puzzle started and Patient required max verbal cueing for word generation and hand over hand assist for writing answers in the squares.    Tali is progressing well towards her goals. Current goals remain appropriate. Goals to be updated as necessary.     Pt prognosis is Good. Pt will continue to benefit from skilled outpatient speech and language therapy to address the deficits listed in the problem list on initial evaluation, provide pt/family education and to maximize pt's level of independence in the home and community environment.     Barriers to Therapy: n/a  Pt's spiritual, cultural and educational needs considered and pt agreeable to plan of care and goals.    Plan:   Continue POC with focus on expressive language deficits.    Sara Davila M.S., CCC-SLP   12/16/2024

## 2024-12-17 NOTE — TELEPHONE ENCOUNTER
Son, Jesus, states HTN, just prior to call, 155/93.  Currently 144/86.   Asymptomatic.   Care advice provided per protocol, with recommendation to see MD within 2 weeks of call. Caller WILLIE.     Unable to route to PCP    Reason for Disposition   [1] Systolic BP  >= 130 OR Diastolic >= 80 AND [2] taking BP medications    Additional Information   Negative: Difficult to awaken or acting confused (e.g., disoriented, slurred speech)   Negative: SEVERE difficulty breathing (e.g., struggling for each breath, speaks in single words)   Negative: [1] Weakness of the face, arm or leg on one side of the body AND [2] new-onset   Negative: [1] Numbness (i.e., loss of sensation) of the face, arm or leg on one side of the body AND [2] new-onset   Negative: [1] Chest pain lasts > 5 minutes AND [2] history of heart disease (i.e., heart attack, bypass surgery, angina, angioplasty, CHF)   Negative: [1] Chest pain AND [2] took nitroglycerin AND [3] pain was not relieved   Negative: Sounds like a life-threatening emergency to the triager   Negative: [1] Systolic BP  >= 160 OR Diastolic >= 100 AND [2] cardiac (e.g., breathing difficulty, chest pain) or neurologic symptoms (e.g., new-onset blurred or double vision, unsteady gait)   Negative: [1] Systolic BP  >= 200 OR Diastolic >= 120 AND [2] having NO cardiac or neurologic symptoms   Negative: [1] Systolic BP  >= 180 OR Diastolic >= 110 AND [2] missed most recent dose of blood pressure medication   Negative: Systolic BP  >= 180 OR Diastolic >= 110   Negative: Ran out of BP medications   Negative: Systolic BP  >= 160 OR Diastolic >= 100   Negative: [1] Taking BP medications AND [2] feels is having side effects (e.g., impotence, cough, dizzy upon standing)    Protocols used: Blood Pressure - High-A-

## 2024-12-18 NOTE — PLAN OF CARE
"[facility]   Physical Therapy Initial Evaluation      Name: Tali Beard  Clinic Number: [id number]    Therapy Diagnosis:   Encounter Diagnoses   Name Primary?    Weakness     Nontraumatic hemorrhage of right cerebral hemisphere Yes    Balance problems     Unspecified abnormalities of gait and mobility         Physician: Lili Booker MD    Physician Orders: PT Eval and Treat, 2 wk 6  Medical Diagnosis from Referral: I61.9- Hemorrhagic CVA  Evaluation Date: 12/19/2024  Authorization Period Expiration: TBD  Plan of Care Expiration: TBD  Reassessment / Plan of Care Due: 1/15/25  Visit # / Visits authorized: 0/      Functional FOTO score: not performed    Precautions: Standard, aphasia     Time In: 0930  Time Out: 1020  Total Appointment Time (timed & untimed codes): 50 minutes    Subjective     Date of onset / History of current condition - Tali reports: pt's son present throughout session for historian. Pt had initial CVA in December 2023 with full recovery. Pt had next CVA was in Sept 2024 and another CVA in Oct 2024 which pt received therapy prior to discharged home with sons who provide 24 hr assistance as needed and supervision for safety. Pt's biggest deficit is her aphasia. She is able to dress herself sometimes but requires assistance with bathing and household chores. Pt's DME: walk in tub / shower, rails in bathroom, bed rail. She does not use anything to walk with, denies any pain, and denies any falls    Falls: none    Prior Therapy: yes, OT, PT, and ST in hospital  Social History:  lives with her adult sons, no steps to enter home however 5 steps / single rail to enter her son's home  Occupation: retired  Prior Level of Function: prior to last 2 CVAs, pt mod Independent with ADLs and most IADLs without AD      Pain:  Current 0/10, worst 0/10, best 0/10   Location: n/a    Patients goals: son's goal " pt go up / down stairs using single rail so she can go to his house"   "   Medical History:   Past Medical History:   Diagnosis Date    Subarachnoid hemorrhage      Surgical History:   Tali Beard  has no past surgical history on file.    Medications:   Tali has a current medication list which includes the following prescription(s): amlodipine, atorvastatin, ferrous sulfate, latanoprost, levetiracetam, and timolol maleate 0.5%.    Allergies:   Review of patient's allergies indicates:   Allergen Reactions    Hydroxyzine hcl      Other Reaction(s): BUMPS ALL OVER BODY    Other reaction(s): BUMPS ALL OVER BODY    Felodipine Rash     Edema in the legs        Objective      B LE strength: 4-/5 MMT throughout with difficulty understanding cues given for testing and exercises  B LE ROM: WNL throughout    Functional mobility:  Sit <> supine: supervision requiring extra time and explanation to complete task  Sit to stands: supervision using B UE  Transfers: supervision without AD  Gait: supervision without AD level surfaces, slow cortney, minimal abnormalities or evidence of instability  Stair negotiation: SBA using B rails for support, reciprocal pattern when ascending, step-to pattern with descending    5x sit to stands: 28.95 secs using B UE  TU.63 secs without AD  MCTSIB: 3/4 (30,30,30,1) multiple attempts required on #3  Gait speed: 0.53 m/sec    Treatment     Total Treatment time (time-based codes) separate from Evaluation: 5 minutes     Tali received the treatments listed below:      therapeutic exercises to develop strength and endurance for 5 minutes including:    neuromuscular re-education activities to improve: Balance for 0 minutes. The following activities were included:    Therapeutic Exercise   Therapeutic Exercise Grid     Exercise 1  Exercise 2  Exercise 3  Exercise 4    Exercise :    B LE sitting:  Hip flexion,  Knee flexion with TB,   LAQ with DF Sit to stands B LE standing:  Heel raises,  Hip flexion,  Hip abd,   Knee flexion,  squats B LE step-ups   Repetition/Time  :                  Resist or Assist :                  Comment :                Done :                                Exercise 5  Exercise 6  Exercise 7  Exercise 8    Exercise :    Balance activities: floor   Balance activities: Airex Gait activities      Repetition/Time :                  Resist or Assist :                  Comment :                  Done :                                  Exercise 9  Exercise 10  Exercise 11  Exercise 12    Exercise :                  Repetition/Time :                  Resist or Assist :                  Comment :                  Done :                                 Exercise 13 Exercise 14  Exercise 15  Exercise 16   Exercise :                  Repetition/Time :                  Resist or Assist :                  Comment :                  Done :                                Patient Education and Home Exercises     Education provided:   - importance and benefits of performing HEP daily for optimal improvements even after discharged from PT    Written Home Exercises Provided: Patient instructed to cont prior HEP. Exercises were reviewed and Tali was able to demonstrate them prior to the end of the session.  Tali demonstrated good understanding of the education provided. See EMR under Patient Instructions for exercises provided during therapy sessions.    Assessment     Tali is a 89 y.o. female referred to outpatient Physical Therapy with a medical diagnosis of CVA. Patient presents with generalized weakness, aphasia affecting understanding tasks, balance deficits increasing risk for falls, and gait abnormalities all affecting tolerance with daily activities. Pt would benefit from PT services to address pt's deficits    Patient prognosis is Fair.   Patient will benefit from skilled outpatient Physical Therapy to address the deficits stated above and in the chart below, provide patient /family education, and to maximize patientt's level of independence.     Plan of care  discussed with patient: YES  Patient's spiritual, cultural and educational needs considered and patient is agreeable to the plan of care and goals as stated below:     Anticipated Barriers for therapy: multiple CVAs    Medical Necessity is demonstrated by the following  History  Co-morbidities and personal factors that may impact the plan of care [] LOW: no personal factors / co-morbidities  [x] MODERATE: 1-2 personal factors / co-morbidities  [] HIGH: 3+ personal factors / co-morbidities    Moderate / High Support Documentation:     Co-morbidities affecting plan of care: see above     Examination  Body Structures and Functions, activity limitations and participation restrictions that may impact the plan of care [] LOW: addressing 1-2 elements  [x] MODERATE: 3+ elements  [] HIGH: 4+ elements (please support below)    Moderate / High Support Documentation: see above     Clinical Presentation [] LOW: stable  [x] MODERATE: Evolving  [] HIGH: Unstable     Decision Making/ Complexity Score: moderate       Goals:    Short Term Goals: 4 weeks     Pt will demonstrate knowledge and independence with HEP to continue with exercises outside of therapy to facilitate optimal overall improvements    Improve strength in B LE to 4/5 MMT throughout in order to improve tolerance with overall functional mobility    Improve generalized strength and balance with no falls reported during daily tasks    Long Term Goals: 6 weeks     Improve strength in B LE to 4+/5 MMT throughout in order to improve tolerance with overall functional mobility    Pt will improve safety with step negotiation using single rail to SBA level    Pt will improve distance and stability with amb without AD performed on even and uneven surfaces with supervision for safety and improved TUG score to < 16 secs to improve community amb    Pt will reduce risk for falls and improve ability and safety with transfers as evidence by improved 5x sit to stands to < 20 secs    Pt  will improve balance and reduce risk for falls as evidence by improved MCTSIB to 3/4 (30,30,30,15)    Plan     Plan of care Certification: TBD.    Outpatient Physical Therapy 2 times weekly for 6 weeks to include the following interventions: Neuromuscular Re-ed, Patient Education, and Therapeutic Exercise.     Carol Saucedo, PT       I CERTIFY THE NEED FOR THESE SERVICES FURNISHED UNDER THIS PLAN OF TREATMENT AND WHILE UNDER MY CARE   Physician's comments:      Physician's Signature: ___________________________________________________

## 2024-12-19 ENCOUNTER — CLINICAL SUPPORT (OUTPATIENT)
Dept: REHABILITATION | Facility: HOSPITAL | Age: 89
End: 2024-12-19
Attending: PEDIATRICS
Payer: MEDICARE

## 2024-12-19 DIAGNOSIS — R26.9 UNSPECIFIED ABNORMALITIES OF GAIT AND MOBILITY: ICD-10-CM

## 2024-12-19 DIAGNOSIS — I61.2 NONTRAUMATIC HEMORRHAGE OF RIGHT CEREBRAL HEMISPHERE: Primary | ICD-10-CM

## 2024-12-19 DIAGNOSIS — R26.89 BALANCE PROBLEMS: ICD-10-CM

## 2024-12-19 DIAGNOSIS — R53.1 WEAKNESS: ICD-10-CM

## 2024-12-19 PROCEDURE — 97162 PT EVAL MOD COMPLEX 30 MIN: CPT | Mod: KX

## 2024-12-20 ENCOUNTER — CLINICAL SUPPORT (OUTPATIENT)
Dept: REHABILITATION | Facility: HOSPITAL | Age: 89
End: 2024-12-20
Attending: PEDIATRICS
Payer: MEDICARE

## 2024-12-20 DIAGNOSIS — R47.01 APHASIA: Primary | ICD-10-CM

## 2024-12-20 DIAGNOSIS — I61.9 HEMORRHAGIC CEREBROVASCULAR ACCIDENT (CVA): Primary | ICD-10-CM

## 2024-12-20 PROCEDURE — 92507 TX SP LANG VOICE COMM INDIV: CPT | Mod: KX

## 2024-12-20 NOTE — PROGRESS NOTES
"    Speech and Language Therapy Outpatient Progress Note  Date:  12/20/2024     Name: Tali Beard   MRN: [id number]   Therapy Diagnosis:   Encounter Diagnosis   Name Primary?    Aphasia Yes   Physician: ABHIJEET Keene  Physician Orders: eval and treat  Medical Diagnosis: Nontraumatic hemorrhage of right cerebral hemisphere I61.2     Visit #/Visits authorized: 7/16  Date of Evaluation:  11/22/24  Insurance Authorization Period: 11/25/24-1/24/25  Plan of Care Expiration Date:  1/24/25  Extended POC:  TBD     Time In:  11:00  Time Out:  11:30  Total Billable Time: 30 minutes         Subjective:   Patient seen in SLP office. Patient pleasant and in good spirits.    She  was compliant to home exercise program.     Response to previous treatment: good     Pain Scale:  0/10 on VAS currently.   Pain Location: no pain indicated or expressed  Objective:     TIMED  Procedure Min.   Cognitive Communication Therapy    0         UNTIMED  Procedure Min.   {Speech- Language- Voice Therapy  30   Total Timed Units: 0  Total Untimed Units: 1  Charges Billed/# of units: 1    Short Term Goals: (6 weeks) Current Progress:   Patient will expressively produce 3+ word phrase to state object function with 80% accuracy Vy given initial phoneme cue as needed across 2 sessions.     Patient will respond accurately to simple "wh" questions for one word response with 90% accuracy Vy.     Patient will complete word finding task (i.e. Creating subject, verb, object pairs in VNEST protocol) with 90% acc min A to improve word fluency.     Patient will recall 3 basic time and personal information facts with environmental cues and minimal assistance for use of strategies.       Long Term Goal: (8 weeks)  Patient will develop functional expressive language skills to communicate wants,needs, and emotions effectively to variety of communication partners in medical and home environments     Patient Education/Response:     Written Home Exercises " Provided: Patient instructed to cont prior HEP.  Exercises were reviewed and Tali was able to demonstrate them prior to the end of the session.  Tali demonstrated good  understanding of the education provided.     Assessment:   Say words in category level 1 via [employer] therapy iPad fior completed w/ 20% accuracy modA. Repetition of stimuli and directions provided frequently across task.   Memory match task w/ 10 pairs completed given mod-max verbal and visual cueing for attention and memory.    Tali is progressing well towards her goals. Current goals remain appropriate. Goals to be updated as necessary.     Pt prognosis is Good. Pt will continue to benefit from skilled outpatient speech and language therapy to address the deficits listed in the problem list on initial evaluation, provide pt/family education and to maximize pt's level of independence in the home and community environment.     Barriers to Therapy: n/a  Pt's spiritual, cultural and educational needs considered and pt agreeable to plan of care and goals.    Plan:   Continue POC with focus on expressive language deficits.    Sara Davila M.S., CCC-SLP   12/20/2024

## 2024-12-23 ENCOUNTER — CLINICAL SUPPORT (OUTPATIENT)
Dept: REHABILITATION | Facility: HOSPITAL | Age: 89
End: 2024-12-23
Attending: PEDIATRICS
Payer: MEDICARE

## 2024-12-23 DIAGNOSIS — R47.01 APHASIA: Primary | ICD-10-CM

## 2024-12-23 PROCEDURE — 92507 TX SP LANG VOICE COMM INDIV: CPT | Mod: KX

## 2024-12-23 NOTE — PROGRESS NOTES
"    Speech and Language Therapy Outpatient Progress Note  Date:  12/23/2024     Name: Tali Beard   MRN: [id number]   Therapy Diagnosis:   Encounter Diagnosis   Name Primary?    Aphasia Yes   Physician: ABHIJEET Keene  Physician Orders: eval and treat  Medical Diagnosis: Nontraumatic hemorrhage of right cerebral hemisphere I61.2     Visit #/Visits authorized: 8/16  Date of Evaluation:  11/22/24  Insurance Authorization Period: 11/25/24-1/24/25  Plan of Care Expiration Date:  1/24/25  Extended POC:  TBD     Time In:  11:00  Time Out:  11:30  Total Billable Time: 30 minutes         Subjective:   Patient seen in SLP office. Patient pleasant and in good spirits.    She  was compliant to home exercise program.     Response to previous treatment: good     Pain Scale:  0/10 on VAS currently.   Pain Location: no pain indicated or expressed  Objective:     TIMED  Procedure Min.   Cognitive Communication Therapy    0         UNTIMED  Procedure Min.   {Speech- Language- Voice Therapy  30   Total Timed Units: 0  Total Untimed Units: 1  Charges Billed/# of units: 1    Short Term Goals: (6 weeks) Current Progress:   Patient will expressively produce 3+ word phrase to state object function with 80% accuracy Vy given initial phoneme cue as needed across 2 sessions.     Patient will respond accurately to simple "wh" questions for one word response with 90% accuracy Vy.     Patient will complete word finding task (i.e. Creating subject, verb, object pairs in VNEST protocol) with 90% acc min A to improve word fluency.     Patient will recall 3 basic time and personal information facts with environmental cues and minimal assistance for use of strategies.       Long Term Goal: (8 weeks)  Patient will develop functional expressive language skills to communicate wants,needs, and emotions effectively to variety of communication partners in medical and home environments     Patient Education/Response:     Written Home Exercises " Provided: Patient instructed to cont prior HEP.  Exercises were reviewed and Tali was able to demonstrate them prior to the end of the session.  Tali demonstrated good  understanding of the education provided.     Assessment:   Describe picture scenes using subject+verb+object structure. Visual aid utilized to provide cueing across task for sentence structure. Task completed w/ 10% accuracy Independently, increasing to 60% accuracy modA.    Tali is progressing well towards her goals. Current goals remain appropriate. Goals to be updated as necessary.     Pt prognosis is Good. Pt will continue to benefit from skilled outpatient speech and language therapy to address the deficits listed in the problem list on initial evaluation, provide pt/family education and to maximize pt's level of independence in the home and community environment.     Barriers to Therapy: n/a  Pt's spiritual, cultural and educational needs considered and pt agreeable to plan of care and goals.    Plan:   Continue POC with focus on expressive language deficits.    Sara Davila M.S., CCC-SLP   12/23/2024

## 2024-12-24 ENCOUNTER — HOSPITAL ENCOUNTER (OUTPATIENT)
Dept: RADIOLOGY | Facility: HOSPITAL | Age: 89
Discharge: HOME | End: 2024-12-24
Attending: NURSE PRACTITIONER
Payer: MEDICARE

## 2024-12-24 DIAGNOSIS — I63.89 OTHER CEREBRAL INFARCTION: ICD-10-CM

## 2024-12-24 PROCEDURE — A9577 INJ MULTIHANCE: HCPCS | Performed by: NURSE PRACTITIONER

## 2024-12-24 PROCEDURE — 70553 MRI BRAIN STEM W/O & W/DYE: CPT | Mod: TC

## 2024-12-24 PROCEDURE — 25500020 PHARM REV CODE 255: Performed by: NURSE PRACTITIONER

## 2024-12-24 RX ADMIN — GADOBENATE DIMEGLUMINE 15 ML: 529 INJECTION, SOLUTION INTRAVENOUS at 09:12

## 2024-12-27 ENCOUNTER — TELEPHONE (OUTPATIENT)
Dept: NEUROLOGY | Facility: CLINIC | Age: 89
End: 2024-12-27
Payer: MEDICARE

## 2024-12-27 ENCOUNTER — CLINICAL SUPPORT (OUTPATIENT)
Dept: REHABILITATION | Facility: HOSPITAL | Age: 89
End: 2024-12-27
Attending: PEDIATRICS
Payer: MEDICARE

## 2024-12-27 DIAGNOSIS — R47.01 APHASIA: Primary | ICD-10-CM

## 2024-12-27 PROCEDURE — 92507 TX SP LANG VOICE COMM INDIV: CPT | Mod: KX

## 2024-12-27 NOTE — PROGRESS NOTES
"    Speech and Language Therapy Outpatient Progress Note  Date:  12/27/2024     Name: Tali Beard   MRN: [id number]   Therapy Diagnosis:   Encounter Diagnosis   Name Primary?    Aphasia Yes   Physician: ABHIJEET Keene  Physician Orders: eval and treat  Medical Diagnosis: Nontraumatic hemorrhage of right cerebral hemisphere I61.2     Visit #/Visits authorized: /16  Date of Evaluation:  11/22/24  Insurance Authorization Period: 11/25/24-1/24/25  Plan of Care Expiration Date:  1/24/25  Extended POC:  TBD     Time In:  11:00  Time Out:  11:30  Total Billable Time: 30 minutes         Subjective:   Patient seen in SLP office. Patient pleasant and in good spirits. LOB while walking in therapy gym to SLP office to which SLP provided physical assistance to correct.    She  was compliant to home exercise program.     Response to previous treatment: good     Pain Scale:  0/10 on VAS currently.   Pain Location: no pain indicated or expressed  Objective:     TIMED  Procedure Min.   Cognitive Communication Therapy    0         UNTIMED  Procedure Min.   {Speech- Language- Voice Therapy  30   Total Timed Units: 0  Total Untimed Units: 1  Charges Billed/# of units: 1    Short Term Goals: (6 weeks) Current Progress:   Patient will expressively produce 3+ word phrase to state object function with 80% accuracy Vy given initial phoneme cue as needed across 2 sessions.     Patient will respond accurately to simple "wh" questions for one word response with 90% accuracy Vy.     Patient will complete word finding task (i.e. Creating subject, verb, object pairs in VNEST protocol) with 90% acc min A to improve word fluency.     Patient will recall 3 basic time and personal information facts with environmental cues and minimal assistance for use of strategies.       Long Term Goal: (8 weeks)  Patient will develop functional expressive language skills to communicate wants,needs, and emotions effectively to variety of communication " partners in medical and home environments     Patient Education/Response:     Written Home Exercises Provided: Patient instructed to cont prior HEP.  Exercises were reviewed and Tali was able to demonstrate them prior to the end of the session.  Tali demonstrated good  understanding of the education provided.     Assessment:   Card game of Lauren completed targeting verbal expression stating highest vs lowest card, turn taking, attention, and command following. Patient able to accurately identify highest vs lowest card each round. Constant verbal cueing required across structured task for rules of the game.    Tali is progressing well towards her goals. Current goals remain appropriate. Goals to be updated as necessary.     Pt prognosis is Good. Pt will continue to benefit from skilled outpatient speech and language therapy to address the deficits listed in the problem list on initial evaluation, provide pt/family education and to maximize pt's level of independence in the home and community environment.     Barriers to Therapy: n/a  Pt's spiritual, cultural and educational needs considered and pt agreeable to plan of care and goals.    Plan:   Continue POC with focus on expressive language deficits.    Sara Davila M.S., CCC-SLP   12/27/2024

## 2024-12-27 NOTE — TELEPHONE ENCOUNTER
Pt's son called inquiring the results of the MRI and what would the plan be, if there is one. He is also asking if her appointment can be sooner. He feels she needs to be seen sooner than six months.    Please advise.      Request Jesus to be primary call.

## 2024-12-30 ENCOUNTER — CLINICAL SUPPORT (OUTPATIENT)
Dept: REHABILITATION | Facility: HOSPITAL | Age: 89
End: 2024-12-30
Attending: PEDIATRICS
Payer: MEDICARE

## 2024-12-30 DIAGNOSIS — R53.1 WEAKNESS: ICD-10-CM

## 2024-12-30 DIAGNOSIS — R47.01 APHASIA: Primary | ICD-10-CM

## 2024-12-30 DIAGNOSIS — I61.2 NONTRAUMATIC HEMORRHAGE OF RIGHT CEREBRAL HEMISPHERE: Primary | ICD-10-CM

## 2024-12-30 DIAGNOSIS — R26.89 BALANCE PROBLEMS: ICD-10-CM

## 2024-12-30 DIAGNOSIS — R26.9 UNSPECIFIED ABNORMALITIES OF GAIT AND MOBILITY: ICD-10-CM

## 2024-12-30 PROCEDURE — 97110 THERAPEUTIC EXERCISES: CPT | Mod: KX,CQ

## 2024-12-30 PROCEDURE — 92507 TX SP LANG VOICE COMM INDIV: CPT | Mod: KX

## 2024-12-30 PROCEDURE — 97116 GAIT TRAINING THERAPY: CPT | Mod: KX,CQ

## 2024-12-30 PROCEDURE — 97112 NEUROMUSCULAR REEDUCATION: CPT | Mod: KX,CQ

## 2024-12-30 NOTE — PROGRESS NOTES
[facility]   Physical Therapy Treatment Note     Name: Tali Beard  Clinic Number: [id number]    Therapy Diagnosis:   Encounter Diagnoses   Name Primary?    Nontraumatic hemorrhage of right cerebral hemisphere Yes    Weakness     Balance problems     Unspecified abnormalities of gait and mobility      Physician: Lili Booker MD    Visit Date: 12/30/2024    Physician Orders: PT Eval and Treat, 2 wk 6  Medical Diagnosis from Referral: I61.9- Hemorrhagic CVA  Evaluation Date: 12/19/2024  Authorization Period Expiration: 2/7/25  Plan of Care Expiration: 2/7/25  Reassessment / Plan of Care Due: 1/15/25  Visit # / Visits authorized: 1/12     Functional FOTO score: not performed     Precautions: Standard, aphasia     PTA Visit #: 1/5     Time In: 1015  Time Out: 1100  Total Billable Time: 45 minutes    SUBJECTIVE     Pt reports: she has no pain today and denies any falls. Her son reports she is a little sleepy this morning.  She was compliant with home exercise program.      Pain: 0/10    OBJECTIVE     Objective Measures updated at progress report unless specified.     Treatment     Tali received the treatments listed below:      therapeutic exercises, neuromuscular reeducation, and gait  to develop strength, posture, core stabilization, and balance for 45 minutes including:  Therapeutic Exercise Grid     Exercise 1  Exercise 2  Exercise 3  Exercise 4    Exercise :    B LE sitting:  Hip flexion,  Knee flexion with TB,   LAQ with DF Sit to stands B LE standing:  Heel raises,  Hip flexion,  Hip abd,   Knee flexion,  squats B LE step-ups   Repetition/Time :    20 ea   15   20 ea        Resist or Assist :                  Comment :                 Done :    yes  yes   yes                       Exercise 5  Exercise 6  Exercise 7  Exercise 8    Exercise :    Balance activities: floor   Balance activities: Airex Gait activities      Repetition/Time :    2 x 1 min NBOS   2 x 1 min   4 laps         Resist or Assist :          Cues for large steps        Comment :                  Done :    yes   yes yes                       Exercise 9  Exercise 10  Exercise 11  Exercise 12    Exercise :                  Repetition/Time :                  Resist or Assist :                  Comment :                  Done :                                   Exercise 13 Exercise 14  Exercise 15  Exercise 16   Exercise :                  Repetition/Time :                  Resist or Assist :                  Comment :                  Done :                                         Patient Education and Home Exercises     Home Exercises Provided and Patient Education Provided       Written Home Exercises Provided: Patient instructed to cont prior HEP. Exercises were reviewed and Tali was able to demonstrate them prior to the end of the session.  Tali demonstrated good  understanding of the education provided. See EMR under Patient Instructions for exercises provided during therapy sessions    ASSESSMENT     Tali demonstrated a fair response to initiation of strengthening and balance exercises today, as evidenced by difficulty with following verbal and tactile cues, difficulty with completing reps through full ROM due to cognitive deficits, but no report of fatigue and no loss of balance during gait activities. Tactile assist necessary for all lower extremity strengthening exercises to maintain correct execution. Patient verbally repeats any instruction given and makes effort to follow, but has difficulty maintaining focus on task. Attempted to perform step overs, but patient was unable to complete without turning fully sideways to clear obstacle. Recommend continue with current plan of care and progress as tolerated.     Tali Is progressing well towards her goals.   Pt prognosis is Guarded.     Pt will continue to benefit from skilled outpatient physical therapy to address the deficits listed in the problem list box on initial  evaluation, provide pt/family education and to maximize pt's level of independence in the home and community environment.     Pt's spiritual, cultural and educational needs considered and pt agreeable to plan of care and goals.      Goals: Short Term Goals: 4 weeks      Pt will demonstrate knowledge and independence with HEP to continue with exercises outside of therapy to facilitate optimal overall improvements     Improve strength in B LE to 4/5 MMT throughout in order to improve tolerance with overall functional mobility     Improve generalized strength and balance with no falls reported during daily tasks     Long Term Goals: 6 weeks      Improve strength in B LE to 4+/5 MMT throughout in order to improve tolerance with overall functional mobility     Pt will improve safety with step negotiation using single rail to SBA level     Pt will improve distance and stability with amb without AD performed on even and uneven surfaces with supervision for safety and improved TUG score to < 16 secs to improve community amb     Pt will reduce risk for falls and improve ability and safety with transfers as evidence by improved 5x sit to stands to < 20 secs     Pt will improve balance and reduce risk for falls as evidence by improved MCTSIB to 3/4 (30,30,30,15)    PLAN     Outpatient Physical Therapy 2 times weekly for 6 weeks to include the following interventions: Neuromuscular Re-ed, Patient Education, and Therapeutic Exercise.     Paul Reyes, PTA

## 2024-12-30 NOTE — PROGRESS NOTES
"    Speech and Language Therapy Outpatient Progress Note  Date:  12/30/2024     Name: Tali Beard   MRN: [id number]   Therapy Diagnosis:   Encounter Diagnosis   Name Primary?    Aphasia Yes   Physician: ABHIJEET Keene  Physician Orders: eval and treat  Medical Diagnosis: Nontraumatic hemorrhage of right cerebral hemisphere I61.2     Visit #/Visits authorized: 10/16  Date of Evaluation:  11/22/24  Insurance Authorization Period: 11/25/24-1/24/25  Plan of Care Expiration Date:  1/24/25  Extended POC:  TBD     Time In:  11:00  Time Out:  11:30  Total Billable Time: 30 minutes         Subjective:   Patient seen in SLP office following PT session. Patient pleasant and in good spirits.    She  was compliant to home exercise program.     Response to previous treatment: good     Pain Scale:  0/10 on VAS currently.   Pain Location: no pain indicated or expressed  Objective:     TIMED  Procedure Min.   Cognitive Communication Therapy    0         UNTIMED  Procedure Min.   {Speech- Language- Voice Therapy  30   Total Timed Units: 0  Total Untimed Units: 1  Charges Billed/# of units: 1    Short Term Goals: (6 weeks) Current Progress:   Patient will expressively produce 3+ word phrase to state object function with 80% accuracy Vy given initial phoneme cue as needed across 2 sessions.     Patient will respond accurately to simple "wh" questions for one word response with 90% accuracy Vy.  Goal met 12/30/24   Patient will complete word finding task (i.e. Creating subject, verb, object pairs in VNEST protocol) with 90% acc min A to improve word fluency.     Patient will recall 3 basic time and personal information facts with environmental cues and minimal assistance for use of strategies.       Long Term Goal: (8 weeks)  Patient will develop functional expressive language skills to communicate wants,needs, and emotions effectively to variety of communication partners in medical and home environments     Patient " Education/Response:     Written Home Exercises Provided: Patient instructed to cont prior HEP.  Exercises were reviewed and Tali was able to demonstrate them prior to the end of the session.  Tali demonstrated good  understanding of the education provided.     Assessment:   Wh questions answered w/ 100% accuracy Alayna.  SLP presented objects and Patient provided 3+ word phrase to describe object function w/ 40% accuracy modA. Verbal cueing for word generation and to increase utterance length.    Tali is progressing well towards her goals. Current goals remain appropriate. Goals to be updated as necessary.     Pt prognosis is Good. Pt will continue to benefit from skilled outpatient speech and language therapy to address the deficits listed in the problem list on initial evaluation, provide pt/family education and to maximize pt's level of independence in the home and community environment.     Barriers to Therapy: n/a  Pt's spiritual, cultural and educational needs considered and pt agreeable to plan of care and goals.    Plan:   Continue POC with focus on expressive language deficits.    Sara Davila M.S., CCC-SLP   12/30/2024

## 2025-01-02 ENCOUNTER — PATIENT MESSAGE (OUTPATIENT)
Dept: NEUROLOGY | Facility: CLINIC | Age: OVER 89
End: 2025-01-02
Payer: MEDICARE

## 2025-01-03 ENCOUNTER — CLINICAL SUPPORT (OUTPATIENT)
Dept: REHABILITATION | Facility: HOSPITAL | Age: OVER 89
End: 2025-01-03
Attending: PEDIATRICS
Payer: MEDICARE

## 2025-01-03 DIAGNOSIS — R53.1 WEAKNESS: ICD-10-CM

## 2025-01-03 DIAGNOSIS — I61.2 NONTRAUMATIC HEMORRHAGE OF RIGHT CEREBRAL HEMISPHERE: Primary | ICD-10-CM

## 2025-01-03 DIAGNOSIS — R47.01 APHASIA: Primary | ICD-10-CM

## 2025-01-03 DIAGNOSIS — R26.89 BALANCE PROBLEMS: ICD-10-CM

## 2025-01-03 DIAGNOSIS — R26.9 UNSPECIFIED ABNORMALITIES OF GAIT AND MOBILITY: ICD-10-CM

## 2025-01-03 PROCEDURE — 97110 THERAPEUTIC EXERCISES: CPT | Mod: CQ

## 2025-01-03 PROCEDURE — 92507 TX SP LANG VOICE COMM INDIV: CPT

## 2025-01-03 NOTE — PROGRESS NOTES
"    Speech and Language Therapy Outpatient Progress Note  Date:  1/3/2025     Name: Tali Beard   MRN: [id number]   Therapy Diagnosis:   Encounter Diagnosis   Name Primary?    Aphasia Yes   Physician: ABHIJEET Keene  Physician Orders: eval and treat  Medical Diagnosis: Nontraumatic hemorrhage of right cerebral hemisphere I61.2     Visit #/Visits authorized: 11/16  Date of Evaluation:  11/22/24  Insurance Authorization Period: 11/25/24-1/24/25  Plan of Care Expiration Date:  1/24/25  Extended POC:  TBD     Time In:  11:00  Time Out:  11:30  Total Billable Time: 30 minutes         Subjective:   Patient seen in SLP office following PT session. Patient pleasant and in good spirits.    She  was compliant to home exercise program.     Response to previous treatment: good     Pain Scale:  0/10 on VAS currently.   Pain Location: no pain indicated or expressed  Objective:     TIMED  Procedure Min.   Cognitive Communication Therapy    0         UNTIMED  Procedure Min.   {Speech- Language- Voice Therapy  30   Total Timed Units: 0  Total Untimed Units: 1  Charges Billed/# of units: 1    Short Term Goals: (6 weeks) Current Progress:   Patient will expressively produce 3+ word phrase to state object function with 80% accuracy Vy given initial phoneme cue as needed across 2 sessions.     Patient will respond accurately to simple "wh" questions for one word response with 90% accuracy Vy.  Goal met 12/30/24   Patient will complete word finding task (i.e. Creating subject, verb, object pairs in VNEST protocol) with 90% acc min A to improve word fluency.     Patient will recall 3 basic time and personal information facts with environmental cues and minimal assistance for use of strategies.       Long Term Goal: (8 weeks)  Patient will develop functional expressive language skills to communicate wants,needs, and emotions effectively to variety of communication partners in medical and home environments     Patient " Education/Response:     Written Home Exercises Provided: Patient instructed to cont prior HEP.  Exercises were reviewed and Tali was able to demonstrate them prior to the end of the session.  Tali demonstrated good  understanding of the education provided.     Assessment:     SLP presented picture scenes and instructed Patient w/ subject+verb+object sentence structure to increase word generation and sentence formulation. Task completed w/ 17% accuracy Independently, increasing to 83% accuracy modA.    Tali is progressing well towards her goals. Current goals remain appropriate. Goals to be updated as necessary.     Pt prognosis is Good. Pt will continue to benefit from skilled outpatient speech and language therapy to address the deficits listed in the problem list on initial evaluation, provide pt/family education and to maximize pt's level of independence in the home and community environment.     Barriers to Therapy: n/a  Pt's spiritual, cultural and educational needs considered and pt agreeable to plan of care and goals.    Plan:   Continue POC with focus on expressive language deficits.    Sara Davila M.S., CCC-SLP   1/3/2025

## 2025-01-03 NOTE — PROGRESS NOTES
[facility]   Physical Therapy Treatment Note     Name: Tali Beard  Clinic Number: [id number]    Therapy Diagnosis:   Encounter Diagnoses   Name Primary?    Nontraumatic hemorrhage of right cerebral hemisphere Yes    Weakness     Balance problems     Unspecified abnormalities of gait and mobility      Physician: Lili Booker MD    Visit Date: 1/3/2025    Physician Orders: PT Eval and Treat, 2 wk 6  Medical Diagnosis from Referral: I61.9- Hemorrhagic CVA  Evaluation Date: 12/19/2024  Authorization Period Expiration: 2/7/25  Plan of Care Expiration: 2/7/25  Reassessment / Plan of Care Due: 1/15/25  Visit # / Visits authorized: 2/12     Functional FOTO score: not performed     Precautions: Standard, aphasia     PTA Visit #: 2/5     Time In: 1015  Time Out: 1100  Total Billable Time: 45 minutes    SUBJECTIVE     Pt reports: she has no pain today and denies any falls.  She was compliant with home exercise program.      Pain: 0/10    OBJECTIVE     Objective Measures updated at progress report unless specified.     Treatment     Tali received the treatments listed below:      therapeutic exercises, neuromuscular reeducation, and gait  to develop strength, posture, core stabilization, and balance for 45 minutes including:  Therapeutic Exercise Grid     Exercise 1  Exercise 2  Exercise 3  Exercise 4    Exercise :    B LE sitting:  Hip flexion,  Knee flexion with TB,   LAQ with DF Sit to stands B LE standing:  Heel raises,  Hip flexion,  Hip abd,   Knee flexion B LE step-ups   Repetition/Time :    20  20   20 ea        Resist or Assist :    Red TB for knee flexion              Comment :                 Done :    yes  yes   yes                       Exercise 5  Exercise 6  Exercise 7  Exercise 8    Exercise :    Balance activities: floor   Balance activities: Airex Step overs with bolsters      Repetition/Time :    2 x 1 min NBOS   2 x 1 min   2 laps        Resist or Assist :       -  jose toss         Comment :                  Done :     yes yes                       Exercise 9  Exercise 10  Exercise 11  Exercise 12    Exercise :                  Repetition/Time :                  Resist or Assist :                  Comment :                  Done :                                   Exercise 13 Exercise 14  Exercise 15  Exercise 16   Exercise :                  Repetition/Time :                  Resist or Assist :                  Comment :                  Done :                                         Patient Education and Home Exercises     Home Exercises Provided and Patient Education Provided       Written Home Exercises Provided: Patient instructed to cont prior HEP. Exercises were reviewed and Tali was able to demonstrate them prior to the end of the session.  Tali demonstrated good  understanding of the education provided. See EMR under Patient Instructions for exercises provided during therapy sessions    ASSESSMENT     Tali demonstrated a (+) response to today's session as evidenced by ability to complete all exercises with good effort. Moderate to max verbal and tactile cues required for attention to task and proper execution with exercises due to cognitive deficits. She is making a fair progression towards goals as evidenced by patient's ability to perform step overs with improved hip flexion and advancement of the lower extremity. She continues to demonstrate deficits with bilateral lower extremity weakness, gait instability, balance instability, trunk control, and fall risk. Recommend continue with current plan of care and progress as tolerated.     Tali Is progressing well towards her goals.   Pt prognosis is Guarded.     Pt will continue to benefit from skilled outpatient physical therapy to address the deficits listed in the problem list box on initial evaluation, provide pt/family education and to maximize pt's level of independence in the home and community environment.      Pt's spiritual, cultural and educational needs considered and pt agreeable to plan of care and goals.      Goals: Short Term Goals: 4 weeks      Pt will demonstrate knowledge and independence with HEP to continue with exercises outside of therapy to facilitate optimal overall improvements     Improve strength in B LE to 4/5 MMT throughout in order to improve tolerance with overall functional mobility     Improve generalized strength and balance with no falls reported during daily tasks     Long Term Goals: 6 weeks      Improve strength in B LE to 4+/5 MMT throughout in order to improve tolerance with overall functional mobility     Pt will improve safety with step negotiation using single rail to SBA level     Pt will improve distance and stability with amb without AD performed on even and uneven surfaces with supervision for safety and improved TUG score to < 16 secs to improve community amb     Pt will reduce risk for falls and improve ability and safety with transfers as evidence by improved 5x sit to stands to < 20 secs     Pt will improve balance and reduce risk for falls as evidence by improved MCTSIB to 3/4 (30,30,30,15)    PLAN     Outpatient Physical Therapy 2 times weekly for 6 weeks to include the following interventions: Neuromuscular Re-ed, Patient Education, and Therapeutic Exercise.     Ebonie Meyer, PTA

## 2025-01-06 ENCOUNTER — CLINICAL SUPPORT (OUTPATIENT)
Dept: REHABILITATION | Facility: HOSPITAL | Age: OVER 89
End: 2025-01-06
Attending: PEDIATRICS
Payer: MEDICARE

## 2025-01-06 DIAGNOSIS — R26.89 BALANCE PROBLEMS: ICD-10-CM

## 2025-01-06 DIAGNOSIS — I61.2 NONTRAUMATIC HEMORRHAGE OF RIGHT CEREBRAL HEMISPHERE: Primary | ICD-10-CM

## 2025-01-06 DIAGNOSIS — R26.9 UNSPECIFIED ABNORMALITIES OF GAIT AND MOBILITY: ICD-10-CM

## 2025-01-06 DIAGNOSIS — R47.01 APHASIA: Primary | ICD-10-CM

## 2025-01-06 DIAGNOSIS — R53.1 WEAKNESS: ICD-10-CM

## 2025-01-06 PROCEDURE — 92507 TX SP LANG VOICE COMM INDIV: CPT

## 2025-01-06 PROCEDURE — 97110 THERAPEUTIC EXERCISES: CPT | Mod: CQ

## 2025-01-06 NOTE — PROGRESS NOTES
[facility]  Speech Therapy Updated Plan of Care-Neurological Rehabilitation         Date: 2025   Name: Tali Beard  Clinic Number: [id number]    Therapy Diagnosis:   Encounter Diagnosis   Name Primary?    Aphasia Yes     Physician: Lili Booker MD    Physician Orders: eval and treat  Medical Diagnosis: CVA    Visit #/ Visits Authorized:     Evaluation Date: 24  Insurance Authorization Period: 24-25  Plan of Care Expiration:    25  New POC Certification Period:  TBD    Total Visits Received: 12    Precautions:Fall     Subjective     Patient seen in SLP office, pleasant and in good spirits. Patient ambulating without AD.    Objective     MS Aphasia Screening Test: The MAST assesses both expressive and receptive language abilities for 90 language domains which include: Naming, Automatic Speech, Repetition, Yes/No Accuracy, Object Recognition from a Field of Five, Following Verbal Instructions, Reading Instructions, Verbal Fluency, and Writing/Spelling to Dictation. The test yields 9 subtest scores and 2 index scores.  The Mast Total Score ranges from 0 to 100 points.     Expressive Index:  Namin  Automatic Speech: 6   Repetition: 6  Writin  Verbal Fluency: 5    Receptive Index:   Yes/No Accuracy: 16  Object Recognition: 10  Following Instructions: 4  Reading Instructions: 4    Total Index:   Expressive: 23/50  Receptive: 34/50    Total Score: 57/100    Assessment     Update: Tali Beard presents to [facility] status post medical diagnosis of CVA. Demonstrates impairments including limitations as described in the problem list. Positive prognostic factors include family support, attendance and progress thus far w/ speech therapy. Negative prognostic factors include n/a. She presents with expressive and receptive aphasia characterized by word finding errors and limited grammatical structure across responses. Patient also presents w/  "written expression and reading comprehension deficits w/ writing of 1 and 2 syllable words and reading at the phrase and sentence level. No barriers to therapy identified.. Patient will benefit from skilled, outpatient rehabilitation speech therapy.    Rehab Potential: good   Pt's spiritual, cultural, and educational needs considered and patient agreeable to plan of care and goals.    Education: Plan of Care, role of SLP in care, scheduling/ cancellation policy, and insurance limitations / visit limit      Previous Short Term Goals Status: 6 weeks  Patient will expressively produce 3+ word phrase to state object function with 80% accuracy Vy given initial phoneme cue as needed across 2 sessions.   Patient will respond accurately to simple "wh" questions for one word response with 90% accuracy Vy. Goal Met  Patient will complete word finding task (i.e. Creating subject, verb, object pairs in VNEST protocol) with 90% acc min A to improve word fluency.   Patient will recall 3 basic time and personal information facts with environmental cues and minimal assistance for use of strategies.     New Short Term Goals: 6 weeks  Patient will expressively produce 3+ word phrase to state object function with 80% accuracy Vy given initial phoneme cue as needed across 2 sessions.   Patient will complete word finding task (i.e. Creating subject, verb, object pairs in VNEST protocol) with 90% acc min A to improve word fluency.   Patient will recall 3 basic time and personal information facts with environmental cues and minimal assistance for use of strategies.   Patient will participate in functional written expression tasks w/ 80% accuracy Vy.  Patient will follow 2-3 step commands w/ 805 accuracy Vy.    Long Term Goal Status:  8 weeks  Patient will develop functional expressive language skills to communicate wants,needs, and emotions effectively to variety of communication partners in medical and home " "environments.    Goals Previously Met:  Patient will respond accurately to simple "wh" questions for one word response with 90% accuracy Vy. Goal Met 12/30/24     Reasons for Recertification of Therapy: Patient continues to present w/ aphasia which impacts expressive and receptive language, as well as reading comprehension and written expression. Patient would benefit from skilled SLP intervention to target above stated deficits and promote a return to PLOF. Patient remains w/ good family support from her sons and great attendance and motivation across therapy sessions.      Plan     Updated Certification Period: 1/6/2025 to [facility]    Recommended Treatment Plan: Patient will participate in the [facility] rehabilitation program for speech therapy 2 times per week to address her Communication deficits, to educate patient and their family, and to participate in a home exercise program.     Other recommendations: n/a       Therapist's Name:  Sara Davila CCC-SLP   1/6/2025      I CERTIFY THE NEED FOR THESE SERVICES FURNISHED UNDER THIS PLAN OF TREATMENT AND WHILE UNDER MY CARE      Physician Name: _______________________________    Physician Signature: ____________________________   "

## 2025-01-06 NOTE — PROGRESS NOTES
[facility]   Physical Therapy Treatment Note     Name: Tali Beard  Clinic Number: [id number]    Therapy Diagnosis:   Encounter Diagnoses   Name Primary?    Nontraumatic hemorrhage of right cerebral hemisphere Yes    Weakness     Balance problems     Unspecified abnormalities of gait and mobility      Physician: Lili Booker MD    Visit Date: 1/6/2025    Physician Orders: PT Eval and Treat, 2 wk 6  Medical Diagnosis from Referral: I61.9- Hemorrhagic CVA  Evaluation Date: 12/19/2024  Authorization Period Expiration: 2/7/25  Plan of Care Expiration: 2/7/25  Reassessment / Plan of Care Due: 1/15/25  Visit # / Visits authorized: 2/12     Functional FOTO score: not performed     Precautions: Standard, aphasia     PTA Visit #: 2/5     Time In: 1015  Time Out: 1100  Total Billable Time: 45 minutes    SUBJECTIVE     Pt reports: she has no pain today and denies any falls.  She was compliant with home exercise program.      Pain: 0/10    OBJECTIVE     Objective Measures updated at progress report unless specified.     Treatment     Tali received the treatments listed below:      therapeutic exercises, neuromuscular reeducation, and gait  to develop strength, posture, core stabilization, and balance for 45 minutes including:  Therapeutic Exercise Grid     Exercise 1  Exercise 2  Exercise 3  Exercise 4    Exercise :    B LE sitting:  Hip flexion,  Knee flexion with TB,   LAQ with DF Sit to stands B LE standing:  Heel raises,  Hip flexion,  Hip abd,   Knee flexion B LE step-ups   Repetition/Time :    20  20   20 ea        Resist or Assist :    Red TB for knee flexion              Comment :                 Done :    yes  yes   yes                       Exercise 5  Exercise 6  Exercise 7  Exercise 8    Exercise :    Balance activities: floor   Balance activities: Airex Step overs with bolsters      Repetition/Time :    2 x 1 min NBOS   2 x 1 min   2 laps        Resist or Assist :       -  jose sutherland         Comment :                  Done :     yes yes                       Exercise 9  Exercise 10  Exercise 11  Exercise 12    Exercise :                  Repetition/Time :                  Resist or Assist :                  Comment :                  Done :                                   Exercise 13 Exercise 14  Exercise 15  Exercise 16   Exercise :                  Repetition/Time :                  Resist or Assist :                  Comment :                  Done :                                         Patient Education and Home Exercises     Home Exercises Provided and Patient Education Provided       Written Home Exercises Provided: Patient instructed to cont prior HEP. Exercises were reviewed and Tali was able to demonstrate them prior to the end of the session.  Tali demonstrated good  understanding of the education provided. See EMR under Patient Instructions for exercises provided during therapy sessions    ASSESSMENT     Tali demonstrated a positive response to today's session as evidenced by ability to complete all exercises with good effort. Moderate to max verbal and tactile cues required for attention to task and proper execution with exercises due to cognitive deficits. She is making a fair progression towards goals as evidenced by greater success during step-over exercise, but continued deficits with motor planning and balance. She continues to demonstrate deficits with bilateral lower extremity weakness, gait instability, balance instability, trunk control, and fall risk. Recommend continue with current plan of care and progress as tolerated.     Tali Is progressing well towards her goals.   Pt prognosis is Guarded.     Pt will continue to benefit from skilled outpatient physical therapy to address the deficits listed in the problem list box on initial evaluation, provide pt/family education and to maximize pt's level of independence in the home and community environment.      Pt's spiritual, cultural and educational needs considered and pt agreeable to plan of care and goals.      Goals: Short Term Goals: 4 weeks      Pt will demonstrate knowledge and independence with HEP to continue with exercises outside of therapy to facilitate optimal overall improvements     Improve strength in B LE to 4/5 MMT throughout in order to improve tolerance with overall functional mobility     Improve generalized strength and balance with no falls reported during daily tasks     Long Term Goals: 6 weeks      Improve strength in B LE to 4+/5 MMT throughout in order to improve tolerance with overall functional mobility     Pt will improve safety with step negotiation using single rail to SBA level     Pt will improve distance and stability with amb without AD performed on even and uneven surfaces with supervision for safety and improved TUG score to < 16 secs to improve community amb     Pt will reduce risk for falls and improve ability and safety with transfers as evidence by improved 5x sit to stands to < 20 secs     Pt will improve balance and reduce risk for falls as evidence by improved MCTSIB to 3/4 (30,30,30,15)    PLAN     Outpatient Physical Therapy 2 times weekly for 6 weeks to include the following interventions: Neuromuscular Re-ed, Patient Education, and Therapeutic Exercise.     Paul Reyes, PTA

## 2025-01-10 ENCOUNTER — CLINICAL SUPPORT (OUTPATIENT)
Dept: REHABILITATION | Facility: HOSPITAL | Age: OVER 89
End: 2025-01-10
Attending: PEDIATRICS
Payer: MEDICARE

## 2025-01-10 DIAGNOSIS — R26.89 BALANCE PROBLEMS: ICD-10-CM

## 2025-01-10 DIAGNOSIS — R47.01 APHASIA: Primary | ICD-10-CM

## 2025-01-10 DIAGNOSIS — R26.9 UNSPECIFIED ABNORMALITIES OF GAIT AND MOBILITY: ICD-10-CM

## 2025-01-10 DIAGNOSIS — R53.1 WEAKNESS: ICD-10-CM

## 2025-01-10 DIAGNOSIS — I61.2 NONTRAUMATIC HEMORRHAGE OF RIGHT CEREBRAL HEMISPHERE: Primary | ICD-10-CM

## 2025-01-10 PROCEDURE — 92507 TX SP LANG VOICE COMM INDIV: CPT

## 2025-01-10 PROCEDURE — 97110 THERAPEUTIC EXERCISES: CPT | Mod: CQ

## 2025-01-10 NOTE — PROGRESS NOTES
"    Speech and Language Therapy Outpatient Progress Note  Date:  1/10/2025     Name: Tali Beard   MRN: [id number]   Therapy Diagnosis:   Encounter Diagnosis   Name Primary?    Aphasia Yes   Physician: ABHIJEET Keene  Physician Orders: eval and treat  Medical Diagnosis: Nontraumatic hemorrhage of right cerebral hemisphere I61.2     Visit #/Visits authorized: 13/16  Date of Evaluation:  11/22/24  Insurance Authorization Period: 11/25/24-1/24/25  Plan of Care Expiration Date:  1/24/25  Extended POC:  TBD     Time In:  11:00  Time Out:  11:30  Total Billable Time: 30 minutes         Subjective:   Patient seen in SLP office following PT session. Patient pleasant and in good spirits.    She  was compliant to home exercise program.     Response to previous treatment: good     Pain Scale:  0/10 on VAS currently.   Pain Location: no pain indicated or expressed  Objective:     TIMED  Procedure Min.   Cognitive Communication Therapy    0         UNTIMED  Procedure Min.   {Speech- Language- Voice Therapy  30   Total Timed Units: 0  Total Untimed Units: 1  Charges Billed/# of units: 1    Short Term Goals: (6 weeks) Current Progress:   Patient will expressively produce 3+ word phrase to state object function with 80% accuracy Vy given initial phoneme cue as needed across 2 sessions.     Patient will respond accurately to simple "wh" questions for one word response with 90% accuracy Vy.  Goal met 12/30/24   Patient will complete word finding task (i.e. Creating subject, verb, object pairs in VNEST protocol) with 90% acc min A to improve word fluency.     Patient will recall 3 basic time and personal information facts with environmental cues and minimal assistance for use of strategies.       Long Term Goal: (8 weeks)  Patient will develop functional expressive language skills to communicate wants,needs, and emotions effectively to variety of communication partners in medical and home environments     Patient " Education/Response:     Written Home Exercises Provided: Patient instructed to cont prior HEP.  Exercises were reviewed and Tali was able to demonstrate them prior to the end of the session.  Tali demonstrated good  understanding of the education provided.     Assessment:     SLP presented various topics related to family and hobbies to which Patient formulated sentences to provide information. Patient able to Independently name 4/5 names of children, and required extra time and verbal cueing to name 5/5 children. SLP provided visual aid to assist w/ word finding and sequencing. Patient able to Independently state detail related to family. Improved word generation when engaged in structured conversation.    Tali is progressing well towards her goals. Current goals remain appropriate. Goals to be updated as necessary.     Pt prognosis is Good. Pt will continue to benefit from skilled outpatient speech and language therapy to address the deficits listed in the problem list on initial evaluation, provide pt/family education and to maximize pt's level of independence in the home and community environment.     Barriers to Therapy: n/a  Pt's spiritual, cultural and educational needs considered and pt agreeable to plan of care and goals.    Plan:   Continue POC with focus on expressive language deficits.    Sara Davila M.S., CCC-SLP   1/10/2025

## 2025-01-10 NOTE — PROGRESS NOTES
[facility]PAT[facility] THERAPY AND [facility]LL[facility]   Physical Therapy Treatment Note     Name: Tali Beard  Clinic Number: [id number]    Therapy Diagnosis:   Encounter Diagnoses   Name Primary?    Nontraumatic hemorrhage of right cerebral hemisphere Yes    Weakness     Balance problems     Unspecified abnormalities of gait and mobility      Physician: Aurora Moe FNP    Visit Date: 1/10/2025    Physician Orders: PT Eval and Treat, 2 wk 6  Medical Diagnosis from Referral: I61.9- Hemorrhagic CVA  Evaluation Date: 12/19/2024  Authorization Period Expiration: 2/7/25  Plan of Care Expiration: 2/7/25  Reassessment / Plan of Care Due: 1/15/25  Visit # / Visits authorized: 4/12     Functional FOTO score: not performed     Precautions: Standard, aphasia     PTA Visit #: 4/5     Time In: 1015  Time Out: 1100  Total Billable Time: 45 minutes    SUBJECTIVE     Pt reports: she has no pain today and denies any falls.  She was compliant with home exercise program.      Pain: 0/10    OBJECTIVE     Objective Measures updated at progress report unless specified.     Treatment     Tali received the treatments listed below:      therapeutic exercises, neuromuscular reeducation, and gait  to develop strength, posture, core stabilization, and balance for 45 minutes including:  Therapeutic Exercise Grid     Exercise 1  Exercise 2  Exercise 3  Exercise 4    Exercise :    B LE sitting:  Hip flexion,  Knee flexion with TB,   LAQ with DF Sit to stands B LE standing:  Heel raises,  Hip flexion,  Hip abd,   Knee flexion B LE step-ups   Repetition/Time :    20  20   20 ea        Resist or Assist :                Comment :                 Done :    yes  yes   yes                       Exercise 5  Exercise 6  Exercise 7  Exercise 8    Exercise :    Balance activities: floor   Balance activities: Airex Step overs with bolsters Weaving through bolsters     Repetition/Time :    2 x 1 min NBOS   2 x 1 min   2 laps   2 laps     Resist or Assist :        - balloon toss         Comment :             **while picking up cones     Done :      yes yes                      Exercise 9  Exercise 10  Exercise 11  Exercise 12    Exercise :    Modified sit up with wedge and cone reaching              Repetition/Time :                  Resist or Assist :                  Comment :                  Done :    yes                               Exercise 13 Exercise 14  Exercise 15  Exercise 16   Exercise :                  Repetition/Time :                  Resist or Assist :                  Comment :                  Done :                                         Patient Education and Home Exercises     Home Exercises Provided and Patient Education Provided       Written Home Exercises Provided: Patient instructed to cont prior HEP. Exercises were reviewed and Tali was able to demonstrate them prior to the end of the session.  Tali demonstrated good  understanding of the education provided. See EMR under Patient Instructions for exercises provided during therapy sessions    ASSESSMENT     Tali demonstrated a (+) response to today's session as evidenced by exhibiting good effort with assigned and additional exercises. Added trunk strengthening exercises due to mild posterior lean and to engage core musculature and facilitate anterior lean. She continues to require moderate to max verbal and tactile cues required for attention to task and proper execution with exercises due to cognitive deficits. She is making a fair progression towards goals as evidenced by improved stability and lower extremity advancement with dynamic gait exercises. She continues to demonstrate deficits with bilateral lower extremity weakness, gait instability, balance instability, trunk control, fall risk, and motor planning. Recommend continue with current plan of care and progress as tolerated.     Tali Is progressing well towards her goals.   Pt prognosis is Guarded.     Pt will continue to benefit from  skilled outpatient physical therapy to address the deficits listed in the problem list box on initial evaluation, provide pt/family education and to maximize pt's level of independence in the home and community environment.     Pt's spiritual, cultural and educational needs considered and pt agreeable to plan of care and goals.      Goals: Short Term Goals: 4 weeks      Pt will demonstrate knowledge and independence with HEP to continue with exercises outside of therapy to facilitate optimal overall improvements     Improve strength in B LE to 4/5 MMT throughout in order to improve tolerance with overall functional mobility     Improve generalized strength and balance with no falls reported during daily tasks     Long Term Goals: 6 weeks      Improve strength in B LE to 4+/5 MMT throughout in order to improve tolerance with overall functional mobility     Pt will improve safety with step negotiation using single rail to SBA level     Pt will improve distance and stability with amb without AD performed on even and uneven surfaces with supervision for safety and improved TUG score to < 16 secs to improve community amb     Pt will reduce risk for falls and improve ability and safety with transfers as evidence by improved 5x sit to stands to < 20 secs     Pt will improve balance and reduce risk for falls as evidence by improved MCTSIB to 3/4 (30,30,30,15)    PLAN     Outpatient Physical Therapy 2 times weekly for 6 weeks to include the following interventions: Neuromuscular Re-ed, Patient Education, and Therapeutic Exercise.     Ebonie Meyer PTA

## 2025-01-13 ENCOUNTER — CLINICAL SUPPORT (OUTPATIENT)
Dept: REHABILITATION | Facility: HOSPITAL | Age: OVER 89
End: 2025-01-13
Attending: PEDIATRICS
Payer: MEDICARE

## 2025-01-13 ENCOUNTER — DOCUMENTATION ONLY (OUTPATIENT)
Dept: REHABILITATION | Facility: HOSPITAL | Age: OVER 89
End: 2025-01-13
Payer: MEDICARE

## 2025-01-13 ENCOUNTER — TELEPHONE (OUTPATIENT)
Dept: NEUROLOGY | Facility: CLINIC | Age: OVER 89
End: 2025-01-13
Payer: MEDICARE

## 2025-01-13 DIAGNOSIS — R53.1 WEAKNESS: ICD-10-CM

## 2025-01-13 DIAGNOSIS — I61.2 NONTRAUMATIC HEMORRHAGE OF RIGHT CEREBRAL HEMISPHERE: Primary | ICD-10-CM

## 2025-01-13 DIAGNOSIS — R26.89 BALANCE PROBLEMS: ICD-10-CM

## 2025-01-13 DIAGNOSIS — R26.9 UNSPECIFIED ABNORMALITIES OF GAIT AND MOBILITY: ICD-10-CM

## 2025-01-13 DIAGNOSIS — R47.01 APHASIA: Primary | ICD-10-CM

## 2025-01-13 PROCEDURE — 97110 THERAPEUTIC EXERCISES: CPT

## 2025-01-13 PROCEDURE — 92507 TX SP LANG VOICE COMM INDIV: CPT

## 2025-01-13 NOTE — PROGRESS NOTES
Performed face to face conference with Ebonie Meyer PTA, regarding pt's POC and progress thus far

## 2025-01-13 NOTE — PROGRESS NOTES
"    Speech and Language Therapy Outpatient Progress Note  Date:  1/13/2025     Name: Tali Beard   MRN: [id number]   Therapy Diagnosis:   Encounter Diagnosis   Name Primary?    Aphasia Yes   Physician: ABHIJEET Keene  Physician Orders: eval and treat  Medical Diagnosis: Nontraumatic hemorrhage of right cerebral hemisphere I61.2     Visit #/Visits authorized: 14/16  Date of Evaluation:  11/22/24  Insurance Authorization Period: 11/25/24-1/24/25  Plan of Care Expiration Date:  1/24/25  Extended POC:  TBD     Time In:  11:00  Time Out:  11:30  Total Billable Time: 30 minutes         Subjective:   Patient seen in SLP office following PT session. Patient pleasant and in good spirits.    She  was compliant to home exercise program.     Response to previous treatment: good     Pain Scale:  0/10 on VAS currently.   Pain Location: no pain indicated or expressed  Objective:     TIMED  Procedure Min.   Cognitive Communication Therapy    0         UNTIMED  Procedure Min.   {Speech- Language- Voice Therapy  30   Total Timed Units: 0  Total Untimed Units: 1  Charges Billed/# of units: 1    Short Term Goals: (6 weeks) Current Progress:   Patient will expressively produce 3+ word phrase to state object function with 80% accuracy Vy given initial phoneme cue as needed across 2 sessions.     Patient will respond accurately to simple "wh" questions for one word response with 90% accuracy Vy.  Goal met 12/30/24   Patient will complete word finding task (i.e. Creating subject, verb, object pairs in VNEST protocol) with 90% acc min A to improve word fluency.     Patient will recall 3 basic time and personal information facts with environmental cues and minimal assistance for use of strategies.       Long Term Goal: (8 weeks)  Patient will develop functional expressive language skills to communicate wants,needs, and emotions effectively to variety of communication partners in medical and home environments     Patient " Education/Response:     Written Home Exercises Provided: Patient instructed to cont prior HEP.  Exercises were reviewed and Tali was able to demonstrate them prior to the end of the session.  Tali demonstrated good  understanding of the education provided.     Assessment:     SLP presented picture scenes and instructed Patient w/ subject+verb+object sentence structure to increase word generation and sentence formulation. Task completed w/ 20% accuracy Independently, increasing to 90% accuracy mod-max verbal cueing.    Tali is progressing well towards her goals. Current goals remain appropriate. Goals to be updated as necessary.     Pt prognosis is Good. Pt will continue to benefit from skilled outpatient speech and language therapy to address the deficits listed in the problem list on initial evaluation, provide pt/family education and to maximize pt's level of independence in the home and community environment.     Barriers to Therapy: n/a  Pt's spiritual, cultural and educational needs considered and pt agreeable to plan of care and goals.    Plan:   Continue POC with focus on expressive language deficits.    Sara Davila M.S., CCC-SLP   1/13/2025

## 2025-01-13 NOTE — TELEPHONE ENCOUNTER
Pt's son states the pt's BP is elevated. States it is 156/81 this morning. He is asking if there is anything that can be done to help her BP stay down. States she is taking her meds as directed.    Please advise.

## 2025-01-13 NOTE — PROGRESS NOTES
[facility]   Reassessment / Physical Therapy Treatment Note     Name: Tali Beard  Clinic Number: [id number]    Therapy Diagnosis:   Encounter Diagnoses   Name Primary?    Nontraumatic hemorrhage of right cerebral hemisphere Yes    Weakness     Balance problems     Unspecified abnormalities of gait and mobility      Physician: Lili Booker MD    Visit Date: 2025    Physician Orders: PT Eval and Treat, 2 wk 6  Medical Diagnosis from Referral: I61.9- Hemorrhagic CVA  Evaluation Date: 2024  Authorization Period Expiration: 25  Plan of Care Expiration: 25  Reassessment / Plan of Care completed: RA- 25  Next Reassessment / Plan of Care due: 2/3/25  Visit # / Visits authorized:      Functional FOTO score: not performed     Precautions: Standard, aphasia     PTA Visit #: 0/5     Time In: 1015  Time Out: 1100  Total Billable Time: 45 minutes    SUBJECTIVE     Pt reports: she has no pain today and denies any falls.  She was compliant with home exercise program.    Pain: 0/10    OBJECTIVE     B LE strength: generalized WFL throughout with difficulty understanding cues given for testing and exercises  B LE ROM: WNL throughout     Functional mobility:  Sit <> supine: supervision requiring extra time and explanation to complete task  Sit to stands: supervision using B UE  Transfers: supervision without AD  Gait: supervision without AD level surfaces, slow cortney, minimal abnormalities or evidence of instability  Stair negotiation: SBA using B rails for support, reciprocal pattern when ascending, step-to pattern with descending     5x sit to stands: 27.50 secs using B UE  TU.363 secs without AD  MCTSIB: 3/4 (30,30,30,1) multiple attempts required on #3    Treatment     Tali received the treatments listed below:      therapeutic exercises, neuromuscular reeducation, and gait  to develop strength, posture, core stabilization, and balance for 45 minutes  including:  Therapeutic Exercise Grid     Exercise 1  Exercise 2  Exercise 3  Exercise 4    Exercise :    B LE sitting:  Hip flexion,   LAQ Sit to stands B LE standing:  Heel raises,  Hip flexion,  Hip abd,   Knee flexion B LE step-ups   Repetition/Time :    20  20   20 ea   10     Resist or Assist :    1.5 lbs Normal height mat   1.5 lbs 1.5 lbs,  8 inch step,  supported     Comment :          Continuous verbal and visual cues required       Done :    yes  yes   yes yes                      Exercise 5  Exercise 6  Exercise 7  Exercise 8    Exercise :    Balance activities: floor   Balance activities: Airex Step overs with bolsters Weaving through bolsters     Repetition/Time :    2 x 1 min NBOS   3 min   2 laps   2 laps     Resist or Assist :       - balloon toss         Comment :             **while picking up cones     Done :    no yes no no                    Exercise 9  Exercise 10  Exercise 11  Exercise 12    Exercise :    Modified sit up with wedge and cone reaching              Repetition/Time :                  Resist or Assist :                  Comment :                  Done :    no                               Exercise 13 Exercise 14  Exercise 15  Exercise 16   Exercise :                  Repetition/Time :                  Resist or Assist :                  Comment :                  Done :                                 Patient Education and Home Exercises     Home Exercises Provided and Patient Education Provided   - importance and benefits of performing HEP daily for optimal improvements even after discharged from PT     Written Home Exercises Provided: Patient instructed to cont prior HEP. Exercises were reviewed and Tali was able to demonstrate them prior to the end of the session.  Tali demonstrated good  understanding of the education provided. See EMR under Patient Instructions for exercises provided during therapy sessions    ASSESSMENT     Pt has completed 5 PT visits since since initial  denisse. Pt progressing slowly with overall functional mobility, generalized strengthening, and balance with pt at SBA to supervision level for safety without AD. Pt / caregivers deny any falls but they are present with all her mobility. Pt has significant difficulty following instructions given for exercises and activities even with verbal, visual, and tactile cues due to cognitive deficits. Unsure if HEP being performed outside of therapy. Pt to continue with current POC, progress per pt's tolerance, and reassess pt at later date to determine need for additional therapy     Tali Is progressing well towards her goals.   Pt prognosis is Guarded.     Pt will continue to benefit from skilled outpatient physical therapy to address the deficits listed in the problem list box on initial evaluation, provide pt/family education and to maximize pt's level of independence in the home and community environment.     Pt's spiritual, cultural and educational needs considered and pt agreeable to plan of care and goals.      Goals: Short Term Goals: 4 weeks      Pt will demonstrate knowledge and independence with HEP to continue with exercises outside of therapy to facilitate optimal overall improvements- progressing     Improve strength in B LE to 4/5 MMT throughout in order to improve tolerance with overall functional mobility- progressing ( generalized WFL throughout with difficulty understanding cues given for testing and exercises     Improve generalized strength and balance with no falls reported during daily tasks- met (no falls reported)     Long Term Goals: 6 weeks      Improve strength in B LE to 4+/5 MMT throughout in order to improve tolerance with overall functional mobility- progressing ( generalized WFL throughout with difficulty understanding cues given for testing and exercises     Pt will improve safety with step negotiation using single rail to SBA level- progressing     Pt will improve distance and stability with  amb without AD performed on even and uneven surfaces with supervision for safety and improved TUG score to < 16 secs to improve community amb- progressing     Pt will reduce risk for falls and improve ability and safety with transfers as evidence by improved 5x sit to stands to < 20 secs- progressing     Pt will improve balance and reduce risk for falls as evidence by improved MCTSIB to 3/4 (30,30,30,15)- progressing    PLAN     Outpatient Physical Therapy 2 times weekly for 6 weeks to include the following interventions: Neuromuscular Re-ed, Patient Education, and Therapeutic Exercise.     Pt to continue with current POC, progress per pt's tolerance, and reassess pt at later date to determine need for additional therapy     Carol Saucedo, PT

## 2025-01-17 ENCOUNTER — CLINICAL SUPPORT (OUTPATIENT)
Dept: REHABILITATION | Facility: HOSPITAL | Age: OVER 89
End: 2025-01-17
Attending: PEDIATRICS
Payer: MEDICARE

## 2025-01-17 DIAGNOSIS — R47.01 APHASIA: Primary | ICD-10-CM

## 2025-01-17 DIAGNOSIS — R26.9 UNSPECIFIED ABNORMALITIES OF GAIT AND MOBILITY: ICD-10-CM

## 2025-01-17 DIAGNOSIS — R26.89 BALANCE PROBLEMS: ICD-10-CM

## 2025-01-17 DIAGNOSIS — I61.2 NONTRAUMATIC HEMORRHAGE OF RIGHT CEREBRAL HEMISPHERE: Primary | ICD-10-CM

## 2025-01-17 DIAGNOSIS — R53.1 WEAKNESS: ICD-10-CM

## 2025-01-17 PROCEDURE — 97110 THERAPEUTIC EXERCISES: CPT | Mod: KX,CQ

## 2025-01-17 PROCEDURE — 92507 TX SP LANG VOICE COMM INDIV: CPT | Mod: KX

## 2025-01-17 PROCEDURE — 97112 NEUROMUSCULAR REEDUCATION: CPT | Mod: KX,CQ

## 2025-01-17 NOTE — PROGRESS NOTES
[facility]   Physical Therapy Treatment Note     Name: Tali Beard  Clinic Number: [id number]    Therapy Diagnosis:   Encounter Diagnoses   Name Primary?    Nontraumatic hemorrhage of right cerebral hemisphere Yes    Weakness     Balance problems     Unspecified abnormalities of gait and mobility      Physician: Lili Booker MD    Visit Date: 1/17/2025    Physician Orders: PT Eval and Treat, 2 wk 6  Medical Diagnosis from Referral: I61.9- Hemorrhagic CVA  Evaluation Date: 12/19/2024  Authorization Period Expiration: 2/7/25  Plan of Care Expiration: 2/7/25  Reassessment / Plan of Care completed: RA- 1/13/25  Next Reassessment / Plan of Care due: 2/3/25  Visit # / Visits authorized: 5/12     Functional FOTO score: not performed     Precautions: Standard, aphasia     PTA Visit #: 0/5     Time In: 1015  Time Out: 1100  Total Billable Time: 45 minutes    SUBJECTIVE     Pt reports: she has no pain today and denies any falls.  She was compliant with home exercise program.    Pain: 0/10    OBJECTIVE       Functional mobility:  Sit <> supine: supervision requiring extra time and explanation to complete task  Sit to stands: supervision using B UE  Transfers: supervision without AD  Gait: supervision without AD level surfaces, slow cortney, minimal abnormalities or evidence of instability  Stair negotiation: SBA using B rails for support, reciprocal pattern when ascending, step-to pattern with descending      Treatment     Tali received the treatments listed below:      therapeutic exercises, neuromuscular reeducation, and gait  to develop strength, posture, core stabilization, and balance for 45 minutes including:  Therapeutic Exercise Grid     Exercise 1  Exercise 2  Exercise 3  Exercise 4    Exercise :    B LE sitting:  Hip flexion,   LAQ Sit to stands B LE standing:  Heel raises,  Hip flexion,  Hip abd,   Knee flexion B LE step-ups   Repetition/Time :    20  20   20 ea   10     Resist  or Assist :    1.5 lbs Normal height mat   1.5 lbs 1.5 lbs,  8 inch step,  supported     Comment :          Continuous verbal and visual cues required       Done :    yes  yes   yes yes                      Exercise 5  Exercise 6  Exercise 7  Exercise 8    Exercise :    Balance activities: floor   Balance activities: Airex Step overs with bolsters Weaving through bolsters     Repetition/Time :    2 x 1 min NBOS   3 min   2 laps   2 laps     Resist or Assist :       - balloon toss         Comment :             **while picking up cones     Done :    no yes no no                    Exercise 9  Exercise 10  Exercise 11  Exercise 12    Exercise :    Modified sit up with wedge and cone reaching              Repetition/Time :                  Resist or Assist :                  Comment :                  Done :    no                               Exercise 13 Exercise 14  Exercise 15  Exercise 16   Exercise :                  Repetition/Time :                  Resist or Assist :                  Comment :                  Done :                                 Patient Education and Home Exercises     Home Exercises Provided and Patient Education Provided   - importance and benefits of performing HEP daily for optimal improvements even after discharged from PT     Written Home Exercises Provided: Patient instructed to cont prior HEP. Exercises were reviewed and Tali was able to demonstrate them prior to the end of the session.  Tali demonstrated good  understanding of the education provided. See EMR under Patient Instructions for exercises provided during therapy sessions    ASSESSMENT     Tali demonstrated a positive response to today's session as evidenced by ability to complete all exercises with good effort. She continues to require moderate to max verbal and tactile cues required for attention to task and proper execution with exercises due to cognitive deficits. She is making a fair progression towards goals as  evidenced by improved stability, however, she continues to demonstrate deficits with bilateral lower extremity weakness, gait instability, balance instability, trunk control, fall risk, and motor planning. Recommend continue with current plan of care and progress as tolerated.     Tali Is progressing well towards her goals.   Pt prognosis is Guarded.     Pt will continue to benefit from skilled outpatient physical therapy to address the deficits listed in the problem list box on initial evaluation, provide pt/family education and to maximize pt's level of independence in the home and community environment.     Pt's spiritual, cultural and educational needs considered and pt agreeable to plan of care and goals.      Goals: Short Term Goals: 4 weeks      Pt will demonstrate knowledge and independence with HEP to continue with exercises outside of therapy to facilitate optimal overall improvements- progressing     Improve strength in B LE to 4/5 MMT throughout in order to improve tolerance with overall functional mobility- progressing ( generalized WFL throughout with difficulty understanding cues given for testing and exercises     Improve generalized strength and balance with no falls reported during daily tasks- met (no falls reported)     Long Term Goals: 6 weeks      Improve strength in B LE to 4+/5 MMT throughout in order to improve tolerance with overall functional mobility- progressing ( generalized WFL throughout with difficulty understanding cues given for testing and exercises     Pt will improve safety with step negotiation using single rail to SBA level- progressing     Pt will improve distance and stability with amb without AD performed on even and uneven surfaces with supervision for safety and improved TUG score to < 16 secs to improve community amb- progressing     Pt will reduce risk for falls and improve ability and safety with transfers as evidence by improved 5x sit to stands to < 20 secs-  progressing     Pt will improve balance and reduce risk for falls as evidence by improved MCTSIB to 3/4 (30,30,30,15)- progressing    PLAN     Outpatient Physical Therapy 2 times weekly for 6 weeks to include the following interventions: Neuromuscular Re-ed, Patient Education, and Therapeutic Exercise.     Pt to continue with current POC, progress per pt's tolerance, and reassess pt at later date to determine need for additional therapy     Paul Reyes, PTA

## 2025-01-17 NOTE — PROGRESS NOTES
"    Speech and Language Therapy Outpatient Progress Note  Date:  1/17/2025     Name: Tali Beard   MRN: [id number]   Therapy Diagnosis:   Encounter Diagnosis   Name Primary?    Aphasia Yes   Physician: ABHIJEET Keene  Physician Orders: eval and treat  Medical Diagnosis: Nontraumatic hemorrhage of right cerebral hemisphere I61.2     Visit #/Visits authorized: 15/16  Date of Evaluation:  11/22/24  Insurance Authorization Period: 11/25/24-1/24/25  Plan of Care Expiration Date:  1/24/25  Extended POC:  TBD     Time In:  11:00  Time Out:  11:30  Total Billable Time: 30 minutes         Subjective:   Patient seen in SLP office following PT session. Patient pleasant and in good spirits.    She  was compliant to home exercise program.     Response to previous treatment: good     Pain Scale:  0/10 on VAS currently.   Pain Location: no pain indicated or expressed  Objective:     TIMED  Procedure Min.   Cognitive Communication Therapy    0         UNTIMED  Procedure Min.   {Speech- Language- Voice Therapy  30   Total Timed Units: 0  Total Untimed Units: 1  Charges Billed/# of units: 1    Short Term Goals: (6 weeks) Current Progress:   Patient will expressively produce 3+ word phrase to state object function with 80% accuracy Vy given initial phoneme cue as needed across 2 sessions.     Patient will respond accurately to simple "wh" questions for one word response with 90% accuracy Vy.  Goal met 12/30/24   Patient will complete word finding task (i.e. Creating subject, verb, object pairs in VNEST protocol) with 90% acc min A to improve word fluency.     Patient will recall 3 basic time and personal information facts with environmental cues and minimal assistance for use of strategies.       Long Term Goal: (8 weeks)  Patient will develop functional expressive language skills to communicate wants,needs, and emotions effectively to variety of communication partners in medical and home environments     Patient " Education/Response:     Written Home Exercises Provided: Patient instructed to cont prior HEP.  Exercises were reviewed and Tali was able to demonstrate them prior to the end of the session.  Tali demonstrated good  understanding of the education provided.     Assessment:     Patient read aloud active sentences (3-4 words) w/ 20% accuracy Independently, increasing to 70% accuracy modA. Frequent redirection and cueing to read sentence on L side. Patient looking towards other words on the screen positioned on R side.  Naming pictures: 50% accuracy Independently, increasing to 75% accuracy phonemic cueing. Increased time required to complete task.    Tali is progressing well towards her goals. Current goals remain appropriate. Goals to be updated as necessary.     Pt prognosis is Good. Pt will continue to benefit from skilled outpatient speech and language therapy to address the deficits listed in the problem list on initial evaluation, provide pt/family education and to maximize pt's level of independence in the home and community environment.     Barriers to Therapy: n/a  Pt's spiritual, cultural and educational needs considered and pt agreeable to plan of care and goals.    Plan:   Continue POC with focus on expressive language deficits.    Sara Davila M.S., CCC-SLP   1/17/2025

## 2025-01-27 ENCOUNTER — CLINICAL SUPPORT (OUTPATIENT)
Dept: REHABILITATION | Facility: HOSPITAL | Age: OVER 89
End: 2025-01-27
Attending: PEDIATRICS
Payer: MEDICARE

## 2025-01-27 DIAGNOSIS — I61.2 NONTRAUMATIC HEMORRHAGE OF RIGHT CEREBRAL HEMISPHERE: Primary | ICD-10-CM

## 2025-01-27 DIAGNOSIS — R47.01 APHASIA: Primary | ICD-10-CM

## 2025-01-27 DIAGNOSIS — R53.1 WEAKNESS: ICD-10-CM

## 2025-01-27 DIAGNOSIS — R26.89 BALANCE PROBLEMS: ICD-10-CM

## 2025-01-27 PROCEDURE — 97110 THERAPEUTIC EXERCISES: CPT | Mod: CQ

## 2025-01-27 NOTE — PROGRESS NOTES
Outpatient Rehab    Physical Therapy Progress Note    Patient Name: Tali Beard  MRN: [id number]  YOB: 1935  Today's Date: 1/27/2025    Therapy Diagnosis:   Encounter Diagnoses   Name Primary?    Nontraumatic hemorrhage of right cerebral hemisphere Yes    Weakness     Balance problems     Unspecified abnormalities of gait and mobility      Physician: Lili Booker MD    Physician Orders: Eval and Treat  Medical Diagnosis: I61.9- Hemorrhagic CVA    PTA Visit #: 2/5  Visit # / Visits Authorized:  7 / 12   Evaluation Date: 12/19/2024  Authorization Period Expiration: 2/7/25  Plan of Care Expiration: 2/7/25  Reassessment / Plan of Care completed: RA- 1/13/25  Next Reassessment / Plan of Care due: 2/3/25     Time In: 1100   Time Out: 1145  Total Time: 45     Subjective      Pain     Patient reports a current pain level of 0/10.                 Past Medical History/Physical Systems Review:   Tali Beard  has a past medical history of Subarachnoid hemorrhage.    Tali Beard  has no past surgical history on file.    Tali has a current medication list which includes the following prescription(s): amlodipine, atorvastatin, latanoprost, levetiracetam, and timolol maleate 0.5%.    Review of patient's allergies indicates:   Allergen Reactions    Hydroxyzine hcl      Other Reaction(s): BUMPS ALL OVER BODY    Other reaction(s): BUMPS ALL OVER BODY    Felodipine Rash     Edema in the legs        Objective           Intake Outcome Measure for FOTO Survey    Therapist reviewed FOTO scores for Tali Beard on 1/27/2025.   FOTO report - see Media section or FOTO account episode details.     Intake Score:  %  Survey Score 1:  %  Survey Score 2:  %    Treatment:  Therapeutic Exercise  Therapeutic Exercise Activity 1: warm up: nustep bike for 7 minutes  Therapeutic Exercise Activity 2: Sit to stands, 2x10  Therapeutic Exercise Activity 3: Standing hip flexion and abduction (reps: 20)  Therapeutic  Exercise Activity 4: Squats (reps: 20)  Therapeutic Exercise Activity 5: Bilateral 8 inch step ups    Balance/Neuromuscular Re-Education  Balance/Neuromuscular Re-Education Activity 1: Balance on level surfaces: ball toss utilizing bilateral upper extremities  Balance/Neuromuscular Re-Education Activity 2: Stepping over bolsters and picking up objects for visual scanning and dynamic balance    Patient's spiritual, cultural, and educational needs considered and patient agreeable to plan of care and goals.     Assessment & Plan   Assessment: Tali demonstrated a fair response to today's session as evidenced by requiring increased verbal, visual, and tactile cues for directions and proper execution and attention to task. She is making a slow progression towards goals as evidenced by inability to advance lower extremity for step over balance activity however, she continues to demonstrate deficits with bilateral lower extremity weakness, gait instability, balance instability, trunk control, fall risk, and motor planning. Recommend continue with current plan of care and progress as tolerated.  Evaluation/Treatment Tolerance: Patient tolerated treatment well        Short Term Goals: 4 weeks      Pt will demonstrate knowledge and independence with HEP to continue with exercises outside of therapy to facilitate optimal overall improvements- progressing     Improve strength in B LE to 4/5 MMT throughout in order to improve tolerance with overall functional mobility- progressing ( generalized WFL throughout with difficulty understanding cues given for testing and exercises     Improve generalized strength and balance with no falls reported during daily tasks- met (no falls reported)     Long Term Goals: 6 weeks      Improve strength in B LE to 4+/5 MMT throughout in order to improve tolerance with overall functional mobility- progressing ( generalized WFL throughout with difficulty understanding cues given for testing and  exercises     Pt will improve safety with step negotiation using single rail to SBA level- progressing     Pt will improve distance and stability with amb without AD performed on even and uneven surfaces with supervision for safety and improved TUG score to < 16 secs to improve community amb- progressing     Pt will reduce risk for falls and improve ability and safety with transfers as evidence by improved 5x sit to stands to < 20 secs- progressing     Pt will improve balance and reduce risk for falls as evidence by improved MCTSIB to 3/4 (30,30,30,15)- progressing    Plan: Continue plan of care and progress as patient is able.  Outpatient Physical Therapy 2 times weekly for 6 weeks to include the following interventions: Neuromuscular Re-ed, Patient Education, and Therapeutic Exercise.

## 2025-01-27 NOTE — PROGRESS NOTES
Outpatient Rehab    Speech-Language Pathology Visit    Patient Name: Tali Beard  MRN: [id number]  YOB: 1935  Today's Date: 1/27/2025    Therapy Diagnosis: No diagnosis found.  Physician: Lili Booker MD    Physician Orders: Eval and Treat  Medical Diagnosis: Nontraumatic hemorrhage of right cerebral hemisphere I61.2     Visit # / Visits Authorized:  16 / 16   Date of Evaluation:  11/22/24   Insurance Authorization Period: 11/25/24 to 1/27/25  Plan of Care Certification:  11/25/24 to 1/27/25      Time In: 1100   Time Out: 1130  Total Time: 30   Total Billable Time: 30         Subjective   Pt ambulated to SLP office w/ PTA following session. Pt w/ gait belt donned..  Pain reported as 0. no pain indicated      Past Medical History/Physical Systems Review:   Tali Beard  has a past medical history of Subarachnoid hemorrhage.    Tali Beard  has no past surgical history on file.    Tali has a current medication list which includes the following prescription(s): amlodipine, atorvastatin, latanoprost, levetiracetam, and timolol maleate 0.5%.    Review of patient's allergies indicates:   Allergen Reactions    Hydroxyzine hcl      Other Reaction(s): BUMPS ALL OVER BODY    Other reaction(s): BUMPS ALL OVER BODY    Felodipine Rash     Edema in the legs        Objective            Treatment  Speech  Verbal Expression: Picture naming completed w/ 38% acc I'ly, increasing to 53% acc modA. Categorization Fo3 completed given max verbal and visual cueing across task. Increased cueing for following commands across structured task due to decreased sustained attention.    Patient's spiritual, cultural, and educational needs considered and patient agreeable to plan of care and goals.     Assessment & Plan   Assessment  Pt more distrable across session, decreased sustained attention.  Evaluation/Treatment Tolerance: Patient tolerated treatment well         Plan  continue SLP POC  Diet  "Recommendation: Oral diet       Goals:   Short Term Goals: (6 weeks) Current Progress:   Patient will expressively produce 3+ word phrase to state object function with 80% accuracy Vy given initial phoneme cue as needed across 2 sessions.      Patient will respond accurately to simple "wh" questions for one word response with 90% accuracy Vy.  Goal met 12/30/24   Patient will complete word finding task (i.e. Creating subject, verb, object pairs in VNEST protocol) with 90% acc min A to improve word fluency.      Patient will recall 3 basic time and personal information facts with environmental cues and minimal assistance for use of strategies.         Long Term Goal: (8 weeks)  Patient will develop functional expressive language skills to communicate wants,needs, and emotions effectively to variety of communication partners in medical and home environments.      Sara Davila M.S., CCC-SLP   "

## 2025-01-31 ENCOUNTER — CLINICAL SUPPORT (OUTPATIENT)
Dept: REHABILITATION | Facility: HOSPITAL | Age: OVER 89
End: 2025-01-31
Attending: PEDIATRICS
Payer: MEDICARE

## 2025-01-31 DIAGNOSIS — R26.89 BALANCE PROBLEMS: ICD-10-CM

## 2025-01-31 DIAGNOSIS — I61.2 NONTRAUMATIC HEMORRHAGE OF RIGHT CEREBRAL HEMISPHERE: Primary | ICD-10-CM

## 2025-01-31 DIAGNOSIS — R53.1 WEAKNESS: ICD-10-CM

## 2025-01-31 DIAGNOSIS — R26.9 UNSPECIFIED ABNORMALITIES OF GAIT AND MOBILITY: ICD-10-CM

## 2025-01-31 PROCEDURE — 92507 TX SP LANG VOICE COMM INDIV: CPT

## 2025-01-31 PROCEDURE — 97110 THERAPEUTIC EXERCISES: CPT | Mod: CQ

## 2025-01-31 PROCEDURE — 97112 NEUROMUSCULAR REEDUCATION: CPT | Mod: CQ

## 2025-01-31 NOTE — PROGRESS NOTES
"  Outpatient Rehab    Speech-Language Pathology Visit    Patient Name: Tali Beard  MRN: [id number]  YOB: 1935  Today's Date: 1/31/2025    Therapy Diagnosis: No diagnosis found.  Physician: Lili Booker MD    Physician Orders: Eval and Treat  Medical Diagnosis:  Nontraumatic hemorrhage of right cerebral hemisphere I61.2     Visit # / Visits Authorized:  1 / 16   Date of Evaluation:  11/22/24   Insurance Authorization Period: 1/31/25 to 3/28/25  Plan of Care Certification:  1/31/25 to 3/28/25      Time In: 1100   Time Out: 1130  Total Time: 30   Total Billable Time: 30         Subjective   Pt seen in SLP office following session w/ PTA. Gait belt donned for safety..  Pain reported as 0. no pain expressed      Objective            Treatment  Speech  Verbal Expression: SLP presented picture scenes and Pt formulated sentences to include appropriate grammatical structure (subject+verb+object) w/ 40% acc Alayna, increasing to 60% acc modA. Pt frequenly perseverating on previous responses and required cueing for awareness and to correct.    Patient's spiritual, cultural, and educational needs considered and patient agreeable to plan of care and goals.     Assessment & Plan   Assessment     Evaluation/Treatment Tolerance: Patient tolerated treatment well         Plan  continued SLP POC targeting expressive language skills and recall.  Diet Recommendation: Oral diet       Goals:   Short Term Goals: (6 weeks) Current Progress:   Patient will expressively produce 3+ word phrase to state object function with 80% accuracy Vy given initial phoneme cue as needed across 2 sessions.      Patient will respond accurately to simple "wh" questions for one word response with 90% accuracy Vy.  Goal met 12/30/24   Patient will complete word finding task (i.e. Creating subject, verb, object pairs in VNEST protocol) with 90% acc min A to improve word fluency.      Patient will recall 3 basic time and personal " information facts with environmental cues and minimal assistance for use of strategies.         Long Term Goal: (8 weeks)  Patient will develop functional expressive language skills to communicate wants,needs, and emotions effectively to variety of communication partners in medical and home environments.

## 2025-01-31 NOTE — PROGRESS NOTES
Outpatient Rehab    Physical Therapy Visit    Patient Name: Tali Beard  MRN: [id number]  YOB: 1935  Today's Date: 1/31/2025    Therapy Diagnosis:   Encounter Diagnoses   Name Primary?    Nontraumatic hemorrhage of right cerebral hemisphere Yes    Weakness     Balance problems     Unspecified abnormalities of gait and mobility      Physician: Lili Booker MD    Physician Orders: Eval and Treat  Medical Diagnosis: I61.9- Hemorrhagic CVA     PTA Visit #: 3/5  Visit # / Visits Authorized:  8 / 12   Evaluation Date: 12/19/2024  Authorization Period Expiration: 2/7/25  Plan of Care Expiration: 2/7/25  Reassessment / Plan of Care completed: RA- 1/13/25  Next Reassessment / Plan of Care due: 2/3/25     Time In: 1015   Time Out: 1100  Total Time: 45   Total Billable Time: 45         Subjective   Pt reports no pain today..  Pain reported as 0.      Objective            Treatment:  Therapeutic Exercise  Therapeutic Exercise Activity 1: warm up: nustep bike for 7 minutes  Therapeutic Exercise Activity 2: Sit to stands, 2x10  Therapeutic Exercise Activity 3: Standing hip flexion and abduction (reps: 20)  Therapeutic Exercise Activity 4: Squats (reps: 20)  Therapeutic Exercise Activity 5: Bilateral 8 inch step ups    Balance/Neuromuscular Re-Education  Balance/Neuromuscular Re-Education Activity 1: Balance on level surfaces: ball toss utilizing bilateral upper extremities  Balance/Neuromuscular Re-Education Activity 2: Stepping over bolsters and picking up objects for visual scanning and dynamic balance    Patient's spiritual, cultural, and educational needs considered and patient agreeable to plan of care and goals.     Assessment & Plan   Assessment:  Tali demonstrated a fair response to today's session as evidenced by continued need for constant verbal and tactile cues for directions and proper execution and attention to task. She is making a slow progression towards goals as evidenced by continued  deficits with bilateral lower extremity weakness, gait instability, balance instability, trunk control, fall risk, and motor planning. Recommend continue with current plan of care and progress as tolerated.            Plan: Continue with current plan of care.    Goals: Short Term Goals: 4 weeks      Pt will demonstrate knowledge and independence with HEP to continue with exercises outside of therapy to facilitate optimal overall improvements- progressing     Improve strength in B LE to 4/5 MMT throughout in order to improve tolerance with overall functional mobility- progressing ( generalized WFL throughout with difficulty understanding cues given for testing and exercises     Improve generalized strength and balance with no falls reported during daily tasks- met (no falls reported)     Long Term Goals: 6 weeks      Improve strength in B LE to 4+/5 MMT throughout in order to improve tolerance with overall functional mobility- progressing ( generalized WFL throughout with difficulty understanding cues given for testing and exercises     Pt will improve safety with step negotiation using single rail to SBA level- progressing     Pt will improve distance and stability with amb without AD performed on even and uneven surfaces with supervision for safety and improved TUG score to < 16 secs to improve community amb- progressing     Pt will reduce risk for falls and improve ability and safety with transfers as evidence by improved 5x sit to stands to < 20 secs- progressing     Pt will improve balance and reduce risk for falls as evidence by improved MCTSIB to 3/4 (30,30,30,15)- progressing     Plan: Continue plan of care and progress as patient is able.  Outpatient Physical Therapy 2 times weekly for 6 weeks to include the following interventions: Neuromuscular Re-ed, Patient Education, and Therapeutic Exercise.

## 2025-02-03 ENCOUNTER — CLINICAL SUPPORT (OUTPATIENT)
Dept: REHABILITATION | Facility: HOSPITAL | Age: OVER 89
End: 2025-02-03
Attending: PEDIATRICS
Payer: MEDICARE

## 2025-02-03 ENCOUNTER — DOCUMENTATION ONLY (OUTPATIENT)
Dept: REHABILITATION | Facility: HOSPITAL | Age: OVER 89
End: 2025-02-03
Payer: MEDICARE

## 2025-02-03 DIAGNOSIS — R26.89 BALANCE PROBLEMS: ICD-10-CM

## 2025-02-03 DIAGNOSIS — R47.01 APHASIA: Primary | ICD-10-CM

## 2025-02-03 DIAGNOSIS — R26.9 UNSPECIFIED ABNORMALITIES OF GAIT AND MOBILITY: ICD-10-CM

## 2025-02-03 DIAGNOSIS — R53.1 WEAKNESS: ICD-10-CM

## 2025-02-03 DIAGNOSIS — I61.2 NONTRAUMATIC HEMORRHAGE OF RIGHT CEREBRAL HEMISPHERE: Primary | ICD-10-CM

## 2025-02-03 PROCEDURE — 92507 TX SP LANG VOICE COMM INDIV: CPT | Mod: KX

## 2025-02-03 PROCEDURE — 97110 THERAPEUTIC EXERCISES: CPT

## 2025-02-03 NOTE — PROGRESS NOTES
Outpatient Rehab    Speech-Language Pathology Visit    Patient Name: Tali Beard  MRN: [id number]  YOB: 1935  Today's Date: 2/3/2025    Therapy Diagnosis:   Encounter Diagnosis   Name Primary?    Aphasia Yes     Physician: Lili Booker MD    Physician Orders: Eval and Treat  Medical Diagnosis: Nontraumatic hemorrhage of right cerebral hemisphere I61.2     Visit # / Visits Authorized:  2 / 16   Date of Evaluation:  11/22/24   Insurance Authorization Period: 1/31/25 to 3/28/25  Plan of Care Certification:  1/31/25 to 3/28/25      Time In: 1100   Time Out: 1130  Total Time: 30   Total Billable Time: 30         Subjective   Pt ambulated to SLP office following PT session. Pt pleasant and in good spirits. Handoff to Pt's son miriam santana following session..  Pain reported as 0. no pain expressed or noticed      Objective            Treatment  Speech  Other Speech Activity: SLP provided rhymning word pair puzzle pieces in Fo5 and Pt instructed to find matches by reading words aloud. Task completed w/ 50% acc I'ly, increasing to 100% acc maxA. Redirection and cueing provided across task for attention to cards in hand and repetition of instructions to complete task accurately. Assistance needed to read 1 and 2 syllable words.    Patient's spiritual, cultural, and educational needs considered and patient agreeable to plan of care and goals.     Assessment & Plan   Assessment  Pt continues to require verbal cueing for word finding and attention during structured tasks. Less perseverative responses as compared to previous session.  Evaluation/Treatment Tolerance: Patient tolerated treatment well         Plan  continue POC 2x/week  Diet Recommendation: Oral diet       Goals:   Short Term Goals: (6 weeks) Current Progress:   Patient will expressively produce 3+ word phrase to state object function with 80% accuracy Vy given initial phoneme cue as needed across 2 sessions.      Patient will respond  "accurately to simple "wh" questions for one word response with 90% accuracy Vy.  Goal met 12/30/24   Patient will complete word finding task (i.e. Creating subject, verb, object pairs in VNEST protocol) with 90% acc min A to improve word fluency.      Patient will recall 3 basic time and personal information facts with environmental cues and minimal assistance for use of strategies.         Long Term Goal: (8 weeks)  Patient will develop functional expressive language skills to communicate wants,needs, and emotions effectively to variety of communication partners in medical and home environments.    Sara Davila M.S., CCC-SLP   "

## 2025-02-03 NOTE — PROGRESS NOTES
Outpatient Rehab    Physical Therapy Visit    Patient Name: Tali Beard  MRN: [id number]  YOB: 1935  Today's Date: 2/3/2025    Therapy Diagnosis:   Encounter Diagnoses   Name Primary?    Nontraumatic hemorrhage of right cerebral hemisphere Yes    Weakness     Balance problems     Unspecified abnormalities of gait and mobility      Physician: Lili Booker MD    Physician Orders: Eval and Treat  Medical Diagnosis: I61.9- Hemorrhagic CVA     PTA Visit #: 0/5  Visit # / Visits Authorized:  8/ 12   Evaluation Date: 12/19/2024  Authorization Period Expiration: 2/7/25  Plan of Care Expiration: 2/7/25  Reassessment / Plan of Care completed: RA- 1/13/25     Time In: 1015   Time Out: 1100  Total Time: 45            Subjective   no pain, denies any falls.  Family / care giver present for this visit:          Objective            Treatment:  Therapeutic Exercise  Therapeutic Exercise Activity 1: warm up: nustep bike (UE and LE) (see exercise sheet)  Therapeutic Exercise Activity 2: Sit to stands (see exercises sheet)  Therapeutic Exercise Activity 3: Standing B LE strengthening exercises (see exercise sheet)  Therapeutic Exercise Activity 4: functional activities to challenge balance (see exercise sheet)  Therapeutic Exercise Activity 5: Bilateral LE step ups, supported (see exercise sheet)         Patient's spiritual, cultural, and educational needs considered and patient agreeable to plan of care and goals.     Assessment & Plan   Assessment: Pt performed generalized strengthening exercises and functional mobility activities with good effort however limited with comprehension due to cognitive deficits requiring continuous verbal, tactile, and visual cues to complete tasks. Discussed upcoming end of authorized PT visits with plans for discharge with [facility].   Evaluation/Treatment Tolerance: Patient tolerated treatment well        Plan: Continue with current plan of care, progress per pt's tolerance, and  prepare pt for upcoming discharge from PT with HEP    Goals: Short Term Goals: 4 weeks      Pt will demonstrate knowledge and independence with HEP to continue with exercises outside of therapy to facilitate optimal overall improvements- progressing     Improve strength in B LE to 4/5 MMT throughout in order to improve tolerance with overall functional mobility- progressing ( generalized WFL throughout with difficulty understanding cues given for testing and exercises     Improve generalized strength and balance with no falls reported during daily tasks- met (no falls reported)     Long Term Goals: 6 weeks      Improve strength in B LE to 4+/5 MMT throughout in order to improve tolerance with overall functional mobility- progressing ( generalized WFL throughout with difficulty understanding cues given for testing and exercises     Pt will improve safety with step negotiation using single rail to SBA level- progressing     Pt will improve distance and stability with amb without AD performed on even and uneven surfaces with supervision for safety and improved TUG score to < 16 secs to improve community amb- progressing     Pt will reduce risk for falls and improve ability and safety with transfers as evidence by improved 5x sit to stands to < 20 secs- progressing     Pt will improve balance and reduce risk for falls as evidence by improved MCTSIB to 3/4 (30,30,30,15)- progressing     Plan: Continue plan of care and progress as patient is able.  Outpatient Physical Therapy 2 times weekly for 6 weeks to include the following interventions: Neuromuscular Re-ed, Patient Education, and Therapeutic Exercise.

## 2025-02-07 ENCOUNTER — CLINICAL SUPPORT (OUTPATIENT)
Dept: REHABILITATION | Facility: HOSPITAL | Age: OVER 89
End: 2025-02-07
Attending: PEDIATRICS
Payer: MEDICARE

## 2025-02-07 DIAGNOSIS — R47.01 APHASIA: Primary | ICD-10-CM

## 2025-02-07 DIAGNOSIS — R53.81 PHYSICAL DECONDITIONING: ICD-10-CM

## 2025-02-07 DIAGNOSIS — I63.9 CEREBROVASCULAR ACCIDENT (CVA), UNSPECIFIED MECHANISM: ICD-10-CM

## 2025-02-07 DIAGNOSIS — R26.9 UNSPECIFIED ABNORMALITIES OF GAIT AND MOBILITY: ICD-10-CM

## 2025-02-07 DIAGNOSIS — R26.89 BALANCE PROBLEMS: ICD-10-CM

## 2025-02-07 PROCEDURE — 97110 THERAPEUTIC EXERCISES: CPT | Mod: KX,CQ

## 2025-02-07 PROCEDURE — 92507 TX SP LANG VOICE COMM INDIV: CPT | Mod: KX

## 2025-02-07 PROCEDURE — 97112 NEUROMUSCULAR REEDUCATION: CPT | Mod: KX,CQ

## 2025-02-07 NOTE — PROGRESS NOTES
"  Outpatient Rehab    Speech-Language Pathology Visit    Patient Name: Tali Beard  MRN: [id number]  YOB: 1935  Today's Date: 2/7/2025    Therapy Diagnosis: No diagnosis found.  Physician: Lili Booker MD    Physician Orders: Eval and Treat  Medical Diagnosis: Nontraumatic hemorrhage of right cerebral hemisphere I61.2     Visit # / Visits Authorized:  3 / 16   Date of Evaluation:  11/22/24   Insurance Authorization Period: 1/31/25 to 3/28/25  Plan of Care Certification:  1/31/25 to 3/28/25      Time In: 1100   Time Out: 1130  Total Time: 30   Total Billable Time: 30         Subjective   Pt seen in SLP office. Pt pleasant and in good spirits..  Pain reported as 0/10. no pain expressed      Objective            Treatment  Speech  Verbal Expression: SLP presented picture scenes and Pt formulated 4-6 word sentences to describe the picture w/ 56% acc I'ly, increasing to 100% acc modA.    Patient's spiritual, cultural, and educational needs considered and patient agreeable to plan of care and goals.     Assessment & Plan   Assessment  Improvement noted w/ word finding and sentence formulation as compared to previous 2 sessions. Pt is progressing towards goals.  Evaluation/Treatment Tolerance: Patient tolerated treatment well         Plan  continue SLP POC 2x/week  Diet Recommendation: Oral diet       Goals:   Short Term Goals: (6 weeks) Current Progress:   Patient will expressively produce 3+ word phrase to state object function with 80% accuracy Vy given initial phoneme cue as needed across 2 sessions.      Patient will respond accurately to simple "wh" questions for one word response with 90% accuracy Vy.  Goal met 12/30/24   Patient will complete word finding task (i.e. Creating subject, verb, object pairs in VNEST protocol) with 90% acc min A to improve word fluency.      Patient will recall 3 basic time and personal information facts with environmental cues and minimal assistance for use " of strategies.         Long Term Goal: (8 weeks)  Patient will develop functional expressive language skills to communicate wants,needs, and emotions effectively to variety of communication partners in medical and home environments.      Sara Davila, CCC-SLP

## 2025-02-07 NOTE — PROGRESS NOTES
Outpatient Rehab    Physical Therapy Visit    Patient Name: Tali Beard  MRN: [id number]  YOB: 1935  Today's Date: 2/7/2025    Therapy Diagnosis: No diagnosis found.  Physician: Lili Booker MD    Physician Orders: Eval and Treat  Medical Diagnosis: I61.9- Hemorrhagic CVA     PTA Visit #: 1/5  Visit # / Visits Authorized:  9 / 12   Evaluation Date: 12/19/2024  Authorization Period Expiration: 2/7/25  Plan of Care Expiration: 2/7/25  Reassessment / Plan of Care completed: RA- 1/13/25  Next Reassessment / Plan of Care due: 2/3/25     Time In: 1015   Time Out: 1100  Total Time: 45   Total Billable Time: 45         Subjective   No apparent pain or other new issues..  Pain reported as 0/10.      Objective            Treatment:  Therapeutic Exercise  Therapeutic Exercise Activity 1: warm up: nustep bike (UE and LE) (see exercise sheet)  Therapeutic Exercise Activity 2: Sit to stands (see exercises sheet)  Therapeutic Exercise Activity 3: Standing B LE strengthening exercises (see exercise sheet)  Therapeutic Exercise Activity 4: functional activities to challenge balance (see exercise sheet)  Therapeutic Exercise Activity 5: Bilateral LE step ups, supported (see exercise sheet)    Balance/Neuromuscular Re-Education  Balance/Neuromuscular Re-Education Activity 1: Balance on level surfaces: ball toss utilizing bilateral upper extremities  Balance/Neuromuscular Re-Education Activity 2: Stepping over bolsters and picking up objects for visual scanning and dynamic balance    Patient's spiritual, cultural, and educational needs considered and patient agreeable to plan of care and goals.     Assessment & Plan   Assessment: Tali demonstrated a fair response to today's session as evidenced by continued need for constant verbal and tactile cues for directions and proper execution and attention to task. improved engagement today, as well as improved hand eye coordination during ball toss. She is making a  slow progression towards goals as evidenced by continued deficits with bilateral lower extremity weakness, gait instability, balance instability, trunk control, fall risk, and motor planning.  Evaluation/Treatment Tolerance: Patient tolerated treatment well        Plan: Continue with current plan of care.    Goals: Short Term Goals: 4 weeks      Pt will demonstrate knowledge and independence with HEP to continue with exercises outside of therapy to facilitate optimal overall improvements- progressing     Improve strength in B LE to 4/5 MMT throughout in order to improve tolerance with overall functional mobility- progressing ( generalized WFL throughout with difficulty understanding cues given for testing and exercises     Improve generalized strength and balance with no falls reported during daily tasks- met (no falls reported)     Long Term Goals: 6 weeks      Improve strength in B LE to 4+/5 MMT throughout in order to improve tolerance with overall functional mobility- progressing ( generalized WFL throughout with difficulty understanding cues given for testing and exercises     Pt will improve safety with step negotiation using single rail to SBA level- progressing     Pt will improve distance and stability with amb without AD performed on even and uneven surfaces with supervision for safety and improved TUG score to < 16 secs to improve community amb- progressing     Pt will reduce risk for falls and improve ability and safety with transfers as evidence by improved 5x sit to stands to < 20 secs- progressing     Pt will improve balance and reduce risk for falls as evidence by improved MCTSIB to 3/4 (30,30,30,15)- progressing     Plan: Continue plan of care and progress as patient is able.  Outpatient Physical Therapy 2 times weekly for 6 weeks to include the following interventions: Neuromuscular Re-ed, Patient Education, and Therapeutic Exercise.        Paul Reyes, PTA

## 2025-02-10 ENCOUNTER — CLINICAL SUPPORT (OUTPATIENT)
Dept: REHABILITATION | Facility: HOSPITAL | Age: OVER 89
End: 2025-02-10
Attending: PEDIATRICS
Payer: MEDICARE

## 2025-02-10 DIAGNOSIS — R47.01 APHASIA: Primary | ICD-10-CM

## 2025-02-10 DIAGNOSIS — R26.89 BALANCE PROBLEMS: Primary | ICD-10-CM

## 2025-02-10 DIAGNOSIS — R26.9 UNSPECIFIED ABNORMALITIES OF GAIT AND MOBILITY: ICD-10-CM

## 2025-02-10 PROCEDURE — 97110 THERAPEUTIC EXERCISES: CPT | Mod: KX,CQ

## 2025-02-10 PROCEDURE — 92507 TX SP LANG VOICE COMM INDIV: CPT | Mod: KX

## 2025-02-10 NOTE — PROGRESS NOTES
Outpatient Rehab    Physical Therapy Visit    Patient Name: Tali Beard  MRN: [id number]  YOB: 1935  Today's Date: 2/10/2025    Therapy Diagnosis:        Encounter Diagnoses   Name Primary?    Nontraumatic hemorrhage of right cerebral hemisphere Yes    Weakness      Balance problems      Unspecified abnormalities of gait and mobility        Physician: Lili Booker MD    Physician Orders: Eval and Treat  Medical Diagnosis: I61.9- Hemorrhagic CVA     PTA Visit #: 2/5  Visit # / Visits Authorized:  11 / 12   Evaluation Date: 12/19/2024  Authorization Period Expiration: 2/14/25  Plan of Care Expiration: 2/14/25  Reassessment / Plan of Care completed: RA- 1/13/25, 2/3/25     Time In: 1015   Time Out: 1100  Total Time: 45          Subjective   No apparent pain or other new issues..  Pain reported as 0/10. no pain expressed    Objective            Treatment:  Therapeutic Exercise  Therapeutic Exercise Activity 1: warm up: nustep bike (UE and LE) (see exercise sheet)  Therapeutic Exercise Activity 2: Sit to stands from standard chair (see exercises sheet)  Therapeutic Exercise Activity 3: Standing B LE strengthening exercises (see exercise sheet)  Therapeutic Exercise Activity 4: functional activities to challenge balance and gait (see exercise sheet)  Therapeutic Exercise Activity 5: Bilateral LE step ups, supported (see exercise sheet)    Balance/Neuromuscular Re-Education  Balance/Neuromuscular Re-Education Activity 1: Balance on level surfaces: ball toss utilizing bilateral upper extremities  Balance/Neuromuscular Re-Education Activity 2: Stepping over bolsters and picking up objects for visual scanning and dynamic balance    Patient's spiritual, cultural, and educational needs considered and patient agreeable to plan of care and goals.     Assessment & Plan   Assessment: Tali demonstrated a fair response to today's session as evidenced by improved engagement with functional balance and gait  activites. Today she was able to perform step overs with hand held assist occasionally which has improved from previous PT session. She is making a fair progression toward goals as evidenced by constant requirement of verbal and tactile cues for safety.  She is making a slow progression towards goals as evidenced by continued deficits with bilateral lower extremity weakness, gait instability, balance instability, trunk control, fall risk, and motor planning.  Evaluation/Treatment Tolerance: Patient tolerated treatment well        Plan: Continue with current plan of care.    Goals: Short Term Goals: 4 weeks      Pt will demonstrate knowledge and independence with HEP to continue with exercises outside of therapy to facilitate optimal overall improvements- progressing     Improve strength in B LE to 4/5 MMT throughout in order to improve tolerance with overall functional mobility- progressing ( generalized WFL throughout with difficulty understanding cues given for testing and exercises     Improve generalized strength and balance with no falls reported during daily tasks- met (no falls reported)     Long Term Goals: 6 weeks      Improve strength in B LE to 4+/5 MMT throughout in order to improve tolerance with overall functional mobility- progressing ( generalized WFL throughout with difficulty understanding cues given for testing and exercises     Pt will improve safety with step negotiation using single rail to SBA level- progressing     Pt will improve distance and stability with amb without AD performed on even and uneven surfaces with supervision for safety and improved TUG score to < 16 secs to improve community amb- progressing     Pt will reduce risk for falls and improve ability and safety with transfers as evidence by improved 5x sit to stands to < 20 secs- progressing     Pt will improve balance and reduce risk for falls as evidence by improved MCTSIB to 3/4 (30,30,30,15)- progressing     Plan: Continue  plan of care and progress as patient is able.  Outpatient Physical Therapy 2 times weekly for 6 weeks to include the following interventions: Neuromuscular Re-ed, Patient Education, and Therapeutic Exercise.        Ebonie Meyer, MALATHI

## 2025-02-10 NOTE — PROGRESS NOTES
"  Outpatient Rehab    Speech-Language Pathology Visit    Patient Name: Tali Beard  MRN: [id number]  YOB: 1935  Today's Date: 2/10/2025    Therapy Diagnosis: No diagnosis found.  Physician: Lili Booker MD    Physician Orders: Eval and Treat  Medical Diagnosis: Nontraumatic hemorrhage of right cerebral hemisphere I61.2      Visit # / Visits Authorized:  4 / 16   Date of Evaluation:  11/22/24   Insurance Authorization Period: 1/31/25 to 3/28/25  Plan of Care Certification:  1/31/25 to 3/28/25        Time In: 1100   Time Out: 1130  Total Time: 30   Total Billable Time: 30         Subjective   Pt seen in SLP office. Pt pleasant and in good spirits..  Pain reported as 0/10. no pain expressed      Objective            Treatment  Speech  Verbal Expression: Divergent naming via concrete categories. Animals x0 I'ly, increasing to x4 modA. Colors x5 modA. Fruit x1 I'ly. Convergent naming task completed w/ 25% acc maxA.    Patient's spiritual, cultural, and educational needs considered and patient agreeable to plan of care and goals.     Assessment & Plan   Assessment  Pt required increased cueing across structured expressive language tasks this date. Pt w/ perseverations and unable to self correct.  Evaluation/Treatment Tolerance: Patient tolerated treatment well         Plan  Continue SLP POC  Diet Recommendation: Oral diet       Goals:   Short Term Goals: (6 weeks) Current Progress:   Patient will expressively produce 3+ word phrase to state object function with 80% accuracy Vy given initial phoneme cue as needed across 2 sessions.      Patient will respond accurately to simple "wh" questions for one word response with 90% accuracy Vy.  Goal met 12/30/24   Patient will complete word finding task (i.e. Creating subject, verb, object pairs in VNEST protocol) with 90% acc min A to improve word fluency.      Patient will recall 3 basic time and personal information facts with environmental cues and " minimal assistance for use of strategies.         Long Term Goal: (8 weeks)  Patient will develop functional expressive language skills to communicate wants,needs, and emotions effectively to variety of communication partners in medical and home environments.      Sara Davila, CCC-SLP

## 2025-02-14 ENCOUNTER — CLINICAL SUPPORT (OUTPATIENT)
Dept: REHABILITATION | Facility: HOSPITAL | Age: OVER 89
End: 2025-02-14
Attending: PEDIATRICS
Payer: MEDICARE

## 2025-02-14 DIAGNOSIS — R26.9 UNSPECIFIED ABNORMALITIES OF GAIT AND MOBILITY: ICD-10-CM

## 2025-02-14 DIAGNOSIS — R26.89 BALANCE PROBLEMS: ICD-10-CM

## 2025-02-14 DIAGNOSIS — R53.1 WEAKNESS: ICD-10-CM

## 2025-02-14 DIAGNOSIS — R47.01 APHASIA: Primary | ICD-10-CM

## 2025-02-14 DIAGNOSIS — R47.01 APHASIA: ICD-10-CM

## 2025-02-14 DIAGNOSIS — R29.818 ACUTE FOCAL NEUROLOGICAL DEFICIT: Primary | ICD-10-CM

## 2025-02-14 PROCEDURE — 92507 TX SP LANG VOICE COMM INDIV: CPT | Mod: KX

## 2025-02-14 NOTE — PROGRESS NOTES
Outpatient Rehab    Physical Therapy Discharge    Patient Name: Tali Beard  MRN: [id number]  YOB: 1935  Today's Date: 2/14/2025    Therapy Diagnosis:   Encounter Diagnoses   Name Primary?    Acute focal neurological deficit Yes    Aphasia     Balance problems     Weakness     Unspecified abnormalities of gait and mobility      Physician: Aurora Moe FNP    Physician Orders: Eval and Treat  Medical Diagnosis: I61.9- Hemorrhagic CVA     PTA Visit #: 2/5  Visit # / Visits Authorized:  12 / 12   Evaluation Date: 12/19/2024  Authorization Period Expiration: 2/14/25  Plan of Care Expiration: 2/14/25  Reassessment / Plan of Care completed: RA- 1/13/25, 2/3/25     Time In: (P) 1015   Time Out: (P) 1100  Total Time: (P) 45            Subjective   son present to observe final PT visits to continue with exercises outside of therapy. Son reports pt has not had any falls.  Family / care giver present for this visit:   Pain reported as 0/10.      Objective            Treatment:  Therapeutic Exercise  Therapeutic Exercise Activity 1: warm up: nustep bike (UE and LE) (see exercise sheet)  Therapeutic Exercise Activity 3: Standing B LE strengthening exercises (see exercise sheet)  Therapeutic Exercise Activity 4: functional activities to challenge balance and gait (see exercise sheet)    Assessment & Plan   Assessment: Pt has completed 11 PT visits since initial eval. Pt has progressed slowly with generalized strengthening, endurance, balance, and overall functional mobility at SBA level of assistance without AD with no falls reported with very good caregiver assistance provided by pt's sons. Pt's expressive and receptive aphasia affects pt's comprehension of instructions with continuous verbal, visual, and tactile cues required for all activities / exercises with poor execution of movements. Pt's son observed session to continue with HEP. Attempted to perform standardized tests to determine progress  however results inaccurate due to comprehension. Pt to be discharged from PT today with HEP and will continue with ST  Evaluation/Treatment Tolerance: Patient tolerated treatment well    Patient's spiritual, cultural, and educational needs considered and patient agreeable to plan of care and goals.           Plan: pt to be discharged from PT today with HEP    Goals:   Short Term Goals: 4 weeks      Pt will demonstrate knowledge and independence with HEP to continue with exercises outside of therapy to facilitate optimal overall improvements- progressing     Improve strength in B LE to 4/5 MMT throughout in order to improve tolerance with overall functional mobility- progressing ( generalized WFL throughout with difficulty understanding cues given for testing and exercises     Improve generalized strength and balance with no falls reported during daily tasks- met (no falls reported)     Long Term Goals: 6 weeks      Improve strength in B LE to 4+/5 MMT throughout in order to improve tolerance with overall functional mobility- progressing ( generalized WFL throughout with difficulty understanding cues given for testing and exercises     Pt will improve safety with step negotiation using single rail to SBA level- progressing     Pt will improve distance and stability with amb without AD performed on even and uneven surfaces with supervision for safety and improved TUG score to < 16 secs to improve community amb- progressing     Pt will reduce risk for falls and improve ability and safety with transfers as evidence by improved 5x sit to stands to < 20 secs- progressing     Pt will improve balance and reduce risk for falls as evidence by improved MCTSIB to 3/4 (30,30,30,15)- progressing    Carol Saucedo, PT

## 2025-02-14 NOTE — PROGRESS NOTES
Outpatient Rehab    Speech-Language Pathology Visit    Patient Name: Tali Beard  MRN: [id number]  YOB: 1935  Today's Date: 2/14/2025    Therapy Diagnosis: No diagnosis found.  Physician: Lili Booker MD    Physician Orders: Eval and Treat  Medical Diagnosis: Nontraumatic hemorrhage of right cerebral hemisphere I61.2     Visit # / Visits Authorized:  5 / 16   Date of Evaluation:  11/22/24   Insurance Authorization Period: 1/31/25 to 3/28/25  Plan of Care Certification:  1/31/25 to 3/28/25      Time In: 1100   Time Out: 1130  Total Time: 30   Total Billable Time: 30       Subjective   Pt seen in SLP office w/ son present. Pt pleasant and in good spirits..  Pain reported as 0/10. no pain expressed  Family/caregiver present for this visit:       Objective            Treatment  Speech  Verbal Expression: Picture scene description completed w/ 25% acc I'ly, increasing to 100% acc mod-max verbal cueing.    Assessment & Plan   Assessment  Phonemic cueing and cloze procedures utilized as forms of verbal cueing across verbal expression tasks. Phonemic cueing not as beneficial to increase accuracy /w word finding. Pt perseverating on previous targets x1 which required cueing to correct.  Evaluation/Treatment Tolerance: Patient tolerated treatment well    Patient will continue to benefit from skilled outpatient speech therapy to address the deficits listed in the problem list box on initial evaluation, provide pt/family education and to maximize pt's level of independence in the home and community environment.     Patient's spiritual, cultural, and educational needs considered and patient agreeable to plan of care and goals.     Education  Education was done with Other recipient present.   Heri participated in education. They identified as Child. The reported learning style is Demonstration. The recipient Demonstrates understanding.              Plan  Continue SLP POC  Diet Recommendation: Oral  "diet       Goals:   Short Term Goals: (6 weeks) Current Progress:   Patient will expressively produce 3+ word phrase to state object function with 80% accuracy Vy given initial phoneme cue as needed across 2 sessions.      Patient will respond accurately to simple "wh" questions for one word response with 90% accuracy Vy.  Goal met 12/30/24   Patient will complete word finding task (i.e. Creating subject, verb, object pairs in VNEST protocol) with 90% acc min A to improve word fluency.      Patient will recall 3 basic time and personal information facts with environmental cues and minimal assistance for use of strategies.         Long Term Goal: (8 weeks)  Patient will develop functional expressive language skills to communicate wants,needs, and emotions effectively to variety of communication partners in medical and home environments.      Sara Davila, CCC-SLP    "

## 2025-02-17 ENCOUNTER — CLINICAL SUPPORT (OUTPATIENT)
Dept: REHABILITATION | Facility: HOSPITAL | Age: OVER 89
End: 2025-02-17
Attending: PEDIATRICS
Payer: MEDICARE

## 2025-02-17 DIAGNOSIS — R47.01 APHASIA: Primary | ICD-10-CM

## 2025-02-17 PROCEDURE — 92507 TX SP LANG VOICE COMM INDIV: CPT | Mod: KX

## 2025-02-17 NOTE — PROGRESS NOTES
Outpatient Rehab    Speech-Language Pathology Visit    Patient Name: Tali Beard  MRN: [id number]  YOB: 1935  Today's Date: 2/17/2025    Therapy Diagnosis: No diagnosis found.  Physician: Lili Booker MD    Physician Orders: Eval and Treat  Medical Diagnosis: Nontraumatic hemorrhage of right cerebral hemisphere I61.2     Visit # / Visits Authorized:  6 / 16   Date of Evaluation:  11/22/24   Insurance Authorization Period: 1/31/25 to 3/28/25  Plan of Care Certification:  1/31/25 to 3/28/25      Time In: 1100   Time Out: 1130  Total Time: 30   Total Billable Time: 30         Subjective   Pt seen in SLP office. Pt pleasant and in good spirits..  Pain reported as 0/10. no pain expressed      Objective            Treatment  Speech  Verbal Expression: Divergent naming via concrete categories w/ 1 minute time limit.  Other Speech Activity: Reading 1-2 syllable words. Reading news article print out.    Assessment & Plan   Assessment  max verbal cueing to read 1-2 syllable words in order to participate in word search. maxA to locate words and [place] appropriately. Pt able to read 2 words in news article, due to amount of difficulty demonstrated, task stopped. Pt participated in divergent naming task as follows: Animals x1 Vy, increasing to x3 maxA; Colors x3 maxA.  Evaluation/Treatment Tolerance: Patient tolerated treatment well    Patient will continue to benefit from skilled outpatient speech therapy to address the deficits listed in the problem list box on initial evaluation, provide pt/family education and to maximize pt's level of independence in the home and community environment.     Patient's spiritual, cultural, and educational needs considered and patient agreeable to plan of care and goals.          Plan  continue SLP POC  Diet Recommendation: Oral diet       Goals:   Short Term Goals: (6 weeks) Current Progress:   Patient will expressively produce 3+ word phrase to state object  "function with 80% accuracy Vy given initial phoneme cue as needed across 2 sessions.      Patient will respond accurately to simple "wh" questions for one word response with 90% accuracy Vy.  Goal met 12/30/24   Patient will complete word finding task (i.e. Creating subject, verb, object pairs in VNEST protocol) with 90% acc min A to improve word fluency.      Patient will recall 3 basic time and personal information facts with environmental cues and minimal assistance for use of strategies.         Long Term Goal: (8 weeks)  Patient will develop functional expressive language skills to communicate wants,needs, and emotions effectively to variety of communication partners in medical and home environments      Sara Davila, CCC-SLP    "

## 2025-02-19 ENCOUNTER — HOSPITAL ENCOUNTER (EMERGENCY)
Facility: HOSPITAL | Age: OVER 89
Discharge: HOME | End: 2025-02-19
Attending: EMERGENCY MEDICINE
Payer: MEDICARE

## 2025-02-19 VITALS
HEART RATE: 71 BPM | DIASTOLIC BLOOD PRESSURE: 76 MMHG | TEMPERATURE: 98 F | SYSTOLIC BLOOD PRESSURE: 139 MMHG | BODY MASS INDEX: 25.86 KG/M2 | OXYGEN SATURATION: 97 % | RESPIRATION RATE: 20 BRPM | WEIGHT: 146 LBS
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
## 2025-02-19 DIAGNOSIS — S63.502A WRIST SPRAIN, LEFT, INITIAL ENCOUNTER: Primary | ICD-10-CM
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
## 2025-02-19 PROCEDURE — 99283 EMERGENCY DEPT VISIT LOW MDM: CPT | Mod: 25

## 2025-02-19 NOTE — ED PROVIDER NOTES
Encounter Date: 2/19/2025       History     Chief Complaint   Patient presents with    fall sunday night     left wrist and hand pain     Fell pulling  a chair out and losing her balance     This 89-year-old female fell Sunday night at home and presents with swelling and discomfort to her left wrist and hand.  She reports she lost her balance.  She had no loss of consciousness.  She denies head injury..       Review of patient's allergies indicates:   Allergen Reactions    Hydroxyzine hcl      Other Reaction(s): BUMPS ALL OVER BODY    Other reaction(s): BUMPS ALL OVER BODY    Felodipine Rash     Edema in the legs     Past Medical History:   Diagnosis Date    Subarachnoid hemorrhage      No past surgical history on file.  No family history on file.  Social History[1]  Review of Systems   Constitutional:  Negative for fever.   HENT:  Negative for sore throat.    Respiratory:  Negative for shortness of breath.    Cardiovascular:  Negative for chest pain.   Gastrointestinal:  Negative for nausea.   Genitourinary:  Negative for dysuria.   Musculoskeletal:  Positive for arthralgias. Negative for back pain.   Skin:  Negative for rash.   Neurological:  Negative for weakness.   Hematological:  Does not bruise/bleed easily.       Physical Exam     Initial Vitals [02/19/25 0930]   BP Pulse Resp Temp SpO2   139/76 71 20 97.5 °F (36.4 °C) 97 %      MAP       --         Physical Exam    Nursing note and vitals reviewed.  Constitutional: She appears well-developed and well-nourished.   HENT:   Head: Normocephalic and atraumatic.   Eyes: Conjunctivae and EOM are normal. Pupils are equal, round, and reactive to light.   Neck: Neck supple.   Normal range of motion.  Cardiovascular:  Normal rate, regular rhythm, normal heart sounds and intact distal pulses.           Pulmonary/Chest: Breath sounds normal.   Abdominal: Abdomen is soft. Bowel sounds are normal.   Musculoskeletal:         General: Tenderness (and swelling of the left  wrist) present. Normal range of motion.      Cervical back: Normal range of motion and neck supple.     Neurological: She is alert and oriented to person, place, and time. She has normal strength.   Skin: Skin is warm and dry. Capillary refill takes less than 2 seconds.   Psychiatric: She has a normal mood and affect. Her behavior is normal. Judgment and thought content normal.         ED Course   Procedures  Labs Reviewed - No data to display       Imaging Results              X-Ray Wrist Complete Left (Preliminary result)  Result time 02/19/25 10:48:54      Wet Read by Trey Figueroa MD (02/19/25 10:48:54, [facility], Emergency Medicine)    No fractures seen.                                      X-Ray Hand 3 View Left (Preliminary result)  Result time 02/19/25 10:49:05      Wet Read by Trey Figueroa MD (02/19/25 10:49:05, [facility], Emergency Medicine)    No fractures seen                                     Medications - No data to display  Medical Decision Making  Amount and/or Complexity of Data Reviewed  Radiology: ordered and independent interpretation performed.                                      Clinical Impression:  Final diagnoses:  [W19.XXXA, Y92.009] Fall at home  [S63.502A] Wrist sprain, left, initial encounter (Primary)          ED Disposition Condition    Discharge Stable          ED Prescriptions    None       Follow-up Information       Follow up With Specialties Details Why Contact Info    [facility] Schedule an appointment as soon as possible for a visit  As needed, If symptoms worsen [address] [place] [id number]  [phone]                   [1]   Social History  Tobacco Use    Smoking status: Never     Passive exposure: Never    Smokeless tobacco: Never   Substance Use Topics    Alcohol use: Not Currently    Drug use: Never        Trey Figueroa MD  02/19/25 [id number]

## 2025-02-21 ENCOUNTER — CLINICAL SUPPORT (OUTPATIENT)
Dept: REHABILITATION | Facility: HOSPITAL | Age: OVER 89
End: 2025-02-21
Attending: PEDIATRICS
Payer: MEDICARE

## 2025-02-21 DIAGNOSIS — R47.01 APHASIA: Primary | ICD-10-CM

## 2025-02-21 PROCEDURE — 92507 TX SP LANG VOICE COMM INDIV: CPT | Mod: KX

## 2025-02-21 NOTE — PROGRESS NOTES
"  Outpatient Rehab    Speech-Language Pathology Visit    Patient Name: Tali Beard  MRN: [id number]  YOB: 1935  Today's Date: 2/21/2025    Therapy Diagnosis: No diagnosis found.  Physician: Lili Booker MD    Physician Orders: Eval and Treat  Medical Diagnosis: Nontraumatic hemorrhage of right cerebral hemisphere I61.2     Visit # / Visits Authorized:  7 / 16   Date of Evaluation:  11/22/24   Insurance Authorization Period: 1/31/25 to 3/28/25  Plan of Care Certification:  1/31/25 to 3/28/25      Time In: 1100   Time Out: 1130  Total Time: 30   Total Billable Time: 30       Subjective   Pt seen in SLP office. Pt pleasant and in good spirits..  Pain reported as 0/10. no pain expressed      Objective            Treatment  Speech  Verbal Expression: Picture scene description completed w/ 50% acc I'ly, increasing to 88% acc Vy.    Assessment & Plan   Assessment  improvement in word finding noted across structured task. verbal cueing to include all parts of sentence (i.e., subject+verb+object). Pt frequently omittign subject.  Evaluation/Treatment Tolerance: Patient tolerated treatment well    Patient will continue to benefit from skilled outpatient speech therapy to address the deficits listed in the problem list box on initial evaluation, provide pt/family education and to maximize pt's level of independence in the home and community environment.     Patient's spiritual, cultural, and educational needs considered and patient agreeable to plan of care and goals.          Plan  continue SLP POC  Diet Recommendation: Oral diet       Goals:   Short Term Goals: (6 weeks) Current Progress:   Patient will expressively produce 3+ word phrase to state object function with 80% accuracy Vy given initial phoneme cue as needed across 2 sessions.      Patient will respond accurately to simple "wh" questions for one word response with 90% accuracy Vy.  Goal met 12/30/24   Patient will complete word " Dr Moore's office -[phone]. Writer left voicemail message -requesting call back. Regarding patient not getting a call back.   finding task (i.e. Creating subject, verb, object pairs in VNEST protocol) with 90% acc min A to improve word fluency.      Patient will recall 3 basic time and personal information facts with environmental cues and minimal assistance for use of strategies.         Long Term Goal: (8 weeks)  Patient will develop functional expressive language skills to communicate wants,needs, and emotions effectively to variety of communication partners in medical and home environments      Sara Davila, DONITA-SLP

## 2025-02-24 ENCOUNTER — CLINICAL SUPPORT (OUTPATIENT)
Dept: REHABILITATION | Facility: HOSPITAL | Age: OVER 89
End: 2025-02-24
Attending: PEDIATRICS
Payer: MEDICARE

## 2025-02-24 DIAGNOSIS — R47.01 APHASIA: Primary | ICD-10-CM

## 2025-02-24 PROCEDURE — 92507 TX SP LANG VOICE COMM INDIV: CPT | Mod: KX

## 2025-02-24 NOTE — PROGRESS NOTES
Outpatient Rehab    Speech-Language Pathology Visit    Patient Name: Tali Beard  MRN: [id number]  YOB: 1935  Encounter Date: 2/24/2025    Therapy Diagnosis: No diagnosis found.  Physician: Lili Booker MD    Physician Orders: Eval and Treat  Medical Diagnosis: Nontraumatic hemorrhage of right cerebral hemisphere I61.2     Visit # / Visits Authorized:  8 / 16   Date of Evaluation:  11/22/24   Insurance Authorization Period: 1/31/25 to 3/28/25  Plan of Care Certification:  1/31/25 to 3/28/25      Time In: 1100   Time Out: 1130  Total Time: 30   Total Billable Time: 30       Subjective   Pt seen in SLP office. Pt pleasant, though reports being sad due to recent, sudden passing of grandson..  Pain reported as 0/10. no pain indicated      Objective            Treatment  Speech  Verbal Expression: Formulating sentences to describe picture scenes.    Cognitive Skills  Memory: 3 word recall following 1 minute filled delay.    Assessment & Plan   Assessment  SLP presented picture scenes and Pt formulated a sentence to describe picture scenes w/ 1-3 pictures in each sequence. Task completed w/ 80% acc I'ly. Improvement noted w/ word finding and sentence formulation. SLP provided occasional cueing for Pt to include subject across responses. 3 word recall following 1 minute filled delay completed w/ 1/3 I'ly, increasing to 2/3 Vy.  Evaluation/Treatment Tolerance: Patient tolerated treatment well    Patient will continue to benefit from skilled outpatient speech therapy to address the deficits listed in the problem list box on initial evaluation, provide pt/family education and to maximize pt's level of independence in the home and community environment.     Patient's spiritual, cultural, and educational needs considered and patient agreeable to plan of care and goals.          Plan  Continue SLP POC  Diet Recommendation: Oral diet       Goals:   Active       LTG       Patient will develop functional  expressive language skills to communicate wants,needs, and emotions effectively to variety of communication partners in medical and home environments       Start:  02/24/25    Expected End:  03/28/25               STG       Patient will recall 3 basic time and personal information facts with environmental cues and minimal assistance for use of strategies.        Start:  02/24/25    Expected End:  03/28/25            Patient will complete word finding task (i.e. Creating subject, verb, object pairs in VNEST protocol) with 90% acc min A to improve word fluency.        Start:  02/24/25    Expected End:  03/28/25            Patient will expressively produce 3+ word phrase to state object function with 80% accuracy Vy given initial phoneme cue as needed across 2 sessions.       Start:  02/24/25    Expected End:  03/28/25                  Sara Davila CCC-SLP

## 2025-02-28 ENCOUNTER — CLINICAL SUPPORT (OUTPATIENT)
Dept: REHABILITATION | Facility: HOSPITAL | Age: OVER 89
End: 2025-02-28
Attending: PEDIATRICS
Payer: MEDICARE

## 2025-02-28 DIAGNOSIS — R47.01 APHASIA: Primary | ICD-10-CM

## 2025-02-28 NOTE — PROGRESS NOTES
Outpatient Rehab    Speech-Language Pathology Visit    Patient Name: Tali Beard  MRN: [id number]  YOB: 1935  Encounter Date: 2025    Therapy Diagnosis: No diagnosis found.  Physician: Lili Booker MD    Physician Orders: Eval and Treat  Medical Diagnosis: Nontraumatic hemorrhage of right cerebral hemisphere I61.2     Visit # / Visits Authorized:     Date of Evaluation:  24   Insurance Authorization Period: 25 to 3/28/25  Plan of Care Certification:  25 to 3/28/25      Time In: 1100   Time Out: 1130  Total Time: 30   Total Billable Time: 30       Subjective   Pt ambulated to SLP office w/o AD. Pt pleasant and in good spirits..  Pain reported as 0/10. no pain expressed      Objective            Treatment  Speech  Speech: Picture scene description         Assessment & Plan   Assessment  Pt formulated sentences to describe picture scenes via iPad w/ 60% acc [place], increasing to 80% acc modA. Improvement in word finding via noun and verb usage. Pt able to I'ly participate in conversation regarding upcoming  for a family member. Overall good session.  Evaluation/Treatment Tolerance: Patient tolerated treatment well    Patient will continue to benefit from skilled outpatient speech therapy to address the deficits listed in the problem list box on initial evaluation, provide pt/family education and to maximize pt's level of independence in the home and community environment.     Patient's spiritual, cultural, and educational needs considered and patient agreeable to plan of care and goals.          Plan  continue SLP POC targeting expressive language deficits.  Diet Recommendation: Oral diet       Goals:   Active       LTG       Patient will develop functional expressive language skills to communicate wants,needs, and emotions effectively to variety of communication partners in medical and home environments (Progressing)       Start:  25    Expected End:   03/28/25               STG       Patient will recall 3 basic time and personal information facts with environmental cues and minimal assistance for use of strategies.  (Progressing)       Start:  02/24/25    Expected End:  03/28/25            Patient will complete word finding task (i.e. Creating subject, verb, object pairs in VNEST protocol) with 90% acc min A to improve word fluency.  (Progressing)       Start:  02/24/25    Expected End:  03/28/25            Patient will expressively produce 3+ word phrase to state object function with 80% accuracy Vy given initial phoneme cue as needed across 2 sessions. (Progressing)       Start:  02/24/25    Expected End:  03/28/25                  Sara Davila, DONITA-SLP

## 2025-03-04 ENCOUNTER — HOSPITAL ENCOUNTER (EMERGENCY)
Facility: HOSPITAL | Age: OVER 89
Discharge: HOME | End: 2025-03-04
Attending: EMERGENCY MEDICINE
Payer: MEDICARE

## 2025-03-04 VITALS
HEART RATE: 85 BPM | DIASTOLIC BLOOD PRESSURE: 80 MMHG | WEIGHT: 146 LBS | HEIGHT: 63 IN | SYSTOLIC BLOOD PRESSURE: 100 MMHG | RESPIRATION RATE: 20 BRPM | TEMPERATURE: 100 F | BODY MASS INDEX: 25.87 KG/M2 | OXYGEN SATURATION: 100 %

## 2025-03-04 DIAGNOSIS — B34.9 VIRAL ILLNESS: Primary | ICD-10-CM

## 2025-03-04 LAB
ALBUMIN SERPL-MCNC: 3.2 G/DL (ref 3.4–4.8)
ALBUMIN/GLOB SERPL: 0.7 RATIO (ref 1.1–2)
ALP SERPL-CCNC: 114 UNIT/L (ref 40–150)
ALT SERPL-CCNC: 13 UNIT/L (ref 0–55)
ANION GAP SERPL CALC-SCNC: 11 MEQ/L
AST SERPL-CCNC: 18 UNIT/L (ref 5–34)
BASOPHILS # BLD AUTO: 0.02 X10(3)/MCL
BASOPHILS NFR BLD AUTO: 0.2 %
BILIRUB SERPL-MCNC: 0.5 MG/DL
BUN SERPL-MCNC: 12.4 MG/DL (ref 9.8–20.1)
CALCIUM SERPL-MCNC: 9.8 MG/DL (ref 8.4–10.2)
CHLORIDE SERPL-SCNC: 105 MMOL/L (ref 98–107)
CO2 SERPL-SCNC: 24 MMOL/L (ref 23–31)
CREAT SERPL-MCNC: 0.89 MG/DL (ref 0.55–1.02)
CREAT/UREA NIT SERPL: 14
EOSINOPHIL # BLD AUTO: 0.14 X10(3)/MCL (ref 0–0.9)
EOSINOPHIL NFR BLD AUTO: 1.7 %
ERYTHROCYTE [DISTWIDTH] IN BLOOD BY AUTOMATED COUNT: 15.5 % (ref 11.5–17)
FLUAV AG UPPER RESP QL IA.RAPID: NOT DETECTED
FLUBV AG UPPER RESP QL IA.RAPID: NOT DETECTED
GFR SERPLBLD CREATININE-BSD FMLA CKD-EPI: >60 ML/MIN/1.73/M2
GLOBULIN SER-MCNC: 4.8 GM/DL (ref 2.4–3.5)
GLUCOSE SERPL-MCNC: 110 MG/DL (ref 82–115)
HCT VFR BLD AUTO: 38.5 % (ref 37–47)
HGB BLD-MCNC: 11.8 G/DL (ref 12–16)
IMM GRANULOCYTES # BLD AUTO: 0.02 X10(3)/MCL (ref 0–0.04)
IMM GRANULOCYTES NFR BLD AUTO: 0.2 %
LYMPHOCYTES # BLD AUTO: 0.98 X10(3)/MCL (ref 0.6–4.6)
LYMPHOCYTES NFR BLD AUTO: 11.6 %
MCH RBC QN AUTO: 23.3 PG (ref 27–31)
MCHC RBC AUTO-ENTMCNC: 30.6 G/DL (ref 33–36)
MCV RBC AUTO: 75.9 FL (ref 80–94)
MONOCYTES # BLD AUTO: 0.64 X10(3)/MCL (ref 0.1–1.3)
MONOCYTES NFR BLD AUTO: 7.6 %
NEUTROPHILS # BLD AUTO: 6.67 X10(3)/MCL (ref 2.1–9.2)
NEUTROPHILS NFR BLD AUTO: 78.7 %
PLATELET # BLD AUTO: 146 X10(3)/MCL (ref 130–400)
PMV BLD AUTO: 10.4 FL (ref 7.4–10.4)
POTASSIUM SERPL-SCNC: 4.1 MMOL/L (ref 3.5–5.1)
PROT SERPL-MCNC: 8 GM/DL (ref 5.8–7.6)
RBC # BLD AUTO: 5.07 X10(6)/MCL (ref 4.2–5.4)
RSV A 5' UTR RNA NPH QL NAA+PROBE: NOT DETECTED
SARS-COV-2 RNA RESP QL NAA+PROBE: NOT DETECTED
SODIUM SERPL-SCNC: 140 MMOL/L (ref 136–145)
WBC # BLD AUTO: 8.47 X10(3)/MCL (ref 4.5–11.5)

## 2025-03-04 PROCEDURE — 80053 COMPREHEN METABOLIC PANEL: CPT | Performed by: EMERGENCY MEDICINE

## 2025-03-04 PROCEDURE — 0241U COVID/RSV/FLU A&B PCR: CPT | Performed by: EMERGENCY MEDICINE

## 2025-03-04 PROCEDURE — 85025 COMPLETE CBC W/AUTO DIFF WBC: CPT | Performed by: EMERGENCY MEDICINE

## 2025-03-04 PROCEDURE — 99284 EMERGENCY DEPT VISIT MOD MDM: CPT | Mod: 25

## 2025-03-04 PROCEDURE — 25000003 PHARM REV CODE 250: Performed by: SPECIALIST

## 2025-03-04 RX ORDER — ACETAMINOPHEN 500 MG
1000 TABLET ORAL
Status: COMPLETED | OUTPATIENT
Start: 2025-03-04 | End: 2025-03-04

## 2025-03-04 RX ORDER — ONDANSETRON 4 MG/1
4 TABLET, ORALLY DISINTEGRATING ORAL
Status: COMPLETED | OUTPATIENT
Start: 2025-03-04 | End: 2025-03-04

## 2025-03-04 RX ADMIN — ONDANSETRON 4 MG: 4 TABLET, ORALLY DISINTEGRATING ORAL at 06:03

## 2025-03-04 RX ADMIN — ACETAMINOPHEN 1000 MG: 500 TABLET ORAL at 06:03

## 2025-03-04 NOTE — ED PROVIDER NOTES
NAME:  Tali Beard  CSN:     [id number]  MRN:    [id number]  ADMIT DATE: 3/4/2025        eMERGENCY dEPARTMENT eNCOUnter    CHIEF COMPLAINT    Chief Complaint   Patient presents with    Chills     Per son, pt began trembling about 45 minutes PTA; pt nonverbal d/t CVA, no complaints; son denies cough, exposure to illness, any other changes        HPI      Tali Beard is a 89 y.o. female who presents to the ED for fever and chills.  This occurred this afternoon.  Patient has speech difficulties secondary to a previous stroke but they state that today she is not able to talk at all.  She has not had any complaints at home.          ALLERGIES    Review of patient's allergies indicates:   Allergen Reactions    Hydroxyzine hcl      Other Reaction(s): BUMPS ALL OVER BODY    Other reaction(s): BUMPS ALL OVER BODY    Felodipine Rash     Edema in the legs       PAST MEDICAL HISTORY  Past Medical History:   Diagnosis Date    Subarachnoid hemorrhage        SURGICAL HISTORY    No past surgical history on file.    SOCIAL HISTORY    Social History     Socioeconomic History    Marital status:    Tobacco Use    Smoking status: Never     Passive exposure: Never    Smokeless tobacco: Never   Substance and Sexual Activity    Alcohol use: Not Currently    Drug use: Never    Sexual activity: Not Currently     Social Drivers of Health     Financial Resource Strain: Low Risk  (11/8/2024)    Overall Financial Resource Strain (CARDIA)     Difficulty of Paying Living Expenses: Not hard at all   Food Insecurity: No Food Insecurity (11/8/2024)    Hunger Vital Sign     Worried About Running Out of Food in the Last Year: Never true     Ran Out of Food in the Last Year: Never true   Transportation Needs: No Transportation Needs (11/8/2024)    TRANSPORTATION NEEDS     Transportation : No   Physical Activity: Inactive (11/8/2024)    Exercise Vital Sign     Days of Exercise per Week: 0 days     Minutes of Exercise per Session: 0 min  "  Stress: No Stress Concern Present (11/8/2024)    [employer] - Occupational Stress Questionnaire     Feeling of Stress : Only a little   Housing Stability: Unknown (11/8/2024)    Housing Stability Vital Sign     Unable to Pay for Housing in the Last Year: No     Homeless in the Last Year: No       FAMILY HISTORY    No family history on file.    REVIEW OF SYSTEMS   ROS  All Systems otherwise negative except as noted in the History of Present Illness.        PHYSICAL EXAM    Reviewed Triage Note  VITAL SIGNS:   ED Triage Vitals [03/04/25 1733]   Encounter Vitals Group      BP (!) 166/87      Systolic BP Percentile       Diastolic BP Percentile       Pulse 93      Resp 20      Temp (!) 100.9 °F (38.3 °C)      Temp Source Oral      SpO2 98 %      Weight 146 lb      Height 5' 3"      Head Circumference       Peak Flow       Pain Score       Pain Loc       Pain Education       Exclude from Growth Chart        Patient Vitals for the past 24 hrs:   BP Temp Temp src Pulse Resp SpO2 Height Weight   03/04/25 2032 100/80 -- -- 85 -- 100 % -- --   03/04/25 1915 -- 100.2 °F (37.9 °C) Axillary -- -- -- -- --   03/04/25 1903 (!) 146/71 -- -- 82 -- 100 % -- --   03/04/25 1833 (!) 132/102 -- -- 91 -- 100 % -- --   03/04/25 1800 (!) 147/55 -- -- 88 -- 100 % -- --   03/04/25 1733 (!) 166/87 (!) 100.9 °F (38.3 °C) Oral 93 20 98 % 5' 3" (1.6 m) 66.2 kg (146 lb)           Physical Exam    Constitutional:  Well-developed, well-nourished. No acute distress  HENT:  Normocephalic, atraumatic.  Eyes:  EOMI. Conjunctiva normal without discharge.   Neck: Normal range of motion.No stridor. No meningismus.   Respiratory:  Normal breath sounds bilaterally.  No respiratory distress, retractions, or conversational dyspnea. No wheezing. No rhonchi. No rales.   Cardiovascular:  Normal heart rate. Normal rhythm. No pitting lower extremity edema.   GI:  Abdomen soft, non-distended, non-tender. Normal bowel sounds. No " guarding, rigidity or rebound.    : No CVA tenderness.   Musculoskeletal:  No gross deformity or limited range of motion of all major joints. No palpable bony deformity. No tenderness to palpation.  Integument:  Warm and dry. No rash.  Neurologic:  Normal motor function. Normal sensory function. No focal deficits noted. Alert and Interactive.  Psychiatric:  Affect normal. Mood normal.         LABS  Pertinent labs reviewed. (See chart for details)   Labs Reviewed   COMPREHENSIVE METABOLIC PANEL - Abnormal       Result Value    Sodium 140      Potassium 4.1      Chloride 105      CO2 24      Glucose 110      Blood Urea Nitrogen 12.4      Creatinine 0.89      Calcium 9.8      Protein Total 8.0 (*)     Albumin 3.2 (*)     Globulin 4.8 (*)     Albumin/Globulin Ratio 0.7 (*)     Bilirubin Total 0.5            ALT 13      AST 18      eGFR >60      Anion Gap 11.0      BUN/Creatinine Ratio 14     CBC WITH DIFFERENTIAL - Abnormal    WBC 8.47      RBC 5.07      Hgb 11.8 (*)     Hct 38.5      MCV 75.9 (*)     MCH 23.3 (*)     MCHC 30.6 (*)     RDW 15.5      Platelet 146      MPV 10.4      Neut % 78.7      Lymph % 11.6      Mono % 7.6      Eos % 1.7      Basophil % 0.2      Imm Grans % 0.2      Neut # 6.67      Lymph # 0.98      Mono # 0.64      Eos # 0.14      Baso # 0.02      Imm Gran # 0.02     COVID/RSV/FLU A&B PCR - Normal    Influenza A PCR Not Detected      Influenza B PCR Not Detected      Respiratory Syncytial Virus PCR Not Detected      SARS-CoV-2 PCR Not Detected      Narrative:     The Xpert Xpress SARS-CoV-2/FLU/RSV plus is a rapid, multiplexed real-time PCR test intended for the simultaneous qualitative detection and differentiation of SARS-CoV-2, Influenza A, Influenza B, and respiratory syncytial virus (RSV) viral RNA in either nasopharyngeal swab or nasal swab specimens.         CBC W/ AUTO DIFFERENTIAL    Narrative:     The following orders were created for panel order CBC auto differential.  Procedure  "                              Abnormality         Status                     ---------                               -----------         ------                     CBC with Differential[[id number]]       Abnormal            Final result                 Please view results for these tests on the individual orders.         RADIOLOGY    Imaging Results              X-Ray Chest AP Portable (Preliminary result)  Result time 03/04/25 19:57:35      Wet Read by Herminio Onofre MD (03/04/25 19:57:35, [facility] - Emergency Dept, Emergency Medicine)    No acute cardiopulmonary process                                    PROCEDURES    Procedures      EKG     Interpreted by ERP:          ED COURSE & MEDICAL DECISION MAKING    Pertinent & Imaging studies reviewed. (See chart for details and specific orders.)    I, Dr. Onofre, assumed care of this patient at 6:00 p.m. from Dr. Funez; patient's exam is unremarkable other than her speech difficulty which her son states is now at her baseline; her workup has been unremarkable; she was unable to give urinalysis but her son states she has not had any symptoms and will bring her to her primary care office tomorrow if she continues to have fever and check a UA    Medications   acetaminophen tablet 1,000 mg (1,000 mg Oral Given 3/4/25 1823)   ondansetron disintegrating tablet 4 mg (4 mg Oral Given 3/4/25 1835)        Patient Vitals for the past 24 hrs:   BP Temp Temp src Pulse Resp SpO2 Height Weight   03/04/25 2032 100/80 -- -- 85 -- 100 % -- --   03/04/25 1915 -- 100.2 °F (37.9 °C) Axillary -- -- -- -- --   03/04/25 1903 (!) 146/71 -- -- 82 -- 100 % -- --   03/04/25 1833 (!) 132/102 -- -- 91 -- 100 % -- --   03/04/25 1800 (!) 147/55 -- -- 88 -- 100 % -- --   03/04/25 1733 (!) 166/87 (!) 100.9 °F (38.3 °C) Oral 93 20 98 % 5' 3" (1.6 m) 66.2 kg (146 lb)     The patient is resting comfortably and in no acute distress.  She states that her symptoms have improved after treatment in " Emergency Department. I personally discussed her test results and treatment plan.  Gave strict ED precautions.  Specific conditions for return to the emergency department and importance of follow up with her primary care provided or the physician listed on the discharge instructions.  Patient voices understanding and agrees to the plan discussed. All patients' questions have been answered at this time.   She has remained hemodynamically stable throughout entire stay in ED and is stable for discharge home.         DISPOSITION  Patient  in stable condition at 3/4/2025  8:52 PM      DISCHARGE INSTRUCTIONS & MEDS       Medication List        ASK your doctor about these medications      amLODIPine 5 MG tablet  Commonly known as: NORVASC  Take 1 tablet (5 mg total) by mouth once daily.     atorvastatin 40 MG tablet  Commonly known as: LIPITOR  Take 1 tablet (40 mg total) by mouth every evening.     latanoprost 0.005 % ophthalmic solution     levETIRAcetam 500 MG Tab  Commonly known as: KEPPRA  Take 1 tablet (500 mg total) by mouth 2 (two) times daily.     timolol maleate 0.5% 0.5 % Drop  Commonly known as: TIMOPTIC                Discharge Medication List as of 3/4/2025  8:31 PM              FINAL IMPRESSION    1. Viral illness              Blood Pressure Follow-Up Advised  Patient advised to follow up with PCP within 3-5 days for blood pressure re-check if blood pressure is equal to or greater than 120/80.         Critical care time spent with this patient (not including separately billable items) was  0 minutes.     DISCLAIMER: This note was prepared with Dragon NaturallySpeaking voice recognition transcription software. Garbled syntax, mangled pronouns, and other bizarre constructions may be attributed to that software system.      Herminio Onofre MD  03/05/2025  5:50 PM           Herminio Onofre MD  03/05/25 0026

## 2025-03-09 ENCOUNTER — HOSPITAL ENCOUNTER (EMERGENCY)
Facility: HOSPITAL | Age: OVER 89
Discharge: HOME | End: 2025-03-09
Attending: EMERGENCY MEDICINE
Payer: MEDICARE

## 2025-03-09 VITALS
WEIGHT: 146 LBS | SYSTOLIC BLOOD PRESSURE: 132 MMHG | TEMPERATURE: 98 F | OXYGEN SATURATION: 100 % | HEART RATE: 65 BPM | DIASTOLIC BLOOD PRESSURE: 56 MMHG | HEIGHT: 63 IN | BODY MASS INDEX: 25.87 KG/M2 | RESPIRATION RATE: 18 BRPM

## 2025-03-09 DIAGNOSIS — M79.605 PAIN AND SWELLING OF LEFT LOWER EXTREMITY: ICD-10-CM

## 2025-03-09 DIAGNOSIS — M79.89 PAIN AND SWELLING OF LEFT LOWER EXTREMITY: ICD-10-CM

## 2025-03-09 DIAGNOSIS — N39.0 URINARY TRACT INFECTION WITHOUT HEMATURIA, SITE UNSPECIFIED: Primary | ICD-10-CM

## 2025-03-09 LAB
ANION GAP SERPL CALC-SCNC: 8 MEQ/L
BACTERIA #/AREA URNS AUTO: ABNORMAL /HPF
BASOPHILS # BLD AUTO: 0.02 X10(3)/MCL
BASOPHILS NFR BLD AUTO: 0.4 %
BILIRUB UR QL STRIP.AUTO: NEGATIVE
BUN SERPL-MCNC: 13.8 MG/DL (ref 9.8–20.1)
CALCIUM SERPL-MCNC: 9.5 MG/DL (ref 8.4–10.2)
CHLORIDE SERPL-SCNC: 106 MMOL/L (ref 98–107)
CLARITY UR: ABNORMAL
CO2 SERPL-SCNC: 28 MMOL/L (ref 23–31)
COLOR UR AUTO: ABNORMAL
CREAT SERPL-MCNC: 0.81 MG/DL (ref 0.55–1.02)
CREAT/UREA NIT SERPL: 17
EOSINOPHIL # BLD AUTO: 0.19 X10(3)/MCL (ref 0–0.9)
EOSINOPHIL NFR BLD AUTO: 3.8 %
ERYTHROCYTE [DISTWIDTH] IN BLOOD BY AUTOMATED COUNT: 15.1 % (ref 11.5–17)
GFR SERPLBLD CREATININE-BSD FMLA CKD-EPI: >60 ML/MIN/1.73/M2
GLUCOSE SERPL-MCNC: 119 MG/DL (ref 82–115)
GLUCOSE UR QL STRIP: NEGATIVE
HCT VFR BLD AUTO: 37.5 % (ref 37–47)
HGB BLD-MCNC: 11.4 G/DL (ref 12–16)
HGB UR QL STRIP: ABNORMAL
IMM GRANULOCYTES # BLD AUTO: 0.01 X10(3)/MCL (ref 0–0.04)
IMM GRANULOCYTES NFR BLD AUTO: 0.2 %
KETONES UR QL STRIP: NEGATIVE
LEUKOCYTE ESTERASE UR QL STRIP: NEGATIVE
LYMPHOCYTES # BLD AUTO: 1.24 X10(3)/MCL (ref 0.6–4.6)
LYMPHOCYTES NFR BLD AUTO: 24.7 %
MCH RBC QN AUTO: 23.1 PG (ref 27–31)
MCHC RBC AUTO-ENTMCNC: 30.4 G/DL (ref 33–36)
MCV RBC AUTO: 76.1 FL (ref 80–94)
MONOCYTES # BLD AUTO: 0.56 X10(3)/MCL (ref 0.1–1.3)
MONOCYTES NFR BLD AUTO: 11.2 %
NEUTROPHILS # BLD AUTO: 3 X10(3)/MCL (ref 2.1–9.2)
NEUTROPHILS NFR BLD AUTO: 59.7 %
NITRITE UR QL STRIP: NEGATIVE
PH UR STRIP: 6 [PH]
PLATELET # BLD AUTO: 157 X10(3)/MCL (ref 130–400)
PMV BLD AUTO: 10.3 FL (ref 7.4–10.4)
POTASSIUM SERPL-SCNC: 4.2 MMOL/L (ref 3.5–5.1)
PROT UR QL STRIP: NEGATIVE
RBC # BLD AUTO: 4.93 X10(6)/MCL (ref 4.2–5.4)
RBC #/AREA URNS AUTO: ABNORMAL /HPF
SODIUM SERPL-SCNC: 142 MMOL/L (ref 136–145)
SP GR UR STRIP.AUTO: 1.02 (ref 1–1.03)
SQUAMOUS #/AREA URNS AUTO: ABNORMAL /HPF
UROBILINOGEN UR STRIP-ACNC: 0.2
WBC # BLD AUTO: 5.02 X10(3)/MCL (ref 4.5–11.5)
WBC #/AREA URNS AUTO: ABNORMAL /HPF

## 2025-03-09 PROCEDURE — 85025 COMPLETE CBC W/AUTO DIFF WBC: CPT | Performed by: EMERGENCY MEDICINE

## 2025-03-09 PROCEDURE — 99284 EMERGENCY DEPT VISIT MOD MDM: CPT | Mod: 25

## 2025-03-09 PROCEDURE — 80048 BASIC METABOLIC PNL TOTAL CA: CPT | Performed by: EMERGENCY MEDICINE

## 2025-03-09 PROCEDURE — 81003 URINALYSIS AUTO W/O SCOPE: CPT | Performed by: EMERGENCY MEDICINE

## 2025-03-09 RX ORDER — NITROFURANTOIN 25; 75 MG/1; MG/1
100 CAPSULE ORAL 2 TIMES DAILY
Qty: 10 CAPSULE | Refills: 0 | Status: SHIPPED | OUTPATIENT
Start: 2025-03-09 | End: 2025-03-14

## 2025-03-09 NOTE — ED PROVIDER NOTES
Encounter Date: 3/9/2025       History     Chief Complaint   Patient presents with    Leg Pain     Leg swelling x 2 days with pain x 1 day      This 89-year-old is brought in by her son with complaints of pain in her left lower leg in the calf with associated swelling the past 2 days.  In addition she has been having some dysuria for the past week.  She has aphasia secondary to a subarachnoid hemorrhage.  Almost all of her history is provided by her son.       Review of patient's allergies indicates:   Allergen Reactions    Hydroxyzine hcl      Other Reaction(s): BUMPS ALL OVER BODY    Other reaction(s): BUMPS ALL OVER BODY    Felodipine Rash     Edema in the legs     Past Medical History:   Diagnosis Date    Kaposi's sarcoma, skin     Subarachnoid hemorrhage      History reviewed. No pertinent surgical history.  No family history on file.  Social History[1]  Review of Systems   Constitutional:  Negative for fever.   HENT:  Negative for sore throat.    Respiratory:  Negative for shortness of breath.    Cardiovascular:  Positive for leg swelling. Negative for chest pain.   Gastrointestinal:  Negative for nausea.   Genitourinary:  Positive for dysuria.   Musculoskeletal:  Negative for back pain.   Skin:  Negative for rash.   Neurological:  Negative for weakness.   Hematological:  Does not bruise/bleed easily.       Physical Exam     Initial Vitals [03/09/25 0739]   BP Pulse Resp Temp SpO2   137/77 76 18 97.9 °F (36.6 °C) 99 %      MAP       --         Physical Exam    Nursing note and vitals reviewed.  Constitutional: She appears well-developed and well-nourished.   HENT:   Head: Normocephalic and atraumatic.   Eyes: Conjunctivae and EOM are normal. Pupils are equal, round, and reactive to light.   Neck: Neck supple.   Normal range of motion.  Cardiovascular:  Normal rate, regular rhythm, normal heart sounds and intact distal pulses.           Pulmonary/Chest: Breath sounds normal.   Abdominal: Abdomen is soft. Bowel  sounds are normal.   Musculoskeletal:         General: Normal range of motion.      Cervical back: Normal range of motion and neck supple.     Neurological: She is alert and oriented to person, place, and time. She has normal strength.   Skin: Skin is warm and dry. Capillary refill takes less than 2 seconds.   Psychiatric: She has a normal mood and affect. Her behavior is normal. Judgment and thought content normal.         ED Course   Procedures  Labs Reviewed   URINALYSIS, REFLEX TO URINE CULTURE - Abnormal       Result Value    Color, UA Straw      Appearance, UA Slightly Cloudy (*)     Specific Gravity, UA 1.020      pH, UA 6.0      Protein, UA Negative      Glucose, UA Negative      Ketones, UA Negative      Blood, UA Small (*)     Bilirubin, UA Negative      Urobilinogen, UA 0.2      Nitrites, UA Negative      Leukocyte Esterase, UA Negative     BASIC METABOLIC PANEL - Abnormal    Sodium 142      Potassium 4.2      Chloride 106      CO2 28      Glucose 119 (*)     Blood Urea Nitrogen 13.8      Creatinine 0.81      BUN/Creatinine Ratio 17      Calcium 9.5      Anion Gap 8.0      eGFR >60     CBC WITH DIFFERENTIAL - Abnormal    WBC 5.02      RBC 4.93      Hgb 11.4 (*)     Hct 37.5      MCV 76.1 (*)     MCH 23.1 (*)     MCHC 30.4 (*)     RDW 15.1      Platelet 157      MPV 10.3      Neut % 59.7      Lymph % 24.7      Mono % 11.2      Eos % 3.8      Basophil % 0.4      Imm Grans % 0.2      Neut # 3.00      Lymph # 1.24      Mono # 0.56      Eos # 0.19      Baso # 0.02      Imm Gran # 0.01     URINALYSIS, MICROSCOPIC - Abnormal    Bacteria, UA Moderate (*)     RBC, UA 6-10 (*)     WBC, UA 0-5      Squamous Epithelial Cells, UA Many (*)    CBC W/ AUTO DIFFERENTIAL    Narrative:     The following orders were created for panel order CBC auto differential.  Procedure                               Abnormality         Status                     ---------                               -----------         ------                      CBC with Differential[[id number]]       Abnormal            Final result                 Please view results for these tests on the individual orders.          Imaging Results              US Lower Extremity Veins Left (In process)                   X-Rays:   Independently Interpreted Readings:   Other Readings:  US tech : no DVT    Medications - No data to display  Medical Decision Making  Amount and/or Complexity of Data Reviewed  Labs: ordered.                                      Clinical Impression:  Final diagnoses:  [M79.605, M79.89] Pain and swelling of left lower extremity  [N39.0] Urinary tract infection without hematuria, site unspecified (Primary)          ED Disposition Condition    Discharge Stable          ED Prescriptions       Medication Sig Dispense Start Date End Date Auth. Provider    nitrofurantoin, macrocrystal-monohydrate, (MACROBID) 100 MG capsule Take 1 capsule (100 mg total) by mouth 2 (two) times daily. for 5 days 10 capsule 3/9/2025 3/14/2025 rTey Figueroa MD          Follow-up Information       Follow up With Specialties Details Why Contact Info    Aurora Moe, FNP Family Medicine Schedule an appointment as soon as possible for a visit  As needed, If symptoms worsen [address] [place] [id number]  [phone]                   [1]   Social History  Tobacco Use    Smoking status: Never     Passive exposure: Never    Smokeless tobacco: Never   Substance Use Topics    Alcohol use: Not Currently    Drug use: Never        Trey Figueroa MD  03/09/25 0951

## 2025-03-10 ENCOUNTER — CLINICAL SUPPORT (OUTPATIENT)
Dept: REHABILITATION | Facility: HOSPITAL | Age: OVER 89
End: 2025-03-10
Attending: PEDIATRICS
Payer: MEDICARE

## 2025-03-10 DIAGNOSIS — R47.01 APHASIA: Primary | ICD-10-CM

## 2025-03-10 PROCEDURE — 92507 TX SP LANG VOICE COMM INDIV: CPT | Mod: KX

## 2025-03-10 NOTE — PROGRESS NOTES
Outpatient Rehab    Speech-Language Pathology Visit    Patient Name: Tali Beard  MRN: [id number]  YOB: 1935  Encounter Date: 3/10/2025    Therapy Diagnosis: No diagnosis found.  Physician: Lili Booker MD    Physician Orders: Eval and Treat  Medical Diagnosis: Nontraumatic hemorrhage of right cerebral hemisphere I61.2     Visit # / Visits Authorized:  10 / 16   Date of Evaluation:  12/5/25   Insurance Authorization Period: 1/31/25 to 3/28/25  Plan of Care Certification:  1/31/25 to 3/28/25      Time In: 1100   Time Out: 1130  Total Time: 30   Total Billable Time: 30       Subjective   Pt ambulated to SLP office w/o AD. Pt's son reports swelling in LE to which they went to the ER last night. Pt denies any pain or swelling today..           Objective            Treatment  Speech  Verbal Expression: Conversation targeting word finding and sentence formulation.    Assessment & Plan   Assessment  SLP provided conversation starters to engage Pt in structured conversation to target word finding, sentence formulation and expanding utterance length. min verbal cueing required to participate in conversation w/ appropriate responses related to topic at hand. Pt w/ occasional perseverations which required verbal cueing. Pt unable to self correct in these moments. Pt also fidgeting w/ jacket on chair (took off initially and then put back on during session) which resulted in decreased attention and appropriate responses during language task. Overall good session.  Evaluation/Treatment Tolerance: Patient tolerated treatment well    Patient will continue to benefit from skilled outpatient speech therapy to address the deficits listed in the problem list box on initial evaluation, provide pt/family education and to maximize pt's level of independence in the home and community environment.     Patient's spiritual, cultural, and educational needs considered and patient agreeable to plan of care and goals.           Plan  Continue SLP POC  Diet Recommendation: Oral diet       Goals:   Active       LTG       Patient will develop functional expressive language skills to communicate wants,needs, and emotions effectively to variety of communication partners in medical and home environments (Progressing)       Start:  02/24/25    Expected End:  03/28/25               STG       Patient will recall 3 basic time and personal information facts with environmental cues and minimal assistance for use of strategies.  (Progressing)       Start:  02/24/25    Expected End:  03/28/25            Patient will complete word finding task (i.e. Creating subject, verb, object pairs in VNEST protocol) with 90% acc min A to improve word fluency.  (Progressing)       Start:  02/24/25    Expected End:  03/28/25            Patient will expressively produce 3+ word phrase to state object function with 80% accuracy Vy given initial phoneme cue as needed across 2 sessions. (Progressing)       Start:  02/24/25    Expected End:  03/28/25                Sara Davila, DONITA-SLP

## 2025-03-14 ENCOUNTER — CLINICAL SUPPORT (OUTPATIENT)
Dept: REHABILITATION | Facility: HOSPITAL | Age: OVER 89
End: 2025-03-14
Attending: PEDIATRICS
Payer: MEDICARE

## 2025-03-14 DIAGNOSIS — R47.01 APHASIA: Primary | ICD-10-CM

## 2025-03-14 PROCEDURE — 92507 TX SP LANG VOICE COMM INDIV: CPT

## 2025-03-14 NOTE — PROGRESS NOTES
Outpatient Rehab    Speech-Language Pathology Visit    Patient Name: Tali Beard  MRN: [id number]  YOB: 1935  Encounter Date: 3/14/2025    Therapy Diagnosis: No diagnosis found.  Physician: Lili Booker MD    Physician Orders: Eval and Treat  Medical Diagnosis: Nontraumatic hemorrhage of right cerebral hemisphere I61.2     Visit # / Visits Authorized:  11 / 16   Date of Evaluation:  12/5/25   Insurance Authorization Period: 1/31/25 to 3/28/25  Plan of Care Certification:  1/31/25 to 3/28/25      Time In: 1100   Time Out: 1130  Total Time: 30   Total Billable Time: 30       Subjective   Pt seen in SLP office. Pt pleasant and in good spirits..  Pain reported as 0/10.        Objective            Treatment  Speech  Verbal Expression: Select category member worksheet in Fo3  Other Speech Activity: Writing first and last name    Assessment & Plan   Assessment  Pt completed categorization worksheet (reading and selecting correct answer) given min-mod verbal and visual cueing throughout task. Pt wrote first and last name via copying model provided by SLP. Additional time required to complete tasks due to impaired attention.  Evaluation/Treatment Tolerance: Patient tolerated treatment well    Patient will continue to benefit from skilled outpatient speech therapy to address the deficits listed in the problem list box on initial evaluation, provide pt/family education and to maximize pt's level of independence in the home and community environment.     Patient's spiritual, cultural, and educational needs considered and patient agreeable to plan of care and goals.          Plan  Continue SLP POC  Diet Recommendation: Oral diet       Goals:   Active       LTG       Patient will develop functional expressive language skills to communicate wants,needs, and emotions effectively to variety of communication partners in medical and home environments (Progressing)       Start:  02/24/25    Expected End:   03/28/25               STG       Patient will recall 3 basic time and personal information facts with environmental cues and minimal assistance for use of strategies.  (Progressing)       Start:  02/24/25    Expected End:  03/28/25            Patient will complete word finding task (i.e. Creating subject, verb, object pairs in VNEST protocol) with 90% acc min A to improve word fluency.  (Progressing)       Start:  02/24/25    Expected End:  03/28/25            Patient will expressively produce 3+ word phrase to state object function with 80% accuracy yV given initial phoneme cue as needed across 2 sessions. (Progressing)       Start:  02/24/25    Expected End:  03/28/25                Sara Davila, DONITA-SLP

## 2025-03-17 ENCOUNTER — CLINICAL SUPPORT (OUTPATIENT)
Dept: REHABILITATION | Facility: HOSPITAL | Age: OVER 89
End: 2025-03-17
Attending: PEDIATRICS
Payer: MEDICARE

## 2025-03-17 DIAGNOSIS — R47.01 APHASIA: Primary | ICD-10-CM

## 2025-03-17 PROCEDURE — 92507 TX SP LANG VOICE COMM INDIV: CPT

## 2025-03-17 NOTE — PROGRESS NOTES
Outpatient Rehab    Speech-Language Pathology Visit    Patient Name: Tali Beard  MRN: [id number]  YOB: 1935  Encounter Date: 3/17/2025    Therapy Diagnosis: No diagnosis found.  Physician: Lili Booker MD    Physician Orders: Eval and Treat  Medical Diagnosis: Nontraumatic hemorrhage of right cerebral hemisphere I61.2     Visit # / Visits Authorized:  12 / 16   Date of Evaluation:  12/5/25   Insurance Authorization Period: 1/31/25 to 3/28/25  Plan of Care Certification:  1/31/25 to 3/28/25      Time In: 1100   Time Out: 1130  Total Time: 30   Total Billable Time: 30       Subjective   Pt seen in SLP office. Pt pleasant and in good spirits..  Pain reported as 0/10.        Objective            Treatment  Speech  Speech: Picture scene description targeting sentence structure and increasing utterance length.    Assessment & Plan   Assessment  SLP presented picture scene cards and Pt formulated appropriate grammatical structure while also targeting expanding utterance length. Task completed w/ 40% acc I'ly, increasing to 100% acc Vy. Improvement noted w/ overall verbal expression this date w/ increase accuracy and a decrease in amounth of cueing. Overall great session. Pt's son reports the Pt has been speaking well at home amongst family w/ improvement in sentence length.  Evaluation/Treatment Tolerance: Patient tolerated treatment well    Patient will continue to benefit from skilled outpatient speech therapy to address the deficits listed in the problem list box on initial evaluation, provide pt/family education and to maximize pt's level of independence in the home and community environment.     Patient's spiritual, cultural, and educational needs considered and patient agreeable to plan of care and goals.          Plan  Continue SLP POC  Diet Recommendation: Oral diet       Goals:   Active       LTG       Patient will develop functional expressive language skills to communicate  wants,needs, and emotions effectively to variety of communication partners in medical and home environments (Progressing)       Start:  02/24/25    Expected End:  03/28/25               STG       Patient will recall 3 basic time and personal information facts with environmental cues and minimal assistance for use of strategies.  (Met)       Start:  02/24/25    Expected End:  03/28/25    Resolved:  03/17/25         Patient will complete word finding task (i.e. Creating subject, verb, object pairs in VNEST protocol) with 90% acc min A to improve word fluency.  (Progressing)       Start:  02/24/25    Expected End:  03/28/25            Patient will expressively produce 3+ word phrase to state object function with 80% accuracy Vy given initial phoneme cue as needed across 2 sessions. (Met)       Start:  02/24/25    Expected End:  03/28/25    Resolved:  03/17/25               Sara Davila, DONITA-SLP

## 2025-03-18 DIAGNOSIS — I82.409 DVT (DEEP VENOUS THROMBOSIS): Primary | ICD-10-CM

## 2025-03-18 NOTE — PROGRESS NOTES
"    [facility] Vascular - Clinic Note  Juan Hurtado MD      Patient Name: Tali Beard                   : 1935      MRN: [id number]   Visit Date: 3/19/2025       History Present Illness     Reason for Visit: Deep Vein Thrombosis    Ms. Beard is a pleasant 89 y.o. female being referred over by Dr. Adame for evaluation of lower extremity swelling. She reports that the swelling started about two weeks ago and has gotten worse, left greater than the right.  She had 2 outpatient ultrasounds performed which were negative for DVT, but then saw Dr. Adame in his office and an ultrasound was obtained that showed bilateral iliofemoral deep vein thrombosis. 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 She had superficial resection of this on her left lower leg. She has a history of recurring hemorrhagic strokes. Most recent one this past November requiring her to come off of all anticoagulation. She remains with expressive aphasia and left sided weakness to her arm. She denies any prior history of DVT.         REVIEW OF SYSTEMS:  12 point review of systems conducted, negative except as stated in the history of present illness. See HPI for details.        Physical Exam      Vitals:    25 0931 25 0933   BP: (!) 143/74 135/81   BP Location: Left arm Right arm   Patient Position: Sitting Sitting   Pulse: 65 65   Weight: 62.6 kg (138 lb)    Height: 5' 3" (1.6 m)           General: well-nourished, no acute distress, and healthy appearing, alert, pleasant, and oriented  Neurologic: cranial nerves are grossly intact, stroke deficits with mild left upper and lower extremity weakness  Neck/Chest: normal , soft without lymphadenopathy, and no carotid bruits noted  Respiratory: breathing easily, without respiratory distress, and normal breath sounds  Abdomen: normal and soft  Cardiology: regular rate and rhythm and no audible murmur    Upper Extremity Arterial Exam:   Right - radial is palpable and brachial is " palpable  Left - radial is palpable and brachial is palpable    Lower Extremity Arterial Exam:  Right - posterior tibial is palpable  Left - posterior tibial is palpable    Musculoskeletal:   Upper Extremity: normal bilateral hand function,  weakness to left hand  Lower Extremity: right lower extremity has +1 edema and left lower extremity has trace edema with hemosiderin deposition consistent with chronic venous insufficiency     Skin: hemosiderin deposition to left lower extremity                Assessment and Plan     Ms. Beard is a 89 y.o. female with with bilateral lower extremity DVTs and a previous history of hemorrhagic stroke.  I explained with the patient at this point she is not a candidate for anticoagulation.  We did discuss placement of an IVC filter protection from pulmonary embolism.  I explained this to her and her son who is with her.  I also explained that we would need to proceed through an IJ access due to thrombosis of both femoral systems.  She understands procedure, risks and benefits and agrees to proceed.      1. DVT (deep venous thrombosis)  - Ambulatory referral/consult to Vascular Surgery          Imaging Obtained/Reviewed   Study: bilateral lower extremity venous duplex  Date:   3/18/2025  Repeated a DVT study to verify presence of thrombosis as well as to evaluate for a possible access site in the groin.  This shows bilateral iliofemoral DVTs.  There is no adequate common femoral vein access site based off of this ultrasound      Medical History     Past Medical History:   Diagnosis Date    History of DVT (deep vein thrombosis)     Kaposi's sarcoma, skin     Subarachnoid hemorrhage 10/03/2024     History reviewed. No pertinent surgical history.  No family history on file.  Social History[1]  Current Outpatient Medications   Medication Instructions    amLODIPine (NORVASC) 5 mg, Oral, Daily    atorvastatin (LIPITOR) 40 mg, Oral, Nightly    FEROSUL 325 mg (65 mg iron) Tab tablet Take by  mouth.    latanoprost 0.005 % ophthalmic solution Place into both eyes.    levETIRAcetam (KEPPRA) 500 mg, Oral, 2 times daily    multivit,iron,minerals/lutein (CENTRUM SILVER ULTRA WOMEN'S ORAL) Take by mouth.    timolol maleate 0.5% (TIMOPTIC) 0.5 % Drop Place into both eyes.     Review of patient's allergies indicates:   Allergen Reactions    Hydroxyzine hcl      Other Reaction(s): BUMPS ALL OVER BODY    Other reaction(s): BUMPS ALL OVER BODY    Felodipine Rash     Edema in the legs       Patient Care Team:  Aurora Moe FNP as PCP - General (Family Medicine)  Zina Yanez [facility] (Home Health Services)  Francisco Adame MD as Consulting Physician (Cardiothoracic Surgery)  Moises Hannah MD as Consulting Physician (Neurology)        No follow-ups on file. In addition to their scheduled follow up, the patient has also been instructed to follow up on as needed basis.     Future Appointments   Date Time Provider Department Center   3/21/2025 11:00 AM [facility]   3/24/2025 11:00 AM Sara [facility]ila [facility]   3/28/2025 11:00 AM Sara Davila CCC-SLP [facility]   6/12/2025 11:00 AM Yue Rodriguez FNP [facility] [address]             [1]   Social History  Socioeconomic History    Marital status:    Tobacco Use    Smoking status: Never     Passive exposure: Never    Smokeless tobacco: Never   Substance and Sexual Activity    Alcohol use: Not Currently    Drug use: Never    Sexual activity: Not Currently     Social Drivers of Health     Financial Resource Strain: Low Risk  (11/8/2024)    Overall Financial Resource Strain (CARDIA)     Difficulty of Paying Living Expenses: Not hard at all   Food Insecurity: No Food Insecurity (11/8/2024)    Hunger Vital Sign     Worried About Running Out of Food in the Last Year: Never true     Ran Out of Food in the Last Year: Never true   Transportation Needs: No Transportation  Needs (11/8/2024)    TRANSPORTATION NEEDS     Transportation : No   Physical Activity: Inactive (11/8/2024)    Exercise Vital Sign     Days of Exercise per Week: 0 days     Minutes of Exercise per Session: 0 min   Stress: No Stress Concern Present (11/8/2024)    [employer] [employer] - Occupational Stress Questionnaire     Feeling of Stress : Only a little   Housing Stability: Unknown (11/8/2024)    Housing Stability Vital Sign     Unable to Pay for Housing in the Last Year: No     Homeless in the Last Year: No

## 2025-03-18 NOTE — H&P (VIEW-ONLY)
"    [facility] Vascular - Clinic Note  Juan Hurtado MD      Patient Name: Tali Beard                   : 1935      MRN: [id number]   Visit Date: 3/19/2025       History Present Illness     Reason for Visit: Deep Vein Thrombosis    Ms. Beard is a pleasant 89 y.o. female being referred over by Dr. Adame for evaluation of lower extremity swelling. She reports that the swelling started about two weeks ago and has gotten worse, left greater than the right.  She had 2 outpatient ultrasounds performed which were negative for DVT, but then saw Dr. Adame in his office and an ultrasound was obtained that showed bilateral iliofemoral deep vein thrombosis. 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 She had superficial resection of this on her left lower leg. She has a history of recurring hemorrhagic strokes. Most recent one this past November requiring her to come off of all anticoagulation. She remains with expressive aphasia and left sided weakness to her arm. She denies any prior history of DVT.         REVIEW OF SYSTEMS:  12 point review of systems conducted, negative except as stated in the history of present illness. See HPI for details.        Physical Exam      Vitals:    25 0931 25 0933   BP: (!) 143/74 135/81   BP Location: Left arm Right arm   Patient Position: Sitting Sitting   Pulse: 65 65   Weight: 62.6 kg (138 lb)    Height: 5' 3" (1.6 m)           General: well-nourished, no acute distress, and healthy appearing, alert, pleasant, and oriented  Neurologic: cranial nerves are grossly intact, stroke deficits with mild left upper and lower extremity weakness  Neck/Chest: normal , soft without lymphadenopathy, and no carotid bruits noted  Respiratory: breathing easily, without respiratory distress, and normal breath sounds  Abdomen: normal and soft  Cardiology: regular rate and rhythm and no audible murmur    Upper Extremity Arterial Exam:   Right - radial is palpable and brachial is " palpable  Left - radial is palpable and brachial is palpable    Lower Extremity Arterial Exam:  Right - posterior tibial is palpable  Left - posterior tibial is palpable    Musculoskeletal:   Upper Extremity: normal bilateral hand function,  weakness to left hand  Lower Extremity: right lower extremity has +1 edema and left lower extremity has trace edema with hemosiderin deposition consistent with chronic venous insufficiency     Skin: hemosiderin deposition to left lower extremity                Assessment and Plan     Ms. Beard is a 89 y.o. female with with bilateral lower extremity DVTs and a previous history of hemorrhagic stroke.  I explained with the patient at this point she is not a candidate for anticoagulation.  We did discuss placement of an IVC filter protection from pulmonary embolism.  I explained this to her and her son who is with her.  I also explained that we would need to proceed through an IJ access due to thrombosis of both femoral systems.  She understands procedure, risks and benefits and agrees to proceed.      1. DVT (deep venous thrombosis)  - Ambulatory referral/consult to Vascular Surgery          Imaging Obtained/Reviewed   Study: bilateral lower extremity venous duplex  Date:   3/18/2025  Repeated a DVT study to verify presence of thrombosis as well as to evaluate for a possible access site in the groin.  This shows bilateral iliofemoral DVTs.  There is no adequate common femoral vein access site based off of this ultrasound      Medical History     Past Medical History:   Diagnosis Date    History of DVT (deep vein thrombosis)     Kaposi's sarcoma, skin     Subarachnoid hemorrhage 10/03/2024     History reviewed. No pertinent surgical history.  No family history on file.  Social History[1]  Current Outpatient Medications   Medication Instructions    amLODIPine (NORVASC) 5 mg, Oral, Daily    atorvastatin (LIPITOR) 40 mg, Oral, Nightly    FEROSUL 325 mg (65 mg iron) Tab tablet Take by  mouth.    latanoprost 0.005 % ophthalmic solution Place into both eyes.    levETIRAcetam (KEPPRA) 500 mg, Oral, 2 times daily    multivit,iron,minerals/lutein (CENTRUM SILVER ULTRA WOMEN'S ORAL) Take by mouth.    timolol maleate 0.5% (TIMOPTIC) 0.5 % Drop Place into both eyes.     Review of patient's allergies indicates:   Allergen Reactions    Hydroxyzine hcl      Other Reaction(s): BUMPS ALL OVER BODY    Other reaction(s): BUMPS ALL OVER BODY    Felodipine Rash     Edema in the legs       Patient Care Team:  Aurora Moe FNP as PCP - General (Family Medicine)  Zina Yanez [facility] (Home Health Services)  Francisco Adame MD as Consulting Physician (Cardiothoracic Surgery)  Moises Hannah MD as Consulting Physician (Neurology)        No follow-ups on file. In addition to their scheduled follow up, the patient has also been instructed to follow up on as needed basis.     Future Appointments   Date Time Provider Department Center   3[facility]21/2025 11:00 AM [facility]   3/24/2025 11:00 AM Sara [facility]ila [facility]   3/28/2025 11:00 AM Sara Davila CCC-SLP [facility]   6/12/2025 11:00 AM Yue Rodriguez FNP [facility] [address]             [1]   Social History  Socioeconomic History    Marital status:    Tobacco Use    Smoking status: Never     Passive exposure: Never    Smokeless tobacco: Never   Substance and Sexual Activity    Alcohol use: Not Currently    Drug use: Never    Sexual activity: Not Currently     Social Drivers of Health     Financial Resource Strain: Low Risk  (11/8/2024)    Overall Financial Resource Strain (CARDIA)     Difficulty of Paying Living Expenses: Not hard at all   Food Insecurity: No Food Insecurity (11/8/2024)    Hunger Vital Sign     Worried About Running Out of Food in the Last Year: Never true     Ran Out of Food in the Last Year: Never true   Transportation Needs: No Transportation  Needs (11/8/2024)    TRANSPORTATION NEEDS     Transportation : No   Physical Activity: Inactive (11/8/2024)    Exercise Vital Sign     Days of Exercise per Week: 0 days     Minutes of Exercise per Session: 0 min   Stress: No Stress Concern Present (11/8/2024)    [employer] - Occupational Stress Questionnaire     Feeling of Stress : Only a little   Housing Stability: Unknown (11/8/2024)    Housing Stability Vital Sign     Unable to Pay for Housing in the Last Year: No     Homeless in the Last Year: No

## 2025-03-19 ENCOUNTER — OFFICE VISIT (OUTPATIENT)
Dept: VASCULAR SURGERY | Facility: CLINIC | Age: OVER 89
End: 2025-03-19
Payer: MEDICARE

## 2025-03-19 VITALS
HEIGHT: 63 IN | WEIGHT: 138 LBS | DIASTOLIC BLOOD PRESSURE: 81 MMHG | SYSTOLIC BLOOD PRESSURE: 135 MMHG | BODY MASS INDEX: 24.45 KG/M2 | HEART RATE: 65 BPM

## 2025-03-19 DIAGNOSIS — I82.409 DVT (DEEP VENOUS THROMBOSIS): ICD-10-CM

## 2025-03-19 DIAGNOSIS — I82.429 DEEP VEIN THROMBOSIS (DVT) OF ILIAC VEIN, UNSPECIFIED CHRONICITY, UNSPECIFIED LATERALITY: Primary | ICD-10-CM

## 2025-03-19 DIAGNOSIS — I82.413 ACUTE DEEP VEIN THROMBOSIS (DVT) OF FEMORAL VEIN OF BOTH LOWER EXTREMITIES: ICD-10-CM

## 2025-03-19 DIAGNOSIS — R22.43 LOCALIZED SWELLING, MASS, OR LUMP OF LOWER EXTREMITY, BILATERAL: Primary | ICD-10-CM

## 2025-03-19 PROCEDURE — 99204 OFFICE O/P NEW MOD 45 MIN: CPT | Mod: ,,, | Performed by: SURGERY

## 2025-03-19 RX ORDER — POLYETHYLENE GLYCOL 3350 17 G/17G
POWDER, FOR SOLUTION ORAL
COMMUNITY
Start: 2025-01-28 | End: 2025-03-19

## 2025-03-19 RX ORDER — METFORMIN HYDROCHLORIDE 500 MG/1
500 TABLET ORAL
COMMUNITY
Start: 2025-01-23 | End: 2025-03-19

## 2025-03-19 RX ORDER — SODIUM CHLORIDE 9 MG/ML
INJECTION, SOLUTION INTRAVENOUS CONTINUOUS
Status: CANCELLED | OUTPATIENT
Start: 2025-03-19

## 2025-03-19 RX ORDER — FERROUS SULFATE 325(65) MG
TABLET ORAL
COMMUNITY

## 2025-03-20 ENCOUNTER — TELEPHONE (OUTPATIENT)
Dept: VASCULAR SURGERY | Facility: CLINIC | Age: OVER 89
End: 2025-03-20
Payer: MEDICARE

## 2025-03-21 ENCOUNTER — HOSPITAL ENCOUNTER (OUTPATIENT)
Facility: HOSPITAL | Age: OVER 89
Discharge: HOME | End: 2025-03-21
Attending: SURGERY | Admitting: SURGERY
Payer: MEDICARE

## 2025-03-21 VITALS
DIASTOLIC BLOOD PRESSURE: 61 MMHG | RESPIRATION RATE: 18 BRPM | SYSTOLIC BLOOD PRESSURE: 140 MMHG | WEIGHT: 137.13 LBS | TEMPERATURE: 98 F | HEIGHT: 64 IN | OXYGEN SATURATION: 97 % | BODY MASS INDEX: 23.41 KG/M2 | HEART RATE: 64 BPM

## 2025-03-21 DIAGNOSIS — I82.429 DEEP VEIN THROMBOSIS (DVT) OF ILIAC VEIN, UNSPECIFIED CHRONICITY, UNSPECIFIED LATERALITY: ICD-10-CM

## 2025-03-21 DIAGNOSIS — I82.409 DVT (DEEP VENOUS THROMBOSIS): ICD-10-CM

## 2025-03-21 PROCEDURE — 99152 MOD SED SAME PHYS/QHP 5/>YRS: CPT | Mod: ,,, | Performed by: SURGERY

## 2025-03-21 PROCEDURE — 99152 MOD SED SAME PHYS/QHP 5/>YRS: CPT | Performed by: SURGERY

## 2025-03-21 PROCEDURE — 25500020 PHARM REV CODE 255: Performed by: SURGERY

## 2025-03-21 PROCEDURE — C1769 GUIDE WIRE: HCPCS | Performed by: SURGERY

## 2025-03-21 PROCEDURE — 63600175 PHARM REV CODE 636 W HCPCS: Performed by: SURGERY

## 2025-03-21 PROCEDURE — 25000003 PHARM REV CODE 250: Performed by: SURGERY

## 2025-03-21 PROCEDURE — 37191 INS ENDOVAS VENA CAVA FILTR: CPT | Mod: ,,, | Performed by: SURGERY

## 2025-03-21 PROCEDURE — C1894 INTRO/SHEATH, NON-LASER: HCPCS | Performed by: SURGERY

## 2025-03-21 PROCEDURE — 37191 INS ENDOVAS VENA CAVA FILTR: CPT | Performed by: SURGERY

## 2025-03-21 PROCEDURE — C1880 VENA CAVA FILTER: HCPCS | Performed by: SURGERY

## 2025-03-21 DEVICE — OPTION ELITE RETRIEVABLE VENA CAVA FILTER SUITABLE FOR JUGULAR OR FEMORAL DELIVERY
Type: IMPLANTABLE DEVICE | Site: VENA CAVA | Status: FUNCTIONAL
Brand: OPTION ELITE RETRIEVABLE VENA CAVA FILTER SYSTEM

## 2025-03-21 RX ORDER — LIDOCAINE HYDROCHLORIDE 10 MG/ML
INJECTION, SOLUTION EPIDURAL; INFILTRATION; INTRACAUDAL; PERINEURAL
Status: DISCONTINUED | OUTPATIENT
Start: 2025-03-21 | End: 2025-03-21 | Stop reason: HOSPADM

## 2025-03-21 RX ORDER — FENTANYL CITRATE 50 UG/ML
INJECTION, SOLUTION INTRAMUSCULAR; INTRAVENOUS
Status: DISCONTINUED | OUTPATIENT
Start: 2025-03-21 | End: 2025-03-21 | Stop reason: HOSPADM

## 2025-03-21 RX ORDER — SODIUM CHLORIDE 9 MG/ML
INJECTION, SOLUTION INTRAVENOUS CONTINUOUS
Status: DISCONTINUED | OUTPATIENT
Start: 2025-03-21 | End: 2025-03-21 | Stop reason: HOSPADM

## 2025-03-21 RX ORDER — MIDAZOLAM HYDROCHLORIDE 1 MG/ML
INJECTION, SOLUTION INTRAMUSCULAR; INTRAVENOUS
Status: DISCONTINUED | OUTPATIENT
Start: 2025-03-21 | End: 2025-03-21 | Stop reason: HOSPADM

## 2025-03-21 RX ORDER — ACETAMINOPHEN 325 MG/1
650 TABLET ORAL EVERY 4 HOURS PRN
Status: DISCONTINUED | OUTPATIENT
Start: 2025-03-21 | End: 2025-03-21 | Stop reason: HOSPADM

## 2025-03-21 RX ORDER — ONDANSETRON 4 MG/1
8 TABLET, ORALLY DISINTEGRATING ORAL EVERY 8 HOURS PRN
Status: DISCONTINUED | OUTPATIENT
Start: 2025-03-21 | End: 2025-03-21 | Stop reason: HOSPADM

## 2025-03-21 RX ORDER — IOPAMIDOL 755 MG/ML
INJECTION, SOLUTION INTRAVASCULAR
Status: DISCONTINUED | OUTPATIENT
Start: 2025-03-21 | End: 2025-03-21 | Stop reason: HOSPADM

## 2025-03-21 RX ADMIN — SODIUM CHLORIDE: 9 INJECTION, SOLUTION INTRAVENOUS at 09:03

## 2025-03-21 NOTE — OP NOTE
[facility] Vascular Surgery Procedure Note    Patient: Tali Beard    Date of Procedure: 03/21/2025    Preop Diagnosis:  Bilateral iliofemoral DVT with inability to anticoagulate    Postop Diagnosis:  Same    Procedure: 1. Moderate sedation directly supervised for me for 19 minutes.   2. Ultrasound-guided access of the left jugular vein  3. Placement of IVC filter     Surgeon: Juan Hurtado    Indication for Procedure: Tali Beard is a 89 y.o. female  who has developed bilateral iliofemoral DVTs.  She has a history of previous hemorrhagic strokes and is unable to be anticoagulated.    Anesthesia:  Moderate sedation directly supervised by me and 1% lidocaine.  An independent observer was used to monitor cardiorespiratory function throughout the procedure.  Please see the procedure logs for timing and dosing of sedation medications as well as the name of the there was providing sedation.    Blood Loss:  5 mL    Findings:  Angiography noted the IVC to be patent.  The filter was in place orthogonal below the renal veins.  Ultrasound evaluation of the left IJ noted it to be patent.    Implants:  Argon option elite IVC filter    Procedure in Detail:  After informed consent was obtained, and risks and benefits discussed with the patient, she was brought to the cath lab and placed supine. After Adequate sedation was obtained, she was prepped in the standard sterile fashion.  Ultrasound evaluation of the left IJ was performed.  Local anesthetic was infused in the skin overlying the jugular vein.  A micropuncture needle was used to access the vein under direct ultrasound visualization.  I advanced my wire and verify that I was in the SVC.  I then used an 11 blade to make a skin nick.  I exchanged out for a microcatheter and placed an 035 wire.  I then placed a 5 [place] sheath.  I was at least pigtail catheter to advance down into the IVC.  Angiography was performed.  I then replaced my wire into the left iliac system.   I advanced the filter delivery sheath to the infrarenal position.  The filter was then advanced through the sheath and the sheath withdrawn to deploy the filter infrarenally.  Once this was done I performed completion venography to verify that I was below the renal veins.  The sheath was removed and pressure was held.  The patient was transferred back to cath lab holding in stable condition.    Complications: None    Contrast Volume: 16 mL    Specimens: None    Flouro Time: 3.5 min

## 2025-03-21 NOTE — Clinical Note
A venogram was performed of the inferior vena cava. The vessel was injected via single simultaneous hand injection through bilateral acess sheaths The venogram syringe was removed and the venogram is complete.

## 2025-03-21 NOTE — DISCHARGE SUMMARY
[facility] - Cath Lab Services  Discharge Note  Short Stay    Procedure(s) (LRB):  Insertion, Filter, Inferior Vena Cava (N/A)      OUTCOME: Patient tolerated treatment/procedure well without complication and is now ready for discharge.    DISPOSITION: Home or Self Care    FINAL DIAGNOSIS:  Deep vein thrombosis (DVT) of iliac vein    FOLLOWUP: None    DISCHARGE INSTRUCTIONS:    Discharge Procedure Orders   Diet general     No dressing needed     Activity as tolerated        TIME SPENT ON DISCHARGE: 5 minutes

## 2025-03-28 ENCOUNTER — DOCUMENTATION ONLY (OUTPATIENT)
Dept: REHABILITATION | Facility: HOSPITAL | Age: OVER 89
End: 2025-03-28
Payer: MEDICARE

## 2025-03-28 ENCOUNTER — HOSPITAL ENCOUNTER (OUTPATIENT)
Facility: HOSPITAL | Age: OVER 89
Discharge: HOME | End: 2025-03-29
Attending: EMERGENCY MEDICINE | Admitting: STUDENT IN AN ORGANIZED HEALTH CARE EDUCATION/TRAINING PROGRAM
Payer: MEDICARE

## 2025-03-28 DIAGNOSIS — R41.82 ALTERED MENTAL STATUS, UNSPECIFIED ALTERED MENTAL STATUS TYPE: Primary | ICD-10-CM

## 2025-03-28 DIAGNOSIS — R07.9 CHEST PAIN: ICD-10-CM

## 2025-03-28 DIAGNOSIS — R29.818 ACUTE FOCAL NEUROLOGICAL DEFICIT: ICD-10-CM

## 2025-03-28 LAB
ALBUMIN SERPL-MCNC: 3.4 G/DL (ref 3.4–4.8)
ALBUMIN/GLOB SERPL: 0.7 RATIO (ref 1.1–2)
ALP SERPL-CCNC: 111 UNIT/L (ref 40–150)
ALT SERPL-CCNC: 17 UNIT/L (ref 0–55)
ANION GAP SERPL CALC-SCNC: 12 MEQ/L
ANION GAP SERPL CALC-SCNC: 13 MMOL/L (ref 8–16)
AST SERPL-CCNC: 24 UNIT/L (ref 11–45)
BACTERIA #/AREA URNS AUTO: ABNORMAL /HPF
BASOPHILS # BLD AUTO: 0.04 X10(3)/MCL
BASOPHILS NFR BLD AUTO: 0.6 %
BILIRUB SERPL-MCNC: 0.4 MG/DL
BILIRUB UR QL STRIP.AUTO: NEGATIVE
BUN SERPL-MCNC: 16.7 MG/DL (ref 9.8–20.1)
BUN SERPL-MCNC: 20 MG/DL (ref 6–30)
CALCIUM SERPL-MCNC: 9.3 MG/DL (ref 8.4–10.2)
CHLORIDE SERPL-SCNC: 105 MMOL/L (ref 95–110)
CHLORIDE SERPL-SCNC: 106 MMOL/L (ref 98–107)
CHOLEST SERPL-MCNC: 184 MG/DL
CHOLEST/HDLC SERPL: 2 {RATIO} (ref 0–5)
CLARITY UR: CLEAR
CO2 SERPL-SCNC: 23 MMOL/L (ref 23–31)
COLOR UR AUTO: YELLOW
CREAT SERPL-MCNC: 1.36 MG/DL (ref 0.55–1.02)
CREAT SERPL-MCNC: 1.6 MG/DL (ref 0.5–1.4)
CREAT/UREA NIT SERPL: 12
EOSINOPHIL # BLD AUTO: 0.15 X10(3)/MCL (ref 0–0.9)
EOSINOPHIL NFR BLD AUTO: 2.4 %
ERYTHROCYTE [DISTWIDTH] IN BLOOD BY AUTOMATED COUNT: 15.9 % (ref 11.5–17)
GFR SERPLBLD CREATININE-BSD FMLA CKD-EPI: 37 ML/MIN/1.73/M2
GLOBULIN SER-MCNC: 4.8 GM/DL (ref 2.4–3.5)
GLUCOSE SERPL-MCNC: 181 MG/DL (ref 82–115)
GLUCOSE SERPL-MCNC: 182 MG/DL (ref 70–110)
GLUCOSE UR QL STRIP: NORMAL
HCT VFR BLD AUTO: 39.5 % (ref 37–47)
HCT VFR BLD CALC: 39 %PCV (ref 36–54)
HDLC SERPL-MCNC: 81 MG/DL (ref 35–60)
HGB BLD-MCNC: 11.7 G/DL (ref 12–16)
HGB BLD-MCNC: 13 G/DL
HGB UR QL STRIP: NEGATIVE
HYALINE CASTS #/AREA URNS LPF: ABNORMAL /LPF
IMM GRANULOCYTES # BLD AUTO: 0.03 X10(3)/MCL (ref 0–0.04)
IMM GRANULOCYTES NFR BLD AUTO: 0.5 %
INR PPP: 1.1
KETONES UR QL STRIP: NEGATIVE
LDLC SERPL CALC-MCNC: 88 MG/DL (ref 50–140)
LEUKOCYTE ESTERASE UR QL STRIP: NEGATIVE
LYMPHOCYTES # BLD AUTO: 1.01 X10(3)/MCL (ref 0.6–4.6)
LYMPHOCYTES NFR BLD AUTO: 16 %
MAGNESIUM SERPL-MCNC: 2.2 MG/DL (ref 1.6–2.6)
MCH RBC QN AUTO: 23.3 PG (ref 27–31)
MCHC RBC AUTO-ENTMCNC: 29.6 G/DL (ref 33–36)
MCV RBC AUTO: 78.5 FL (ref 80–94)
MONOCYTES # BLD AUTO: 0.43 X10(3)/MCL (ref 0.1–1.3)
MONOCYTES NFR BLD AUTO: 6.8 %
MUCOUS THREADS URNS QL MICRO: ABNORMAL /LPF
NEUTROPHILS # BLD AUTO: 4.67 X10(3)/MCL (ref 2.1–9.2)
NEUTROPHILS NFR BLD AUTO: 73.7 %
NITRITE UR QL STRIP: NEGATIVE
NRBC BLD AUTO-RTO: 0.3 %
PH UR STRIP: 6 [PH]
PLATELET # BLD AUTO: 111 X10(3)/MCL (ref 130–400)
PLATELETS.RETICULATED NFR BLD AUTO: 3.8 % (ref 0.9–11.2)
PMV BLD AUTO: 11.1 FL (ref 7.4–10.4)
POC IONIZED CALCIUM: 1.19 MMOL/L (ref 1.06–1.42)
POC TCO2 (MEASURED): 29 MMOL/L (ref 23–29)
POCT GLUCOSE: 180 MG/DL (ref 70–110)
POTASSIUM BLD-SCNC: 4.8 MMOL/L (ref 3.5–5.1)
POTASSIUM SERPL-SCNC: 4.9 MMOL/L (ref 3.5–5.1)
PROT SERPL-MCNC: 8.2 GM/DL (ref 5.8–7.6)
PROT UR QL STRIP: NEGATIVE
PROTHROMBIN TIME: 13.8 SECONDS (ref 12.5–14.5)
RBC # BLD AUTO: 5.03 X10(6)/MCL (ref 4.2–5.4)
RBC #/AREA URNS AUTO: ABNORMAL /HPF
SAMPLE: ABNORMAL
SODIUM BLD-SCNC: 141 MMOL/L (ref 136–145)
SODIUM SERPL-SCNC: 141 MMOL/L (ref 136–145)
SP GR UR STRIP.AUTO: 1.04 (ref 1–1.03)
SQUAMOUS #/AREA URNS LPF: ABNORMAL /HPF
TRIGL SERPL-MCNC: 75 MG/DL (ref 37–140)
TROPONIN I SERPL-MCNC: <0.01 NG/ML (ref 0–0.04)
TSH SERPL-ACNC: 4.81 UIU/ML (ref 0.35–4.94)
UROBILINOGEN UR STRIP-ACNC: NORMAL
VLDLC SERPL CALC-MCNC: 15 MG/DL
WBC # BLD AUTO: 6.33 X10(3)/MCL (ref 4.5–11.5)
WBC #/AREA URNS AUTO: ABNORMAL /HPF

## 2025-03-28 PROCEDURE — 80061 LIPID PANEL: CPT | Performed by: EMERGENCY MEDICINE

## 2025-03-28 PROCEDURE — 85025 COMPLETE CBC W/AUTO DIFF WBC: CPT | Performed by: EMERGENCY MEDICINE

## 2025-03-28 PROCEDURE — 25500020 PHARM REV CODE 255: Performed by: EMERGENCY MEDICINE

## 2025-03-28 PROCEDURE — 80053 COMPREHEN METABOLIC PANEL: CPT | Performed by: EMERGENCY MEDICINE

## 2025-03-28 PROCEDURE — 93005 ELECTROCARDIOGRAM TRACING: CPT

## 2025-03-28 PROCEDURE — 85610 PROTHROMBIN TIME: CPT | Performed by: EMERGENCY MEDICINE

## 2025-03-28 PROCEDURE — 83735 ASSAY OF MAGNESIUM: CPT | Performed by: EMERGENCY MEDICINE

## 2025-03-28 PROCEDURE — 84484 ASSAY OF TROPONIN QUANT: CPT | Performed by: EMERGENCY MEDICINE

## 2025-03-28 PROCEDURE — 93010 ELECTROCARDIOGRAM REPORT: CPT | Mod: ,,, | Performed by: INTERNAL MEDICINE

## 2025-03-28 PROCEDURE — 80047 BASIC METABLC PNL IONIZED CA: CPT

## 2025-03-28 PROCEDURE — 84443 ASSAY THYROID STIM HORMONE: CPT | Performed by: EMERGENCY MEDICINE

## 2025-03-28 PROCEDURE — 99285 EMERGENCY DEPT VISIT HI MDM: CPT | Mod: 25

## 2025-03-28 PROCEDURE — 82962 GLUCOSE BLOOD TEST: CPT

## 2025-03-28 PROCEDURE — 81001 URINALYSIS AUTO W/SCOPE: CPT | Performed by: EMERGENCY MEDICINE

## 2025-03-28 RX ORDER — GENTAMICIN SULFATE 1 MG/G
15 OINTMENT TOPICAL DAILY PRN
Status: ON HOLD | COMMUNITY
End: 2025-03-29 | Stop reason: HOSPADM

## 2025-03-28 RX ORDER — LISINOPRIL AND HYDROCHLOROTHIAZIDE 20; 25 MG/1; MG/1
1 TABLET ORAL EVERY MORNING
COMMUNITY

## 2025-03-28 RX ORDER — NYSTATIN 100000 U/G
30 CREAM TOPICAL DAILY PRN
COMMUNITY

## 2025-03-28 RX ORDER — TRIAMCINOLONE ACETONIDE 1 MG/G
CREAM TOPICAL DAILY PRN
COMMUNITY

## 2025-03-28 RX ADMIN — IOHEXOL 100 ML: 350 INJECTION, SOLUTION INTRAVENOUS at 06:03

## 2025-03-28 NOTE — PROGRESS NOTES
Outpatient Rehab    Speech-Language Pathology Discharge    Patient Name: Tali Beard  MRN: [id number]  YOB: 1935  Encounter Date: 3/28/2025    Therapy Diagnosis: No diagnosis found.  Physician: No ref. provider found    Physician Orders: Eval and Treat  Medical Diagnosis:     Visit # / Visits Authorized:    Insurance Authorization Period:   Date of Evaluation: 12/5/24   Plan of Care Certification: 1/31/25 to 3/28/25           Subjective        Patient not seen this date for last visit due to the son calling the clinic and cancelling due to the Patient still not feeling well following procedure last week.      Objective            Treatment   N/a    Time Entry(in minutes):   N/a    Assessment & Plan   Assessment   Patient has met 2/3 goals during the course of speech therapy services to which Patient's expressive language skills have improved. Patient's son also noted improvements at home related to language. Patient is appropriate for discharge from services at this time due to reaching a plateau in progress.  SLP  called and spoke to Patient's son, Heri, this date and informed them of the plan. He was agreeable and appreciative.        Patient's spiritual, cultural, and educational needs considered and patient agreeable to plan of care and goals.          Plan   Patient is discharged this date from Speech Therapy services due to plateau in progress.          Goals:     LTG         Patient will develop functional expressive language skills to communicate wants,needs, and emotions effectively to variety of communication partners in medical and home environments (Progressing)          STG:  Patient will complete word finding task (i.e. Creating subject, verb, object pairs in VNEST protocol) with 90% acc min A to improve word fluency.  (Progressing)     Patient met 2/3 STGs across the course of Speech Therapy services.      Sara Davila, CCC-SLP

## 2025-03-29 VITALS
TEMPERATURE: 98 F | SYSTOLIC BLOOD PRESSURE: 124 MMHG | DIASTOLIC BLOOD PRESSURE: 75 MMHG | OXYGEN SATURATION: 99 % | RESPIRATION RATE: 18 BRPM | HEART RATE: 68 BPM | HEIGHT: 64 IN | BODY MASS INDEX: 22.85 KG/M2 | WEIGHT: 133.81 LBS

## 2025-03-29 PROBLEM — D64.9 ANEMIA: Status: ACTIVE | Noted: 2025-03-29

## 2025-03-29 PROBLEM — D69.6 THROMBOCYTOPENIA: Status: ACTIVE | Noted: 2025-03-29

## 2025-03-29 PROBLEM — N17.9 AKI (ACUTE KIDNEY INJURY): Status: ACTIVE | Noted: 2025-03-29

## 2025-03-29 PROBLEM — R41.82 ALTERED MENTAL STATUS: Status: ACTIVE | Noted: 2025-03-29

## 2025-03-29 PROBLEM — N17.9 AKI (ACUTE KIDNEY INJURY): Status: RESOLVED | Noted: 2025-03-29 | Resolved: 2025-03-29

## 2025-03-29 LAB
ANION GAP SERPL CALC-SCNC: 8 MEQ/L
BUN SERPL-MCNC: 19.1 MG/DL (ref 9.8–20.1)
CALCIUM SERPL-MCNC: 9.3 MG/DL (ref 8.4–10.2)
CHLORIDE SERPL-SCNC: 109 MMOL/L (ref 98–107)
CO2 SERPL-SCNC: 25 MMOL/L (ref 23–31)
CREAT SERPL-MCNC: 0.94 MG/DL (ref 0.55–1.02)
CREAT/UREA NIT SERPL: 20
GFR SERPLBLD CREATININE-BSD FMLA CKD-EPI: 58 ML/MIN/1.73/M2
GLUCOSE SERPL-MCNC: 97 MG/DL (ref 82–115)
OHS QRS DURATION: 70 MS
OHS QTC CALCULATION: 395 MS
POTASSIUM SERPL-SCNC: 4.2 MMOL/L (ref 3.5–5.1)
SODIUM SERPL-SCNC: 142 MMOL/L (ref 136–145)

## 2025-03-29 PROCEDURE — G0378 HOSPITAL OBSERVATION PER HR: HCPCS

## 2025-03-29 PROCEDURE — 80048 BASIC METABOLIC PNL TOTAL CA: CPT | Performed by: STUDENT IN AN ORGANIZED HEALTH CARE EDUCATION/TRAINING PROGRAM

## 2025-03-29 PROCEDURE — 36415 COLL VENOUS BLD VENIPUNCTURE: CPT | Performed by: STUDENT IN AN ORGANIZED HEALTH CARE EDUCATION/TRAINING PROGRAM

## 2025-03-29 PROCEDURE — 25000003 PHARM REV CODE 250: Performed by: STUDENT IN AN ORGANIZED HEALTH CARE EDUCATION/TRAINING PROGRAM

## 2025-03-29 RX ORDER — SODIUM CHLORIDE 0.9 % (FLUSH) 0.9 %
10 SYRINGE (ML) INJECTION
Status: DISCONTINUED | OUTPATIENT
Start: 2025-03-29 | End: 2025-03-29 | Stop reason: HOSPADM

## 2025-03-29 RX ORDER — ONDANSETRON HYDROCHLORIDE 2 MG/ML
4 INJECTION, SOLUTION INTRAVENOUS EVERY 8 HOURS PRN
Status: DISCONTINUED | OUTPATIENT
Start: 2025-03-29 | End: 2025-03-29 | Stop reason: HOSPADM

## 2025-03-29 RX ORDER — AMLODIPINE BESYLATE 5 MG/1
5 TABLET ORAL DAILY
Status: DISCONTINUED | OUTPATIENT
Start: 2025-03-29 | End: 2025-03-29 | Stop reason: HOSPADM

## 2025-03-29 RX ORDER — BISACODYL 10 MG/1
10 SUPPOSITORY RECTAL DAILY PRN
Status: DISCONTINUED | OUTPATIENT
Start: 2025-03-29 | End: 2025-03-29 | Stop reason: HOSPADM

## 2025-03-29 RX ORDER — IBUPROFEN 200 MG
24 TABLET ORAL
Status: DISCONTINUED | OUTPATIENT
Start: 2025-03-29 | End: 2025-03-29 | Stop reason: HOSPADM

## 2025-03-29 RX ORDER — LEVETIRACETAM 500 MG/1
500 TABLET ORAL 2 TIMES DAILY
Status: DISCONTINUED | OUTPATIENT
Start: 2025-03-29 | End: 2025-03-29 | Stop reason: HOSPADM

## 2025-03-29 RX ORDER — ACETAMINOPHEN 325 MG/1
650 TABLET ORAL EVERY 4 HOURS PRN
Status: DISCONTINUED | OUTPATIENT
Start: 2025-03-29 | End: 2025-03-29 | Stop reason: HOSPADM

## 2025-03-29 RX ORDER — GLUCAGON 1 MG
1 KIT INJECTION
Status: DISCONTINUED | OUTPATIENT
Start: 2025-03-29 | End: 2025-03-29 | Stop reason: HOSPADM

## 2025-03-29 RX ORDER — MORPHINE SULFATE 4 MG/ML
2 INJECTION, SOLUTION INTRAMUSCULAR; INTRAVENOUS EVERY 6 HOURS PRN
Refills: 0 | Status: DISCONTINUED | OUTPATIENT
Start: 2025-03-29 | End: 2025-03-29 | Stop reason: HOSPADM

## 2025-03-29 RX ORDER — POLYETHYLENE GLYCOL 3350 17 G/17G
17 POWDER, FOR SOLUTION ORAL 3 TIMES DAILY PRN
Status: DISCONTINUED | OUTPATIENT
Start: 2025-03-29 | End: 2025-03-29 | Stop reason: HOSPADM

## 2025-03-29 RX ORDER — HYDROCODONE BITARTRATE AND ACETAMINOPHEN 5; 325 MG/1; MG/1
1 TABLET ORAL EVERY 6 HOURS PRN
Refills: 0 | Status: DISCONTINUED | OUTPATIENT
Start: 2025-03-29 | End: 2025-03-29 | Stop reason: HOSPADM

## 2025-03-29 RX ORDER — SIMETHICONE 80 MG
1 TABLET,CHEWABLE ORAL 4 TIMES DAILY PRN
Status: DISCONTINUED | OUTPATIENT
Start: 2025-03-29 | End: 2025-03-29 | Stop reason: HOSPADM

## 2025-03-29 RX ORDER — LOPERAMIDE HYDROCHLORIDE 2 MG/1
2 CAPSULE ORAL
Status: DISCONTINUED | OUTPATIENT
Start: 2025-03-29 | End: 2025-03-29 | Stop reason: HOSPADM

## 2025-03-29 RX ORDER — TALC
9 POWDER (GRAM) TOPICAL NIGHTLY PRN
Status: DISCONTINUED | OUTPATIENT
Start: 2025-03-29 | End: 2025-03-29 | Stop reason: HOSPADM

## 2025-03-29 RX ORDER — ENOXAPARIN SODIUM 100 MG/ML
30 INJECTION SUBCUTANEOUS EVERY 24 HOURS
Status: DISCONTINUED | OUTPATIENT
Start: 2025-03-29 | End: 2025-03-29

## 2025-03-29 RX ORDER — ATORVASTATIN CALCIUM 40 MG/1
40 TABLET, FILM COATED ORAL NIGHTLY
Status: DISCONTINUED | OUTPATIENT
Start: 2025-03-29 | End: 2025-03-29 | Stop reason: HOSPADM

## 2025-03-29 RX ORDER — IBUPROFEN 200 MG
16 TABLET ORAL
Status: DISCONTINUED | OUTPATIENT
Start: 2025-03-29 | End: 2025-03-29 | Stop reason: HOSPADM

## 2025-03-29 RX ORDER — NALOXONE HCL 0.4 MG/ML
0.02 VIAL (ML) INJECTION
Status: DISCONTINUED | OUTPATIENT
Start: 2025-03-29 | End: 2025-03-29 | Stop reason: HOSPADM

## 2025-03-29 RX ORDER — ALUMINUM HYDROXIDE, MAGNESIUM HYDROXIDE, AND SIMETHICONE 1200; 120; 1200 MG/30ML; MG/30ML; MG/30ML
30 SUSPENSION ORAL 4 TIMES DAILY PRN
Status: DISCONTINUED | OUTPATIENT
Start: 2025-03-29 | End: 2025-03-29 | Stop reason: HOSPADM

## 2025-03-29 RX ORDER — IPRATROPIUM BROMIDE AND ALBUTEROL SULFATE 2.5; .5 MG/3ML; MG/3ML
3 SOLUTION RESPIRATORY (INHALATION) EVERY 6 HOURS PRN
Status: DISCONTINUED | OUTPATIENT
Start: 2025-03-29 | End: 2025-03-29 | Stop reason: HOSPADM

## 2025-03-29 RX ORDER — LATANOPROST 50 UG/ML
1 SOLUTION/ DROPS OPHTHALMIC NIGHTLY
Status: DISCONTINUED | OUTPATIENT
Start: 2025-03-29 | End: 2025-03-29 | Stop reason: HOSPADM

## 2025-03-29 RX ORDER — TIMOLOL MALEATE 5 MG/ML
1 SOLUTION/ DROPS OPHTHALMIC 2 TIMES DAILY
Status: DISCONTINUED | OUTPATIENT
Start: 2025-03-29 | End: 2025-03-29 | Stop reason: HOSPADM

## 2025-03-29 RX ORDER — SODIUM CHLORIDE 9 MG/ML
INJECTION, SOLUTION INTRAVENOUS CONTINUOUS
Status: DISCONTINUED | OUTPATIENT
Start: 2025-03-29 | End: 2025-03-29 | Stop reason: HOSPADM

## 2025-03-29 RX ORDER — LANOLIN ALCOHOL/MO/W.PET/CERES
1 CREAM (GRAM) TOPICAL DAILY
Status: DISCONTINUED | OUTPATIENT
Start: 2025-03-29 | End: 2025-03-29 | Stop reason: HOSPADM

## 2025-03-29 RX ORDER — ONDANSETRON 4 MG/1
8 TABLET, ORALLY DISINTEGRATING ORAL EVERY 8 HOURS PRN
Status: DISCONTINUED | OUTPATIENT
Start: 2025-03-29 | End: 2025-03-29 | Stop reason: HOSPADM

## 2025-03-29 RX ADMIN — SODIUM CHLORIDE: 9 INJECTION, SOLUTION INTRAVENOUS at 05:03

## 2025-03-29 NOTE — PROGRESS NOTES
Pharmacist Renal Dose Adjustment Note    Tali Beard is a 89 y.o. female being treated with the medication lovenox    Patient Data:    Vital Signs (Most Recent):  Temp: 98.4 °F (36.9 °C) (03/29/25 0245)  Pulse: 68 (03/29/25 0245)  Resp: 18 (03/29/25 0245)  BP: (!) 158/78 (03/29/25 0245)  SpO2: 100 % (03/29/25 0245) Vital Signs (72h Range):  Temp:  [97.7 °F (36.5 °C)-98.4 °F (36.9 °C)]   Pulse:  [66-77]   Resp:  [16-20]   BP: (110-158)/(55-81)   SpO2:  [95 %-100 %]      Recent Labs   Lab 03/28/25 1903   CREATININE 1.36*     Serum creatinine: 1.36 mg/dL (H) 03/28/25 1903  Estimated creatinine clearance: 24.2 mL/min (A)    Medication: lovenox dose: 40mg frequency every 24 hours will be changed to medication: lovenox dose: 30 mg frequency: every 24 hours    Pharmacist's Name: Gabino Caceres  Pharmacist's Extension: [phone]

## 2025-03-29 NOTE — ED PROVIDER NOTES
Encounter Date: 3/28/2025    SCRIBE #1 NOTE: I, Marco Antonio Finch, am scribing for, and in the presence of,  Barak Monge MD. I have scribed the following portions of the note - Other sections scribed: HPI, ROS, PE.       History     Chief Complaint   Patient presents with    Altered Mental Status     Pt presents to the ED via [facility]. Per medics they were called due to patient having difficulty speaking. Onset 1500 today. They states that upon their arrival pt was able to speak and has left sided weakness which family explained is residual from previous CVA. Pt has pt confused to time on arrival. Left sided weakness noted.      Patient is a 88 y/o female with a hx of DVT and CVA x2 who presents to the ED via EMS for altered mental status beginning today at 15:00. EMS reports at 15:00 today the pt became aphasic, confused, and began having left sided focal weakness. Pt not on anticoagulates. Pt has had no recent falls or head trauma.    Hx and ROS limited due to mental status change    The history is provided by medical records and the EMS personnel. No  was used.     Review of patient's allergies indicates:   Allergen Reactions    Hydroxyzine hcl      Other Reaction(s): BUMPS ALL OVER BODY    Other reaction(s): BUMPS ALL OVER BODY    Felodipine Rash     Edema in the legs     Past Medical History:   Diagnosis Date    History of DVT (deep vein thrombosis)     Kaposi's sarcoma, skin     Subarachnoid hemorrhage 10/03/2024     Past Surgical History:   Procedure Laterality Date    INSERTION OF INFERIOR VENA CAVAL FILTER N/A 3/21/2025    Procedure: Insertion, Filter, Inferior Vena Cava;  Surgeon: Juan Hurtado MD;  Location: [facility] CATH LAB;  Service: Peripheral Vascular;  Laterality: N/A;     No family history on file.  Social History[1]  Review of Systems   Unable to perform ROS: Mental status change (Aphasia)       Physical Exam     Initial Vitals [03/28/25 1847]   BP Pulse Resp Temp SpO2    (!) 128/55 76 16 97.7 °F (36.5 °C) 98 %      MAP       --         Physical Exam    Nursing note and vitals reviewed.  Constitutional: She appears well-developed and well-nourished.   HENT:   Head: Normocephalic and atraumatic.   Right Ear: External ear normal.   Left Ear: External ear normal.   Eyes: Conjunctivae and EOM are normal. Pupils are equal, round, and reactive to light.   Neck: Neck supple.   Normal range of motion.  Cardiovascular:  Normal rate, regular rhythm, normal heart sounds and intact distal pulses.           Pulmonary/Chest: Breath sounds normal.   Abdominal: Abdomen is soft. Bowel sounds are normal.   Musculoskeletal:         General: Normal range of motion.      Cervical back: Normal range of motion and neck supple.     Neurological: She is alert. She has normal strength. No cranial nerve deficit or sensory deficit. GCS eye subscore is 4. GCS verbal subscore is 4. GCS motor subscore is 6.   Patient able to follow commands with redirection. No pronator drift. 5/5 motor strength to all 4 extremities. Sensation intact to light touch to all 4 extremities.    Skin: Skin is warm and dry. Capillary refill takes less than 2 seconds.         ED Course   Procedures  Labs Reviewed   COMPREHENSIVE METABOLIC PANEL - Abnormal       Result Value    Sodium 141      Potassium 4.9      Chloride 106      CO2 23      Glucose 181 (*)     Blood Urea Nitrogen 16.7      Creatinine 1.36 (*)     Calcium 9.3      Protein Total 8.2 (*)     Albumin 3.4      Globulin 4.8 (*)     Albumin/Globulin Ratio 0.7 (*)     Bilirubin Total 0.4            ALT 17      AST 24      eGFR 37      Anion Gap 12.0      BUN/Creatinine Ratio 12     LIPID PANEL - Abnormal    Cholesterol Total 184      HDL Cholesterol 81 (*)     Triglyceride 75      Cholesterol/HDL Ratio 2      Very Low Density Lipoprotein 15      LDL Cholesterol 88.00     URINALYSIS, REFLEX TO URINE CULTURE - Abnormal    Color, UA Yellow      Appearance, UA Clear       Specific Gravity, UA 1.039 (*)     pH, UA 6.0      Protein, UA Negative      Glucose, UA Normal      Ketones, UA Negative      Blood, UA Negative      Bilirubin, UA Negative      Urobilinogen, UA Normal      Nitrites, UA Negative      Leukocyte Esterase, UA Negative      RBC, UA 0-5      WBC, UA 0-5      Bacteria, UA Trace      Squamous Epithelial Cells, UA Trace      Mucous, UA Trace (*)     Hyaline Casts, UA 11-20 (*)    CBC WITH DIFFERENTIAL - Abnormal    WBC 6.33      RBC 5.03      Hgb 11.7 (*)     Hct 39.5      MCV 78.5 (*)     MCH 23.3 (*)     MCHC 29.6 (*)     RDW 15.9      Platelet 111 (*)     MPV 11.1 (*)     IPF 3.8      Neut % 73.7      Lymph % 16.0      Mono % 6.8      Eos % 2.4      Basophil % 0.6      Imm Grans % 0.5      Neut # 4.67      Lymph # 1.01      Mono # 0.43      Eos # 0.15      Baso # 0.04      Imm Gran # 0.03      NRBC% 0.3     POCT GLUCOSE - Abnormal    POCT Glucose 180 (*)    ISTAT CHEM8 - Abnormal    POC Glucose 182 (*)     POC BUN 20      POC Creatinine 1.6 (*)     POC Sodium 141      POC Potassium 4.8      POC Chloride 105      POC TCO2 (MEASURED) 29      POC Anion Gap 13      POC Ionized Calcium 1.19      POC Hematocrit 39      POC HEMOGLOBIN 13      Sample VENOUS     PROTIME-INR - Normal    PT 13.8      INR 1.1      Narrative:     Protimes are used to monitor anticoagulant agents such as warfarin. PT INR values are based on the current patient normal mean and the LA NENA value for the specific instrument reagent used.  **Routine theraputic target values for the INR are 2.0-3.0**   TSH - Normal    TSH 4.806     TROPONIN I - Normal    Troponin-I <0.010     MAGNESIUM - Normal    Magnesium Level 2.20     CBC W/ AUTO DIFFERENTIAL    Narrative:     The following orders were created for panel order CBC W/ AUTO DIFFERENTIAL.  Procedure                               Abnormality         Status                     ---------                               -----------         ------                      CBC with Differential[[id number]]       Abnormal            Final result                 Please view results for these tests on the individual orders.   POCT GLUCOSE, HAND-HELD DEVICE          Imaging Results              CTA Head and Neck for Stroke (XPD) (Final result)  Result time 03/28/25 19:37:16      Final result by Marcus Barrett MD (03/28/25 19:37:16)                   Impression:      No hemodynamically significant flow-limiting stenosis identified.      Electronically signed by: Marcus Barrett  Date:    03/28/2025  Time:    19:37               Narrative:    EXAMINATION:  CTA head and neck for stroke.    CLINICAL HISTORY:  Aphasia    TECHNIQUE:  Multidetector axial were performed of head and neck before and following administration of contrast.  Sagittal and coronal coronal including CT angiogram images images are reconstructed.  MIP and MPR images are also reconstructed.    Automated exposure control was utilized to minimize radiation dose.  Counts include 234 beds at the [facility] 01/02/2001    COMPARISON:  CT brain without contrast same date.    FINDINGS:  Carotid artery are assessed in accordance with the NASCET criteria.    Visualized thoracic aorta is without aneurysmal dilatation or dissection.  There is no significant stenosis of the brachiocephalic trunk and the subclavian arteries.  Bilateral common carotid arteries are unremarkable without hemodynamically significant stenosis.  There are mild less than 20% stenosis at the origins of the bilateral internal carotid arteries.  Otherwise, bilateral internal carotid arteries are without significant stenosis there are moderate calcified plaques of the cavernous and the supraclinoid portion of the internal carotid arteries.  There is flow within bilateral anterior cerebral arteries, middle cerebral arteries and major branches without hemodynamically significant stenosis.    The left vertebral artery is hypoplastic.  Flow is present bilaterally within the vertebral arteries without  hemodynamically significant stenosis.  The basilar arteries patent.  There is also flow bilaterally within the posterior cerebral arteries.                                       CT HEAD FOR CODE STROKE (Final result)  Result time 03/28/25 18:59:01      Final result by José Barrientos MD (03/28/25 18:59:01)                   Impression:      Stable areas of edema hypoattenuation seen in the right parietal lobe at the cerebral convexity and in the left parietal region posteriorly at the centrum semiovale.  Previously these areas were shown to contain hemorrhage and were presumed to be areas of amyloid angiopathy.      Electronically signed by: Dagoberto Barrientos  Date:    03/28/2025  Time:    18:59               Narrative:    EXAMINATION:  CT HEAD FOR CODE STROKE    CLINICAL HISTORY:  Neuro deficit, acute, stroke suspected;    TECHNIQUE:  Multiple axial images were obtained from the base of the brain to the vertex without contrast administration.  Sagittal and coronal reconstructions were performed..Automatic exposure control is utilized to reduce patient radiation exposure.    COMPARISON:  11/06/2024 and 12/24/2024    FINDINGS:  There is an area of edema seen in the cerebral convexity on the right side in the right parietal region.  There is also an area of edema seen in the left posterior parietal region the centrum semiovale.  These were previously shown to be potential areas of amyloid deposition and hemorrhage.  No acute hemorrhage is seen on this examination.  The ventricles and basal cisterns appear grossly unremarkable.  Posterior fossa appears normal.  Calvarium is intact.                                       Medications   iohexoL (OMNIPAQUE 350) injection 100 mL (100 mLs Intravenous Given 3/28/25 5189)     Medical Decision Making  Differential diagnosis includes but is not limited to: CVA, dehydration, electrolyte disturbance, infectious process, medication side effect, dementia      Amount and/or  Complexity of Data Reviewed  Independent Historian: EMS     Details: EMS reports at 15:00 today the pt became aphasic, confused, and began having left sided focal weakness. Pt not on anticoagulates. Pt has had no recent falls or head trauma.    Labs: ordered.  Radiology: ordered.    Risk  Prescription drug management.            Scribe Attestation:   Scribe #1: I performed the above scribed service and the documentation accurately describes the services I performed. I attest to the accuracy of the note.    Attending Attestation:           Physician Attestation for Scribe:  Physician Attestation Statement for Scribe #1: I, Barak Monge MD, reviewed documentation, as scribed by Marco Antonio Finch in my presence, and it is both accurate and complete.             ED Course as of 03/29/25 0002   Fri Mar 28, 2025   1906 I spoke with Dr. Clark (neurointerventional) - will review CT, but based on presentation, not likely a candidate for lytics or intervention. [CL]   Sat Mar 29, 2025   0001 I spoke with DHAVAL Delacruz (hospital medicine) - states admit to Dr. Reyes [CL]      ED Course User Index  [CL] Barak Monge MD                           Clinical Impression:  Final diagnoses:  [R29.818] Acute focal neurological deficit  [R41.82] Altered mental status, unspecified altered mental status type (Primary)          ED Disposition Condition    Observation Stable                    [1]   Social History  Tobacco Use    Smoking status: Never     Passive exposure: Never    Smokeless tobacco: Never   Substance Use Topics    Alcohol use: Not Currently    Drug use: Never        Barak Monge MD  03/29/25 0002

## 2025-03-29 NOTE — ED NOTES
Pt escorted to room 29 on stretcher. Dr. Monge notified of POC results. Bedside handoff performed with DAVID Cornejo. Awaiting neuro recommendations.

## 2025-03-29 NOTE — PLAN OF CARE
Problem: Adult Inpatient Plan of Care  Goal: Plan of Care Review  Outcome: Progressing  Goal: Patient-Specific Goal (Individualized)  Outcome: Progressing  Goal: Absence of Hospital-Acquired Illness or Injury  Outcome: Progressing  Goal: Optimal Comfort and Wellbeing  Outcome: Progressing  Goal: Readiness for Transition of Care  Outcome: Progressing     Problem: Wound  Goal: Optimal Coping  Outcome: Progressing  Goal: Optimal Functional Ability  Outcome: Progressing  Goal: Absence of Infection Signs and Symptoms  Outcome: Progressing  Goal: Improved Oral Intake  Outcome: Progressing  Goal: Optimal Pain Control and Function  Outcome: Progressing  Goal: Skin Health and Integrity  Outcome: Progressing  Goal: Optimal Wound Healing  Outcome: Progressing     Problem: Diabetes Comorbidity  Goal: Blood Glucose Level Within Targeted Range  Outcome: Progressing     Problem: Fall Injury Risk  Goal: Absence of Fall and Fall-Related Injury  Outcome: Progressing     Problem: Skin Injury Risk Increased  Goal: Skin Health and Integrity  Outcome: Progressing

## 2025-03-29 NOTE — H&P
[facility] - 9th Floor [facility] MEDICINE - H&P ADMISSION NOTE      Patient Name: Tali Beard  MRN: [id number]  Patient Class: OP- Observation   Admission Date: 3/28/2025   Admitting Physician: YURIY Service   Attending Physician: Thairy G Reyes, DO  Primary Care Provider: Aurora Moe FNP  Face-to-Face encounter date: 03/29/2025        CHIEF COMPLAINT     Chief Complaint   Patient presents with    Altered Mental Status     Pt presents to the ED via [facility]. Per medics they were called due to patient having difficulty speaking. Onset 1500 today. They states that upon their arrival pt was able to speak and has left sided weakness which family explained is residual from previous CVA. Pt has pt confused to time on arrival. Left sided weakness noted.        HISTORY OF PRESENTING ILLNESS   89 year old female with PMHx of HTN, HLD, DM2, Kaposi Sarcoma s/p resection and radiation to left ankle and submental node (followed by [facility]), prior subarachnoid hemorrhage and intraparenchymal hemorrhage, recent IVC filter placement secondary to deep venous thrombosis of the iliac vein (03/21/2025 with ) presents to [facility] after her son's felt she became incoherent.  At the time of my exam symptoms have resolved and the son states patient is back to her baseline.  Record review of my own personal assessment of this patient dating back to November detail that the patient is alert and oriented x3 however has difficult to understand speech at baseline.  The assessment is the same on evaluation today.    At baseline the patient lives at home and is cared for 24/7 by her sons who take excellent care of her and alternate day and nighttime sitter duties.  She requires help for all ADLs.  PAST MEDICAL HISTORY     Past Medical History:   Diagnosis Date    History of DVT (deep vein thrombosis)     Kaposi's sarcoma, skin     Subarachnoid hemorrhage 10/03/2024       PAST SURGICAL HISTORY      Past Surgical History:   Procedure Laterality Date    INSERTION OF INFERIOR VENA CAVAL FILTER N/A 3/21/2025    Procedure: Insertion, Filter, Inferior Vena Cava;  Surgeon: Juan Hurtado MD;  Location: [facility] CATH LAB;  Service: Peripheral Vascular;  Laterality: N/A;       FAMILY HISTORY   Reviewed and noncontributory to this case    SOCIAL HISTORY   Social History[1]    HOME MEDICATIONS     Prior to Admission medications    Medication Sig Start Date End Date Taking? Authorizing Provider   amLODIPine (NORVASC) 5 MG tablet Take 1 tablet (5 mg total) by mouth once daily. 11/20/24 3/28/25 Yes Deann Joy PA   atorvastatin (LIPITOR) 40 MG tablet Take 1 tablet (40 mg total) by mouth every evening. 11/20/24 3/28/25 Yes Deann Joy PA   FEROSUL 325 mg (65 mg iron) Tab tablet Take 325 mg by mouth once daily.   Yes Provider, Historical   latanoprost 0.005 % ophthalmic solution Place 1 drop into both eyes every evening. 12/11/23  Yes Provider, Historical   levETIRAcetam (KEPPRA) 500 MG Tab Take 1 tablet (500 mg total) by mouth 2 (two) times daily. 11/20/24 3/28/25 Yes Deann Joy PA   lisinopriL-hydrochlorothiazide (PRINZIDE,ZESTORETIC) 20-25 mg Tab Take 1 tablet by mouth every morning.   Yes Provider, Historical   multivit,iron,minerals/lutein (CENTRUM SILVER ULTRA WOMEN'S ORAL) Take 1 tablet by mouth once daily.   Yes Provider, Historical   nystatin (MYCOSTATIN) cream Apply 30 g topically daily as needed for Dry Skin.   Yes Provider, Historical   timolol maleate 0.5% (TIMOPTIC) 0.5 % Drop Place 1 drop into both eyes 2 (two) times daily. 12/6/24  Yes Provider, Historical   triamcinolone acetonide 0.1% (KENALOG) 0.1 % cream Apply topically daily as needed.   Yes Provider, Historical   gentamicin (GARAMYCIN) 0.1 % ointment Apply 15 g topically daily as needed.    Provider, Historical       ALLERGIES   Hydroxyzine hcl and Felodipine    REVIEW OF SYSTEMS   Except as documented above, all other systems  reviewed and negative    PHYSICAL EXAM     Vitals:    03/29/25 0454   BP: 129/69   Pulse: 70   Resp:    Temp: 97.8 °F (36.6 °C)      General:  Frail  Head and neck:  Atraumatic, normocephalic, moist mucous membranes, supple neck  Chest:  Clear to auscultation bilaterally  Heart:  S1, S2, no appreciable murmur  Abdomen:  Soft, nontender, BS +  MSK:  Warm, no lower extremity edema, no clubbing or cyanosis  Neuro:  Alert and oriented x2, tangential, difficult to understand speech  ASSESSMENT AND PLAN   AMS   -low suspicion for neurologic pathology suspect symptoms are secondary to hypovolemic hypotension in the setting of acute diarrhea  -BP at home today as low as 90/58, her son was taking it every 20 minutes  -CTA with no hemodynamically significant LDL  -CT head with stable areas of edema in the areas where the patient previously had known intracranial hemorrhage    Diarrhea   -follow stool culture, stool PCR, C diff   -patient completed a course of antibiotics for urinary tract infection per her son approximately 2 weeks ago  -supportive care    BERNARD   -Patient's baseline creatinine ranges from 0.7-0.8, presented at 1.3    H/o ICH  -subarachnoid hemorrhage and intraparenchymal hemorrhage  -now s/p recent IVC filter placement secondary to deep venous thrombosis of the iliac vein (03/21/2025 with ) as pt cannot tolerate AC/antiplatelet therapy  -avoid antiplatelets, chemical DVT prophylaxis    PMHx of HTN, HLD, DM2, Kaposi Sarcoma s/p resection and radiation to left ankle and submental node (followed by [facility])    Critical care time greater than 35 minutes for AMS     DVT prophylaxis:  SCD  Patient's screen for food insecurity, housing instability, transportation needs, utility difficulties, and interpersonal safety. No needs identified  __________________________________________________________________  LABS/MICRO/MEDS/DIAGNOSTICS       LABS  Recent Labs     03/28/25  1903      K 4.9   CO2  23   BUN 16.7   CREATININE 1.36*   GLUCOSE 181*   CALCIUM 9.3   ALKPHOS 111   AST 24   ALT 17   ALBUMIN 3.4     Recent Labs     03/28/25  1903   WBC 6.33   RBC 5.03   HCT 39.5   MCV 78.5*   *       MICROBIOLOGY  Microbiology Results (last 7 days)       ** No results found for the last 168 hours. **            MEDICATIONS   amLODIPine  5 mg Oral Daily    atorvastatin  40 mg Oral QHS    enoxparin  30 mg Subcutaneous Daily    ferrous sulfate  1 tablet Oral Daily    latanoprost  1 drop Both Eyes QHS    levETIRAcetam  500 mg Oral BID    timolol maleate 0.5%  1 drop Both Eyes BID      INFUSIONS   0.9% NaCl   Intravenous Continuous           DIAGNOSTIC TESTS  CTA Head and Neck for Stroke (XPD)   Final Result      No hemodynamically significant flow-limiting stenosis identified.         Electronically signed by: Marcus Barrett   Date:    03/28/2025   Time:    19:37      CT HEAD FOR CODE STROKE   Final Result      Stable areas of edema hypoattenuation seen in the right parietal lobe at the cerebral convexity and in the left parietal region posteriorly at the centrum semiovale.  Previously these areas were shown to contain hemorrhage and were presumed to be areas of amyloid angiopathy.         Electronically signed by: Dagoberto Barrientos   Date:    03/28/2025   Time:    18:59             Patient information was obtained from patient, patient's family, past medical records and ER records.   All diagnosis and differential diagnosis have been reviewed; assessment and plan has been documented. I have personally reviewed the labs and test results that are presently available; I have reviewed the patients medication list. I have reviewed the consulting providers response and recommendations. I have reviewed or attempted to review medical records based upon their availability.  All of the patient's questions have been addressed and answered. Patient's is agreeable to the above stated plan. I will continue to monitor closely and make  adjustments to medical management as needed.  This note was created using [employer] voice recognition software that occasionally misinterpreted phrases or words.  Please contact me if any questions may rise regarding documentation to clarify verbiage.        Thairy G Reyes, DO   Internal Medicine  Department of [facility] Medicine  [facility] - 9th Floor Medical Telemetry         [1]   Social History  Socioeconomic History    Marital status:    Tobacco Use    Smoking status: Never     Passive exposure: Never    Smokeless tobacco: Never   Substance and Sexual Activity    Alcohol use: Not Currently    Drug use: Never    Sexual activity: Not Currently     Social Drivers of Health     Financial Resource Strain: Low Risk  (11/8/2024)    Overall Financial Resource Strain (CARDIA)     Difficulty of Paying Living Expenses: Not hard at all   Food Insecurity: No Food Insecurity (11/8/2024)    Hunger Vital Sign     Worried About Running Out of Food in the Last Year: Never true     Ran Out of Food in the Last Year: Never true   Transportation Needs: No Transportation Needs (11/8/2024)    TRANSPORTATION NEEDS     Transportation : No   Physical Activity: Inactive (11/8/2024)    Exercise Vital Sign     Days of Exercise per Week: 0 days     Minutes of Exercise per Session: 0 min   Stress: No Stress Concern Present (11/8/2024)    [employer] [place] [employer] - Occupational Stress Questionnaire     Feeling of Stress : Only a little   Housing Stability: Unknown (11/8/2024)    Housing Stability Vital Sign     Unable to Pay for Housing in the Last Year: No     Homeless in the Last Year: No

## 2025-03-30 NOTE — DISCHARGE SUMMARY
"Hospital Medicine  Discharge Summary    Patient Name: Tali Beard  MRN: [id number]  Admit Date: 3/28/2025  Discharge Date: 3/29/2025   Status: OP- Observation   Length of Stay: 0      PHYSICIANS   Admitting Physician: Thairy G Reyes, DO  Discharging Physician: Cisco Smith MD.  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 after her son's felt she became incoherent.  At the time of my exam symptoms have resolved and the son states patient is back to her baseline.  Record review of my own personal assessment of this patient dating back to November detail that the patient is alert and oriented x3 however has difficult to understand speech at baseline.  The assessment is the same on evaluation today.     At baseline the patient lives at home and is cared for 24/7 by her sons who take excellent care of her and alternate day and nighttime sitter duties.  She requires help for all ADLs."    HPI reviewed, son notes patient with diarrhea prior to arrival, though nothing further.  However he states she had to be given a laxative given constipation, which is common given she is on iron replacement.  Stool studies discontinued.  BERNARD was resolved with IV hydration.  Patient back to baseline, family inquiring about and patient stable for discharge home.    .  STATUS  Improved    DISPOSITION  Discharge to home to care of family    DIET  Cardiac    ACTIVITY  As tolerated      FOLLOW-UP      Aurora Moe FNP. Schedule an " appointment as soon as possible for a visit in 1 week(s).    Specialty: Family Medicine  Why: someone will contact you with an appointment date and time.  Contact information:  [place] [address]  [phone]                 DISCHARGE MEDICATION RECONCILIATION     HOLD     lisinopriL-hydrochlorothiazide 20-25 mg Tab  Wait to take this until: March 31, 2025  Commonly known as: PRINZIDE,ZESTORETIC            CONTINUE      amLODIPine 5 MG tablet  Commonly known as: NORVASC  Take 1 tablet (5 mg total) by mouth once daily.     atorvastatin 40 MG tablet  Commonly known as: LIPITOR  Take 1 tablet (40 mg total) by mouth every evening.     CENTRUM SILVER ULTRA WOMEN'S ORAL     FeroSuL 325 mg (65 mg iron) Tab tablet  Generic drug: ferrous sulfate     latanoprost 0.005 % ophthalmic solution     levETIRAcetam 500 MG Tab  Commonly known as: KEPPRA  Take 1 tablet (500 mg total) by mouth 2 (two) times daily.     nystatin cream  Commonly known as: MYCOSTATIN     timolol maleate 0.5% 0.5 % Drop  Commonly known as: TIMOPTIC     triamcinolone acetonide 0.1% 0.1 % cream  Commonly known as: KENALOG            DISCONTINUE     gentamicin 0.1 % ointment  Commonly known as: GARAMYCIN              PHYSICAL EXAM   VITALS: T 97.7 °F (36.5 °C)   /75   P 68   RR 18   O2 99 %    GENERAL: awake and in no acute distress  LUNGS: Respirations non-labored, no peripheral cyanosis  CVS: Normal rate  ABD: Soft, non-tender  EXTREMITIES: Radial pulse 2+  NEURO: AAOx3  PSYCHIATRIC: Cooperative          Discharge time: 33 minutes     Cisco Smith MD  [facility]       DIAGNOSITCS   CBC:   Recent Labs   Lab 03/28/25  1857 03/28/25  1903   WBC  --  6.33   HGB  --  11.7*   HCT 39 39.5   PLT  --  111*       CMP:   Recent Labs   Lab 03/28/25  1903 03/29/25  0501   CALCIUM 9.3 9.3   ALBUMIN 3.4  --     142   K 4.9 4.2   CO2 23 25    109*   BUN 16.7 19.1   CREATININE 1.36* 0.94   ALKPHOS 111  --    ALT 17  --    AST  24  --    BILITOT 0.4  --    MG 2.20  --      Estimated Creatinine Clearance: 35 mL/min (based on SCr of 0.94 mg/dL).    COAG:  Recent Labs   Lab 03/28/25 1903   INR 1.1       CARDIAC ENZYMES:   Recent Labs     03/28/25 1903   TROPONINI <0.010         Recent Labs     03/28/25  1838   POCTGLUCOSE 180*        Microbiology Results (last 7 days)       Procedure Component Value Units Date/Time    Stool Culture [[id number]]     Order Status: Canceled Specimen: Stool     Clostridium Diff Toxin, A & B, EIA [[id number]]     Order Status: Canceled Specimen: Stool              Recent Labs     03/28/25 1903   CHOL 184   TRIG 75   HDL 81*   LDL 88.00      Lab Results   Component Value Date    HGBA1C 5.8 (H) 10/28/2024        CTA Head and Neck for Stroke (XPD)  Result Date: 3/28/2025  EXAMINATION: CTA head and neck for stroke. CLINICAL HISTORY: Aphasia TECHNIQUE: Multidetector axial were performed of head and neck before and following administration of contrast.  Sagittal and coronal coronal including CT angiogram images images are reconstructed.  MIP and MPR images are also reconstructed. Automated exposure control was utilized to minimize radiation dose.  [facility] access hospital 01/02/2001 COMPARISON: CT brain without contrast same date. FINDINGS: Carotid artery are assessed in accordance with the NASCET criteria. Visualized thoracic aorta is without aneurysmal dilatation or dissection.  There is no significant stenosis of the brachiocephalic trunk and the subclavian arteries.  Bilateral common carotid arteries are unremarkable without hemodynamically significant stenosis.  There are mild less than 20% stenosis at the origins of the bilateral internal carotid arteries.  Otherwise, bilateral internal carotid arteries are without significant stenosis there are moderate calcified plaques of the cavernous and the supraclinoid portion of the internal carotid arteries.  There is flow within bilateral anterior cerebral arteries, middle cerebral  arteries and major branches without hemodynamically significant stenosis. The left vertebral artery is hypoplastic.  Flow is present bilaterally within the vertebral arteries without hemodynamically significant stenosis.  The basilar arteries patent.  There is also flow bilaterally within the posterior cerebral arteries.     No hemodynamically significant flow-limiting stenosis identified. Electronically signed by: Marcus Barrett Date:    03/28/2025 Time:    19:37    CT HEAD FOR CODE STROKE  Result Date: 3/28/2025  EXAMINATION: CT HEAD FOR CODE STROKE CLINICAL HISTORY: Neuro deficit, acute, stroke suspected; TECHNIQUE: Multiple axial images were obtained from the base of the brain to the vertex without contrast administration.  Sagittal and coronal reconstructions were performed..Automatic exposure control is utilized to reduce patient radiation exposure. COMPARISON: 11/06/2024 and 12/24/2024 FINDINGS: There is an area of edema seen in the cerebral convexity on the right side in the right parietal region.  There is also an area of edema seen in the left posterior parietal region the centrum semiovale.  These were previously shown to be potential areas of amyloid deposition and hemorrhage.  No acute hemorrhage is seen on this examination.  The ventricles and basal cisterns appear grossly unremarkable.  Posterior fossa appears normal.  Calvarium is intact.     Stable areas of edema hypoattenuation seen in the right parietal lobe at the cerebral convexity and in the left parietal region posteriorly at the centrum semiovale.  Previously these areas were shown to contain hemorrhage and were presumed to be areas of amyloid angiopathy. Electronically signed by: Dagoberto Barrientos Date:    03/28/2025 Time:    18:59    Cardiac catheterization  Result Date: 3/21/2025  Procedure performed in the Invasive Lab  - See Procedure Log link below for nursing documentation  - See OpNote on Surgeries Tab for physician findings  - See Imaging  Tab for radiologist dictation    CV Ultrasound doppler venous legs bilat  Result Date: 3/19/2025  There is a deep vein thrombosis in the right external iliac vein, common femoral vein, proximal femoral vein, mid femoral vein, distal femoral vein, popliteal vein, peroneal veins. All other vessels of the right lower extremity venous system are patent with no evidence of thrombosis. There is a deep vein thrombosis in the left common femoral vein, proximal femoral vein, popliteal vein. All other vessels of the left lower extremity venous system are patent with no evidence of thrombosis. Notification: Results relayed to Kathie Grullon RN.     US Lower Extremity Veins Left  Result Date: 3/9/2025  EXAMINATION: US LOWER EXTREMITY VEINS LEFT CLINICAL HISTORY: Pain in left leg TECHNIQUE: Duplex and color flow Doppler evaluation and graded compression of the left lower extremity veins was performed. COMPARISON: None FINDINGS: Left thigh veins: The common femoral, femoral, popliteal, upper greater saphenous, and deep femoral veins are patent and free of thrombus. The veins are normally compressible and have normal phasic flow and augmentation response. Left calf veins: The visualized calf veins are patent. Miscellaneous: None     No evidence of deep venous thrombosis in the left lower extremity. Electronically signed by: Jose Hoang MD Date:    03/09/2025 Time:    10:10    X-Ray Chest AP Portable  Result Date: 3/5/2025  EXAMINATION: XR CHEST AP PORTABLE CLINICAL HISTORY: fever; TECHNIQUE: Single frontal view of the chest was performed. COMPARISON: May 19, 2024 FINDINGS: Examination reveals a poor inspiratory effort that accentuates the cardiac silhouette and pulmonary vascular markings. Mediastinal silhouette is within normal limits cardiac silhouette is magnified by the projection but might be at the upper limits of normal. Lung fields are clear and free of gross infiltrates atelectasis or effusions     No active pulmonary  disease Electronically signed by: Cruz Masters Date:    03/05/2025 Time:    09:00

## 2025-03-31 ENCOUNTER — PATIENT OUTREACH (OUTPATIENT)
Dept: ADMINISTRATIVE | Facility: CLINIC | Age: OVER 89
End: 2025-03-31
Payer: MEDICARE

## 2025-03-31 LAB
POC PTINR: 1.1 (ref 0.9–1.2)
SAMPLE: NORMAL

## 2025-03-31 NOTE — PROGRESS NOTES
C3 nurse spoke with Tali Beard for a TCC post hospital discharge follow up call. The patient reports does not have a scheduled HOSFU appointment. C3 nurse was unable to schedule HOSFU appointment [facility]. Patient advised to contact their PCP to schedule a HOSPFU within 5-7 days.

## 2025-04-11 NOTE — PROGRESS NOTES
"    [facility] Vascular - Clinic Note  Juan Hurtado MD      Patient Name: Tali Beard                   : 1935      MRN: [id number]   Visit Date: 2025       History Present Illness     Reason for Visit: Leg Swelling    Ms. Beard presents to the clinic for complaints of worsening right leg swelling. She is s/p IVC filter placement 3/21/25 secondary to bilateral iliofemoral DVT with the inability to anticoagulate. She originally had left leg swelling, however reports her right leg started swelling shortly after procedure. She denies any pain with this.      REVIEW OF SYSTEMS:  12 point review of systems conducted, negative except as stated in the history of present illness. See HPI for details.        Physical Exam      Vitals:    25 0953 25 0955   BP: (!) 147/77 (!) 147/71   BP Location: Left arm Right arm   Patient Position: Sitting Sitting   Pulse: (!) 55 63   Weight: 60.3 kg (133 lb)    Height: 5' 4" (1.626 m)           General: well-nourished, no acute distress, and healthy appearing, alert, pleasant, conversant, and oriented  Respiratory: breathing easily and without respiratory distress  Cardiology: regular rate and rhythm      Musculoskeletal:   Upper Extremity: normal bilateral hand function  Lower Extremity: right lower extremity has pitting edema                       Assessment and Plan     Ms. Beard is a 89 y.o. female with DVT and inability to anticoagulate.  She is protected currently with her filter explained this to the family.  I also explained that due to her high-risk of bleeding I would recommend type of intervention at this point.  Her swelling is asymptomatic and does not really hurt her.  Recommended compression to see if we can improve her symptoms.  They did have concerns about increasing growth on her toe.  We will refer her to podiatry for further evaluation.  Should her swelling began to become more symptomatic or her she can call for an appointment, otherwise " she can follow up as needed.        1. Deep vein thrombosis (DVT) of iliac vein, unspecified chronicity, unspecified laterality          Imaging Obtained/Reviewed   Study: none  Date:           Medical History     Past Medical History:   Diagnosis Date    History of DVT (deep vein thrombosis)     Kaposi's sarcoma, skin     Subarachnoid hemorrhage 10/03/2024     Past Surgical History:   Procedure Laterality Date    INSERTION OF INFERIOR VENA CAVAL FILTER N/A 3/21/2025    Procedure: Insertion, Filter, Inferior Vena Cava;  Surgeon: Juan Hurtado MD;  Location: [facility] CATH LAB;  Service: Peripheral Vascular;  Laterality: N/A;     No family history on file.  Social History[1]  Current Outpatient Medications   Medication Instructions    amLODIPine (NORVASC) 5 mg, Oral, Daily    atorvastatin (LIPITOR) 40 mg, Oral, Nightly    FeroSuL 325 mg, Daily    latanoprost 0.005 % ophthalmic solution 1 drop, Nightly    levETIRAcetam (KEPPRA) 500 mg, Oral, 2 times daily    lisinopriL-hydrochlorothiazide (PRINZIDE,ZESTORETIC) 20-25 mg Tab 1 tablet, Every morning    multivit,iron,minerals/lutein (CENTRUM SILVER ULTRA WOMEN'S ORAL) 1 tablet, Daily    nystatin (MYCOSTATIN) 30 g, Daily PRN    timolol maleate 0.5% (TIMOPTIC) 0.5 % Drop 1 drop, 2 times daily    triamcinolone acetonide 0.1% (KENALOG) 0.1 % cream Daily PRN     Review of patient's allergies indicates:   Allergen Reactions    Hydroxyzine hcl      Other Reaction(s): BUMPS ALL OVER BODY    Other reaction(s): BUMPS ALL OVER BODY    Felodipine Rash     Edema in the legs       Patient Care Team:  Aurora Moe FNP as PCP - General (Family Medicine)  Zina Yanez [facility] (Home Health Services)  Francisco Adame MD as Consulting Physician (Cardiothoracic Surgery)  Moises Hannah MD as Consulting Physician (Neurology)        No follow-ups on file. In addition to their scheduled follow up, the patient has also been instructed to follow up on as needed  basis.     Future Appointments   Date Time Provider Department Center   6/12/2025 11:00 AM Yue Rodriguez, [facility] [address]irre               [1]  Social History  Socioeconomic History    Marital status:    Tobacco Use    Smoking status: Never     Passive exposure: Never    Smokeless tobacco: Never   Substance and Sexual Activity    Alcohol use: Not Currently    Drug use: Never    Sexual activity: Not Currently     Social Drivers of Health     Financial Resource Strain: Low Risk  (11/8/2024)    Overall Financial Resource Strain (CARDIA)     Difficulty of Paying Living Expenses: Not hard at all   Food Insecurity: No Food Insecurity (11/8/2024)    Hunger Vital Sign     Worried About Running Out of Food in the Last Year: Never true     Ran Out of Food in the Last Year: Never true   Transportation Needs: No Transportation Needs (11/8/2024)    TRANSPORTATION NEEDS     Transportation : No   Physical Activity: Inactive (11/8/2024)    Exercise Vital Sign     Days of Exercise per Week: 0 days     Minutes of Exercise per Session: 0 min   Stress: No Stress Concern Present (11/8/2024)    [place] [employer] - Occupational Stress Questionnaire     Feeling of Stress : Only a little   Housing Stability: Unknown (11/8/2024)    Housing Stability Vital Sign     Unable to Pay for Housing in the Last Year: No     Homeless in the Last Year: No

## 2025-04-14 ENCOUNTER — OFFICE VISIT (OUTPATIENT)
Dept: VASCULAR SURGERY | Facility: CLINIC | Age: OVER 89
End: 2025-04-14
Payer: MEDICARE

## 2025-04-14 VITALS
HEIGHT: 64 IN | BODY MASS INDEX: 22.71 KG/M2 | SYSTOLIC BLOOD PRESSURE: 147 MMHG | HEART RATE: 63 BPM | DIASTOLIC BLOOD PRESSURE: 71 MMHG | WEIGHT: 133 LBS

## 2025-04-14 DIAGNOSIS — L02.612 ABSCESS OF TOE OF LEFT FOOT: Primary | ICD-10-CM

## 2025-04-14 DIAGNOSIS — I82.429 DEEP VEIN THROMBOSIS (DVT) OF ILIAC VEIN, UNSPECIFIED CHRONICITY, UNSPECIFIED LATERALITY: Primary | ICD-10-CM

## 2025-04-14 PROCEDURE — 99213 OFFICE O/P EST LOW 20 MIN: CPT | Mod: ,,, | Performed by: SURGERY

## 2025-05-05 DIAGNOSIS — Z12.31 BREAST CANCER SCREENING BY MAMMOGRAM: Primary | ICD-10-CM

## 2025-05-15 ENCOUNTER — HOSPITAL ENCOUNTER (OUTPATIENT)
Dept: RADIOLOGY | Facility: HOSPITAL | Age: OVER 89
Discharge: HOME | End: 2025-05-15
Attending: NURSE PRACTITIONER
Payer: MEDICARE

## 2025-05-15 DIAGNOSIS — Z12.31 BREAST CANCER SCREENING BY MAMMOGRAM: ICD-10-CM

## 2025-05-15 PROCEDURE — 77067 SCR MAMMO BI INCL CAD: CPT | Mod: TC

## 2025-05-27 ENCOUNTER — TELEPHONE (OUTPATIENT)
Dept: RADIOLOGY | Facility: HOSPITAL | Age: OVER 89
End: 2025-05-27
Payer: MEDICARE

## 2025-05-27 ENCOUNTER — LAB REQUISITION (OUTPATIENT)
Dept: LAB | Facility: HOSPITAL | Age: OVER 89
End: 2025-05-27
Payer: MEDICARE

## 2025-05-27 DIAGNOSIS — L92.9 GRANULOMATOUS DISORDER OF THE SKIN AND SUBCUTANEOUS TISSUE, UNSPECIFIED: ICD-10-CM

## 2025-05-27 DIAGNOSIS — I10 ESSENTIAL (PRIMARY) HYPERTENSION: ICD-10-CM

## 2025-05-27 DIAGNOSIS — G93.41 METABOLIC ENCEPHALOPATHY: ICD-10-CM

## 2025-05-27 LAB
ALBUMIN SERPL-MCNC: 3 G/DL (ref 3.4–4.8)
ALBUMIN/GLOB SERPL: 0.7 RATIO (ref 1.1–2)
ALP SERPL-CCNC: 114 UNIT/L (ref 40–150)
ALT SERPL-CCNC: 15 UNIT/L (ref 0–55)
ANION GAP SERPL CALC-SCNC: 10 MEQ/L
AST SERPL-CCNC: 20 UNIT/L (ref 11–45)
BASOPHILS # BLD AUTO: 0.04 X10(3)/MCL
BASOPHILS NFR BLD AUTO: 1 %
BILIRUB SERPL-MCNC: 0.5 MG/DL
BUN SERPL-MCNC: 14.6 MG/DL (ref 9.8–20.1)
CALCIUM SERPL-MCNC: 9.2 MG/DL (ref 8.4–10.2)
CHLORIDE SERPL-SCNC: 107 MMOL/L (ref 98–107)
CHOLEST SERPL-MCNC: 172 MG/DL
CHOLEST/HDLC SERPL: 2 {RATIO} (ref 0–5)
CO2 SERPL-SCNC: 26 MMOL/L (ref 23–31)
CREAT SERPL-MCNC: 0.78 MG/DL (ref 0.55–1.02)
CREAT/UREA NIT SERPL: 19
EOSINOPHIL # BLD AUTO: 0.22 X10(3)/MCL (ref 0–0.9)
EOSINOPHIL NFR BLD AUTO: 5.7 %
ERYTHROCYTE [DISTWIDTH] IN BLOOD BY AUTOMATED COUNT: 15.6 % (ref 11.5–17)
EST. AVERAGE GLUCOSE BLD GHB EST-MCNC: 116.9 MG/DL
GFR SERPLBLD CREATININE-BSD FMLA CKD-EPI: >60 ML/MIN/1.73/M2
GLOBULIN SER-MCNC: 4.5 GM/DL (ref 2.4–3.5)
GLUCOSE SERPL-MCNC: 94 MG/DL (ref 82–115)
HBA1C MFR BLD: 5.7 %
HCT VFR BLD AUTO: 34.7 % (ref 37–47)
HDLC SERPL-MCNC: 72 MG/DL (ref 35–60)
HGB BLD-MCNC: 11.6 G/DL (ref 12–16)
IMM GRANULOCYTES # BLD AUTO: 0.01 X10(3)/MCL (ref 0–0.04)
IMM GRANULOCYTES NFR BLD AUTO: 0.3 %
IRON SATN MFR SERPL: 55 % (ref 20–50)
IRON SERPL-MCNC: 113 UG/DL (ref 50–170)
LDLC SERPL CALC-MCNC: 90 MG/DL (ref 50–140)
LYMPHOCYTES # BLD AUTO: 1.07 X10(3)/MCL (ref 0.6–4.6)
LYMPHOCYTES NFR BLD AUTO: 27.5 %
MCH RBC QN AUTO: 24.7 PG (ref 27–31)
MCHC RBC AUTO-ENTMCNC: 33.4 G/DL (ref 33–36)
MCV RBC AUTO: 73.8 FL (ref 80–94)
MONOCYTES # BLD AUTO: 0.43 X10(3)/MCL (ref 0.1–1.3)
MONOCYTES NFR BLD AUTO: 11.1 %
NEUTROPHILS # BLD AUTO: 2.12 X10(3)/MCL (ref 2.1–9.2)
NEUTROPHILS NFR BLD AUTO: 54.4 %
PLATELET # BLD AUTO: 153 X10(3)/MCL (ref 130–400)
PMV BLD AUTO: 11.7 FL (ref 7.4–10.4)
POTASSIUM SERPL-SCNC: 4.6 MMOL/L (ref 3.5–5.1)
PROT SERPL-MCNC: 7.5 GM/DL (ref 5.8–7.6)
RBC # BLD AUTO: 4.7 X10(6)/MCL (ref 4.2–5.4)
SODIUM SERPL-SCNC: 143 MMOL/L (ref 136–145)
T4 FREE SERPL-MCNC: 1.04 NG/DL (ref 0.7–1.48)
TIBC SERPL-MCNC: 205 UG/DL (ref 250–450)
TIBC SERPL-MCNC: 92 UG/DL (ref 70–310)
TRANSFERRIN SERPL-MCNC: 171 MG/DL
TRIGL SERPL-MCNC: 48 MG/DL (ref 37–140)
TSH SERPL-ACNC: 1.86 UIU/ML (ref 0.35–4.94)
VLDLC SERPL CALC-MCNC: 10 MG/DL
WBC # BLD AUTO: 3.89 X10(3)/MCL (ref 4.5–11.5)

## 2025-05-27 PROCEDURE — 83036 HEMOGLOBIN GLYCOSYLATED A1C: CPT | Performed by: NURSE PRACTITIONER

## 2025-05-27 PROCEDURE — 80053 COMPREHEN METABOLIC PANEL: CPT | Performed by: NURSE PRACTITIONER

## 2025-05-27 PROCEDURE — 84443 ASSAY THYROID STIM HORMONE: CPT | Performed by: NURSE PRACTITIONER

## 2025-05-27 PROCEDURE — 85025 COMPLETE CBC W/AUTO DIFF WBC: CPT | Performed by: NURSE PRACTITIONER

## 2025-05-27 PROCEDURE — 82306 VITAMIN D 25 HYDROXY: CPT | Performed by: NURSE PRACTITIONER

## 2025-05-27 PROCEDURE — 83540 ASSAY OF IRON: CPT | Performed by: NURSE PRACTITIONER

## 2025-05-27 PROCEDURE — 80061 LIPID PANEL: CPT | Performed by: NURSE PRACTITIONER

## 2025-05-27 PROCEDURE — 84439 ASSAY OF FREE THYROXINE: CPT | Performed by: NURSE PRACTITIONER

## 2025-05-28 LAB — 25(OH)D3+25(OH)D2 SERPL-MCNC: 36 NG/ML (ref 30–80)

## 2025-06-03 ENCOUNTER — HOSPITAL ENCOUNTER (OUTPATIENT)
Dept: RADIOLOGY | Facility: HOSPITAL | Age: OVER 89
Discharge: HOME | End: 2025-06-03
Attending: NURSE PRACTITIONER
Payer: MEDICARE

## 2025-06-03 DIAGNOSIS — R92.8 ABNORMAL SCREENING MAMMOGRAM: ICD-10-CM

## 2025-06-03 PROCEDURE — 76642 ULTRASOUND BREAST LIMITED: CPT | Mod: 26,LT,, | Performed by: STUDENT IN AN ORGANIZED HEALTH CARE EDUCATION/TRAINING PROGRAM

## 2025-06-03 PROCEDURE — 77065 DX MAMMO INCL CAD UNI: CPT | Mod: 26,LT,, | Performed by: STUDENT IN AN ORGANIZED HEALTH CARE EDUCATION/TRAINING PROGRAM

## 2025-06-03 PROCEDURE — 76642 ULTRASOUND BREAST LIMITED: CPT | Mod: TC,LT

## 2025-06-03 PROCEDURE — 77065 DX MAMMO INCL CAD UNI: CPT | Mod: TC,LT

## 2025-06-03 PROCEDURE — 77061 BREAST TOMOSYNTHESIS UNI: CPT | Mod: 26,LT,, | Performed by: STUDENT IN AN ORGANIZED HEALTH CARE EDUCATION/TRAINING PROGRAM

## 2025-06-12 ENCOUNTER — OFFICE VISIT (OUTPATIENT)
Dept: NEUROLOGY | Facility: CLINIC | Age: OVER 89
End: 2025-06-12
Payer: MEDICARE

## 2025-06-12 VITALS
SYSTOLIC BLOOD PRESSURE: 136 MMHG | DIASTOLIC BLOOD PRESSURE: 88 MMHG | HEIGHT: 64 IN | WEIGHT: 132.94 LBS | BODY MASS INDEX: 22.7 KG/M2 | HEART RATE: 77 BPM

## 2025-06-12 DIAGNOSIS — F22 DELUSIONS: Primary | ICD-10-CM

## 2025-06-12 DIAGNOSIS — G40.909 SEIZURE DISORDER: ICD-10-CM

## 2025-06-12 DIAGNOSIS — I67.2 INTRACRANIAL ATHEROSCLEROSIS: ICD-10-CM

## 2025-06-12 DIAGNOSIS — I60.8 OTHER NONTRAUMATIC SUBARACHNOID HEMORRHAGE: ICD-10-CM

## 2025-06-12 PROCEDURE — 99213 OFFICE O/P EST LOW 20 MIN: CPT | Mod: PBBFAC | Performed by: NURSE PRACTITIONER

## 2025-06-12 PROCEDURE — 99214 OFFICE O/P EST MOD 30 MIN: CPT | Mod: S$PBB,,, | Performed by: NURSE PRACTITIONER

## 2025-06-12 PROCEDURE — 99999 PR PBB SHADOW E&M-EST. PATIENT-LVL III: CPT | Mod: PBBFAC,,, | Performed by: NURSE PRACTITIONER

## 2025-06-12 NOTE — PROGRESS NOTES
"Subjective:      Patient ID: Tali Beard is a 89 y.o. female.    Chief Complaint:  Follow-up (6 month follow up DX: right parietal ICH. Patients son stating she is having "delusions" patient having trouble to distinguish reality to the fake world such as tv. Denies any headaches, weakness, dizziness, or any balance issues. )      History of Present Illness  Patient with history of SAH (11/26/23, 9/3/24 and 10/3/24) presents for follow up.Patient was advised against all antiplatelets and anticoagulation. Dr. Hannah discussed with family in the past that DSA could be performed to eval for cerebral angiopathy but that it would not change course of treatment.   Today, patient is accompanied by her son. Reports patient is having delusions. She believes much of what she is seeing on television. Her son states this began about a month ago. Has not had a recent UA per chart review. She denies any new onset of numbness, tingling or weakness. She has home health nurse that sees her once a week.          Past Medical History:   Diagnosis Date    History of DVT (deep vein thrombosis)     Kaposi's sarcoma, skin     Subarachnoid hemorrhage 10/03/2024       Past Surgical History:   Procedure Laterality Date    INSERTION OF INFERIOR VENA CAVAL FILTER N/A 3/21/2025    Procedure: Insertion, Filter, Inferior Vena Cava;  Surgeon: Juan Hurtado MD;  Location: [facility] CATH LAB;  Service: Peripheral Vascular;  Laterality: N/A;       No family history on file.    Social History     Socioeconomic History    Marital status:    Tobacco Use    Smoking status: Never     Passive exposure: Never    Smokeless tobacco: Never   Substance and Sexual Activity    Alcohol use: Not Currently    Drug use: Never    Sexual activity: Not Currently     Social Drivers of Health     Financial Resource Strain: Medium Risk (5/13/2025)    Received from [facility] and Affili[facility]    Overall " "Financial Resource Strain (CARDIA)     Difficulty of Paying Living Expenses: Somewhat hard   Food Insecurity: No Food Insecurity (5/13/2025)    [facility]    Hunger Vital Sign     Worried About Running Out of Food in the Last Year: Never true     Ran Out of Food in the Last Year: Never true   Transportation Needs: No Transportation Needs (5/13/2025)    [facility]    PRAPARE - Transportation     Lack of Transportation (Medical): No     Lack of Transportation (Non-Medical): No   Physical Activity: Inactive (5/13/2025)    [facility]    Exercise Vital Sign     Days of Exercise per Week: 0 days     Minutes of Exercise per Session: 0 min   Stress: Patient Declined (5/13/2025)    [facility] and [facility]    [place] [employer] - Occupational Stress Questionnaire     Feeling of Stress : Patient declined   Housing Stability: Patient Declined (5/13/2025)    [facility]    Housing Stability Vital Sign     Unable to Pay for Housing in the Last Year: Patient declined     Number of Times Moved in the Last Year: 0     Homeless in the Last Year: Patient declined       Current Medications[1]    Review of patient's allergies indicates:   Allergen Reactions    Hydroxyzine hcl      Other Reaction(s): BUMPS ALL OVER BODY    Other reaction(s): BUMPS ALL OVER BODY    Felodipine Rash     Edema in the legs        Vitals:    06/12/25 1031   BP: 136/88   BP Location: Left arm   Patient Position: Sitting   Pulse: 77   Weight: 60.3 kg (132 lb 15 oz)   Height: 5' 4" (1.626 m)        Review of " Systems  12 point ROS performed and negative unless otherwise documented in HPI  Objective:       Neurologic Exam      Mental Status   Oriented to person, year  Speech: speech is normal   Level of consciousness: alert     Cranial Nerves   Cranial nerves II through XII intact.      Motor Exam   Muscle bulk: normal     Strength   Strength 5/5 throughout.      Sensory Exam   Light touch normal.      Gait, Coordination, and Reflexes      Gait  Gait: slow, careful with standby assist        Physical Exam  Vitals reviewed.   Pulmonary:      Effort: Pulmonary effort is normal.   Neurological:      Mental Status: She is oriented to person, place, and time.      Cranial Nerves: Cranial nerves 2-12 are intact.      Motor: Motor strength is normal.     Gait: Gait is intact.   Psychiatric:         Speech: Speech normal.     Assessment:     1. Delusions    2. Other nontraumatic subarachnoid hemorrhage    3. Intracranial atherosclerosis    4. Seizure disorder        Plan:     Having abnormal behavior. Has not had a UA recently  Concern for UTI; will order UA to be done through nursing specialties  Continue avoidance of NSAIDs/anticoagulation.  Will continue Keppra for the time being; may need to lower dosage; she has not had a recent seizure.  Continue blood pressure control and statin.             [1]   Current Outpatient Medications   Medication Sig Dispense Refill    amLODIPine (NORVASC) 5 MG tablet Take 1 tablet (5 mg total) by mouth once daily. 30 tablet 0    atorvastatin (LIPITOR) 40 MG tablet Take 1 tablet (40 mg total) by mouth every evening. 30 tablet 0    FEROSUL 325 mg (65 mg iron) Tab tablet Take 325 mg by mouth once daily.      latanoprost 0.005 % ophthalmic solution Place 1 drop into both eyes every evening.      levETIRAcetam (KEPPRA) 500 MG Tab Take 1 tablet (500 mg total) by mouth 2 (two) times daily. 60 tablet 0    lisinopriL-hydrochlorothiazide (PRINZIDE,ZESTORETIC) 20-25 mg Tab Take 1 tablet by mouth every  morning.      multivit,iron,minerals/lutein (CENTRUM SILVER ULTRA WOMEN'S ORAL) Take 1 tablet by mouth once daily.      nystatin (MYCOSTATIN) cream Apply 30 g topically daily as needed for Dry Skin.      timolol maleate 0.5% (TIMOPTIC) 0.5 % Drop Place 1 drop into both eyes 2 (two) times daily.      triamcinolone acetonide 0.1% (KENALOG) 0.1 % cream Apply topically daily as needed.       No current facility-administered medications for this visit.

## 2025-06-16 ENCOUNTER — LAB REQUISITION (OUTPATIENT)
Dept: LAB | Facility: HOSPITAL | Age: OVER 89
End: 2025-06-16
Payer: MEDICARE

## 2025-06-16 DIAGNOSIS — N39.0 URINARY TRACT INFECTION, SITE NOT SPECIFIED: ICD-10-CM

## 2025-06-16 LAB
BACTERIA #/AREA URNS AUTO: NORMAL /HPF
BILIRUB UR QL STRIP.AUTO: NEGATIVE
CLARITY UR: CLEAR
COLOR UR AUTO: ABNORMAL
GLUCOSE UR QL STRIP: NEGATIVE
HGB UR QL STRIP: ABNORMAL
KETONES UR QL STRIP: NEGATIVE
LEUKOCYTE ESTERASE UR QL STRIP: ABNORMAL
NITRITE UR QL STRIP: NEGATIVE
PH UR STRIP: 6 [PH]
PROT UR QL STRIP: NEGATIVE
RBC #/AREA URNS AUTO: NORMAL /HPF
SP GR UR STRIP.AUTO: 1.02 (ref 1–1.03)
SQUAMOUS #/AREA URNS AUTO: NORMAL /HPF
UROBILINOGEN UR STRIP-ACNC: 0.2
WBC #/AREA URNS AUTO: NORMAL /HPF

## 2025-06-16 PROCEDURE — 81003 URINALYSIS AUTO W/O SCOPE: CPT | Performed by: NURSE PRACTITIONER

## 2025-06-16 PROCEDURE — 87186 SC STD MICRODIL/AGAR DIL: CPT | Performed by: NURSE PRACTITIONER

## 2025-06-20 ENCOUNTER — TELEPHONE (OUTPATIENT)
Dept: NEUROLOGY | Facility: CLINIC | Age: OVER 89
End: 2025-06-20
Payer: MEDICARE

## 2025-06-20 DIAGNOSIS — N39.0 URINARY TRACT INFECTION WITHOUT HEMATURIA, SITE UNSPECIFIED: Primary | ICD-10-CM

## 2025-06-20 LAB
BACTERIA UR CULT: ABNORMAL
BACTERIA UR CULT: ABNORMAL

## 2025-06-20 RX ORDER — SULFAMETHOXAZOLE AND TRIMETHOPRIM 800; 160 MG/1; MG/1
1 TABLET ORAL 2 TIMES DAILY
Qty: 14 TABLET | Refills: 0 | Status: SHIPPED | OUTPATIENT
Start: 2025-06-20

## 2025-06-20 NOTE — TELEPHONE ENCOUNTER
Spoke with Anamaria at [facility]. Advised that we called in Bactrim for pt to start due to UTI/lab results. Also advised patient needs to follow up with PCP with any further testing/recommendations. She verbalized understanding.

## 2025-06-30 ENCOUNTER — HOSPITAL ENCOUNTER (EMERGENCY)
Facility: HOSPITAL | Age: OVER 89
Discharge: HOME | End: 2025-06-30
Attending: EMERGENCY MEDICINE
Payer: MEDICARE

## 2025-06-30 VITALS
TEMPERATURE: 99 F | DIASTOLIC BLOOD PRESSURE: 77 MMHG | OXYGEN SATURATION: 100 % | HEART RATE: 74 BPM | RESPIRATION RATE: 18 BRPM | HEIGHT: 64 IN | WEIGHT: 145.81 LBS | SYSTOLIC BLOOD PRESSURE: 146 MMHG | BODY MASS INDEX: 24.89 KG/M2

## 2025-06-30 DIAGNOSIS — J06.9 VIRAL URI: Primary | ICD-10-CM

## 2025-06-30 DIAGNOSIS — R53.1 WEAKNESS: ICD-10-CM

## 2025-06-30 LAB
ABS NEUT CALC (OHS): 1.81 X10(3)/MCL (ref 2.1–9.2)
ACANTHOCYTES (OLG): ABNORMAL
ALBUMIN SERPL-MCNC: 3.4 G/DL (ref 3.4–4.8)
ALBUMIN/GLOB SERPL: 0.7 RATIO (ref 1.1–2)
ALP SERPL-CCNC: 121 UNIT/L (ref 40–150)
ALT SERPL-CCNC: 20 UNIT/L (ref 0–55)
ANION GAP SERPL CALC-SCNC: 5 MEQ/L
ANISOCYTOSIS BLD QL SMEAR: ABNORMAL
AST SERPL-CCNC: 26 UNIT/L (ref 11–45)
BACTERIA #/AREA URNS AUTO: ABNORMAL /HPF
BILIRUB SERPL-MCNC: 0.3 MG/DL
BILIRUB UR QL STRIP.AUTO: NEGATIVE
BUN SERPL-MCNC: 18 MG/DL (ref 9.8–20.1)
CALCIUM SERPL-MCNC: 9.2 MG/DL (ref 8.4–10.2)
CHLORIDE SERPL-SCNC: 105 MMOL/L (ref 98–107)
CLARITY UR: CLEAR
CO2 SERPL-SCNC: 27 MMOL/L (ref 23–31)
COLOR UR AUTO: ABNORMAL
CREAT SERPL-MCNC: 1.22 MG/DL (ref 0.55–1.02)
CREAT/UREA NIT SERPL: 15
ELLIPTOCYTOSIS (OHS): ABNORMAL
EOSINOPHIL NFR BLD MANUAL: 0.12 X10(3)/MCL (ref 0–0.9)
EOSINOPHIL NFR BLD MANUAL: 4 % (ref 0–8)
ERYTHROCYTE [DISTWIDTH] IN BLOOD BY AUTOMATED COUNT: 15.9 % (ref 11.5–17)
FLUAV AG UPPER RESP QL IA.RAPID: NOT DETECTED
FLUBV AG UPPER RESP QL IA.RAPID: NOT DETECTED
GFR SERPLBLD CREATININE-BSD FMLA CKD-EPI: 43 ML/MIN/1.73/M2
GLOBULIN SER-MCNC: 5.2 GM/DL (ref 2.4–3.5)
GLUCOSE SERPL-MCNC: 116 MG/DL (ref 82–115)
GLUCOSE UR QL STRIP: NEGATIVE
HCT VFR BLD AUTO: 38.3 % (ref 37–47)
HGB BLD-MCNC: 12.2 G/DL (ref 12–16)
HGB UR QL STRIP: ABNORMAL
KETONES UR QL STRIP: NEGATIVE
LEUKOCYTE ESTERASE UR QL STRIP: ABNORMAL
LYMPHOCYTES NFR BLD MANUAL: 0.72 X10(3)/MCL (ref 0.6–4.6)
LYMPHOCYTES NFR BLD MANUAL: 24 % (ref 13–40)
MCH RBC QN AUTO: 23.6 PG (ref 27–31)
MCHC RBC AUTO-ENTMCNC: 31.9 G/DL (ref 33–36)
MCV RBC AUTO: 73.9 FL (ref 80–94)
MICROCYTES BLD QL SMEAR: ABNORMAL
MONOCYTES NFR BLD MANUAL: 0.36 X10(3)/MCL (ref 0.1–1.3)
MONOCYTES NFR BLD MANUAL: 12 % (ref 2–11)
NEUTROPHILS NFR BLD MANUAL: 60 % (ref 47–80)
NITRITE UR QL STRIP: NEGATIVE
PH UR STRIP: 6 [PH]
PLATELET # BLD AUTO: 124 X10(3)/MCL (ref 130–400)
PLATELET # BLD EST: ADEQUATE 10*3/UL
PMV BLD AUTO: 10.2 FL (ref 7.4–10.4)
POIKILOCYTOSIS BLD QL SMEAR: ABNORMAL
POTASSIUM SERPL-SCNC: 4.3 MMOL/L (ref 3.5–5.1)
PROT SERPL-MCNC: 8.6 GM/DL (ref 5.8–7.6)
PROT UR QL STRIP: ABNORMAL
RBC # BLD AUTO: 5.18 X10(6)/MCL (ref 4.2–5.4)
RBC #/AREA URNS AUTO: ABNORMAL /HPF
RBC MORPH BLD: ABNORMAL
RSV A 5' UTR RNA NPH QL NAA+PROBE: NOT DETECTED
SARS-COV-2 RNA RESP QL NAA+PROBE: NOT DETECTED
SODIUM SERPL-SCNC: 137 MMOL/L (ref 136–145)
SP GR UR STRIP.AUTO: 1.02 (ref 1–1.03)
SQUAMOUS #/AREA URNS AUTO: ABNORMAL /HPF
UROBILINOGEN UR STRIP-ACNC: 0.2
WBC # BLD AUTO: 3.02 X10(3)/MCL (ref 4.5–11.5)
WBC #/AREA URNS AUTO: ABNORMAL /HPF

## 2025-06-30 PROCEDURE — 99283 EMERGENCY DEPT VISIT LOW MDM: CPT

## 2025-06-30 PROCEDURE — 81003 URINALYSIS AUTO W/O SCOPE: CPT | Performed by: EMERGENCY MEDICINE

## 2025-06-30 PROCEDURE — 80053 COMPREHEN METABOLIC PANEL: CPT | Performed by: EMERGENCY MEDICINE

## 2025-06-30 PROCEDURE — 0241U COVID/RSV/FLU A&B PCR: CPT | Performed by: EMERGENCY MEDICINE

## 2025-06-30 PROCEDURE — 85025 COMPLETE CBC W/AUTO DIFF WBC: CPT | Performed by: EMERGENCY MEDICINE

## 2025-06-30 NOTE — ED PROVIDER NOTES
Encounter Date: 6/30/2025       History     Chief Complaint   Patient presents with    Weakness     Pt presents for runny nose & weakness x2 days w/confusion; just finished abx for uti w/no improvement in confusion     This 89-year-old is brought in by her son with complaints of weakness for the past 2 days and onset of a cold.  He states she has had a runny nose but no sore throat or cough.  He states she has had more confusion than normal and seemed very unsteady without assistance.  She just finished a prescription of Bactrim for a UTI.  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        Review of patient's allergies indicates:   Allergen Reactions    Hydroxyzine hcl      Other Reaction(s): BUMPS ALL OVER BODY    Other reaction(s): BUMPS ALL OVER BODY    Felodipine Rash     Edema in the legs     Past Medical History:   Diagnosis Date    History of DVT (deep vein thrombosis)     Kaposi's sarcoma, skin     Subarachnoid hemorrhage 10/03/2024     Past Surgical History:   Procedure Laterality Date    INSERTION OF INFERIOR VENA CAVAL FILTER N/A 3/21/2025    Procedure: Insertion, Filter, Inferior Vena Cava;  Surgeon: Juan Hurtado MD;  Location: [facility] CATH LAB;  Service: Peripheral Vascular;  Laterality: N/A;     No family history on file.  Social History[1]  Review of Systems   Constitutional:  Negative for fever.   HENT:  Positive for rhinorrhea. Negative for sore throat.    Respiratory:  Negative for shortness of breath.    Cardiovascular:  Negative for chest pain.   Gastrointestinal:  Negative for nausea.   Genitourinary:  Negative for dysuria.   Musculoskeletal:  Negative for back pain.   Skin:  Negative for rash.   Neurological:  Positive for weakness.   Hematological:  Does not bruise/bleed easily.   Psychiatric/Behavioral:  Positive for confusion.        Physical Exam     Initial Vitals [06/30/25 1535]   BP Pulse Resp Temp SpO2   96/71 78  20 99.2 °F (37.3 °C) 97 %      MAP       --         Physical Exam    Nursing note and vitals reviewed.  Constitutional: She appears well-developed and well-nourished.   HENT:   Head: Normocephalic and atraumatic.   Eyes: Conjunctivae and EOM are normal. Pupils are equal, round, and reactive to light.   Neck: Neck supple.   Normal range of motion.  Cardiovascular:  Normal rate, regular rhythm, normal heart sounds and intact distal pulses.           Pulmonary/Chest: Breath sounds normal.   Abdominal: Abdomen is soft. Bowel sounds are normal.   Musculoskeletal:         General: Normal range of motion.      Cervical back: Normal range of motion and neck supple.     Neurological: She is alert.   Skin: Skin is warm and dry. Capillary refill takes less than 2 seconds.   Psychiatric: She has a normal mood and affect. Her behavior is normal. Judgment normal.         ED Course   Procedures  Labs Reviewed   COMPREHENSIVE METABOLIC PANEL - Abnormal       Result Value    Sodium 137      Potassium 4.3      Chloride 105      CO2 27      Glucose 116 (*)     Blood Urea Nitrogen 18.0      Creatinine 1.22 (*)     Calcium 9.2      Protein Total 8.6 (*)     Albumin 3.4      Globulin 5.2 (*)     Albumin/Globulin Ratio 0.7 (*)     Bilirubin Total 0.3            ALT 20      AST 26      eGFR 43      Anion Gap 5.0      BUN/Creatinine Ratio 15     URINALYSIS, REFLEX TO URINE CULTURE - Abnormal    Color, UA Straw      Appearance, UA Clear      Specific Gravity, UA 1.020      pH, UA 6.0      Protein, UA Trace (*)     Glucose, UA Negative      Ketones, UA Negative      Blood, UA Large (*)     Bilirubin, UA Negative      Urobilinogen, UA 0.2      Nitrites, UA Negative      Leukocyte Esterase, UA Trace (*)    CBC WITH DIFFERENTIAL - Abnormal    WBC 3.02 (*)     RBC 5.18      Hgb 12.2      Hct 38.3      MCV 73.9 (*)     MCH 23.6 (*)     MCHC 31.9 (*)     RDW 15.9      Platelet 124 (*)     MPV 10.2     URINALYSIS, MICROSCOPIC - Abnormal     Bacteria, UA None Seen      RBC, UA 0-5      WBC, UA 0-5      Squamous Epithelial Cells, UA Moderate (*)    CBC W/ AUTO DIFFERENTIAL    Narrative:     The following orders were created for panel order CBC auto differential.  Procedure                               Abnormality         Status                     ---------                               -----------         ------                     CBC with Differential[[id number]]       Abnormal            Final result               Manual Differential[[id number]]                             In process                   Please view results for these tests on the individual orders.   MANUAL DIFFERENTIAL   COVID/RSV/FLU A&B PCR          Imaging Results    None          Medications - No data to display  Medical Decision Making  The patient's labs do not but tray any reason why she would be excessively weak.  She has no focal deficit but she is scared to stand by herself.  I will do a swab for COVID flu and RSV although her son states she does not get out of the house and only sees him and his brother.  I will discharge her and we can call in medicines if the swabs returned positive.     Amount and/or Complexity of Data Reviewed  Labs: ordered. Decision-making details documented in ED Course.                                      Clinical Impression:  Final diagnoses:  [J06.9] Viral URI (Primary)  [R53.1] Weakness          ED Disposition Condition    Discharge Stable          ED Prescriptions    None       Follow-up Information       Follow up With Specialties Details Why Contact Info    Aurora Moe, [facility] Schedule an appointment as soon as possible for a visit  As needed, If symptoms worsen [address] [place] [id number]  [phone]                     [1]   Social History  Tobacco Use    Smoking status: Never     Passive exposure: Never    Smokeless tobacco: Never   Substance Use Topics    Alcohol use: Not Currently    Drug use: Never         Trey Figueroa MD  06/30/25 8388

## 2025-07-26 ENCOUNTER — HOSPITAL ENCOUNTER (EMERGENCY)
Facility: HOSPITAL | Age: OVER 89
Discharge: HOME | End: 2025-07-26
Attending: EMERGENCY MEDICINE
Payer: MEDICARE

## 2025-07-26 VITALS
TEMPERATURE: 99 F | HEIGHT: 64 IN | RESPIRATION RATE: 18 BRPM | BODY MASS INDEX: 25.27 KG/M2 | DIASTOLIC BLOOD PRESSURE: 64 MMHG | WEIGHT: 148 LBS | OXYGEN SATURATION: 98 % | HEART RATE: 75 BPM | SYSTOLIC BLOOD PRESSURE: 110 MMHG

## 2025-07-26 DIAGNOSIS — R53.1 WEAKNESS: Primary | ICD-10-CM

## 2025-07-26 LAB
ALBUMIN SERPL-MCNC: 3.1 G/DL (ref 3.4–4.8)
ALBUMIN/GLOB SERPL: 0.8 RATIO (ref 1.1–2)
ALP SERPL-CCNC: 88 UNIT/L (ref 40–150)
ALT SERPL-CCNC: 20 UNIT/L (ref 0–55)
ANION GAP SERPL CALC-SCNC: 9 MEQ/L
AST SERPL-CCNC: 24 UNIT/L (ref 11–45)
BACTERIA #/AREA URNS AUTO: NORMAL /HPF
BASOPHILS # BLD AUTO: 0.01 X10(3)/MCL
BASOPHILS NFR BLD AUTO: 0.2 %
BILIRUB SERPL-MCNC: 0.5 MG/DL
BILIRUB UR QL STRIP.AUTO: NEGATIVE
BUN SERPL-MCNC: 16.5 MG/DL (ref 9.8–20.1)
CALCIUM SERPL-MCNC: 8.7 MG/DL (ref 8.4–10.2)
CHLORIDE SERPL-SCNC: 110 MMOL/L (ref 98–107)
CLARITY UR: CLEAR
CO2 SERPL-SCNC: 23 MMOL/L (ref 23–31)
COLOR UR AUTO: ABNORMAL
CREAT SERPL-MCNC: 1.03 MG/DL (ref 0.55–1.02)
CREAT/UREA NIT SERPL: 16
EOSINOPHIL # BLD AUTO: 0.12 X10(3)/MCL (ref 0–0.9)
EOSINOPHIL NFR BLD AUTO: 2.1 %
ERYTHROCYTE [DISTWIDTH] IN BLOOD BY AUTOMATED COUNT: 16 % (ref 11.5–17)
FLUAV AG UPPER RESP QL IA.RAPID: NOT DETECTED
FLUBV AG UPPER RESP QL IA.RAPID: NOT DETECTED
GFR SERPLBLD CREATININE-BSD FMLA CKD-EPI: 52 ML/MIN/1.73/M2
GLOBULIN SER-MCNC: 3.9 GM/DL (ref 2.4–3.5)
GLUCOSE SERPL-MCNC: 128 MG/DL (ref 82–115)
GLUCOSE UR QL STRIP: NEGATIVE
HCT VFR BLD AUTO: 35.4 % (ref 37–47)
HGB BLD-MCNC: 11 G/DL (ref 12–16)
HGB UR QL STRIP: ABNORMAL
IMM GRANULOCYTES # BLD AUTO: 0.01 X10(3)/MCL (ref 0–0.04)
IMM GRANULOCYTES NFR BLD AUTO: 0.2 %
KETONES UR QL STRIP: NEGATIVE
LEUKOCYTE ESTERASE UR QL STRIP: NEGATIVE
LYMPHOCYTES # BLD AUTO: 0.25 X10(3)/MCL (ref 0.6–4.6)
LYMPHOCYTES NFR BLD AUTO: 4.3 %
MCH RBC QN AUTO: 23.4 PG (ref 27–31)
MCHC RBC AUTO-ENTMCNC: 31.1 G/DL (ref 33–36)
MCV RBC AUTO: 75.3 FL (ref 80–94)
MONOCYTES # BLD AUTO: 0.2 X10(3)/MCL (ref 0.1–1.3)
MONOCYTES NFR BLD AUTO: 3.5 %
NEUTROPHILS # BLD AUTO: 5.18 X10(3)/MCL (ref 2.1–9.2)
NEUTROPHILS NFR BLD AUTO: 89.7 %
NITRITE UR QL STRIP: NEGATIVE
PH UR STRIP: 6 [PH]
PLATELET # BLD AUTO: 141 X10(3)/MCL (ref 130–400)
PMV BLD AUTO: 10.3 FL (ref 7.4–10.4)
POTASSIUM SERPL-SCNC: 4.1 MMOL/L (ref 3.5–5.1)
PROT SERPL-MCNC: 7 GM/DL (ref 5.8–7.6)
PROT UR QL STRIP: ABNORMAL
RBC # BLD AUTO: 4.7 X10(6)/MCL (ref 4.2–5.4)
RBC #/AREA URNS AUTO: NORMAL /HPF
SARS-COV-2 RNA RESP QL NAA+PROBE: NOT DETECTED
SODIUM SERPL-SCNC: 142 MMOL/L (ref 136–145)
SP GR UR STRIP.AUTO: 1.01 (ref 1–1.03)
SQUAMOUS #/AREA URNS AUTO: NORMAL /HPF
UROBILINOGEN UR STRIP-ACNC: 0.2
WBC # BLD AUTO: 5.77 X10(3)/MCL (ref 4.5–11.5)
WBC #/AREA URNS AUTO: NORMAL /HPF

## 2025-07-26 PROCEDURE — 87636 SARSCOV2 & INF A&B AMP PRB: CPT | Performed by: EMERGENCY MEDICINE

## 2025-07-26 PROCEDURE — 99283 EMERGENCY DEPT VISIT LOW MDM: CPT

## 2025-07-26 PROCEDURE — 85025 COMPLETE CBC W/AUTO DIFF WBC: CPT | Performed by: EMERGENCY MEDICINE

## 2025-07-26 PROCEDURE — 80053 COMPREHEN METABOLIC PANEL: CPT | Performed by: EMERGENCY MEDICINE

## 2025-07-26 PROCEDURE — 81003 URINALYSIS AUTO W/O SCOPE: CPT | Performed by: EMERGENCY MEDICINE

## 2025-07-26 PROCEDURE — P9612 CATHETERIZE FOR URINE SPEC: HCPCS

## 2025-07-26 NOTE — ED PROVIDER NOTES
NAME:  Tali Beard  CSN:     [id number]  MRN:    [id number]  ADMIT DATE: 7/26/2025        eMERGENCY dEPARTMENT eNCOUnter    CHIEF COMPLAINT    Chief Complaint   Patient presents with    Extremity Weakness     Pt accompanied by son whom states pt has increased weakness and confusion since yesterday, is currently on Bactrim for infection between toes, prescribed by Coral Schulz Podiatrist.        HPI      Tali Beard is a 89 y.o. female who presents to the ED for evaluation of generalized weakness and fatigue today.  No fevers.  Patient denies any complaints and has no pain.          ALLERGIES    Review of patient's allergies indicates:   Allergen Reactions    Hydroxyzine hcl      Other Reaction(s): BUMPS ALL OVER BODY    Other reaction(s): BUMPS ALL OVER BODY    Felodipine Rash     Edema in the legs       PAST MEDICAL HISTORY  Past Medical History:   Diagnosis Date    History of DVT (deep vein thrombosis)     Kaposi's sarcoma, skin     Subarachnoid hemorrhage 10/03/2024       SURGICAL HISTORY    Past Surgical History:   Procedure Laterality Date    INSERTION OF INFERIOR VENA CAVAL FILTER N/A 3/21/2025    Procedure: Insertion, Filter, Inferior Vena Cava;  Surgeon: Juan Hurtado MD;  Location: [facility] CATH LAB;  Service: Peripheral Vascular;  Laterality: N/A;       SOCIAL HISTORY    Social History     Socioeconomic History    Marital status:    Tobacco Use    Smoking status: Never     Passive exposure: Never    Smokeless tobacco: Never   Substance and Sexual Activity    Alcohol use: Not Currently    Drug use: Never    Sexual activity: Not Currently     Social Drivers of Health     Financial Resource Strain: Medium Risk (5/13/2025)    [facility] and Affiliates    Overall Financial Resource Strain (CARDIA)     Difficulty of Paying Living Expenses: Somewhat hard   Food Insecurity: No Food Insecurity (5/13/2025)    [facility]    Hunger Vital Sign     Worried About Running Out of Food in the Last Year: Never true     Ran Out of Food in the Last Year: Never true   Transportation Needs: No Transportation Needs (5/13/2025)    Received from [facility] and [facility]iliates    PRAPARE - Transportation     Lack of Transportation (Medical): No     Lack of Transportation (Non-Medical): No   Physical Activity: Inactive (5/13/2025)    [facility]    Exercise Vital Sign     Days of Exercise per Week: 0 days     Minutes of Exercise per Session: 0 min   Stress: Patient Declined (5/13/2025)    Received from [facility]    [facility] [place] of Occupational Health - Occupational Stress Questionnaire     Feeling of Stress : Patient declined   Housing Stability: Patient Declined (5/13/2025)    Received from [facility] and [facility]iliates    Housing Stability Vital Sign     Unable to Pay for Housing in the Last Year: Patient declined     Number of Times Moved in the Last Year: 0     Homeless in the Last Year: Patient declined       FAMILY HISTORY    No family history on file.    REVIEW OF SYSTEMS   ROS  All Systems otherwise negative except as noted in the History of Present Illness.        PHYSICAL EXAM    Reviewed Triage Note  VITAL SIGNS:   ED Triage Vitals [07/26/25 0934]   Encounter Vitals Group      /64      Girls Systolic BP Percentile       Girls Diastolic BP Percentile       Boys Systolic BP Percentile       Boys Diastolic BP Percentile       Pulse 75      Resp 18      Temp 98.5 °F (36.9 °C)      Temp Source Oral      SpO2 98 %      Weight 148 lb      Height 5' 4"      Head Circumference       " "Peak Flow       Pain Score       Pain Loc       Pain Education       Exclude from Growth Chart        Patient Vitals for the past 24 hrs:   BP Temp Temp src Pulse Resp SpO2 Height Weight   07/26/25 0934 110/64 98.5 °F (36.9 °C) Oral 75 18 98 % 5' 4" (1.626 m) 67.1 kg (148 lb)           Physical Exam    Constitutional:  Well-developed, well-nourished. No acute distress  HENT:  Normocephalic, atraumatic.  Eyes:  EOMI. Conjunctiva normal without discharge.   Neck: Normal range of motion.No stridor. No meningismus.   Respiratory:  Normal breath sounds bilaterally.  No respiratory distress, retractions, or conversational dyspnea. No wheezing. No rhonchi. No rales.   Cardiovascular:  Normal heart rate. Normal rhythm. No pitting lower extremity edema.   GI:  Abdomen soft, non-distended, non-tender. Normal bowel sounds. No guarding, rigidity or rebound.    : No CVA tenderness.   Musculoskeletal:  No gross deformity or limited range of motion of all major joints. No palpable bony deformity. No tenderness to palpation.  Integument:  Warm and dry. No rash.  Neurologic:  Normal motor function. Normal sensory function. No focal deficits noted. Alert and Interactive.  Psychiatric:  Affect normal. Mood normal.         LABS  Pertinent labs reviewed. (See chart for details)   Labs Reviewed   COMPREHENSIVE METABOLIC PANEL - Abnormal       Result Value    Sodium 142      Potassium 4.1      Chloride 110 (*)     CO2 23      Glucose 128 (*)     Blood Urea Nitrogen 16.5      Creatinine 1.03 (*)     Calcium 8.7      Protein Total 7.0      Albumin 3.1 (*)     Globulin 3.9 (*)     Albumin/Globulin Ratio 0.8 (*)     Bilirubin Total 0.5      ALP 88      ALT 20      AST 24      eGFR 52      Anion Gap 9.0      BUN/Creatinine Ratio 16     URINALYSIS, REFLEX TO URINE CULTURE - Abnormal    Color, UA Straw      Appearance, UA Clear      Specific Gravity, UA 1.015      pH, UA 6.0      Protein, UA Trace (*)     Glucose, UA Negative      Ketones, UA " Negative      Blood, UA Moderate (*)     Bilirubin, UA Negative      Urobilinogen, UA 0.2      Nitrites, UA Negative      Leukocyte Esterase, UA Negative     CBC WITH DIFFERENTIAL - Abnormal    WBC 5.77      RBC 4.70      Hgb 11.0 (*)     Hct 35.4 (*)     MCV 75.3 (*)     MCH 23.4 (*)     MCHC 31.1 (*)     RDW 16.0      Platelet 141      MPV 10.3      Neut % 89.7      Lymph % 4.3      Mono % 3.5      Eos % 2.1      Basophil % 0.2      Imm Grans % 0.2      Neut # 5.18      Lymph # 0.25 (*)     Mono # 0.20      Eos # 0.12      Baso # 0.01      Imm Gran # 0.01     COVID/FLU A&B PCR - Normal    Influenza A PCR Not Detected      Influenza B PCR Not Detected      SARS-CoV-2 PCR Not Detected      Narrative:     The Xpert Xpress SARS-CoV-2/FLU/RSV plus is a rapid, multiplexed real-time PCR test intended for the simultaneous qualitative detection and differentiation of SARS-CoV-2, Influenza A, Influenza B, and respiratory syncytial virus (RSV) viral RNA in either nasopharyngeal swab or nasal swab specimens.         URINALYSIS, MICROSCOPIC - Normal    Bacteria, UA None Seen      RBC, UA 0-2      WBC, UA 0-2      Squamous Epithelial Cells, UA None Seen     CBC W/ AUTO DIFFERENTIAL    Narrative:     The following orders were created for panel order CBC auto differential.  Procedure                               Abnormality         Status                     ---------                               -----------         ------                     CBC with Differential[[id number]]       Abnormal            Final result                 Please view results for these tests on the individual orders.         RADIOLOGY    Imaging Results    None         PROCEDURES    Procedures      EKG     Interpreted by ERP:          ED COURSE & MEDICAL DECISION MAKING    Pertinent & Imaging studies reviewed. (See chart for details and specific orders.)        Medications - No data to display       Patient has normal vital signs and a normal workup.   Her son was concerned that she may be having a reaction to the Bactrim but that has no evidence for allergic reaction.  They were reassured and given ED return precautions       DISPOSITION  Patient discharged in stable condition at No discharge date for patient encounter.      DISCHARGE INSTRUCTIONS & MEDS       Medication List        ASK your doctor about these medications      amLODIPine 5 MG tablet  Commonly known as: NORVASC  Take 1 tablet (5 mg total) by mouth once daily.     atorvastatin 40 MG tablet  Commonly known as: LIPITOR  Take 1 tablet (40 mg total) by mouth every evening.     CENTRUM SILVER ULTRA WOMEN'S ORAL     FeroSuL 325 mg (65 mg iron) Tab tablet  Generic drug: ferrous sulfate     latanoprost 0.005 % ophthalmic solution     levETIRAcetam 500 MG Tab  Commonly known as: KEPPRA  Take 1 tablet (500 mg total) by mouth 2 (two) times daily.     lisinopriL-hydrochlorothiazide 20-25 mg Tab  Commonly known as: PRINZIDE,ZESTORETIC     nystatin cream  Commonly known as: MYCOSTATIN     sulfamethoxazole-trimethoprim 800-160mg 800-160 mg Tab  Commonly known as: BACTRIM DS  Take 1 tablet by mouth 2 (two) times daily.     timolol maleate 0.5% 0.5 % Drop  Commonly known as: TIMOPTIC     triamcinolone acetonide 0.1% 0.1 % cream  Commonly known as: KENALOG                New Prescriptions    No medications on file           FINAL IMPRESSION    1. Weakness              Blood Pressure Follow-Up Advised  Patient advised to follow up with PCP within 3-5 days for blood pressure re-check if blood pressure is equal to or greater than 120/80.              DISCLAIMER: This note was prepared with Dragon NaturallySpeaking voice recognition transcription software. Garbled syntax, mangled pronouns, and other bizarre constructions may be attributed to that software system.      Zev Funez MD  07/26/2025  11:12 AM           Zev Funez MD  07/26/25 8375

## 2025-07-26 NOTE — ED NOTES
Dc instructions & f/u care reviewed w/pt. Verbalized understanding to plan of care. VSS, NAD. Pt wheeled out ED into sons vehicle w/no difficulties.

## 2025-07-30 ENCOUNTER — LAB REQUISITION (OUTPATIENT)
Dept: LAB | Facility: HOSPITAL | Age: OVER 89
End: 2025-07-30
Payer: MEDICARE

## 2025-07-30 DIAGNOSIS — I08.9 RHEUMATIC MULTIPLE VALVE DISEASE, UNSPECIFIED: ICD-10-CM

## 2025-07-30 LAB
GRAM STN SPEC: NORMAL
GRAM STN SPEC: NORMAL

## 2025-07-30 PROCEDURE — 87102 FUNGUS ISOLATION CULTURE: CPT

## 2025-07-30 PROCEDURE — 87077 CULTURE AEROBIC IDENTIFY: CPT

## 2025-07-30 PROCEDURE — 87075 CULTR BACTERIA EXCEPT BLOOD: CPT

## 2025-07-30 PROCEDURE — 87205 SMEAR GRAM STAIN: CPT

## 2025-08-01 LAB — BACTERIA WND CULT: ABNORMAL

## 2025-08-02 LAB — BACTERIA SPEC ANAEROBE CULT: NORMAL

## 2025-08-06 LAB — FUNGUS SPEC CULT: ABNORMAL

## (undated) DEVICE — SET ANGIO ACIST CVI ANGIOTOUCH

## (undated) DEVICE — Device

## (undated) DEVICE — CATH PIGTAIL 5F 110CM

## (undated) DEVICE — GUIDEWIRE INQWIRE SE 3MM JTIP

## (undated) DEVICE — KIT MINI STICK MAX 5F 21GX10CM

## (undated) DEVICE — CANNULA NASAL ADULT

## (undated) DEVICE — SHEATH INTRODUCER 6FR 11CM